# Patient Record
Sex: FEMALE | Race: WHITE | NOT HISPANIC OR LATINO | Employment: OTHER | URBAN - NONMETROPOLITAN AREA
[De-identification: names, ages, dates, MRNs, and addresses within clinical notes are randomized per-mention and may not be internally consistent; named-entity substitution may affect disease eponyms.]

---

## 2018-01-14 PROBLEM — E78.00 PURE HYPERCHOLESTEROLEMIA: Status: ACTIVE | Noted: 2018-01-14

## 2018-01-14 PROBLEM — K21.9 GASTROESOPHAGEAL REFLUX DISEASE: Status: ACTIVE | Noted: 2018-01-14

## 2018-01-14 PROBLEM — L30.9 ECZEMA: Status: ACTIVE | Noted: 2018-01-14

## 2018-01-14 PROBLEM — E66.01 MORBID OBESITY (CMS/HCC): Status: ACTIVE | Noted: 2018-01-14

## 2018-01-14 PROBLEM — K57.90 DIVERTICULAR DISEASE: Status: ACTIVE | Noted: 2018-01-14

## 2018-01-14 PROBLEM — I10 ESSENTIAL HYPERTENSION: Status: ACTIVE | Noted: 2018-01-14

## 2018-01-15 ENCOUNTER — OFFICE VISIT (OUTPATIENT)
Dept: INTERNAL MEDICINE | Facility: CLINIC | Age: 73
End: 2018-01-15
Payer: MEDICARE

## 2018-01-15 ENCOUNTER — APPOINTMENT (OUTPATIENT)
Dept: LAB | Facility: CLINIC | Age: 73
End: 2018-01-15
Payer: MEDICARE

## 2018-01-15 VITALS
HEART RATE: 78 BPM | BODY MASS INDEX: 40.22 KG/M2 | OXYGEN SATURATION: 100 % | SYSTOLIC BLOOD PRESSURE: 172 MMHG | DIASTOLIC BLOOD PRESSURE: 80 MMHG | HEIGHT: 63 IN | WEIGHT: 227 LBS

## 2018-01-15 DIAGNOSIS — I10 ESSENTIAL HYPERTENSION: ICD-10-CM

## 2018-01-15 DIAGNOSIS — E78.00 PURE HYPERCHOLESTEROLEMIA: ICD-10-CM

## 2018-01-15 DIAGNOSIS — E11.9 DIABETES MELLITUS WITHOUT COMPLICATION (CMS/HCC): Primary | ICD-10-CM

## 2018-01-15 DIAGNOSIS — E11.9 TYPE 2 DIABETES MELLITUS WITHOUT COMPLICATIONS (CMS/HCC): ICD-10-CM

## 2018-01-15 LAB
ALBUMIN SERPL-MCNC: 4.2 G/DL (ref 3.5–5.3)
ALP SERPL-CCNC: 114 U/L (ref 55–142)
ALT SERPL-CCNC: 17 U/L (ref 0–52)
ANION GAP SERPL CALC-SCNC: 7 MMOL/L (ref 3–11)
AST SERPL-CCNC: 13 U/L (ref 0–39)
BILIRUB SERPL-MCNC: 0.34 MG/DL (ref 0–1.4)
BUN SERPL-MCNC: 16 MG/DL (ref 7–25)
CALCIUM ALBUM COR SERPL-MCNC: 9.1 MG/DL (ref 8.5–10.1)
CALCIUM SERPL-MCNC: 9.3 MG/DL (ref 8.6–10.3)
CHLORIDE SERPL-SCNC: 104 MMOL/L (ref 98–107)
CHOLEST SERPL-MCNC: 213 MG/DL (ref 0–199)
CO2 SERPL-SCNC: 29 MMOL/L (ref 21–32)
CREAT SERPL-MCNC: 1 MG/DL (ref 0.6–1.2)
CREAT UR-MCNC: 102.2 MG/DL
EST. AVERAGE GLUCOSE BLD GHB EST-MCNC: 165.7 MG/DL
GFR SERPL CREATININE-BSD FRML MDRD: 55 ML/MIN/1.73M*2
GLUCOSE SERPL-MCNC: 74 MG/DL (ref 70–105)
HBA1C MFR BLD: 7.4 % (ref 4–6)
HDLC SERPL-MCNC: 67 MG/DL
LDLC SERPL CALC-MCNC: 109 MG/DL (ref 0–99)
MICROALBUMIN UR-MCNC: 371 MG/L (ref 0–30)
MICROALBUMIN/CREAT UR: 363 MG/G CREAT (ref 0–30)
POTASSIUM SERPL-SCNC: 3.8 MMOL/L (ref 3.5–5.1)
PROT SERPL-MCNC: 6.8 G/DL (ref 6–8.3)
SODIUM SERPL-SCNC: 140 MMOL/L (ref 135–145)
TRIGL SERPL-MCNC: 184 MG/DL

## 2018-01-15 PROCEDURE — 82043 UR ALBUMIN QUANTITATIVE: CPT | Mod: PO

## 2018-01-15 PROCEDURE — 36415 COLL VENOUS BLD VENIPUNCTURE: CPT | Mod: PO

## 2018-01-15 PROCEDURE — 80053 COMPREHEN METABOLIC PANEL: CPT | Mod: PO

## 2018-01-15 PROCEDURE — 83036 HEMOGLOBIN GLYCOSYLATED A1C: CPT | Mod: PO

## 2018-01-15 PROCEDURE — 99213 OFFICE O/P EST LOW 20 MIN: CPT | Performed by: INTERNAL MEDICINE

## 2018-01-15 PROCEDURE — 80061 LIPID PANEL: CPT | Mod: PO

## 2018-01-15 PROCEDURE — 99213 OFFICE O/P EST LOW 20 MIN: CPT | Mod: PO | Performed by: INTERNAL MEDICINE

## 2018-01-15 RX ORDER — INSULIN ASPART 100 [IU]/ML
INJECTION, SOLUTION INTRAVENOUS; SUBCUTANEOUS
COMMUNITY
End: 2018-06-30 | Stop reason: SDUPTHER

## 2018-01-15 RX ORDER — INSULIN DEGLUDEC 100 U/ML
100 INJECTION, SOLUTION SUBCUTANEOUS DAILY
Qty: 10 PEN | Refills: 5 | Status: SHIPPED | OUTPATIENT
Start: 2018-01-15 | End: 2018-07-23 | Stop reason: SDUPTHER

## 2018-01-15 ASSESSMENT — PAIN SCALES - GENERAL: PAINLEVEL: 0-NO PAIN

## 2018-01-15 NOTE — PROGRESS NOTES
"Internal Medicine Progress Note    Chief Complaint:   Chief Complaint   Patient presents with   • Follow-up     had labs   • Breast Cancer Screening     requests mammo         HPI: Patient is a 72 y.o. female comes in for follow-up mainly on the type 2 diabetes as well as her hypercholesterolemia and her hypertension.    Review of Systems      Current Outpatient Prescriptions:   •  insulin aspart (NovoLOG Flexpen) 100 unit/mL insulin pen, Inject under the skin. Per sliding scale, Disp: , Rfl:   •  aspirin 325 mg tablet, Take 1 tablet every day by oral route., Disp: , Rfl:   •  ciprofloxacin-dexamethasone (CIPRODEX) otic suspension, INSTILL 4 DROPS INTO AFFECTED EAR(S) BY OTIC ROUTE 2 TIMES PER DAY, Disp: 1, Rfl: 1  •  insulin degludec (TRESIBA FLEXTOUCH U-100) 100 unit/mL (3 mL) insulin pen, Inject 100 Units under the skin daily., Disp: 10 pen, Rfl: 5  •  losartan-hydrochlorothiazide (HYZAAR) 50-12.5 mg per tablet, Take 1 tablet by mouth daily., Disp: 90 tablet, Rfl: 3  •  nortriptyline (PAMELOR) 50 mg capsule, , Disp: 90, Rfl: 3  •  omeprazole (PriLOSEC) 40 mg capsule, , Disp: 90, Rfl: 0  •  pen needle, diabetic (RELION NEEDLES) 31 gauge x 1/4\" needle, , Disp: , Rfl:   •  pimecrolimus (ELIDEL) 1 % cream, apply to affected areas by topical route BID prn, Disp: 1, Rfl: 3  •  verapamil SR (CALAN-SR) 180 mg CR tablet, , Disp: 90, Rfl: 0    Allergies:   Allergies   Allergen Reactions   • Aloe Vera    • Bacitracin      ZINC   • Erythromycin Base    • Formaldehyde    • Gramicidins      OPHTHALMIC   • Hydrocortisone      ACETATE   • Latex    • Levofloxacin    • Metformin    • Neomycin    • Neomycin-Bacitracnzn-Polymyxnb    • Polymyxin B    • Prednisolone    • Prednisone    • Statins-Hmg-Coa Reductase Inhibitors    • Sulfamethoxazole    • Sulfamethoxazole-Trimethoprim    • Thimerosal    • Trimethoprim          Labs:   Recent Results (from the past 336 hour(s))   Comprehensive metabolic panel    Collection Time: 01/15/18  " "7:09 AM   Result Value Ref Range    Sodium 140 135 - 145 mmol/L    Potassium 3.8 3.5 - 5.1 mmol/L    Chloride 104 98 - 107 mmol/L    CO2 29 21 - 32 mmol/L    Anion Gap 7 3 - 11 mmol/L    BUN 16 7 - 25 mg/dL    Creatinine 1.0 0.6 - 1.2 mg/dL    Glucose 74 70 - 105 mg/dL    Calcium 9.3 8.6 - 10.3 mg/dL    AST 13 0 - 39 U/L    ALT (SGPT) 17 0 - 52 U/L    Alkaline Phosphatase 114 55 - 142 U/L    Total Protein 6.8 6.0 - 8.3 g/dL    Albumin 4.2 3.5 - 5.3 g/dL    Total Bilirubin 0.34 0.00 - 1.40 mg/dL    eGFR 55 (L) >60 mL/min/1.73m*2    Corrected Calcium 9.1 8.5 - 10.1 mg/dL   Hemoglobin A1c (glycosylated)    Collection Time: 01/15/18  7:09 AM   Result Value Ref Range    Hemoglobin A1C 7.4 (H) 4.0 - 6.0 %    Estimated Average Glucose 165.7 mg/dL   Lipid panel    Collection Time: 01/15/18  7:09 AM   Result Value Ref Range    Triglycerides 184 (H) <=149 mg/dL    Cholesterol 213 (H) 0 - 199 mg/dL    HDL 67 >=40 mg/dL    LDL Calculated 109 (H) 0 - 99 mg/dL   Microalbumin / creatinine ratio, urine    Collection Time: 01/15/18  7:14 AM   Result Value Ref Range    Microalb, Ur 371 (H) 0 - 30 mg/L    Microalb/Creat Ratio 363 (H) 0 - 30 mg/g Creat    Creatinine, Ur 102.2 mg/dL         Imaging: No results found.    Vitals: Blood pressure 172/80, pulse 78, height 1.6 m (5' 3\"), weight 103 kg (227 lb), SpO2 100 %.      Physical Exam   Constitutional: She appears well-developed and well-nourished.   Neck: Normal range of motion. Neck supple.   Cardiovascular: Normal rate, regular rhythm and normal heart sounds.    Pulmonary/Chest: Effort normal and breath sounds normal.   Abdominal: Soft.   Musculoskeletal: She exhibits no edema.   Nursing note and vitals reviewed.        Plan and Discussion:     Diagnosis Plan   1. Diabetes mellitus without complication (CMS/[facility])  Comprehensive metabolic panel Blood, Venous    Hemoglobin A1c (glycosylated) Blood, Venous    insulin degludec (TRESIBA FLEXTOUCH U-100) 100 unit/mL (3 mL) insulin pen   2. " Essential hypertension     3. Pure hypercholesterolemia     4. Uncontrolled type 2 diabetes mellitus without complication, without long-term current use of insulin (CMS/[facility])       For the patient's diabetes 4-1/2 to switch her from Toujeo to Tresiba, was make the switch of 1 unit to 1 unit.  For her elevation in her blood pressure and her microalbuminemia were and increase her losartan at the 5012-1/2 up to 100/25 and she will follow-up with a blood pressure next week.  Otherwise we will see her back in 3 months with repeat labs.    JAMILA MANCERA MD    Date: 1/15/2018  Time: 12:48 PM

## 2018-01-19 ENCOUNTER — ANCILLARY PROCEDURE (OUTPATIENT)
Dept: MAMMOGRAPHY | Facility: CLINIC | Age: 73
End: 2018-01-19
Payer: MEDICARE

## 2018-01-19 DIAGNOSIS — Z12.39 BREAST SCREENING: ICD-10-CM

## 2018-01-19 PROCEDURE — 77067 SCR MAMMO BI INCL CAD: CPT | Mod: 26 | Performed by: RADIOLOGY

## 2018-01-19 PROCEDURE — 77063 BREAST TOMOSYNTHESIS BI: CPT | Mod: 26 | Performed by: RADIOLOGY

## 2018-01-19 PROCEDURE — 77067 SCR MAMMO BI INCL CAD: CPT | Mod: GA,PO

## 2018-01-26 ENCOUNTER — TELEPHONE (OUTPATIENT)
Dept: FAMILY MEDICINE | Facility: CLINIC | Age: 73
End: 2018-01-26

## 2018-01-26 VITALS — SYSTOLIC BLOOD PRESSURE: 138 MMHG | DIASTOLIC BLOOD PRESSURE: 68 MMHG

## 2018-01-26 NOTE — TELEPHONE ENCOUNTER
BP was 138/68 left arm, sitting.    She needs a refill or new prescription if she will continue at current dose of medication.  She has been on the new dose since her recent visit.

## 2018-02-05 ENCOUNTER — TELEPHONE (OUTPATIENT)
Dept: FAMILY MEDICINE | Facility: CLINIC | Age: 73
End: 2018-02-05

## 2018-02-05 NOTE — TELEPHONE ENCOUNTER
I spoke with patient she is doing better and will continue on the higher dose, I did advise patient that they make the higher dose in tablet and this she would only have to take once daily, she would like to do this, rx sent to pharmacy

## 2018-02-05 NOTE — TELEPHONE ENCOUNTER
Called Consuelo, she has been taking 2 tabs of  losartan-hydrochlorothiazide (HYZAAR) 50-12.5 mg   Can she remain on this dose? Last BP 1/26 138/68.

## 2018-02-17 DIAGNOSIS — Z76.0 PRESCRIPTION REFILL: Primary | ICD-10-CM

## 2018-02-21 RX ORDER — PEN NEEDLE, DIABETIC 31 GX5/16"
NEEDLE, DISPOSABLE MISCELLANEOUS
Qty: 100 EACH | Refills: 2 | Status: SHIPPED | OUTPATIENT
Start: 2018-02-21 | End: 2019-09-17 | Stop reason: ALTCHOICE

## 2018-02-24 RX ORDER — PEN NEEDLE, DIABETIC 29 G X1/2"
NEEDLE, DISPOSABLE MISCELLANEOUS
Refills: 19 | Status: CANCELLED | OUTPATIENT
Start: 2018-02-24

## 2018-03-23 RX ORDER — BLOOD SUGAR DIAGNOSTIC
STRIP MISCELLANEOUS
Qty: 200 STRIP | Refills: 17 | OUTPATIENT
Start: 2018-03-23

## 2018-03-31 DIAGNOSIS — E11.9 DIABETES MELLITUS WITHOUT COMPLICATION (CMS/HCC): Primary | ICD-10-CM

## 2018-04-02 RX ORDER — BLOOD SUGAR DIAGNOSTIC
STRIP MISCELLANEOUS
Qty: 300 STRIP | Refills: 2 | Status: SHIPPED | OUTPATIENT
Start: 2018-04-02 | End: 2019-01-08 | Stop reason: SDUPTHER

## 2018-04-23 ENCOUNTER — APPOINTMENT (OUTPATIENT)
Dept: LAB | Facility: CLINIC | Age: 73
End: 2018-04-23
Payer: MEDICARE

## 2018-04-25 ENCOUNTER — OFFICE VISIT (OUTPATIENT)
Dept: INTERNAL MEDICINE | Facility: CLINIC | Age: 73
End: 2018-04-25
Payer: MEDICARE

## 2018-04-25 VITALS
SYSTOLIC BLOOD PRESSURE: 138 MMHG | OXYGEN SATURATION: 95 % | DIASTOLIC BLOOD PRESSURE: 80 MMHG | HEART RATE: 111 BPM | BODY MASS INDEX: 40.4 KG/M2 | RESPIRATION RATE: 17 BRPM | WEIGHT: 228 LBS | HEIGHT: 63 IN

## 2018-04-25 DIAGNOSIS — E66.01 MORBID OBESITY (CMS/HCC): ICD-10-CM

## 2018-04-25 DIAGNOSIS — E78.00 PURE HYPERCHOLESTEROLEMIA: ICD-10-CM

## 2018-04-25 DIAGNOSIS — I10 ESSENTIAL HYPERTENSION: Primary | ICD-10-CM

## 2018-04-25 DIAGNOSIS — Z79.4 TYPE 2 DIABETES MELLITUS WITH DIABETIC NEPHROPATHY, WITH LONG-TERM CURRENT USE OF INSULIN (CMS/HCC): ICD-10-CM

## 2018-04-25 DIAGNOSIS — E11.21 TYPE 2 DIABETES MELLITUS WITH DIABETIC NEPHROPATHY, WITH LONG-TERM CURRENT USE OF INSULIN (CMS/HCC): ICD-10-CM

## 2018-04-25 PROCEDURE — 99213 OFFICE O/P EST LOW 20 MIN: CPT | Mod: PO | Performed by: INTERNAL MEDICINE

## 2018-04-25 PROCEDURE — 99213 OFFICE O/P EST LOW 20 MIN: CPT | Performed by: INTERNAL MEDICINE

## 2018-04-25 ASSESSMENT — PAIN SCALES - GENERAL: PAINLEVEL: 0-NO PAIN

## 2018-04-25 NOTE — PROGRESS NOTES
"Internal Medicine Progress Note    Chief Complaint:   Chief Complaint   Patient presents with   • Follow-up     Patient is in today for recheck. Patient also had labs done.  Patient has had increased stress and anxiety last few days.         HPI: Patient is a 72 y.o. female patient is in for follow-up on her type 2 diabetes.  Overall she is doing fairly well her sugars had been a little high earlier this month she is going to change her diet and increase her activity and they seem to be improving.    Review of Systems      Current Outpatient Prescriptions:   •  aspirin 325 mg tablet, Take 325 mg by mouth daily.  , Disp: , Rfl:   •  BD INSULIN PEN NEEDLE UF MINI 31 gauge x 3/16\" needle, USE ONE NEEDLE TO INJECT INSULIN FIVE TIMES DAILY, Disp: 100 each, Rfl: 2  •  ciprofloxacin-dexamethasone (CIPRODEX) otic suspension, INSTILL 4 DROPS INTO AFFECTED EAR(S) BY OTIC ROUTE 2 TIMES PER DAY, Disp: 1, Rfl: 1  •  insulin aspart (NovoLOG Flexpen) 100 unit/mL insulin pen, Inject under the skin. Per sliding scale (5-8 units q breakfast, 10-12units q lunch, 15-18units q dinner) , Disp: , Rfl:   •  insulin degludec (TRESIBA FLEXTOUCH U-100) 100 unit/mL (3 mL) insulin pen, Inject 100 Units under the skin daily., Disp: 10 pen, Rfl: 5  •  losartan-hydrochlorothiazide (HYZAAR) 100-25 mg per tablet, Take 1 tablet by mouth daily., Disp: 30 tablet, Rfl: 5  •  nortriptyline (PAMELOR) 50 mg capsule, Take 50 mg by mouth daily.  , Disp: 90, Rfl: 3  •  omeprazole (PriLOSEC) 40 mg capsule, 1 capsules daily by mouth, Disp: 90, Rfl: 0  •  ONETOUCH ULTRA TEST strip, USE ONE STRIP TO CHECK GLUCOSE THREE TIMES DAILY, Disp: 300 strip, Rfl: 2  •  pen needle, diabetic (RELION NEEDLES) 31 gauge x 1/4\" needle, Inject 1 each under the skin 3 (three) times a day., Disp: 90 each, Rfl: 3  •  pimecrolimus (ELIDEL) 1 % cream, apply to affected areas by topical route BID prn, Disp: 1, Rfl: 3  •  verapamil SR (CALAN-SR) 180 mg CR tablet, 1 tablet daily by " "mouth, Disp: 90, Rfl: 0    Allergies:   Allergies   Allergen Reactions   • Aloe Vera    • Bacitracin      ZINC   • Erythromycin Base    • Formaldehyde    • Gramicidins      OPHTHALMIC   • Hydrocortisone      ACETATE   • Latex    • Levofloxacin    • Metformin    • Neomycin    • Neomycin-Bacitracnzn-Polymyxnb    • Polymyxin B    • Prednisolone    • Prednisone    • Statins-Hmg-Coa Reductase Inhibitors    • Sulfamethoxazole    • Sulfamethoxazole-Trimethoprim    • Thimerosal    • Trimethoprim          Labs:   Recent Results (from the past 336 hour(s))   Comprehensive metabolic panel Blood, Venous    Collection Time: 04/23/18  7:29 AM   Result Value Ref Range    Sodium 139 135 - 145 mmol/L    Potassium 3.8 3.5 - 5.1 mmol/L    Chloride 101 98 - 107 mmol/L    CO2 30 21 - 32 mmol/L    Anion Gap 8 3 - 11 mmol/L    BUN 23 7 - 25 mg/dL    Creatinine 1.0 0.6 - 1.2 mg/dL    Glucose 116 (H) 70 - 105 mg/dL    Calcium 9.4 8.6 - 10.3 mg/dL    AST 14 0 - 39 U/L    ALT (SGPT) 16 0 - 52 U/L    Alkaline Phosphatase 129 55 - 142 U/L    Total Protein 7.1 6.0 - 8.3 g/dL    Albumin 4.3 3.5 - 5.3 g/dL    Total Bilirubin 0.40 0.00 - 1.40 mg/dL    eGFR 55 (L) >60 mL/min/1.73m*2    Corrected Calcium 9.2 8.5 - 10.1 mg/dL   Hemoglobin A1c (glycosylated) Blood, Venous    Collection Time: 04/23/18  7:29 AM   Result Value Ref Range    Hemoglobin A1C 7.5 (H) 4.0 - 6.0 %    Estimated Average Glucose 168.6 mg/dL         Imaging: No results found.    Vitals: Blood pressure 138/80, pulse 111, resp. rate 17, height 1.6 m (5' 3\"), weight 103 kg (228 lb), SpO2 95 %.      Physical Exam   Constitutional: She appears well-developed.   Neck: Normal range of motion. Neck supple.   Cardiovascular: Normal rate, regular rhythm and normal heart sounds.    Pulmonary/Chest: Effort normal and breath sounds normal.   Abdominal: Soft.   Skin:   Patient does have a fairly large rash it is about 6 cm in size right above the right foot has seen Dr. Rose for this in the " past.   Nursing note and vitals reviewed.        Plan and Discussion:     Diagnosis Plan   1. Essential hypertension     2. Morbid obesity (CMS/[facility])     3. Pure hypercholesterolemia     4. Type 2 diabetes mellitus with diabetic nephropathy, with long-term current use of insulin (CMS/[facility])       Patient's labs reviewed with her and her A1c did increase from 7.2 up to 7.5 at this time were to work with diet, exercise, and weight loss for the next 3 months and have follow-up labs if she still elevated at that time.  We need to adjust her medications.    JAMILA MANCERA MD    Date: 4/25/2018  Time: 2:42 PM

## 2018-05-02 ENCOUNTER — TELEPHONE (OUTPATIENT)
Dept: INTERNAL MEDICINE | Facility: CLINIC | Age: 73
End: 2018-05-02

## 2018-05-23 DIAGNOSIS — I10 HYPERTENSION, UNSPECIFIED TYPE: ICD-10-CM

## 2018-05-23 RX ORDER — LOSARTAN POTASSIUM AND HYDROCHLOROTHIAZIDE 25; 100 MG/1; MG/1
TABLET ORAL
Qty: 90 TABLET | Refills: 1 | Status: SHIPPED | OUTPATIENT
Start: 2018-05-23 | End: 2018-11-05 | Stop reason: SDUPTHER

## 2018-06-13 ENCOUNTER — OFFICE VISIT (OUTPATIENT)
Dept: OTOLARYNGOLOGY | Facility: CLINIC | Age: 73
End: 2018-06-13
Payer: MEDICARE

## 2018-06-13 VITALS
TEMPERATURE: 97.1 F | WEIGHT: 228 LBS | HEIGHT: 63 IN | HEART RATE: 98 BPM | DIASTOLIC BLOOD PRESSURE: 69 MMHG | SYSTOLIC BLOOD PRESSURE: 169 MMHG | BODY MASS INDEX: 40.4 KG/M2

## 2018-06-13 DIAGNOSIS — H61.23 BILATERAL IMPACTED CERUMEN: Primary | ICD-10-CM

## 2018-06-13 PROCEDURE — 99212 OFFICE O/P EST SF 10 MIN: CPT | Performed by: OTOLARYNGOLOGY

## 2018-06-13 PROCEDURE — 69220 CLEAN OUT MASTOID CAVITY: CPT | Performed by: OTOLARYNGOLOGY

## 2018-06-13 PROCEDURE — 99212 OFFICE O/P EST SF 10 MIN: CPT | Mod: 25 | Performed by: OTOLARYNGOLOGY

## 2018-06-13 RX ORDER — DOCUSATE SODIUM 100 MG/1
100 CAPSULE, LIQUID FILLED ORAL DAILY
COMMUNITY
End: 2018-08-13 | Stop reason: ALTCHOICE

## 2018-06-13 ASSESSMENT — ENCOUNTER SYMPTOMS
SINUS PRESSURE: 0
FEVER: 0
SORE THROAT: 0
TROUBLE SWALLOWING: 0
CHILLS: 0
RHINORRHEA: 0
CONSTITUTIONAL NEGATIVE: 1

## 2018-06-13 NOTE — PROGRESS NOTES
"Subjective    CC: Ear cleaning    HPI: Consuelo Singh is a 72 y.o. female here for her periodic mastoid debridement.  She has no other complaints.  She was last here in December.    Past Medical History:   Diagnosis Date   • Blood clot in vein     shani legs   • Diabetes (CMS/HCC)     type 2   • Eczema    • Fibromyalgia    • GERD (gastroesophageal reflux disease)    • Heart murmur    • Hyperlipidemia    • Hypertension    • Psoriasis    • Scoliosis        Past Surgical History:   Procedure Laterality Date   • CHOLECYSTECTOMY     • COLONOSCOPY  03/11/2016    5 yr  follow up   • HYSTERECTOMY      With BSO   • OTHER SURGICAL HISTORY      Rt. radical mastoidectomy, rebuilt canal from growth         Current Outpatient Prescriptions:   •  docusate sodium (COLACE) 100 mg capsule, Take 50 mg by mouth 2 (two) times a day., Disp: , Rfl:   •  aspirin 325 mg tablet, Take 325 mg by mouth daily.  , Disp: , Rfl:   •  BD INSULIN PEN NEEDLE UF MINI 31 gauge x 3/16\" needle, USE ONE NEEDLE TO INJECT INSULIN FIVE TIMES DAILY, Disp: 100 each, Rfl: 2  •  ciprofloxacin-dexamethasone (CIPRODEX) otic suspension, INSTILL 4 DROPS INTO AFFECTED EAR(S) BY OTIC ROUTE 2 TIMES PER DAY, Disp: 1, Rfl: 1  •  insulin aspart (NovoLOG Flexpen) 100 unit/mL insulin pen, Inject under the skin. Per sliding scale (5-8 units q breakfast, 10-12units q lunch, 15-18units q dinner) , Disp: , Rfl:   •  insulin degludec (TRESIBA FLEXTOUCH U-100) 100 unit/mL (3 mL) insulin pen, Inject 100 Units under the skin daily., Disp: 10 pen, Rfl: 5  •  losartan-hydrochlorothiazide (HYZAAR) 100-25 mg per tablet, TAKE ONE TABLET BY MOUTH ONCE DAILY, Disp: 90 tablet, Rfl: 1  •  nortriptyline (PAMELOR) 50 mg capsule, Take 50 mg by mouth daily.  , Disp: 90, Rfl: 3  •  omeprazole (PriLOSEC) 40 mg capsule, 1 capsules daily by mouth, Disp: 90, Rfl: 0  •  ONETOUCH ULTRA TEST strip, USE ONE STRIP TO CHECK GLUCOSE THREE TIMES DAILY, Disp: 300 strip, Rfl: 2  •  pen needle, diabetic (RELION " "NEEDLES) 31 gauge x 1/4\" needle, Inject 1 each under the skin 3 (three) times a day., Disp: 300 each, Rfl: 2  •  pimecrolimus (ELIDEL) 1 % cream, apply to affected areas by topical route BID prn, Disp: 1, Rfl: 3  •  verapamil SR (CALAN-SR) 180 mg CR tablet, Take 1 tablet (180 mg total) by mouth nightly., Disp: 90 tablet, Rfl: 3    Allergies   Allergen Reactions   • Aloe Vera    • Bacitracin      ZINC   • Erythromycin Base    • Formaldehyde    • Gramicidins      OPHTHALMIC   • Hydrocortisone      ACETATE   • Latex    • Levofloxacin    • Metformin    • Neomycin    • Neomycin-Bacitracnzn-Polymyxnb    • Polymyxin B    • Prednisolone    • Prednisone    • Statins-Hmg-Coa Reductase Inhibitors    • Sulfamethoxazole    • Sulfamethoxazole-Trimethoprim    • Thimerosal    • Trimethoprim        family history includes Bone cancer in her maternal grandfather; Breast cancer in her mother; Lung cancer in her maternal grandmother.    Social History   Substance Use Topics   • Smoking status: Former Smoker     Packs/day: 0.50     Years: 40.00     Quit date: 2007   • Smokeless tobacco: Former User   • Alcohol use Yes      Comment: Occasionally; 1-2 glasses monthly       Review of Systems   Constitutional: Negative.  Negative for chills and fever.   HENT: Negative.  Negative for congestion, ear discharge, ear pain, hearing loss, nosebleeds, postnasal drip, rhinorrhea, sinus pressure, sneezing, sore throat, tinnitus and trouble swallowing.         Cerumen       Objective    /69 (BP Location: Left arm, Patient Position: Sitting, Cuff Size: Large)   Pulse 98   Temp 36.2 °C (97.1 °F) (Temporal)   Ht 1.6 m (5' 3\")   Wt 103 kg (228 lb)   BMI 40.39 kg/m²     General: Well-developed well-nourished female in no acute distress.    Ears: Left ear appears to have some cerumen present but otherwise is normal.  Right ear status post canal wall down mastoidectomy.  Cerumen and other debris are present in the cavity.    Procedure: Mastoid " debridement, right ear.    The patient's right ear was examined with the operating microscope.  A combination of cerumen curette and alligator forceps were used to perform removal of all cerumen and associated debris from the mastoid bowl and ear canal.  The underlying tissues appear healthy without evidence of infection.    Diagnosis    1. Bilateral impacted cerumen        Recommendations  Right mastoid debridement done today.  Scant cerumen on the left not a problem.  Follow-up in 6 months for repeat debridement or sooner for any problems.

## 2018-06-30 DIAGNOSIS — E11.9 DIABETES MELLITUS WITHOUT COMPLICATION (CMS/HCC): Primary | ICD-10-CM

## 2018-07-02 RX ORDER — INSULIN ASPART 100 [IU]/ML
INJECTION, SOLUTION INTRAVENOUS; SUBCUTANEOUS
Qty: 10 PEN | Refills: 2 | Status: SHIPPED | OUTPATIENT
Start: 2018-07-02 | End: 2018-11-24 | Stop reason: SDUPTHER

## 2018-07-23 DIAGNOSIS — E11.9 DIABETES MELLITUS WITHOUT COMPLICATION (CMS/HCC): ICD-10-CM

## 2018-07-23 RX ORDER — INSULIN DEGLUDEC 100 U/ML
INJECTION, SOLUTION SUBCUTANEOUS
Qty: 10 PEN | Refills: 5 | Status: SHIPPED | OUTPATIENT
Start: 2018-07-23 | End: 2018-08-16

## 2018-07-30 ENCOUNTER — APPOINTMENT (OUTPATIENT)
Dept: LAB | Facility: CLINIC | Age: 73
End: 2018-07-30
Payer: MEDICARE

## 2018-07-30 ENCOUNTER — OFFICE VISIT (OUTPATIENT)
Dept: INTERNAL MEDICINE | Facility: CLINIC | Age: 73
End: 2018-07-30
Payer: MEDICARE

## 2018-07-30 VITALS
BODY MASS INDEX: 40.93 KG/M2 | WEIGHT: 231 LBS | RESPIRATION RATE: 16 BRPM | DIASTOLIC BLOOD PRESSURE: 82 MMHG | OXYGEN SATURATION: 97 % | HEIGHT: 63 IN | HEART RATE: 78 BPM | SYSTOLIC BLOOD PRESSURE: 140 MMHG

## 2018-07-30 DIAGNOSIS — E78.00 PURE HYPERCHOLESTEROLEMIA: ICD-10-CM

## 2018-07-30 DIAGNOSIS — I10 ESSENTIAL HYPERTENSION: Primary | ICD-10-CM

## 2018-07-30 DIAGNOSIS — Z79.4 TYPE 2 DIABETES MELLITUS WITH DIABETIC NEPHROPATHY, WITH LONG-TERM CURRENT USE OF INSULIN (CMS/HCC): ICD-10-CM

## 2018-07-30 DIAGNOSIS — E11.21 TYPE 2 DIABETES MELLITUS WITH DIABETIC NEPHROPATHY, WITH LONG-TERM CURRENT USE OF INSULIN (CMS/HCC): ICD-10-CM

## 2018-07-30 DIAGNOSIS — K59.09 CHRONIC CONSTIPATION: ICD-10-CM

## 2018-07-30 LAB
ALBUMIN SERPL-MCNC: 4.1 G/DL (ref 3.5–5.3)
ALP SERPL-CCNC: 111 U/L (ref 55–142)
ALT SERPL-CCNC: 15 U/L (ref 0–52)
ANION GAP SERPL CALC-SCNC: 8 MMOL/L (ref 3–11)
AST SERPL-CCNC: 12 U/L (ref 0–39)
BILIRUB SERPL-MCNC: 0.34 MG/DL (ref 0–1.4)
BUN SERPL-MCNC: 18 MG/DL (ref 7–25)
CALCIUM ALBUM COR SERPL-MCNC: 9.4 MG/DL (ref 8.5–10.1)
CALCIUM SERPL-MCNC: 9.5 MG/DL (ref 8.6–10.3)
CHLORIDE SERPL-SCNC: 103 MMOL/L (ref 98–107)
CHOLEST SERPL-MCNC: 185 MG/DL (ref 0–199)
CO2 SERPL-SCNC: 28 MMOL/L (ref 21–32)
CREAT SERPL-MCNC: 0.9 MG/DL (ref 0.6–1.2)
EST. AVERAGE GLUCOSE BLD GHB EST-MCNC: 157.1 MG/DL
FASTING STATUS PATIENT QL REPORTED: YES
GFR SERPL CREATININE-BSD FRML MDRD: 62 ML/MIN/1.73M*2
GLUCOSE SERPL-MCNC: 83 MG/DL (ref 70–105)
HBA1C MFR BLD: 7.1 % (ref 4–6)
HDLC SERPL-MCNC: 57 MG/DL
LDLC SERPL CALC-MCNC: 90 MG/DL (ref 0–99)
POTASSIUM SERPL-SCNC: 3.9 MMOL/L (ref 3.5–5.1)
PROT SERPL-MCNC: 6.7 G/DL (ref 6–8.3)
SODIUM SERPL-SCNC: 139 MMOL/L (ref 135–145)
TRIGL SERPL-MCNC: 189 MG/DL

## 2018-07-30 PROCEDURE — 99213 OFFICE O/P EST LOW 20 MIN: CPT | Mod: PO | Performed by: INTERNAL MEDICINE

## 2018-07-30 PROCEDURE — 80053 COMPREHEN METABOLIC PANEL: CPT | Mod: PO

## 2018-07-30 PROCEDURE — 36415 COLL VENOUS BLD VENIPUNCTURE: CPT | Mod: PO

## 2018-07-30 PROCEDURE — 99213 OFFICE O/P EST LOW 20 MIN: CPT | Performed by: INTERNAL MEDICINE

## 2018-07-30 PROCEDURE — 83036 HEMOGLOBIN GLYCOSYLATED A1C: CPT | Mod: PO

## 2018-07-30 PROCEDURE — 80061 LIPID PANEL: CPT | Mod: PO

## 2018-07-30 RX ORDER — AMLODIPINE BESYLATE 2.5 MG/1
2.5 TABLET ORAL DAILY
Qty: 30 TABLET | Refills: 11 | Status: SHIPPED | OUTPATIENT
Start: 2018-07-30 | End: 2018-10-05 | Stop reason: ALTCHOICE

## 2018-07-30 ASSESSMENT — PAIN SCALES - GENERAL: PAINLEVEL: 0-NO PAIN

## 2018-07-30 NOTE — PROGRESS NOTES
Diabetic foot exam:   Left: Pulses Dorsalis Pedis:  present  Posterior Tibial:  present   Proprioception normal   Sharp/dull discrimination normal   Filament test present    Right: Pulses Dorsalis Pedis:  present  Posterior Tibial:  present   Proprioception normal   Sharp/dull discrimination normal   Filament test present

## 2018-07-30 NOTE — PROGRESS NOTES
"Internal Medicine Progress Note    Chief Complaint:   Chief Complaint   Patient presents with   • Follow-up     Patient is in today for recheck. Patient also had labs done. Patient also has c/o irregular bowel movements.          HPI: Patient is a 72 y.o. female comes in for follow-up on her type 2 diabetes.  Patient's biggest concern is she just continues to have fluctuations in her bowels she seems to go to 3 4 days without a bowel movement and then can have a bowel movements every 15-30 minutes and has to actually leave work.  Last colonoscopy was only a 1-2 years ago and was unremarkable.  The patient's had chronic constipation all of her life.    Review of Systems      Current Outpatient Prescriptions:   •  aspirin 325 mg tablet, Take 325 mg by mouth daily.  , Disp: , Rfl:   •  BD INSULIN PEN NEEDLE UF MINI 31 gauge x 3/16\" needle, USE ONE NEEDLE TO INJECT INSULIN FIVE TIMES DAILY, Disp: 100 each, Rfl: 2  •  docusate sodium (COLACE) 100 mg capsule, Take 100 mg by mouth daily.  , Disp: , Rfl:   •  losartan-hydrochlorothiazide (HYZAAR) 100-25 mg per tablet, TAKE ONE TABLET BY MOUTH ONCE DAILY, Disp: 90 tablet, Rfl: 1  •  nortriptyline (PAMELOR) 50 mg capsule, Take 50 mg by mouth daily.  , Disp: 90, Rfl: 3  •  NOVOLOG FLEXPEN U-100 INSULIN 100 unit/mL insulin pen, INJECT 5 TO 7 UNITS SUBCUTANEOUSLY IN THE MORNING, 10 TO 12 UNITS AT NOON, AND 15 TO 17 UNITS IN THE EVENING, Disp: 10 pen, Rfl: 2  •  omeprazole (PriLOSEC) 40 mg capsule, 1 capsules daily by mouth, Disp: 90, Rfl: 0  •  ONETOUCH ULTRA TEST strip, USE ONE STRIP TO CHECK GLUCOSE THREE TIMES DAILY, Disp: 300 strip, Rfl: 2  •  pen needle, diabetic (RELION NEEDLES) 31 gauge x 1/4\" needle, Inject 1 each under the skin 3 (three) times a day., Disp: 300 each, Rfl: 2  •  TRESIBA FLEXTOUCH U-100 100 unit/mL (3 mL) insulin pen, INJECT 100 UNITS SUBCUTANEOUSLY DAILY, Disp: 10 pen, Rfl: 5    Allergies:   Allergies   Allergen Reactions   • Aloe Vera    • Bacitracin  " "    ZINC   • Erythromycin Base    • Formaldehyde    • Gramicidins      OPHTHALMIC   • Hydrocortisone      ACETATE   • Latex    • Levofloxacin    • Metformin    • Neomycin    • Neomycin-Bacitracnzn-Polymyxnb    • Polymyxin B    • Prednisolone    • Prednisone    • Statins-Hmg-Coa Reductase Inhibitors    • Sulfamethoxazole    • Sulfamethoxazole-Trimethoprim    • Thimerosal    • Trimethoprim          Labs:   Recent Results (from the past 336 hour(s))   Hemoglobin A1c (glycosylated) Blood, Venous    Collection Time: 07/30/18  7:06 AM   Result Value Ref Range    Hemoglobin A1C 7.1 (H) 4.0 - 6.0 %    Estimated Average Glucose 157.1 mg/dL         Imaging: No results found.    Vitals: Blood pressure 140/82, pulse 78, resp. rate 16, height 1.6 m (5' 3\"), weight 105 kg (231 lb), SpO2 97 %.      Physical Exam   Constitutional: She appears well-developed and well-nourished.   Neck: Normal range of motion. Neck supple.   Cardiovascular: Normal rate, regular rhythm and normal heart sounds.    Pulmonary/Chest: Effort normal and breath sounds normal.   Abdominal: Soft. Bowel sounds are normal.   Nursing note and vitals reviewed.        Plan and Discussion:     Diagnosis Plan   1. Essential hypertension     2. Type 2 diabetes mellitus with diabetic nephropathy, with long-term current use of insulin (CMS/[facility])     3. Pure hypercholesterolemia     4. Chronic constipation     For the patient's chronic constipation we will discontinue her verapamil and switch her over to amlodipine.  She is also to start taking Senokot S1-2 per day if that is not effective then we will put her on a small dose of MiraLAX daily.  The patient will follow-up in 3 months or sooner if she has problems.      JAMILA MANCERA MD    Date: 7/30/2018  Time: 9:12 AM          "

## 2018-08-09 ENCOUNTER — TELEPHONE (OUTPATIENT)
Dept: FAMILY MEDICINE | Facility: CLINIC | Age: 73
End: 2018-08-09

## 2018-08-09 NOTE — TELEPHONE ENCOUNTER
Called patient. She is still having cramping and small bowel movements daily and has to go home from work. Advised patient that Dr. Boyer was out of the clinic until next week. She could come into UC or schedule with Dr. Boyer next week. She will get an appointment with Dr. Boyer.

## 2018-08-09 NOTE — TELEPHONE ENCOUNTER
Talked to Frieda BARRON and left a note for her to check the schedule Monday 8/13/18 to see if we can get patient in Monday. Patient was advised and verbalized understanding.

## 2018-08-13 ENCOUNTER — OFFICE VISIT (OUTPATIENT)
Dept: INTERNAL MEDICINE | Facility: CLINIC | Age: 73
End: 2018-08-13
Payer: MEDICARE

## 2018-08-13 VITALS
OXYGEN SATURATION: 96 % | DIASTOLIC BLOOD PRESSURE: 80 MMHG | WEIGHT: 226 LBS | SYSTOLIC BLOOD PRESSURE: 140 MMHG | BODY MASS INDEX: 40.04 KG/M2 | HEIGHT: 63 IN | HEART RATE: 101 BPM | RESPIRATION RATE: 15 BRPM

## 2018-08-13 DIAGNOSIS — E11.9 DIABETES MELLITUS WITHOUT COMPLICATION (CMS/HCC): Primary | ICD-10-CM

## 2018-08-13 DIAGNOSIS — K59.1 FUNCTIONAL DIARRHEA: ICD-10-CM

## 2018-08-13 PROCEDURE — 99213 OFFICE O/P EST LOW 20 MIN: CPT | Performed by: INTERNAL MEDICINE

## 2018-08-13 PROCEDURE — 99213 OFFICE O/P EST LOW 20 MIN: CPT | Mod: PO | Performed by: INTERNAL MEDICINE

## 2018-08-13 RX ORDER — INSULIN GLARGINE 300 [IU]/ML
100 INJECTION, SOLUTION SUBCUTANEOUS DAILY
Qty: 1 PEN | Refills: 0 | Status: SHIPPED | COMMUNITY
Start: 2018-08-13 | End: 2018-08-22 | Stop reason: ALTCHOICE

## 2018-08-13 ASSESSMENT — PAIN SCALES - GENERAL: PAINLEVEL: 3

## 2018-08-13 NOTE — PROGRESS NOTES
"Internal Medicine Progress Note    Chief Complaint:   Chief Complaint   Patient presents with   • Diarrhea     Patient is in today with c/o irregular bowels and cramps in lower abdomen.          HPI: Patient is a 72 y.o. female comes in for follow-up on her irregular bowels.  Patient initially had had some problems with constipation she then used Senokot with fairly frequent loose stools she is now discontinued the Senokot and she is continued to have an urge to have a bowel movement up to 10-20 times a day does have a bowel movement at least 10 times a day and is having some lower abdominal discomfort she denies any blood or mucus in the stool.  Time correlation is similar to when the patient was switched over from Toujeo to Tresiba.    Review of Systems      Current Outpatient Prescriptions:   •  amLODIPine (NORVASC) 2.5 mg tablet, Take 1 tablet (2.5 mg total) by mouth daily., Disp: 30 tablet, Rfl: 11  •  aspirin 325 mg tablet, Take 325 mg by mouth daily.  , Disp: , Rfl:   •  BD INSULIN PEN NEEDLE UF MINI 31 gauge x 3/16\" needle, USE ONE NEEDLE TO INJECT INSULIN FIVE TIMES DAILY, Disp: 100 each, Rfl: 2  •  losartan-hydrochlorothiazide (HYZAAR) 100-25 mg per tablet, TAKE ONE TABLET BY MOUTH ONCE DAILY, Disp: 90 tablet, Rfl: 1  •  nortriptyline (PAMELOR) 50 mg capsule, Take 50 mg by mouth daily.  , Disp: 90, Rfl: 3  •  NOVOLOG FLEXPEN U-100 INSULIN 100 unit/mL insulin pen, INJECT 5 TO 7 UNITS SUBCUTANEOUSLY IN THE MORNING, 10 TO 12 UNITS AT NOON, AND 15 TO 17 UNITS IN THE EVENING, Disp: 10 pen, Rfl: 2  •  omeprazole (PriLOSEC) 40 mg capsule, 1 capsules daily by mouth, Disp: 90, Rfl: 0  •  ONETOUCH ULTRA TEST strip, USE ONE STRIP TO CHECK GLUCOSE THREE TIMES DAILY, Disp: 300 strip, Rfl: 2  •  pen needle, diabetic (RELION NEEDLES) 31 gauge x 1/4\" needle, Inject 1 each under the skin 3 (three) times a day., Disp: 300 each, Rfl: 2  •  TRESIBA FLEXTOUCH U-100 100 unit/mL (3 mL) insulin pen, INJECT 100 UNITS " "SUBCUTANEOUSLY DAILY, Disp: 10 pen, Rfl: 5  •  insulin glargine U-300 conc 300 unit/mL (1.5 mL) insulin pen, Inject 100 Units under the skin daily., Disp: 1 pen, Rfl: 0    Allergies:   Allergies   Allergen Reactions   • Aloe Vera    • Bacitracin      ZINC   • Erythromycin Base    • Formaldehyde    • Gramicidins      OPHTHALMIC   • Hydrocortisone      ACETATE   • Latex    • Levofloxacin    • Metformin    • Neomycin    • Neomycin-Bacitracnzn-Polymyxnb    • Polymyxin B    • Prednisolone    • Prednisone    • Statins-Hmg-Coa Reductase Inhibitors    • Sulfamethoxazole    • Sulfamethoxazole-Trimethoprim    • Thimerosal    • Trimethoprim          Labs:   No results found for this or any previous visit (from the past 336 hour(s)).      Imaging: No results found.    Vitals: Blood pressure 140/80, pulse 101, resp. rate 15, height 1.6 m (5' 3\"), weight 103 kg (226 lb), SpO2 96 %.      Physical Exam   Constitutional: She appears well-developed and well-nourished.   Neck: Normal range of motion. Neck supple.   Cardiovascular: Normal rate and regular rhythm.    Pulmonary/Chest: Effort normal and breath sounds normal.   Abdominal: Soft. Bowel sounds are normal.   Patient does have some very minimal tenderness left lower quadrant and right lower quadrant but has no rebound or guarding.   Nursing note and vitals reviewed.        Plan and Discussion:     Diagnosis Plan   1. Diabetes mellitus without complication (CMS/HCC)  insulin glargine U-300 conc 300 unit/mL (1.5 mL) insulin pen   2. Functional diarrhea       Patient's had a lifelong history of chronic constipation but is now having a lot of fluctuation between constipation, diarrhea, and some abdominal pain it appears to be associated with the time.  But she was changed over to her current insulin.  We are trying to put her back on the Toujeo and see if it resolves or not the patient may need a repeat colonoscopy even though her last ones done within the last 2 years.    JAMILA " MD FIORDALIZA    Date: 8/13/2018  Time: 12:44 PM

## 2018-08-15 DIAGNOSIS — F32.A DEPRESSIVE DISORDER: Primary | ICD-10-CM

## 2018-08-15 RX ORDER — NORTRIPTYLINE HYDROCHLORIDE 50 MG/1
CAPSULE ORAL
Qty: 90 CAPSULE | Refills: 3 | Status: SHIPPED | OUTPATIENT
Start: 2018-08-15 | End: 2019-08-10 | Stop reason: SDUPTHER

## 2018-08-16 ENCOUNTER — TELEPHONE (OUTPATIENT)
Dept: FAMILY MEDICINE | Facility: CLINIC | Age: 73
End: 2018-08-16

## 2018-08-16 DIAGNOSIS — E11.9 DIABETES MELLITUS WITHOUT COMPLICATION (CMS/HCC): ICD-10-CM

## 2018-08-16 RX ORDER — INSULIN GLARGINE 300 U/ML
100 INJECTION, SOLUTION SUBCUTANEOUS DAILY
Qty: 2 PEN | Refills: 0 | Status: SHIPPED | COMMUNITY
Start: 2018-08-16 | End: 2018-08-22 | Stop reason: ALTCHOICE

## 2018-08-16 NOTE — TELEPHONE ENCOUNTER
Pt contacted.  States that she is feeling much better since switching back to Toujeo.  Currently the medication in under prior auth with her insurance and pt is almost out of samples.  Pharmacy contacted and will dispense 2 pens to patient until prior auth can be obtained.  Pt will p/u samples and contact the clinic next week prior to running out if we have not heard from her insurance.

## 2018-08-20 ENCOUNTER — DOCUMENTATION (OUTPATIENT)
Dept: INTERNAL MEDICINE | Facility: CLINIC | Age: 73
End: 2018-08-20

## 2018-08-22 ENCOUNTER — TELEPHONE (OUTPATIENT)
Dept: FAMILY MEDICINE | Facility: CLINIC | Age: 73
End: 2018-08-22

## 2018-08-22 DIAGNOSIS — E11.9 DIABETES MELLITUS WITHOUT COMPLICATION (CMS/HCC): Primary | ICD-10-CM

## 2018-08-22 DIAGNOSIS — I10 ESSENTIAL HYPERTENSION: ICD-10-CM

## 2018-08-22 RX ORDER — INSULIN GLARGINE 300 [IU]/ML
110 INJECTION, SOLUTION SUBCUTANEOUS DAILY
Qty: 4 SYRINGE | Refills: 11 | Status: SHIPPED | OUTPATIENT
Start: 2018-08-22 | End: 2018-11-05 | Stop reason: SDUPTHER

## 2018-08-22 NOTE — TELEPHONE ENCOUNTER
Pt advised that medications have been sent to [facility] with the new dosing instructions.  Pt will contact the office with any further concerns.

## 2018-09-16 DIAGNOSIS — K21.9 CHRONIC GERD: Primary | ICD-10-CM

## 2018-09-17 RX ORDER — OMEPRAZOLE 40 MG/1
CAPSULE, DELAYED RELEASE ORAL
Qty: 90 CAPSULE | Refills: 1 | Status: SHIPPED | OUTPATIENT
Start: 2018-09-17 | End: 2019-02-13 | Stop reason: SDUPTHER

## 2018-09-27 ENCOUNTER — OFFICE VISIT (OUTPATIENT)
Dept: FAMILY MEDICINE | Facility: CLINIC | Age: 73
End: 2018-09-27
Payer: MEDICARE

## 2018-09-27 VITALS
DIASTOLIC BLOOD PRESSURE: 66 MMHG | OXYGEN SATURATION: 99 % | BODY MASS INDEX: 39.64 KG/M2 | SYSTOLIC BLOOD PRESSURE: 134 MMHG | WEIGHT: 223.8 LBS | TEMPERATURE: 98.2 F | HEART RATE: 103 BPM

## 2018-09-27 DIAGNOSIS — R60.0 BILATERAL LOWER EXTREMITY EDEMA: Primary | ICD-10-CM

## 2018-09-27 DIAGNOSIS — M25.561 ACUTE PAIN OF RIGHT KNEE: ICD-10-CM

## 2018-09-27 PROCEDURE — 99213 OFFICE O/P EST LOW 20 MIN: CPT | Performed by: FAMILY MEDICINE

## 2018-09-27 PROCEDURE — 99213 OFFICE O/P EST LOW 20 MIN: CPT | Mod: PO | Performed by: FAMILY MEDICINE

## 2018-09-27 RX ORDER — FUROSEMIDE 20 MG/1
20 TABLET ORAL DAILY
Qty: 30 TABLET | Refills: 0 | Status: SHIPPED | OUTPATIENT
Start: 2018-09-27 | End: 2018-10-27

## 2018-09-27 NOTE — PROGRESS NOTES
Subjective      Consuelo Singh is a 73 y.o. female who presents for Knee Pain (Left knee pain/swelling. Unsure of injury)      Patient presents for swelling and pain to bilateral knees since 9/22/18 right >Left. Pain worsens with walking/weightbearing. She denies chest pain, worse shortness of breath, PND, or palpitations. She denies any recent injury that she is aware of, no previous surgeries to this knee either. Her medications have recently changed, she went from verapamil to amlodipine and Tresiba to Toujeo insulin as prescribed by her PCP.Has tried stretching with no improvement.  Patient feels like there is a tightness to the back of her leg.  Patient also with pain in the anterior lateral knee.      Allergies   Allergen Reactions   • Aloe Vera    • Bacitracin      ZINC   • Erythromycin Base    • Formaldehyde    • Gramicidins      OPHTHALMIC   • Hydrocortisone      ACETATE   • Latex    • Levofloxacin    • Metformin    • Neomycin    • Neomycin-Bacitracnzn-Polymyxnb    • Polymyxin B    • Prednisolone    • Prednisone    • Statins-Hmg-Coa Reductase Inhibitors    • Sulfamethoxazole    • Sulfamethoxazole-Trimethoprim    • Thimerosal    • Tresiba Flextouch U-100 [Insulin Degludec] Diarrhea and Other (see comments)     Abdominal pain   • Trimethoprim        Review of Systems    Objective     Vitals  /66 (BP Location: Left arm, Patient Position: Sitting, Cuff Size: Reg)   Pulse 103   Temp 36.8 °C (98.2 °F) (Temporal)   Wt 102 kg (223 lb 12.8 oz)   SpO2 99%   BMI 39.64 kg/m²     Physical Exam   Constitutional: She appears well-developed and well-nourished. No distress.   Cardiovascular: Normal rate, regular rhythm, normal heart sounds and intact distal pulses.  Exam reveals no gallop and no friction rub.    No murmur heard.  Pulmonary/Chest: Effort normal and breath sounds normal. No respiratory distress. She has no wheezes. She has no rales. She exhibits no tenderness.   Musculoskeletal: She exhibits edema  and tenderness. She exhibits no deformity.   Tender anterior lateral and posterior of right knee.  Trace bilateral pitting edema to knee.  Negative Laina's.  Negative Lochman's.  Negative anterior posterior drawer.   Skin: Skin is warm and dry. No rash noted. She is not diaphoretic. There is erythema (bilateral ankles). No pallor.   Nursing note and vitals reviewed.      Assessment/Plan   Diagnoses and all orders for this visit:    Bilateral lower extremity edema  Comments:  Likely amlodipine side effect. Start furosemide 20mg, f/u by phone call on Monday regarding symptoms. Return precautions given.  Orders:  -     furosemide (LASIX) 20 mg tablet; Take 1 tablet (20 mg total) by mouth daily.    Acute pain of right knee  Comments:  I question if pain is secondary to edema from her amlodipine. Start trial of Lasix.F/u if symptom worsen or persist, will send to physical therapy at that time.        Return if symptoms worsen or fail to improve.    AILYN TOBIN MD

## 2018-10-05 ENCOUNTER — OFFICE VISIT (OUTPATIENT)
Dept: INTERNAL MEDICINE | Facility: CLINIC | Age: 73
End: 2018-10-05
Payer: MEDICARE

## 2018-10-05 VITALS
BODY MASS INDEX: 40.57 KG/M2 | DIASTOLIC BLOOD PRESSURE: 74 MMHG | OXYGEN SATURATION: 95 % | WEIGHT: 229 LBS | HEART RATE: 99 BPM | SYSTOLIC BLOOD PRESSURE: 144 MMHG

## 2018-10-05 DIAGNOSIS — I10 ESSENTIAL HYPERTENSION: Primary | ICD-10-CM

## 2018-10-05 DIAGNOSIS — G89.29 CHRONIC PAIN OF RIGHT KNEE: ICD-10-CM

## 2018-10-05 DIAGNOSIS — M25.561 CHRONIC PAIN OF RIGHT KNEE: ICD-10-CM

## 2018-10-05 PROCEDURE — 99213 OFFICE O/P EST LOW 20 MIN: CPT | Mod: PO | Performed by: INTERNAL MEDICINE

## 2018-10-05 PROCEDURE — 99213 OFFICE O/P EST LOW 20 MIN: CPT | Performed by: INTERNAL MEDICINE

## 2018-10-05 RX ORDER — METOPROLOL SUCCINATE 50 MG/1
50 TABLET, EXTENDED RELEASE ORAL DAILY
Qty: 30 TABLET | Refills: 11 | Status: SHIPPED | OUTPATIENT
Start: 2018-10-05 | End: 2019-10-05 | Stop reason: WASHOUT

## 2018-10-05 ASSESSMENT — PAIN SCALES - GENERAL: PAINLEVEL: 4

## 2018-10-05 NOTE — PROGRESS NOTES
"Internal Medicine Progress Note    Chief Complaint:   Chief Complaint   Patient presents with   • Knee Pain     Right knee pain X2 weeks. She denies any accident or injury. She rates her pain 3-4/10. She notes swelling. She saw Dr. Yanes on 9/27/18 for this issue. She has not had any imaging done. He started her on lasix & she is still taking.    • Follow-up     She would like to follow up on her blood sugars. Her blood sugar this morning was 160 this am, ate cereal and at lunch her blood sugar was 245.          HPI: Patient is a 73 y.o. female is in with with concerns about potential edema of her thighs bilaterally she feels like this is occurred since she started taking the amlodipine.  The swelling seems to go down at night but then returns during the daytime she really has no peripheral edema of the ankle itself.  Her other concern is that she has some significant right knee discomfort    Review of Systems      Current Outpatient Prescriptions:   •  aspirin 325 mg tablet, Take 325 mg by mouth daily.  , Disp: , Rfl:   •  BD INSULIN PEN NEEDLE UF MINI 31 gauge x 3/16\" needle, USE ONE NEEDLE TO INJECT INSULIN FIVE TIMES DAILY, Disp: 100 each, Rfl: 2  •  furosemide (LASIX) 20 mg tablet, Take 1 tablet (20 mg total) by mouth daily., Disp: 30 tablet, Rfl: 0  •  insulin glargine U-300 conc (TOUJEO MAX U-300 SOLOSTAR) 300 unit/mL (3 mL) insulin pen, Inject 110 Units under the skin daily., Disp: 4 Syringe, Rfl: 11  •  losartan-hydrochlorothiazide (HYZAAR) 100-25 mg per tablet, TAKE ONE TABLET BY MOUTH ONCE DAILY, Disp: 90 tablet, Rfl: 1  •  nortriptyline (PAMELOR) 50 mg capsule, TAKE ONE CAPSULE BY MOUTH ONCE DAILY AS DIRECTED, Disp: 90 capsule, Rfl: 3  •  NOVOLOG FLEXPEN U-100 INSULIN 100 unit/mL insulin pen, INJECT 5 TO 7 UNITS SUBCUTANEOUSLY IN THE MORNING, 10 TO 12 UNITS AT NOON, AND 15 TO 17 UNITS IN THE EVENING, Disp: 10 pen, Rfl: 2  •  omeprazole (PriLOSEC) 40 mg capsule, TAKE ONE CAPSULE BY MOUTH ONCE DAILY, " "Disp: 90 capsule, Rfl: 1  •  ONETOUCH ULTRA TEST strip, USE ONE STRIP TO CHECK GLUCOSE THREE TIMES DAILY, Disp: 300 strip, Rfl: 2  •  pen needle, diabetic (RELION NEEDLES) 31 gauge x 1/4\" needle, Inject 1 each under the skin 3 (three) times a day., Disp: 300 each, Rfl: 2    Allergies:   Allergies   Allergen Reactions   • Aloe Vera    • Bacitracin      ZINC   • Erythromycin Base    • Formaldehyde    • Gramicidins      OPHTHALMIC   • Hydrocortisone      ACETATE   • Latex    • Levofloxacin    • Metformin    • Neomycin    • Neomycin-Bacitracnzn-Polymyxnb    • Polymyxin B    • Prednisolone    • Prednisone    • Statins-Hmg-Coa Reductase Inhibitors    • Sulfamethoxazole    • Sulfamethoxazole-Trimethoprim    • Thimerosal    • Tresiba Flextouch U-100 [Insulin Degludec] Diarrhea and Other (see comments)     Abdominal pain   • Trimethoprim          Labs:   No results found for this or any previous visit (from the past 336 hour(s)).      Imaging: No results found.    Vitals: Blood pressure 144/74, pulse 99, weight 104 kg (229 lb), SpO2 95 %.      Physical Exam   Constitutional: She appears well-developed and well-nourished.   Cardiovascular: Normal rate and regular rhythm.    Pulmonary/Chest: Effort normal and breath sounds normal.   Musculoskeletal:   Patient does have some decreased range of motion with crepitations of her right knee examination of both thighs rule out there a relatively large in diameter compared to her calf size she has negative Homans and no specific findings that are consistent with DVTs in her thighs.   Nursing note and vitals reviewed.        Plan and Discussion:     Diagnosis Plan   1. Essential hypertension     2. Chronic pain of right knee     Since the patient's symptoms developed after she started the amlodipine were going to discontinue it switch her over to metoprolol for her blood pressure control.  For her knee discomfort will refer to Dr. Gaviria for further evaluation and " treatment.      JAMILA MANCERA MD    Date: 10/5/2018  Time: 4:08 PM

## 2018-10-08 ENCOUNTER — OFFICE VISIT (OUTPATIENT)
Dept: ORTHOPEDIC SURGERY | Facility: CLINIC | Age: 73
End: 2018-10-08
Payer: MEDICARE

## 2018-10-08 ENCOUNTER — ANCILLARY PROCEDURE (OUTPATIENT)
Dept: RADIOLOGY | Facility: CLINIC | Age: 73
End: 2018-10-08
Payer: MEDICARE

## 2018-10-08 VITALS — WEIGHT: 221 LBS | DIASTOLIC BLOOD PRESSURE: 88 MMHG | SYSTOLIC BLOOD PRESSURE: 144 MMHG | BODY MASS INDEX: 39.15 KG/M2

## 2018-10-08 DIAGNOSIS — M25.561 CHRONIC PAIN OF RIGHT KNEE: ICD-10-CM

## 2018-10-08 DIAGNOSIS — G89.29 CHRONIC PAIN OF RIGHT KNEE: ICD-10-CM

## 2018-10-08 PROCEDURE — 99213 OFFICE O/P EST LOW 20 MIN: CPT | Mod: PO | Performed by: PHYSICIAN ASSISTANT

## 2018-10-08 PROCEDURE — 20610 DRAIN/INJ JOINT/BURSA W/O US: CPT | Mod: RT | Performed by: PHYSICIAN ASSISTANT

## 2018-10-08 PROCEDURE — 73562 X-RAY EXAM OF KNEE 3: CPT | Mod: RT,PO

## 2018-10-08 PROCEDURE — 73562 X-RAY EXAM OF KNEE 3: CPT | Mod: 26,RT | Performed by: ORTHOPAEDIC SURGERY

## 2018-10-08 PROCEDURE — 6360000200 HC RX 636 W HCPCS (ALT 250 FOR IP): Mod: PO | Performed by: PHYSICIAN ASSISTANT

## 2018-10-08 PROCEDURE — 96372 THER/PROPH/DIAG INJ SC/IM: CPT | Mod: PO | Performed by: PHYSICIAN ASSISTANT

## 2018-10-08 PROCEDURE — 99213 OFFICE O/P EST LOW 20 MIN: CPT | Mod: 25 | Performed by: PHYSICIAN ASSISTANT

## 2018-10-08 RX ADMIN — LIDOCAINE HYDROCHLORIDE: 10 INJECTION, SOLUTION EPIDURAL; INFILTRATION; INTRACAUDAL; PERINEURAL at 15:11

## 2018-10-08 RX ADMIN — METHYLPREDNISOLONE ACETATE 80 MG: 80 INJECTION, SUSPENSION INTRA-ARTICULAR; INTRALESIONAL; INTRAMUSCULAR; SOFT TISSUE at 15:11

## 2018-10-08 ASSESSMENT — PAIN - FUNCTIONAL ASSESSMENT: PAIN_FUNCTIONAL_ASSESSMENT: 0-10

## 2018-10-08 NOTE — PROGRESS NOTES
Injection/Arthrocentesis Joint/Tendon/Bursa  Date/Time: 10/8/2018 3:11 PM  Performed by: JOSIE BANG       Consent  Verbal consent was obtained from the patient. Written consent was not obtained from the patient. Risks, benefits, and alternatives were discussed. Consent given by patient. The patient states understanding of the procedure being performed. Patient ID confirmed verbally with patient.       Location Details  An injection was given to Consuelo in her knee. The injection site was the R intraarticular.    Procedure Details   Patient was prepped and draped in the usual sterile fashion. Ethyl chloride spray was used for local anesthesia. A gauge needle was inserted into the R intraarticular using a lateral approach . R intraarticular was injected with lidocaine PF 10 mg/mL (1 %); 80 mg methylPREDNISolone acetate 80 mg/mL. Needle removed without complication.     Post-Procedure  Patient tolerated the procedure well with no immediate complications. A standard bandage was applied. Advised patient to avoid strenous activity for 24-48 hours. Advised patient to use ice, NSAIDs or acetaminophen for pain as needed.     Procedure Comments  80 mg Depomedrol and 3 ml of 1% lidocaine without epinephrine

## 2018-10-08 NOTE — PROGRESS NOTES
Subjective   Patient ID: Consuelo Singh is a 73 y.o. female.    Chief Complaint:  Chief Complaint   Patient presents with   • Pain     Presents with Right knee pain. States pain started about 1 month ago; denies injury; anterior knee pain. Pain level rates 4/10. Does not take meds for pain.       HPI  Patient is here today for evaluation treatment of her right knee pain.  States that he has been bothering her for 6 weeks denies any particular injury, trauma, sprain/strain.  No history of previous surgery.  Pain is primarily in the anterior knee and medial knee.  Increases with weightbearing and activity.  Occasionally feels like it will give out.  Denies any kind of numbness or tingling.  She is taken on a couple different occasions over-the-counter NSAIDs which really did not seem to help much so she is not currently taking anything.  Social History     Occupational History   • Not on file.     Social History Main Topics   • Smoking status: Former Smoker     Packs/day: 0.50     Years: 40.00     Quit date: 2007   • Smokeless tobacco: Former User   • Alcohol use Yes      Comment: Occasionally; 1-2 glasses monthly   • Drug use: No   • Sexual activity: Defer      Review of Systems   Constitutional: Negative for activity change and fever.   Musculoskeletal: Positive for arthralgias, gait problem and joint swelling.   Neurological: Positive for numbness.             Objective   Ortho Exam        General: Patient well-developed well-nourished 73-year-old female in no acute distress.  A and O x3 pleasant and cooperative  Gait: Patient is ambulating without an assistive device mild antalgia is noted  Right knee: Patient has no open areas no lesions no rashes.  Normal color.  Minimal near the superior aspect of the patella just medial to it.  Strength is within normal limits range of motion 0-110 with increased pain at full flexion.  Negative varus and valgus stress test, negative anterior posterior drawer, negative  Madina's.  Mildly positive Laina's  Assessment       Right knee pain is been going on about 4-6 weeks with no specific injury precipitating her symptoms.  She has not had much resolution of her symptoms with over-the-counter NSAIDs.  No significant instability on physical exam.  X-rays were obtained today showing joint space narrowing the medial compartment as well as the patellofemoral joint.      Plan    Reviewed x-rays with patient discussed that she does have some degenerative changes and looks to have had an acute exacerbation of degenerative joint disease.  Cortisone injection was administered as detailed in the procedure note will plan a follow-up visit in about 3 weeks.  Patient may also benefit from physical supplementation in the future

## 2018-10-09 RX ORDER — LIDOCAINE HYDROCHLORIDE 10 MG/ML
INJECTION, SOLUTION EPIDURAL; INFILTRATION; INTRACAUDAL; PERINEURAL
Status: COMPLETED | OUTPATIENT
Start: 2018-10-08 | End: 2018-10-08

## 2018-10-09 RX ORDER — METHYLPREDNISOLONE ACETATE 80 MG/ML
80 INJECTION, SUSPENSION INTRA-ARTICULAR; INTRALESIONAL; INTRAMUSCULAR; SOFT TISSUE
Status: COMPLETED | OUTPATIENT
Start: 2018-10-08 | End: 2018-10-08

## 2018-10-09 ASSESSMENT — ENCOUNTER SYMPTOMS
JOINT SWELLING: 1
ACTIVITY CHANGE: 0
ARTHRALGIAS: 1
FEVER: 0
NUMBNESS: 1

## 2018-10-24 ENCOUNTER — OFFICE VISIT (OUTPATIENT)
Dept: ORTHOPEDIC SURGERY | Facility: CLINIC | Age: 73
End: 2018-10-24
Payer: MEDICARE

## 2018-10-24 VITALS — BODY MASS INDEX: 39.86 KG/M2 | DIASTOLIC BLOOD PRESSURE: 80 MMHG | WEIGHT: 225 LBS | SYSTOLIC BLOOD PRESSURE: 136 MMHG

## 2018-10-24 DIAGNOSIS — M25.561 CHRONIC PAIN OF RIGHT KNEE: Primary | ICD-10-CM

## 2018-10-24 DIAGNOSIS — G89.29 CHRONIC PAIN OF RIGHT KNEE: Primary | ICD-10-CM

## 2018-10-24 PROCEDURE — 99212 OFFICE O/P EST SF 10 MIN: CPT | Performed by: PHYSICIAN ASSISTANT

## 2018-10-24 PROCEDURE — 99212 OFFICE O/P EST SF 10 MIN: CPT | Mod: PO | Performed by: PHYSICIAN ASSISTANT

## 2018-10-24 RX ORDER — MELOXICAM 15 MG/1
15 TABLET ORAL DAILY
Qty: 30 TABLET | Refills: 2 | Status: SHIPPED | OUTPATIENT
Start: 2018-10-24 | End: 2019-10-24

## 2018-10-24 ASSESSMENT — ENCOUNTER SYMPTOMS
FEVER: 0
ACTIVITY CHANGE: 0
NUMBNESS: 1
ARTHRALGIAS: 1

## 2018-10-24 ASSESSMENT — PAIN - FUNCTIONAL ASSESSMENT: PAIN_FUNCTIONAL_ASSESSMENT: 0-10

## 2018-10-24 NOTE — PROGRESS NOTES
Subjective   Patient ID: Consuelo Singh is a 73 y.o. female.    Chief Complaint:  Chief Complaint   Patient presents with   • Follow-up     F/U Right knee pain. Had cortisone injection on 10/8/18; states her knee is better but still has some painful days. Pain level rates 1/10. Does not take meds for pain.       HPI  Patient comes in today for follow-up of her right knee, she had a cortisone injection on 10/8/2018 which did help her symptoms quite a bit.  She still has some intermittent mild pain mostly when she is standing or walking for long periods of time.  She does not take any anti-inflammatories or any other pain medication.  Social History     Occupational History   • Not on file   Tobacco Use   • Smoking status: Former Smoker     Packs/day: 0.50     Years: 40.00     Pack years: 20.00     Last attempt to quit:      Years since quittin.8   • Smokeless tobacco: Former User   Substance and Sexual Activity   • Alcohol use: Yes     Comment: Occasionally; 1-2 glasses monthly   • Drug use: No   • Sexual activity: Defer      Review of Systems   Constitutional: Negative for activity change and fever.   Musculoskeletal: Positive for arthralgias. Negative for gait problem.   Neurological: Positive for numbness.             Objective   Ortho Exam      General: Patient well-developed well-nourished 73-year-old female in no acute distress.  A and O x3  Gait: Patient is ambulating without an assistive device no antalgia is noted  Right knee: Mild swelling is noted she has range of motion 0-115 degrees pain at end range of flexion.      Assessment       Right knee pain due to degenerative joint disease which had increased required cortisone injection administered last visit on 10/8/2018.  She did have quite a significant increase in her blood sugar after the injection which I had discussed with her would probably happen.  Patient is done quite well with cortisone injection her pain is minimal and intermittent at  this point.  She does have some swelling would benefit from inflammatory.      Plan    Patient will continue with her activities as tolerated, will prescribe meloxicam to see if this does not help with her symptoms and will see her on a as needed basis at this point  May benefit from continued cortisone injections every 3-4 months if needed also might consider Visco supplementation in the future

## 2018-11-05 ENCOUNTER — APPOINTMENT (OUTPATIENT)
Dept: LAB | Facility: CLINIC | Age: 73
End: 2018-11-05
Payer: MEDICARE

## 2018-11-05 ENCOUNTER — OFFICE VISIT (OUTPATIENT)
Dept: INTERNAL MEDICINE | Facility: CLINIC | Age: 73
End: 2018-11-05
Payer: MEDICARE

## 2018-11-05 VITALS
RESPIRATION RATE: 16 BRPM | DIASTOLIC BLOOD PRESSURE: 80 MMHG | OXYGEN SATURATION: 96 % | BODY MASS INDEX: 40.57 KG/M2 | WEIGHT: 229 LBS | HEART RATE: 83 BPM | HEIGHT: 63 IN | SYSTOLIC BLOOD PRESSURE: 130 MMHG

## 2018-11-05 DIAGNOSIS — E11.9 DIABETES MELLITUS WITHOUT COMPLICATION (CMS/HCC): ICD-10-CM

## 2018-11-05 DIAGNOSIS — E78.00 PURE HYPERCHOLESTEROLEMIA: ICD-10-CM

## 2018-11-05 DIAGNOSIS — I10 HYPERTENSION, UNSPECIFIED TYPE: ICD-10-CM

## 2018-11-05 DIAGNOSIS — Z79.4 TYPE 2 DIABETES MELLITUS WITH DIABETIC NEPHROPATHY, WITH LONG-TERM CURRENT USE OF INSULIN (CMS/HCC): ICD-10-CM

## 2018-11-05 DIAGNOSIS — E11.21 TYPE 2 DIABETES MELLITUS WITH DIABETIC NEPHROPATHY, WITH LONG-TERM CURRENT USE OF INSULIN (CMS/HCC): ICD-10-CM

## 2018-11-05 DIAGNOSIS — I10 ESSENTIAL HYPERTENSION: Primary | ICD-10-CM

## 2018-11-05 LAB
ALBUMIN SERPL-MCNC: 4.1 G/DL (ref 3.5–5.3)
ALP SERPL-CCNC: 126 U/L (ref 55–142)
ALT SERPL-CCNC: 15 U/L (ref 0–52)
ANION GAP SERPL CALC-SCNC: 6 MMOL/L (ref 3–11)
AST SERPL-CCNC: 12 U/L (ref 0–39)
BILIRUB SERPL-MCNC: 0.4 MG/DL (ref 0–1.4)
BUN SERPL-MCNC: 19 MG/DL (ref 7–25)
CALCIUM ALBUM COR SERPL-MCNC: 9.3 MG/DL (ref 8.6–10.3)
CALCIUM SERPL-MCNC: 9.4 MG/DL (ref 8.6–10.3)
CHLORIDE SERPL-SCNC: 102 MMOL/L (ref 98–107)
CO2 SERPL-SCNC: 29 MMOL/L (ref 21–32)
CREAT SERPL-MCNC: 1.14 MG/DL (ref 0.6–1.2)
EST. AVERAGE GLUCOSE BLD GHB EST-MCNC: 171.4 MG/DL
GFR SERPL CREATININE-BSD FRML MDRD: 47 ML/MIN/1.73M*2
GLUCOSE SERPL-MCNC: 144 MG/DL (ref 70–105)
HBA1C MFR BLD: 7.6 % (ref 4–6)
POTASSIUM SERPL-SCNC: 4.3 MMOL/L (ref 3.5–5.1)
PROT SERPL-MCNC: 6.9 G/DL (ref 6–8.3)
SODIUM SERPL-SCNC: 137 MMOL/L (ref 135–145)

## 2018-11-05 PROCEDURE — 83036 HEMOGLOBIN GLYCOSYLATED A1C: CPT | Mod: PO

## 2018-11-05 PROCEDURE — 99213 OFFICE O/P EST LOW 20 MIN: CPT | Performed by: INTERNAL MEDICINE

## 2018-11-05 PROCEDURE — 80053 COMPREHEN METABOLIC PANEL: CPT | Mod: PO

## 2018-11-05 PROCEDURE — 36415 COLL VENOUS BLD VENIPUNCTURE: CPT | Mod: PO

## 2018-11-05 PROCEDURE — 99213 OFFICE O/P EST LOW 20 MIN: CPT | Mod: PO | Performed by: INTERNAL MEDICINE

## 2018-11-05 RX ORDER — LOSARTAN POTASSIUM AND HYDROCHLOROTHIAZIDE 25; 100 MG/1; MG/1
1 TABLET ORAL DAILY
Qty: 90 TABLET | Refills: 3 | Status: SHIPPED | OUTPATIENT
Start: 2018-11-05 | End: 2019-09-16 | Stop reason: ALTCHOICE

## 2018-11-05 RX ORDER — INSULIN GLARGINE 300 [IU]/ML
120 INJECTION, SOLUTION SUBCUTANEOUS DAILY
Qty: 12 SYRINGE | Refills: 3 | Status: SHIPPED | OUTPATIENT
Start: 2018-11-05 | End: 2019-09-29 | Stop reason: SDUPTHER

## 2018-11-05 RX ORDER — PEN NEEDLE, DIABETIC 29 G X1/2"
1 NEEDLE, DISPOSABLE MISCELLANEOUS 3 TIMES DAILY
Qty: 1000 EACH | Refills: 11 | Status: SHIPPED | OUTPATIENT
Start: 2018-11-05 | End: 2019-09-17 | Stop reason: ALTCHOICE

## 2018-11-05 ASSESSMENT — PAIN SCALES - GENERAL: PAINLEVEL: 0-NO PAIN

## 2018-11-05 NOTE — PROGRESS NOTES
"Internal Medicine Progress Note    Chief Complaint:   Chief Complaint   Patient presents with   • Follow-up     Patient is in today for recheck. Patient also had labs done.          HPI: Patient is a 73 y.o. female in for follow-up on her type II insulin requiring diabetes.  The patient did get a steroid injection in her knee and it has seemed to help she has had a fairly good bump in her sugars for about a week where they were running up in the actually in the 350 range.  They have now returned back to normal    Review of Systems      Current Outpatient Medications:   •  aspirin 325 mg tablet, Take 325 mg by mouth daily.  , Disp: , Rfl:   •  BD INSULIN PEN NEEDLE UF MINI 31 gauge x 3/16\" needle, USE ONE NEEDLE TO INJECT INSULIN FIVE TIMES DAILY, Disp: 100 each, Rfl: 2  •  insulin glargine U-300 conc (TOUJEO MAX U-300 SOLOSTAR) 300 unit/mL (3 mL) insulin pen, Inject 110 Units under the skin daily., Disp: 4 Syringe, Rfl: 11  •  losartan-hydrochlorothiazide (HYZAAR) 100-25 mg per tablet, TAKE ONE TABLET BY MOUTH ONCE DAILY, Disp: 90 tablet, Rfl: 1  •  meloxicam (MOBIC) 15 mg tablet, Take 1 tablet (15 mg total) by mouth daily., Disp: 30 tablet, Rfl: 2  •  metoprolol succinate XL (TOPROL-XL) 50 mg 24 hr tablet, Take 1 tablet (50 mg total) by mouth daily., Disp: 30 tablet, Rfl: 11  •  nortriptyline (PAMELOR) 50 mg capsule, TAKE ONE CAPSULE BY MOUTH ONCE DAILY AS DIRECTED, Disp: 90 capsule, Rfl: 3  •  NOVOLOG FLEXPEN U-100 INSULIN 100 unit/mL insulin pen, INJECT 5 TO 7 UNITS SUBCUTANEOUSLY IN THE MORNING, 10 TO 12 UNITS AT NOON, AND 15 TO 17 UNITS IN THE EVENING (Patient taking differently: INJECT 8 UNITS SUBCUTANEOUSLY IN THE MORNING, 10 TO 12 UNITS AT NOON, AND 15 TO 17 UNITS IN THE EVENING), Disp: 10 pen, Rfl: 2  •  omeprazole (PriLOSEC) 40 mg capsule, TAKE ONE CAPSULE BY MOUTH ONCE DAILY, Disp: 90 capsule, Rfl: 1  •  ONETOUCH ULTRA TEST strip, USE ONE STRIP TO CHECK GLUCOSE THREE TIMES DAILY, Disp: 300 strip, Rfl: " "2  •  pen needle, diabetic (RELION NEEDLES) 31 gauge x 1/4\" needle, Inject 1 each under the skin 3 (three) times a day., Disp: 300 each, Rfl: 2    Allergies:   Allergies   Allergen Reactions   • Aloe Vera    • Bacitracin      ZINC   • Erythromycin Base    • Formaldehyde    • Gramicidins      OPHTHALMIC   • Hydrocortisone      ACETATE   • Latex    • Levofloxacin    • Metformin    • Neomycin    • Neomycin-Bacitracnzn-Polymyxnb    • Polymyxin B    • Prednisolone    • Prednisone    • Statins-Hmg-Coa Reductase Inhibitors    • Sulfamethoxazole    • Sulfamethoxazole-Trimethoprim    • Thimerosal    • Tresiba Flextouch U-100 [Insulin Degludec] Diarrhea and Other (see comments)     Abdominal pain   • Trimethoprim          Labs:   Recent Results (from the past 336 hour(s))   Comprehensive metabolic panel Blood, Venous    Collection Time: 11/05/18  7:12 AM   Result Value Ref Range    Sodium 137 135 - 145 mmol/L    Potassium 4.3 3.5 - 5.1 mmol/L    Chloride 102 98 - 107 mmol/L    CO2 29 21 - 32 mmol/L    Anion Gap 6 3 - 11 mmol/L    BUN 19 7 - 25 mg/dL    Creatinine 1.14 0.60 - 1.20 mg/dL    Glucose 144 (H) 70 - 105 mg/dL    Calcium 9.4 8.6 - 10.3 mg/dL    AST 12 0 - 39 U/L    ALT (SGPT) 15 0 - 52 U/L    Alkaline Phosphatase 126 55 - 142 U/L    Total Protein 6.9 6.0 - 8.3 g/dL    Albumin 4.1 3.5 - 5.3 g/dL    Total Bilirubin 0.40 0.00 - 1.40 mg/dL    eGFR 47 (L) >60 mL/min/1.73m*2    Corrected Calcium 9.3 8.6 - 10.3 mg/dL   Hemoglobin A1c (glycosylated) Blood, Venous    Collection Time: 11/05/18  7:12 AM   Result Value Ref Range    Hemoglobin A1C 7.6 (H) 4.0 - 6.0 %    Estimated Average Glucose 171.4 mg/dL         Imaging: X-ray Knee 3 Views Right    Result Date: 10/10/2018  Narrative: Standard views of the right knee shows minimal arthritic changes with joint space narrowing without fractures or dislocations      Vitals: Blood pressure 130/80, pulse 83, resp. rate 16, height 1.6 m (5' 3\"), weight 104 kg (229 lb), SpO2 96 " %.      Physical Exam   Constitutional: She appears well-developed and well-nourished.   Neck: Normal range of motion. Neck supple.   Cardiovascular: Normal rate and regular rhythm.   Pulmonary/Chest: Effort normal and breath sounds normal.   Abdominal: Soft.   Nursing note and vitals reviewed.        Plan and Discussion:     Diagnosis Plan   1. Essential hypertension     2. Type 2 diabetes mellitus with diabetic nephropathy, with long-term current use of insulin (CMS/[facility]) ([facility])  Comprehensive metabolic panel Blood, Venous    Hemoglobin A1c (glycosylated) Blood, Venous    Lipid panel Blood, Venous    Urine Albumin/Creatinine Ratio Urine, Clean Catch   3. Pure hypercholesterolemia     Patient's labs are reviewed with her A1c did bump up a little bit from 7.1 up to 7.5 due to that we have again talked her about diet, exercise, and weight loss.  In the meantime were will increase her long-acting insulin from 110 up to 120 units/day.  JAMILA MANCERA MD    Date: 11/5/2018  Time: 10:07 AM

## 2018-11-24 DIAGNOSIS — E11.9 DIABETES MELLITUS WITHOUT COMPLICATION (CMS/HCC): ICD-10-CM

## 2018-11-26 RX ORDER — INSULIN ASPART 100 [IU]/ML
INJECTION, SOLUTION INTRAVENOUS; SUBCUTANEOUS
Qty: 10 PEN | Refills: 2 | Status: SHIPPED | OUTPATIENT
Start: 2018-11-26 | End: 2019-06-22 | Stop reason: SDUPTHER

## 2018-12-19 ENCOUNTER — OFFICE VISIT (OUTPATIENT)
Dept: OTOLARYNGOLOGY | Facility: CLINIC | Age: 73
End: 2018-12-19
Payer: MEDICARE

## 2018-12-19 VITALS
TEMPERATURE: 97.6 F | HEIGHT: 63 IN | WEIGHT: 229 LBS | SYSTOLIC BLOOD PRESSURE: 157 MMHG | BODY MASS INDEX: 40.57 KG/M2 | OXYGEN SATURATION: 97 % | HEART RATE: 98 BPM | DIASTOLIC BLOOD PRESSURE: 67 MMHG

## 2018-12-19 DIAGNOSIS — H61.21 IMPACTED CERUMEN OF RIGHT EAR: Primary | ICD-10-CM

## 2018-12-19 PROCEDURE — 69220 CLEAN OUT MASTOID CAVITY: CPT | Performed by: OTOLARYNGOLOGY

## 2018-12-19 PROCEDURE — G0463 HOSPITAL OUTPT CLINIC VISIT: HCPCS | Performed by: OTOLARYNGOLOGY

## 2018-12-19 ASSESSMENT — ENCOUNTER SYMPTOMS
CONSTITUTIONAL NEGATIVE: 1
SINUS PRESSURE: 0
SORE THROAT: 0
TROUBLE SWALLOWING: 0
RHINORRHEA: 0
CHILLS: 0
FEVER: 0

## 2018-12-19 NOTE — PROGRESS NOTES
"Subjective    CC: Follow-up right ear    HPI: Consuelo Singh is a 73 y.o. female with history of right ear canal wall down mastoidectomy.  She sees me periodically for venous exams.  She was last seen a year ago.  She states that the ear has been feeling fine and has not been having pain or otorrhea.    Past Medical History:   Diagnosis Date   • Blood clot in vein     shani legs   • Diabetes (CMS/HCC) (HCC)     type 2   • Eczema    • Fibromyalgia    • GERD (gastroesophageal reflux disease)    • Heart murmur    • Hyperlipidemia    • Hypertension    • Psoriasis    • Scoliosis        Past Surgical History:   Procedure Laterality Date   • CHOLECYSTECTOMY     • COLONOSCOPY  03/11/2016    5 yr  follow up   • HYSTERECTOMY      With BSO   • OTHER SURGICAL HISTORY      Rt. radical mastoidectomy, rebuilt canal from growth         Current Outpatient Medications:   •  aspirin 325 mg tablet, Take 325 mg by mouth daily.  , Disp: , Rfl:   •  BD INSULIN PEN NEEDLE UF MINI 31 gauge x 3/16\" needle, USE ONE NEEDLE TO INJECT INSULIN FIVE TIMES DAILY, Disp: 100 each, Rfl: 2  •  insulin glargine U-300 conc (TOUJEO MAX U-300 SOLOSTAR) 300 unit/mL (3 mL) insulin pen, Inject 120 Units under the skin daily., Disp: 12 Syringe, Rfl: 3  •  losartan-hydrochlorothiazide (HYZAAR) 100-25 mg per tablet, Take 1 tablet by mouth daily., Disp: 90 tablet, Rfl: 3  •  meloxicam (MOBIC) 15 mg tablet, Take 1 tablet (15 mg total) by mouth daily., Disp: 30 tablet, Rfl: 2  •  metoprolol succinate XL (TOPROL-XL) 50 mg 24 hr tablet, Take 1 tablet (50 mg total) by mouth daily., Disp: 30 tablet, Rfl: 11  •  nortriptyline (PAMELOR) 50 mg capsule, TAKE ONE CAPSULE BY MOUTH ONCE DAILY AS DIRECTED, Disp: 90 capsule, Rfl: 3  •  NOVOLOG FLEXPEN U-100 INSULIN 100 unit/mL insulin pen, INJECT 5 TO 7 UNIT SUBCUTANEOUSLY IN THE MORNING, 10 TO 12 UNITS AT NOON, AND 15 TO 17 UNITS IN THE EVENING., Disp: 10 pen, Rfl: 2  •  omeprazole (PriLOSEC) 40 mg capsule, TAKE ONE CAPSULE BY " "MOUTH ONCE DAILY, Disp: 90 capsule, Rfl: 1  •  ONETOUCH ULTRA TEST strip, USE ONE STRIP TO CHECK GLUCOSE THREE TIMES DAILY, Disp: 300 strip, Rfl: 2  •  pen needle, diabetic (RELION NEEDLES) 31 gauge x 1/4\" needle, Inject 1 each under the skin 3 (three) times a day., Disp: 1000 each, Rfl: 11    Allergies   Allergen Reactions   • Aloe Vera    • Bacitracin      ZINC   • Erythromycin Base    • Formaldehyde    • Gramicidins      OPHTHALMIC   • Hydrocortisone      ACETATE   • Latex    • Levofloxacin    • Metformin    • Neomycin    • Neomycin-Bacitracnzn-Polymyxnb    • Polymyxin B    • Prednisolone    • Prednisone    • Statins-Hmg-Coa Reductase Inhibitors    • Sulfamethoxazole    • Sulfamethoxazole-Trimethoprim    • Thimerosal    • Tresiba Flextouch U-100 [Insulin Degludec] Diarrhea and Other (see comments)     Abdominal pain   • Trimethoprim        family history includes Bone cancer in her maternal grandfather; Breast cancer in her mother; Lung cancer in her maternal grandmother.    Social History     Tobacco Use   • Smoking status: Former Smoker     Packs/day: 0.50     Years: 40.00     Pack years: 20.00     Last attempt to quit:      Years since quittin.0   • Smokeless tobacco: Former User   Substance Use Topics   • Alcohol use: Yes     Comment: Occasionally; 1-2 glasses monthly   • Drug use: No       Review of Systems   Constitutional: Negative.  Negative for chills and fever.   HENT: Negative.  Negative for congestion, ear discharge, ear pain, hearing loss, nosebleeds, postnasal drip, rhinorrhea, sinus pressure, sneezing, sore throat, tinnitus and trouble swallowing.        Objective    /67 (BP Location: Left arm, Patient Position: Sitting, Cuff Size: Large)   Pulse 98   Temp 36.4 °C (97.6 °F) (Temporal)   Ht 1.6 m (5' 3\")   Wt 104 kg (229 lb)   SpO2 97%   BMI 40.57 kg/m²     General: Well-developed well-nourished female in no acute distress.    Ears: Left external ear, external auditory canal and " tympanic membrane normal.  Right external ear status post canal wall down mastoidectomy with some cerumen and debris present.    Procedure: Mastoid debridement, simple.  The right external auditory canal and mastoid cavity were examined with the operating microscope.  Cerumen and debris were removed with suction and curette.  Forceps were used as well.  Underlying structures appear normal.    Diagnosis    1. Impacted cerumen of right ear        Recommendations  Patient doing well.  Right mastoid debridement done today.  Follow-up as needed in 6-12 months.

## 2019-01-08 DIAGNOSIS — E11.9 DIABETES MELLITUS WITHOUT COMPLICATION (CMS/HCC): ICD-10-CM

## 2019-01-10 ENCOUNTER — OFFICE VISIT (OUTPATIENT)
Dept: DERMATOLOGY | Facility: CLINIC | Age: 74
End: 2019-01-10
Attending: DERMATOLOGY
Payer: MEDICARE

## 2019-01-10 VITALS
BODY MASS INDEX: 40.59 KG/M2 | HEART RATE: 94 BPM | DIASTOLIC BLOOD PRESSURE: 68 MMHG | HEIGHT: 63 IN | WEIGHT: 229.06 LBS | SYSTOLIC BLOOD PRESSURE: 136 MMHG

## 2019-01-10 DIAGNOSIS — L01.00 IMPETIGO: ICD-10-CM

## 2019-01-10 DIAGNOSIS — R21 RASH: Primary | ICD-10-CM

## 2019-01-10 PROCEDURE — 99213 OFFICE O/P EST LOW 20 MIN: CPT | Mod: 25 | Performed by: DERMATOLOGY

## 2019-01-10 PROCEDURE — 88305 TISSUE EXAM BY PATHOLOGIST: CPT | Mod: PO | Performed by: DERMATOLOGY

## 2019-01-10 PROCEDURE — 88312 SPECIAL STAINS GROUP 1: CPT | Mod: PO | Performed by: DERMATOLOGY

## 2019-01-10 PROCEDURE — 11104 PUNCH BX SKIN SINGLE LESION: CPT | Mod: PO | Performed by: DERMATOLOGY

## 2019-01-10 PROCEDURE — G0463 HOSPITAL OUTPT CLINIC VISIT: HCPCS | Mod: PO | Performed by: DERMATOLOGY

## 2019-01-10 RX ORDER — CLOBETASOL PROPIONATE 0.5 MG/G
1 OINTMENT TOPICAL 2 TIMES DAILY PRN
Qty: 60 G | Refills: 2 | Status: SHIPPED | OUTPATIENT
Start: 2019-01-10 | End: 2020-11-18 | Stop reason: SDUPTHER

## 2019-01-10 RX ORDER — CEPHALEXIN 500 MG/1
500 CAPSULE ORAL 3 TIMES DAILY
Qty: 21 CAPSULE | Refills: 0 | Status: SHIPPED | OUTPATIENT
Start: 2019-01-10 | End: 2019-01-17

## 2019-01-10 RX ORDER — VERAPAMIL HYDROCHLORIDE 180 MG/1
TABLET, EXTENDED RELEASE ORAL
COMMUNITY
Start: 2018-11-10 | End: 2019-02-15 | Stop reason: ENTERED-IN-ERROR

## 2019-01-10 NOTE — PROGRESS NOTES
Consuelo presents for lesion on lower legs and right hand. She states she has had these before and it came back a few months ago.  She reports she has had eczema as long as she can remember. She doesn't respond to creams very well.

## 2019-01-10 NOTE — PROGRESS NOTES
Lesion Biopsy  Date/Time: 1/10/2019 2:41 PM  Performed by: Mor Rose DO      Consent  Verbal consent was obtained from the patient. Written consent was not obtained from the patient. Risks, benefits, and alternatives were discussed. Consent given by patient. The patient states understanding of the procedure being performed. Patient ID confirmed verbally with patient and provided demographic data.         Biopsy 1    Location Details  Body area: lower extremity  Site: L lower leg      Preparation Details  Adequate anesthesia is achieved using lidocaine 1% with epinephrine in a local infiltration method. Area cleaned and prepped with Alcohol.     Procedure Details  Punch biopsy completed with 3mm diameter punch. Hemostasis obtained with sutures. Skin wound edges were approximated and closed using 4-0 Ethilon. A total of 2 sutures were placed in a simple interrupted fashion.                     Post-Procedure  Specimen was sent to Pathology. Non-adherent dressing (vaseline and hypafix) applied for dressing. Patient tolerated the procedure well with no complications.     Procedure Comments  Specimen ID: [id number]  Specimen location: L) Lower Leg  R/O: eczema vs. psoriasis

## 2019-01-10 NOTE — PROGRESS NOTES
HPI     The patient presents today for evaluation of a pruritic eruption on her lower extremities.  She has had similar rashes in the past for the past 15 years.  We have previously seen her in the office for the same though it was milder at that time.  The working diagnosis was psoriasis.  The patient states she did not respond to our previous therapies which included betamethasone and Dovonex.  She has no arthralgias or nail disease.  She reports some pain of the lesions on the right ankle and leg.        Patient problems have been reviewed and documented as below:  Patient Active Problem List   Diagnosis   • Diverticular disease   • Eczema   • Essential hypertension   • Gastroesophageal reflux disease   • Morbid obesity (CMS/[facility]) ([facility])   • Pure hypercholesterolemia   • Type 2 diabetes mellitus with diabetic nephropathy, with long-term current use of insulin (CMS/[facility]) ([facility])   • Chronic constipation   • Functional diarrhea   • Chronic pain of right knee       Patient's social and family history have been reviewed and documented as below:  Social History     Socioeconomic History   • Marital status:      Spouse name: Not on file   • Number of children: Not on file   • Years of education: Not on file   • Highest education level: Not on file   Social Needs   • Financial resource strain: Not on file   • Food insecurity - worry: Not on file   • Food insecurity - inability: Not on file   • Transportation needs - medical: Not on file   • Transportation needs - non-medical: Not on file   Occupational History   • Not on file   Tobacco Use   • Smoking status: Former Smoker     Packs/day: 0.50     Years: 40.00     Pack years: 20.00     Last attempt to quit:      Years since quittin.0   • Smokeless tobacco: Former User   Substance and Sexual Activity   • Alcohol use: Yes     Comment: Occasionally; 1-2 glasses monthly   • Drug use: No   • Sexual activity: Defer   Other Topics Concern   • Not on file   Social  "History Narrative   • Not on file     Family History   Problem Relation Age of Onset   • Breast cancer Mother    • Lung cancer Maternal Grandmother    • Bone cancer Maternal Grandfather        Pertinent positive review of systems are listed below and are otherwise reviewed as negative from HPI:  Review of Systems    All allergies have been reviewed and documented as below:  Allergies   Allergen Reactions   • Aloe Vera    • Bacitracin      ZINC   • Erythromycin Base    • Formaldehyde    • Gramicidins      OPHTHALMIC   • Hydrocortisone      ACETATE   • Latex    • Levofloxacin    • Metformin    • Neomycin    • Neomycin-Bacitracnzn-Polymyxnb    • Polymyxin B    • Prednisolone    • Prednisone    • Statins-Hmg-Coa Reductase Inhibitors    • Sulfamethoxazole    • Sulfamethoxazole-Trimethoprim    • Thimerosal    • Tresiba Flextouch U-100 [Insulin Degludec] Diarrhea and Other (see comments)     Abdominal pain   • Trimethoprim        All medications have been reviewed and documented as currently taking as below:    Current Outpatient Medications:   •  aspirin 325 mg tablet, Take 325 mg by mouth daily.  , Disp: , Rfl:   •  BD INSULIN PEN NEEDLE UF MINI 31 gauge x 3/16\" needle, USE ONE NEEDLE TO INJECT INSULIN FIVE TIMES DAILY, Disp: 100 each, Rfl: 2  •  blood sugar diagnostic (ONETOUCH ULTRA TEST) strip, Use one strip to check glucose TID, Disp: 300 strip, Rfl: 3  •  insulin glargine U-300 conc (TOUJEO MAX U-300 SOLOSTAR) 300 unit/mL (3 mL) insulin pen, Inject 120 Units under the skin daily., Disp: 12 Syringe, Rfl: 3  •  losartan-hydrochlorothiazide (HYZAAR) 100-25 mg per tablet, Take 1 tablet by mouth daily., Disp: 90 tablet, Rfl: 3  •  meloxicam (MOBIC) 15 mg tablet, Take 1 tablet (15 mg total) by mouth daily., Disp: 30 tablet, Rfl: 2  •  metoprolol succinate XL (TOPROL-XL) 50 mg 24 hr tablet, Take 1 tablet (50 mg total) by mouth daily., Disp: 30 tablet, Rfl: 11  •  nortriptyline (PAMELOR) 50 mg capsule, TAKE ONE CAPSULE BY " "MOUTH ONCE DAILY AS DIRECTED, Disp: 90 capsule, Rfl: 3  •  NOVOLOG FLEXPEN U-100 INSULIN 100 unit/mL insulin pen, INJECT 5 TO 7 UNIT SUBCUTANEOUSLY IN THE MORNING, 10 TO 12 UNITS AT NOON, AND 15 TO 17 UNITS IN THE EVENING., Disp: 10 pen, Rfl: 2  •  omeprazole (PriLOSEC) 40 mg capsule, TAKE ONE CAPSULE BY MOUTH ONCE DAILY, Disp: 90 capsule, Rfl: 1  •  pen needle, diabetic (RELION NEEDLES) 31 gauge x 1/4\" needle, Inject 1 each under the skin 3 (three) times a day., Disp: 1000 each, Rfl: 11  •  verapamil SR (CALAN-SR) 180 mg CR tablet, , Disp: , Rfl:     Objective     Vital signs have been reviewed and documented as below:  Vitals:    01/10/19 1409   BP: 136/68   BP Location: Right arm   Patient Position: Sitting   Cuff Size: Regular   Pulse: 94   Weight: 104 kg (229 lb 0.9 oz)   Height: 1.6 m (5' 2.99\")       Physical Exam:  ----------------------  Constitutional: General Appearance: healthy-appearing, well-nourished, and well-developed. Level of Distress: NAD. Ambulation: ambulating normally.  Psychiatric: Insight: good judgement. Mental Status: normal mood and affect and active and alert. Orientation: to time, place, and person. Memory: recent memory normal and remote memory normal.  Head: normocephalic and atraumatic.  Eyes: Lids and Conjunctivae: no discharge or pallor and non-injected. Lens: clear. Sclerae: non-icteric.  Neurologic: Gait and Station: normal gait and station. Cranial Nerves: grossly intact. Coordination and Cerebellum: no tremor.  Skin: a cutaneous examination was performed including: left upper extremity and lower extremity; right upper extremity and lower extremity; and scalp, face, ears, neck, chest, abdomen, and back, and were found to be within normal limits with the exceptions of the findings listed below:  Skin: Nummular pink plaques with scale on the bilateral anterior legs and dorsal hands.  No nail abnormalities    Procedures   Procedures  Derm Physical Exam    Assessment and Plan   1. " Rash  2. Impetigo  Nummular eruption with secondary impetiginization on the bilateral lower extremities.  The patient has a prior history of psoriasis though clinically this appears to be more of an impetiginized nummular eczema at this point.  She has never had a biopsy of these lesions.  Recommend 3 mm punch biopsy today with further management pending biopsy review.  In the interim she will start clobetasol ointment twice daily as well as Keflex 500 mg p.o. 3 times daily times 7 days.  We will see her back in the office in 4 weeks, sooner as needed.

## 2019-01-28 ENCOUNTER — CLINICAL SUPPORT (OUTPATIENT)
Dept: DERMATOLOGY | Facility: CLINIC | Age: 74
End: 2019-01-28
Attending: DERMATOLOGY
Payer: MEDICARE

## 2019-01-28 DIAGNOSIS — Z48.02 ENCOUNTER FOR REMOVAL OF SUTURES: Primary | ICD-10-CM

## 2019-01-28 PROCEDURE — 99024 POSTOP FOLLOW-UP VISIT: CPT | Performed by: DERMATOLOGY

## 2019-01-28 NOTE — PROGRESS NOTES
Pt presents for suture removal following biopsy  Suture/ Staple removal  Date/Time: 1/28/2019 3:58 PM  Performed by: Rachael Mckeon RN  Authorized by: Mor Rose DO     Consent:     Consent obtained:  Verbal    Consent given by:  Patient    Alternatives discussed:  No treatment  Location:     Location:  Lower extremity    Lower extremity location:  Leg    Leg location:  L lower leg  Procedure details:     Wound appearance:  No signs of infection  Post-procedure details:     Post-removal:  No dressing applied    Patient tolerance of procedure:  Tolerated well, no immediate complications

## 2019-02-07 ENCOUNTER — OFFICE VISIT (OUTPATIENT)
Dept: DERMATOLOGY | Facility: CLINIC | Age: 74
End: 2019-02-07
Attending: DERMATOLOGY
Payer: MEDICARE

## 2019-02-07 VITALS
BODY MASS INDEX: 40.57 KG/M2 | WEIGHT: 229 LBS | HEIGHT: 63 IN | HEART RATE: 93 BPM | DIASTOLIC BLOOD PRESSURE: 78 MMHG | SYSTOLIC BLOOD PRESSURE: 168 MMHG

## 2019-02-07 DIAGNOSIS — L30.0 NUMMULAR ECZEMATOUS DERMATITIS: Primary | ICD-10-CM

## 2019-02-07 PROCEDURE — 99212 OFFICE O/P EST SF 10 MIN: CPT | Performed by: DERMATOLOGY

## 2019-02-07 PROCEDURE — G0463 HOSPITAL OUTPT CLINIC VISIT: HCPCS | Mod: PO | Performed by: DERMATOLOGY

## 2019-02-07 RX ORDER — LOSARTAN POTASSIUM 100 MG/1
TABLET ORAL
COMMUNITY
Start: 2019-02-04 | End: 2019-02-15 | Stop reason: ENTERED-IN-ERROR

## 2019-02-07 RX ORDER — HYDROCHLOROTHIAZIDE 25 MG/1
TABLET ORAL
COMMUNITY
Start: 2019-02-04 | End: 2019-02-15 | Stop reason: ENTERED-IN-ERROR

## 2019-02-07 ASSESSMENT — PAIN SCALES - GENERAL: PAINLEVEL: 0-NO PAIN

## 2019-02-07 NOTE — PROGRESS NOTES
Consuelo presents today for a 4 week recheck of a pruritic eruption on her lower extremities.  Most current treatment was clobetasol ointment twice daily as well as Keflex 500 mg p.o. 3 times daily times 7 days.  Skin punch, left lower leg (CCG19/0025) on 1/10/19 showed spongiotic dermatitis. After pt had sutures removed she went to using clobetasol once a day. She wasn't sure how long she should use medication.  She states her legs and had hand are much better.

## 2019-02-07 NOTE — PROGRESS NOTES
HPI     No chief complaint on file.      Consuelo presents today for a 4 week recheck of a pruritic eruption on her lower extremities.  Most current treatment was clobetasol ointment twice daily as well as Keflex 500 mg p.o. 3 times daily times 7 days.  Skin punch, left lower leg (CCG19/0025) on 1/10/19 showed spongiotic dermatitis. After pt had sutures removed she went to using clobetasol once a day. She wasn't sure how long she should use medication.  She states her legs and had hand are much better.          Patient problems have been reviewed and documented as below:  Patient Active Problem List   Diagnosis   • Diverticular disease   • Eczema   • Essential hypertension   • Gastroesophageal reflux disease   • Morbid obesity (CMS/[facility]) ([facility])   • Pure hypercholesterolemia   • Type 2 diabetes mellitus with diabetic nephropathy, with long-term current use of insulin (CMS/[facility]) ([facility])   • Chronic constipation   • Functional diarrhea   • Chronic pain of right knee       Patient's social and family history have been reviewed and documented as below:  Social History     Socioeconomic History   • Marital status:      Spouse name: Not on file   • Number of children: Not on file   • Years of education: Not on file   • Highest education level: Not on file   Social Needs   • Financial resource strain: Not on file   • Food insecurity - worry: Not on file   • Food insecurity - inability: Not on file   • Transportation needs - medical: Not on file   • Transportation needs - non-medical: Not on file   Occupational History   • Not on file   Tobacco Use   • Smoking status: Former Smoker     Packs/day: 0.50     Years: 40.00     Pack years: 20.00     Last attempt to quit:      Years since quittin.1   • Smokeless tobacco: Former User   Substance and Sexual Activity   • Alcohol use: Yes     Comment: Occasionally; 1-2 glasses monthly   • Drug use: No   • Sexual activity: Defer   Other Topics Concern   • Not on file   Social  "History Narrative   • Not on file     Family History   Problem Relation Age of Onset   • Breast cancer Mother    • Lung cancer Maternal Grandmother    • Bone cancer Maternal Grandfather        Pertinent positive review of systems are listed below and are otherwise reviewed as negative from HPI:  Review of Systems   All other systems reviewed and are negative.      All allergies have been reviewed and documented as below:  Allergies   Allergen Reactions   • Aloe Vera    • Bacitracin      ZINC   • Erythromycin Base    • Formaldehyde    • Gramicidins      OPHTHALMIC   • Hydrocortisone      ACETATE   • Latex    • Levofloxacin    • Metformin    • Neomycin    • Neomycin-Bacitracnzn-Polymyxnb    • Polymyxin B    • Prednisolone    • Prednisone    • Statins-Hmg-Coa Reductase Inhibitors    • Sulfamethoxazole    • Sulfamethoxazole-Trimethoprim    • Thimerosal    • Tresiba Flextouch U-100 [Insulin Degludec] Diarrhea and Other (see comments)     Abdominal pain   • Trimethoprim        All medications have been reviewed and documented as currently taking as below:    Current Outpatient Medications:   •  aspirin 325 mg tablet, Take 325 mg by mouth daily.  , Disp: , Rfl:   •  BD INSULIN PEN NEEDLE UF MINI 31 gauge x 3/16\" needle, USE ONE NEEDLE TO INJECT INSULIN FIVE TIMES DAILY, Disp: 100 each, Rfl: 2  •  blood sugar diagnostic (ONETOUCH ULTRA TEST) strip, Use one strip to check glucose TID, Disp: 300 strip, Rfl: 3  •  clobetasol (TEMOVATE) 0.05 % ointment, Apply 1 application topically 2 (two) times a day as needed (Rash), Disp: 60 g, Rfl: 2  •  hydroCHLOROthiazide (HYDRODIURIL) 25 mg tablet, , Disp: , Rfl:   •  insulin glargine U-300 conc (TOUJEO MAX U-300 SOLOSTAR) 300 unit/mL (3 mL) insulin pen, Inject 120 Units under the skin daily., Disp: 12 Syringe, Rfl: 3  •  losartan (COZAAR) 100 mg tablet, , Disp: , Rfl:   •  losartan-hydrochlorothiazide (HYZAAR) 100-25 mg per tablet, Take 1 tablet by mouth daily., Disp: 90 tablet, Rfl: " "3  •  meloxicam (MOBIC) 15 mg tablet, Take 1 tablet (15 mg total) by mouth daily., Disp: 30 tablet, Rfl: 2  •  metoprolol succinate XL (TOPROL-XL) 50 mg 24 hr tablet, Take 1 tablet (50 mg total) by mouth daily., Disp: 30 tablet, Rfl: 11  •  nortriptyline (PAMELOR) 50 mg capsule, TAKE ONE CAPSULE BY MOUTH ONCE DAILY AS DIRECTED, Disp: 90 capsule, Rfl: 3  •  NOVOLOG FLEXPEN U-100 INSULIN 100 unit/mL insulin pen, INJECT 5 TO 7 UNIT SUBCUTANEOUSLY IN THE MORNING, 10 TO 12 UNITS AT NOON, AND 15 TO 17 UNITS IN THE EVENING., Disp: 10 pen, Rfl: 2  •  omeprazole (PriLOSEC) 40 mg capsule, TAKE ONE CAPSULE BY MOUTH ONCE DAILY, Disp: 90 capsule, Rfl: 1  •  pen needle, diabetic (RELION NEEDLES) 31 gauge x 1/4\" needle, Inject 1 each under the skin 3 (three) times a day., Disp: 1000 each, Rfl: 11  •  verapamil SR (CALAN-SR) 180 mg CR tablet, , Disp: , Rfl:     Objective     Vital signs have been reviewed and documented as below:  Vitals:    02/07/19 0701   BP: 168/78   BP Location: Left arm   Patient Position: Sitting   Cuff Size: Regular   Pulse: 93   Weight: 104 kg (229 lb)   Height: 1.6 m (5' 2.99\")       Physical Exam:  ----------------------  Constitutional: General Appearance: healthy-appearing, well-nourished, and well-developed. Level of Distress: NAD. Ambulation: ambulating normally.  Psychiatric: Insight: good judgement. Mental Status: normal mood and affect and active and alert. Orientation: to time, place, and person. Memory: recent memory normal and remote memory normal.  Head: normocephalic and atraumatic.  Eyes: Lids and Conjunctivae: no discharge or pallor and non-injected. Lens: clear. Sclerae: non-icteric.  Neurologic: Gait and Station: normal gait and station. Cranial Nerves: grossly intact. Coordination and Cerebellum: no tremor.  Skin: a cutaneous examination was performed including: left upper extremity and lower extremity; right upper extremity and lower extremity; and scalp, face, ears, neck, chest, " abdomen, and back, and were found to be within normal limits with the exceptions of the findings listed below:  Skin: Nummular Pink patches on right dorsal hand and anterior lower extremities bilaterally.  Thin pink plaque on the right lateral ankle    Procedures   Procedures  Derm Physical Exam    Assessment and Plan   1. Nummular eczematous dermatitis  -Biopsy demonstrated spongiotic dermatitis  -Excellent response to clobetasol and dry skin care measures.  Nearly resolved today.  There is a single thin plaque on the right lateral ankle.  Otherwise there is just some residual erythema on the bilateral lower extremities and right dorsal hand  -Stressed importance of continued dry skin care measures and liberal bland emollients.  The patient states she typically experiences burning with creams.  I discussed with her that this is a component of her eczema.  This should improve with improvement in her eczema.  Recommend petrolatum ointment, Vanicream or Aquaphor as an emollient.  -Continued clobetasol ointment daily to twice daily as needed  -Recommend reevaluation in 1 year, sooner as needed

## 2019-02-13 ENCOUNTER — APPOINTMENT (OUTPATIENT)
Dept: LAB | Facility: CLINIC | Age: 74
End: 2019-02-13
Payer: MEDICARE

## 2019-02-13 DIAGNOSIS — Z79.4 TYPE 2 DIABETES MELLITUS WITH DIABETIC NEPHROPATHY, WITH LONG-TERM CURRENT USE OF INSULIN (CMS/HCC): ICD-10-CM

## 2019-02-13 DIAGNOSIS — K21.9 CHRONIC GERD: ICD-10-CM

## 2019-02-13 DIAGNOSIS — E11.21 TYPE 2 DIABETES MELLITUS WITH DIABETIC NEPHROPATHY, WITH LONG-TERM CURRENT USE OF INSULIN (CMS/HCC): ICD-10-CM

## 2019-02-13 LAB
ALBUMIN SERPL-MCNC: 4.2 G/DL (ref 3.5–5.3)
ALP SERPL-CCNC: 126 U/L (ref 55–142)
ALT SERPL-CCNC: 16 U/L (ref 0–52)
ANION GAP SERPL CALC-SCNC: 8 MMOL/L (ref 3–11)
AST SERPL-CCNC: 13 U/L (ref 0–39)
BILIRUB SERPL-MCNC: 0.47 MG/DL (ref 0–1.4)
BUN SERPL-MCNC: 21 MG/DL (ref 7–25)
CALCIUM ALBUM COR SERPL-MCNC: 9.4 MG/DL (ref 8.6–10.3)
CALCIUM SERPL-MCNC: 9.6 MG/DL (ref 8.6–10.3)
CHLORIDE SERPL-SCNC: 101 MMOL/L (ref 98–107)
CHOLEST SERPL-MCNC: 221 MG/DL (ref 0–199)
CO2 SERPL-SCNC: 28 MMOL/L (ref 21–32)
CREAT SERPL-MCNC: 1.1 MG/DL (ref 0.6–1.2)
CREAT UR-MCNC: 80.8 MG/DL
EST. AVERAGE GLUCOSE BLD GHB EST-MCNC: 171.4 MG/DL
FASTING STATUS PATIENT QL REPORTED: YES
GFR SERPL CREATININE-BSD FRML MDRD: 50 ML/MIN/1.73M*2
GLUCOSE SERPL-MCNC: 156 MG/DL (ref 70–105)
HBA1C MFR BLD: 7.6 % (ref 4–6)
HDLC SERPL-MCNC: 60 MG/DL
LDLC SERPL CALC-MCNC: 117 MG/DL (ref 20–99)
MICROALBUMIN UR-MCNC: 66.5 MG/L (ref 0–30)
MICROALBUMIN/CREAT UR: 82.3 MG/G CREAT (ref 0–34)
POTASSIUM SERPL-SCNC: 4.2 MMOL/L (ref 3.5–5.1)
PROT SERPL-MCNC: 7 G/DL (ref 6–8.3)
SODIUM SERPL-SCNC: 137 MMOL/L (ref 135–145)
TRIGL SERPL-MCNC: 218 MG/DL

## 2019-02-13 PROCEDURE — 80061 LIPID PANEL: CPT | Mod: PO

## 2019-02-13 PROCEDURE — 80053 COMPREHEN METABOLIC PANEL: CPT | Mod: PO

## 2019-02-13 PROCEDURE — 36415 COLL VENOUS BLD VENIPUNCTURE: CPT | Mod: PO

## 2019-02-13 PROCEDURE — 82570 ASSAY OF URINE CREATININE: CPT | Mod: PO

## 2019-02-13 PROCEDURE — 83036 HEMOGLOBIN GLYCOSYLATED A1C: CPT | Mod: PO

## 2019-02-13 RX ORDER — OMEPRAZOLE 40 MG/1
CAPSULE, DELAYED RELEASE ORAL
Qty: 90 CAPSULE | Refills: 1 | Status: SHIPPED | OUTPATIENT
Start: 2019-02-13 | End: 2019-08-14 | Stop reason: SDUPTHER

## 2019-02-15 ENCOUNTER — OFFICE VISIT (OUTPATIENT)
Dept: INTERNAL MEDICINE | Facility: CLINIC | Age: 74
End: 2019-02-15
Payer: MEDICARE

## 2019-02-15 VITALS
BODY MASS INDEX: 41.11 KG/M2 | WEIGHT: 232 LBS | HEIGHT: 63 IN | DIASTOLIC BLOOD PRESSURE: 86 MMHG | OXYGEN SATURATION: 96 % | HEART RATE: 72 BPM | SYSTOLIC BLOOD PRESSURE: 142 MMHG

## 2019-02-15 DIAGNOSIS — Z79.4 TYPE 2 DIABETES MELLITUS WITH DIABETIC NEPHROPATHY, WITH LONG-TERM CURRENT USE OF INSULIN (CMS/HCC): Primary | ICD-10-CM

## 2019-02-15 DIAGNOSIS — E11.21 TYPE 2 DIABETES MELLITUS WITH DIABETIC NEPHROPATHY, WITH LONG-TERM CURRENT USE OF INSULIN (CMS/HCC): Primary | ICD-10-CM

## 2019-02-15 DIAGNOSIS — I10 ESSENTIAL HYPERTENSION: ICD-10-CM

## 2019-02-15 DIAGNOSIS — E78.00 PURE HYPERCHOLESTEROLEMIA: ICD-10-CM

## 2019-02-15 PROCEDURE — G0463 HOSPITAL OUTPT CLINIC VISIT: HCPCS | Mod: PO | Performed by: INTERNAL MEDICINE

## 2019-02-15 PROCEDURE — 99213 OFFICE O/P EST LOW 20 MIN: CPT | Performed by: INTERNAL MEDICINE

## 2019-02-15 RX ORDER — PRAVASTATIN SODIUM 20 MG/1
TABLET ORAL
Qty: 30 TABLET | Refills: 0 | Status: SHIPPED | OUTPATIENT
Start: 2019-02-15 | End: 2019-04-16 | Stop reason: SDUPTHER

## 2019-02-15 ASSESSMENT — PAIN SCALES - GENERAL: PAINLEVEL: 0-NO PAIN

## 2019-02-15 NOTE — PROGRESS NOTES
"Internal Medicine Progress Note    Chief Complaint:   Chief Complaint   Patient presents with   • Diabetes     Follow up with lab review   • Hypertension         HPI: Patient is a 73 y.o. female in for follow-up on her type II insulin requiring diabetes.  Patient freely admits to being off her diet and she just recently within the last 2 weeks is started a exercise program with a .    Review of Systems      Current Outpatient Medications:   •  aspirin 325 mg tablet, Take 325 mg by mouth daily.  , Disp: , Rfl:   •  BD INSULIN PEN NEEDLE UF MINI 31 gauge x 3/16\" needle, USE ONE NEEDLE TO INJECT INSULIN FIVE TIMES DAILY, Disp: 100 each, Rfl: 2  •  blood sugar diagnostic (ONETOUCH ULTRA TEST) strip, Use one strip to check glucose TID, Disp: 300 strip, Rfl: 3  •  clobetasol (TEMOVATE) 0.05 % ointment, Apply 1 application topically 2 (two) times a day as needed (Rash), Disp: 60 g, Rfl: 2  •  insulin glargine U-300 conc (TOUJEO MAX U-300 SOLOSTAR) 300 unit/mL (3 mL) insulin pen, Inject 120 Units under the skin daily., Disp: 12 Syringe, Rfl: 3  •  losartan-hydrochlorothiazide (HYZAAR) 100-25 mg per tablet, Take 1 tablet by mouth daily., Disp: 90 tablet, Rfl: 3  •  meloxicam (MOBIC) 15 mg tablet, Take 1 tablet (15 mg total) by mouth daily., Disp: 30 tablet, Rfl: 2  •  metoprolol succinate XL (TOPROL-XL) 50 mg 24 hr tablet, Take 1 tablet (50 mg total) by mouth daily., Disp: 30 tablet, Rfl: 11  •  nortriptyline (PAMELOR) 50 mg capsule, TAKE ONE CAPSULE BY MOUTH ONCE DAILY AS DIRECTED, Disp: 90 capsule, Rfl: 3  •  NOVOLOG FLEXPEN U-100 INSULIN 100 unit/mL insulin pen, INJECT 5 TO 7 UNIT SUBCUTANEOUSLY IN THE MORNING, 10 TO 12 UNITS AT NOON, AND 15 TO 17 UNITS IN THE EVENING. (Patient taking differently: INJECT 5 TO 9 UNIT SUBCUTANEOUSLY IN THE MORNING, 10 TO 12 UNITS AT NOON, AND 15 TO 17 UNITS IN THE EVENING.), Disp: 10 pen, Rfl: 2  •  omeprazole (PriLOSEC) 40 mg capsule, TAKE 1 CAPSULE BY MOUTH ONCE DAILY, Disp: 90 " "capsule, Rfl: 1  •  pen needle, diabetic (RELION NEEDLES) 31 gauge x 1/4\" needle, Inject 1 each under the skin 3 (three) times a day., Disp: 1000 each, Rfl: 11  •  pravastatin (PRAVACHOL) 20 mg tablet, Take on M-W-F, Disp: 30 tablet, Rfl: 0    Allergies:   Allergies   Allergen Reactions   • Aloe Vera    • Bacitracin      ZINC   • Erythromycin Base    • Formaldehyde    • Gramicidins      OPHTHALMIC   • Hydrocortisone      ACETATE   • Latex    • Levofloxacin    • Metformin    • Neomycin    • Neomycin-Bacitracnzn-Polymyxnb    • Polymyxin B    • Prednisolone    • Prednisone    • Statins-Hmg-Coa Reductase Inhibitors    • Sulfamethoxazole    • Sulfamethoxazole-Trimethoprim    • Thimerosal    • Tresiba Flextouch U-100 [Insulin Degludec] Diarrhea and Other (see comments)     Abdominal pain   • Trimethoprim          Labs:   Recent Results (from the past 336 hour(s))   Comprehensive metabolic panel Blood, Venous    Collection Time: 02/13/19  7:05 AM   Result Value Ref Range    Sodium 137 135 - 145 mmol/L    Potassium 4.2 3.5 - 5.1 mmol/L    Chloride 101 98 - 107 mmol/L    CO2 28 21 - 32 mmol/L    Anion Gap 8 3 - 11 mmol/L    BUN 21 7 - 25 mg/dL    Creatinine 1.10 0.60 - 1.20 mg/dL    Glucose 156 (H) 70 - 105 mg/dL    Calcium 9.6 8.6 - 10.3 mg/dL    AST 13 0 - 39 U/L    ALT (SGPT) 16 0 - 52 U/L    Alkaline Phosphatase 126 55 - 142 U/L    Total Protein 7.0 6.0 - 8.3 g/dL    Albumin 4.2 3.5 - 5.3 g/dL    Total Bilirubin 0.47 0.00 - 1.40 mg/dL    eGFR 50 (L) >60 mL/min/1.73m*2    Corrected Calcium 9.4 8.6 - 10.3 mg/dL   Hemoglobin A1c (glycosylated) Blood, Venous    Collection Time: 02/13/19  7:05 AM   Result Value Ref Range    Hemoglobin A1C 7.6 (H) 4.0 - 6.0 %    Estimated Average Glucose 171.4 mg/dL   Lipid panel Blood, Venous    Collection Time: 02/13/19  7:05 AM   Result Value Ref Range    Triglycerides 218 (H) <=149 mg/dL    Cholesterol 221 (H) 0 - 199 mg/dL    HDL 60 >=40 mg/dL    LDL Calculated 117 (H) 20 - 99 mg/dL    " "Fasting? Yes    Urine Albumin/Creatinine Ratio Urine, Clean Catch    Collection Time: 02/13/19  7:05 AM   Result Value Ref Range    Albumin, Urine 66.5 (H) 0.0 - 30.0 mg/L    Albumin/Creat Ratio 82.3 (H) 0.0 - 34.0 mg/g Creat    Creatinine, Ur 80.8 mg/dL         Imaging: No results found.    Vitals: Blood pressure 142/86, pulse 72, height 1.6 m (5' 3\"), weight 105 kg (232 lb), SpO2 96 %.      Physical Exam   Constitutional: She appears well-developed and well-nourished.   Cardiovascular: Normal rate and regular rhythm.   Pulmonary/Chest: Effort normal and breath sounds normal.   Abdominal: Soft. Bowel sounds are normal.   Musculoskeletal: She exhibits no edema.   Nursing note and vitals reviewed.        Plan and Discussion:     Diagnosis Plan   1. Type 2 diabetes mellitus with diabetic nephropathy, with long-term current use of insulin (CMS/[facility]) ([facility])  Comprehensive metabolic panel Blood, Venous    Hemoglobin A1c (glycosylated) Blood, Venous   2. Pure hypercholesterolemia  pravastatin (PRAVACHOL) 20 mg tablet   3. Essential hypertension     Reviewed her labs with her A1c stable but still elevated at 7.6 we have encouraged patient to work with her diet and exercise like she has just started and her numbers should improve.  For her cholesterol the patient's always developed a little bit of a skin rash when she takes a statin.  She is willing to try and go on pravastatin 20 mg just on Monday Wednesday and Friday if she would develop any rash she will discontinue it.  Otherwise she will follow-up in 3 months or sooner if she has issues.      JAMILA MANCERA MD    Date: 2/15/2019  Time: 11:12 AM          "

## 2019-02-22 ENCOUNTER — ANCILLARY PROCEDURE (OUTPATIENT)
Dept: MAMMOGRAPHY | Facility: CLINIC | Age: 74
End: 2019-02-22
Payer: MEDICARE

## 2019-02-22 DIAGNOSIS — Z12.39 BREAST SCREENING: ICD-10-CM

## 2019-02-22 PROCEDURE — 77067 SCR MAMMO BI INCL CAD: CPT | Mod: PO

## 2019-02-22 PROCEDURE — 77063 BREAST TOMOSYNTHESIS BI: CPT | Mod: 26 | Performed by: RADIOLOGY

## 2019-02-22 PROCEDURE — 77067 SCR MAMMO BI INCL CAD: CPT | Mod: 26 | Performed by: RADIOLOGY

## 2019-02-26 ENCOUNTER — OFFICE VISIT (OUTPATIENT)
Dept: URGENT CARE | Facility: CLINIC | Age: 74
End: 2019-02-26
Payer: MEDICARE

## 2019-02-26 VITALS
RESPIRATION RATE: 18 BRPM | OXYGEN SATURATION: 97 % | HEART RATE: 96 BPM | BODY MASS INDEX: 39.5 KG/M2 | WEIGHT: 223 LBS | TEMPERATURE: 98.7 F | DIASTOLIC BLOOD PRESSURE: 88 MMHG | SYSTOLIC BLOOD PRESSURE: 132 MMHG

## 2019-02-26 DIAGNOSIS — R05.9 COUGH: ICD-10-CM

## 2019-02-26 DIAGNOSIS — J06.9 UPPER RESPIRATORY TRACT INFECTION, UNSPECIFIED TYPE: ICD-10-CM

## 2019-02-26 DIAGNOSIS — H66.91 RIGHT OTITIS MEDIA, UNSPECIFIED OTITIS MEDIA TYPE: Primary | ICD-10-CM

## 2019-02-26 PROCEDURE — 99213 OFFICE O/P EST LOW 20 MIN: CPT | Performed by: PHYSICIAN ASSISTANT

## 2019-02-26 PROCEDURE — G0463 HOSPITAL OUTPT CLINIC VISIT: HCPCS | Mod: PO | Performed by: PHYSICIAN ASSISTANT

## 2019-02-26 RX ORDER — AMOXICILLIN 875 MG/1
875 TABLET, FILM COATED ORAL 2 TIMES DAILY
Qty: 20 TABLET | Refills: 0 | Status: SHIPPED | OUTPATIENT
Start: 2019-02-26 | End: 2019-03-08

## 2019-02-26 RX ORDER — BENZONATATE 200 MG/1
200 CAPSULE ORAL 3 TIMES DAILY PRN
Qty: 30 CAPSULE | Refills: 0 | Status: SHIPPED | OUTPATIENT
Start: 2019-02-26 | End: 2019-03-08

## 2019-02-26 ASSESSMENT — ENCOUNTER SYMPTOMS
FATIGUE: 1
WHEEZING: 0
COUGH: 1
FEVER: 0
MYALGIAS: 1
HEADACHES: 1
SHORTNESS OF BREATH: 1
CHILLS: 1
APPETITE CHANGE: 1
CHEST TIGHTNESS: 1
SINUS PRESSURE: 1
VOMITING: 0
DIZZINESS: 0
NAUSEA: 0
SORE THROAT: 1
ABDOMINAL PAIN: 0
NUMBNESS: 0
DIARRHEA: 0

## 2019-02-26 NOTE — PATIENT INSTRUCTIONS
"We will treat for right ear infection and upper respiratory infection.  You are advised to begin Amoxicillin - 1 tablet AM and PM x 10 days.  Take with food to avoid nausea.  Stay well hydrated.      You are advised to begin nasal saline spray - 2 sprays per nostril AM and Pm.  If maxillary sinus pressure, you can follow these with Fluticasone (generic Flonase) - 1 spray per nostril AM and Pm.    For sore throat, you are advised warm, salt water gargles, hot tea with honey, lozenges or you can use tylenol (1-2 tabs) every 4-6 hours if needed.     For cough, you can use benzonatate (Tessalon Perles) - 1 capsule up to 3 times daily as needed for cough.  May cause mild drowsiness.    Close monitoring advised.  Follow up if symptoms worsen:  Fever over 100.4F on medication, increased work of breathing, not staying well hydrated, nausea/vomiting/diarrhea lasting longer than 24 hours.    Patient Education     Upper Respiratory Infection, Adult  Most upper respiratory infections (URIs) are caused by a virus. A URI affects the nose, throat, and upper air passages. The most common type of URI is often called \"the common cold.\"  Follow these instructions at home:  · Take medicines only as told by your doctor.  · Gargle warm saltwater or take cough drops to comfort your throat as told by your doctor.  · Use a warm mist humidifier or inhale steam from a shower to increase air moisture. This may make it easier to breathe.  · Drink enough fluid to keep your pee (urine) clear or pale yellow.  · Eat soups and other clear broths.  · Have a healthy diet.  · Rest as needed.  · Go back to work when your fever is gone or your doctor says it is okay.  ? You may need to stay home longer to avoid giving your URI to others.  ? You can also wear a face mask and wash your hands often to prevent spread of the virus.  · Use your inhaler more if you have asthma.  · Do not use any tobacco products, including cigarettes, chewing tobacco, or " electronic cigarettes. If you need help quitting, ask your doctor.  Contact a doctor if:  · You are getting worse, not better.  · Your symptoms are not helped by medicine.  · You have chills.  · You are getting more short of breath.  · You have brown or red mucus.  · You have yellow or brown discharge from your nose.  · You have pain in your face, especially when you bend forward.  · You have a fever.  · You have puffy (swollen) neck glands.  · You have pain while swallowing.  · You have white areas in the back of your throat.  Get help right away if:  · You have very bad or constant:  ? Headache.  ? Ear pain.  ? Pain in your forehead, behind your eyes, and over your cheekbones (sinus pain).  ? Chest pain.  · You have long-lasting (chronic) lung disease and any of the following:  ? Wheezing.  ? Long-lasting cough.  ? Coughing up blood.  ? A change in your usual mucus.  · You have a stiff neck.  · You have changes in your:  ? Vision.  ? Hearing.  ? Thinking.  ? Mood.  This information is not intended to replace advice given to you by your health care provider. Make sure you discuss any questions you have with your health care provider.  Document Released: 06/05/2009 Document Revised: 08/20/2017 Document Reviewed: 03/25/2015  Elsevier Interactive Patient Education © 2018 [employer]

## 2019-02-26 NOTE — PROGRESS NOTES
"Subjective      Consuelo Singh is a 73 y.o. female who presents for cough and cold symptoms.    Consuelo presents today due to cough, sore throat, and nasal congestion ongoing since Thursday (5 days).  She also states ear pressure.      She did get influenza vaccination this year.  Her  has had similar symptoms ongoing for 1 1/2 weeks.      She did try [facility] with no specific improvement noted.  She is a diabetic and has noted increased fasting AM glucose values since feeling ill.  They have been running between 150 - 190s.  This AM, it was 150.  She generally is in the 120s when not ill.            The following have been reviewed and updated as appropriate in this visit:  Allergies  Meds  Problems         Allergies   Allergen Reactions   • Aloe Vera    • Bacitracin      ZINC   • Erythromycin Base    • Formaldehyde    • Gramicidins      OPHTHALMIC   • Hydrocortisone      ACETATE   • Latex    • Levofloxacin    • Metformin    • Neomycin    • Neomycin-Bacitracnzn-Polymyxnb    • Polymyxin B    • Prednisolone    • Prednisone    • Statins-Hmg-Coa Reductase Inhibitors    • Sulfamethoxazole    • Sulfamethoxazole-Trimethoprim    • Thimerosal    • Tresiba Flextouch U-100 [Insulin Degludec] Diarrhea and Other (see comments)     Abdominal pain   • Trimethoprim      Current Outpatient Medications   Medication Sig Dispense Refill   • aspirin 325 mg tablet Take 325 mg by mouth daily.       • BD INSULIN PEN NEEDLE UF MINI 31 gauge x 3/16\" needle USE ONE NEEDLE TO INJECT INSULIN FIVE TIMES DAILY 100 each 2   • blood sugar diagnostic (ONETOUCH ULTRA TEST) strip Use one strip to check glucose  strip 3   • clobetasol (TEMOVATE) 0.05 % ointment Apply 1 application topically 2 (two) times a day as needed (Rash) 60 g 2   • insulin glargine U-300 conc (TOUJEO MAX U-300 SOLOSTAR) 300 unit/mL (3 mL) insulin pen Inject 120 Units under the skin daily. 12 Syringe 3   • losartan-hydrochlorothiazide (HYZAAR) " "100-25 mg per tablet Take 1 tablet by mouth daily. 90 tablet 3   • meloxicam (MOBIC) 15 mg tablet Take 1 tablet (15 mg total) by mouth daily. 30 tablet 2   • metoprolol succinate XL (TOPROL-XL) 50 mg 24 hr tablet Take 1 tablet (50 mg total) by mouth daily. 30 tablet 11   • nortriptyline (PAMELOR) 50 mg capsule TAKE ONE CAPSULE BY MOUTH ONCE DAILY AS DIRECTED 90 capsule 3   • NOVOLOG FLEXPEN U-100 INSULIN 100 unit/mL insulin pen INJECT 5 TO 7 UNIT SUBCUTANEOUSLY IN THE MORNING, 10 TO 12 UNITS AT NOON, AND 15 TO 17 UNITS IN THE EVENING. (Patient taking differently: INJECT 5 TO 9 UNIT SUBCUTANEOUSLY IN THE MORNING, 10 TO 12 UNITS AT NOON, AND 15 TO 17 UNITS IN THE EVENING.) 10 pen 2   • omeprazole (PriLOSEC) 40 mg capsule TAKE 1 CAPSULE BY MOUTH ONCE DAILY 90 capsule 1   • pen needle, diabetic (RELION NEEDLES) 31 gauge x 1/4\" needle Inject 1 each under the skin 3 (three) times a day. 1000 each 11   • pravastatin (PRAVACHOL) 20 mg tablet Take on M-W-F 30 tablet 0   • amoxicillin (AMOXIL) 875 mg tablet Take 1 tablet (875 mg total) by mouth 2 (two) times a day for 10 days 20 tablet 0   • benzonatate (TESSALON) 200 mg capsule Take 1 capsule (200 mg total) by mouth 3 (three) times a day as needed for cough for up to 10 days 30 capsule 0     No current facility-administered medications for this visit.      Past Medical History:   Diagnosis Date   • Blood clot in vein     shani legs   • Diabetes (CMS/HCC) (HCC)     type 2   • Eczema    • Fibromyalgia    • GERD (gastroesophageal reflux disease)    • Heart murmur    • Hyperlipidemia    • Hypertension    • Psoriasis    • Scoliosis      Past Surgical History:   Procedure Laterality Date   • CHOLECYSTECTOMY     • COLONOSCOPY  03/11/2016    5 yr  follow up   • HYSTERECTOMY      With BSO   • OTHER SURGICAL HISTORY      Rt. radical mastoidectomy, rebuilt canal from growth     Family History   Problem Relation Age of Onset   • Breast cancer Mother    • Lung cancer Maternal Grandmother  "   • Bone cancer Maternal Grandfather      Social History     Socioeconomic History   • Marital status:      Spouse name: Not on file   • Number of children: Not on file   • Years of education: Not on file   • Highest education level: Not on file   Social Needs   • Financial resource strain: Not on file   • Food insecurity - worry: Not on file   • Food insecurity - inability: Not on file   • Transportation needs - medical: Not on file   • Transportation needs - non-medical: Not on file   Occupational History   • Not on file   Tobacco Use   • Smoking status: Former Smoker     Packs/day: 0.50     Years: 40.00     Pack years: 20.00     Last attempt to quit:      Years since quittin.1   • Smokeless tobacco: Former User   Substance and Sexual Activity   • Alcohol use: Yes     Comment: Occasionally; 1-2 glasses monthly   • Drug use: No   • Sexual activity: Defer   Other Topics Concern   • Not on file   Social History Narrative   • Not on file       Review of Systems   Constitutional: Positive for appetite change (decreased yet staying hydrated), chills and fatigue. Negative for fever.   HENT: Positive for congestion, postnasal drip, sinus pressure and sore throat. Negative for ear pain (bilateral itching).    Respiratory: Positive for cough (with clear sputum production), chest tightness (intermittent) and shortness of breath (intermittent). Negative for wheezing.    Gastrointestinal: Negative for abdominal pain, diarrhea, nausea and vomiting.   Musculoskeletal: Positive for myalgias.   Skin: Positive for rash.   Neurological: Positive for headaches. Negative for dizziness and numbness.       Objective   /88   Pulse 96   Temp 37.1 °C (98.7 °F) (Temporal)   Resp 18   Wt 101 kg (223 lb)   SpO2 97%   BMI 39.50 kg/m²     Physical Exam   Constitutional: She is oriented to person, place, and time. She appears well-developed. No distress.   obese   HENT:   Head: Normocephalic and atraumatic.   Right  Ear: Tympanic membrane is erythematous and retracted.   Nose: Nose normal.   Mouth/Throat: Posterior oropharyngeal erythema present. No oropharyngeal exudate. No tonsillar exudate.   External ear canals erythematous   Eyes: Pupils are equal, round, and reactive to light. Conjunctivae and EOM are normal. Right eye exhibits no discharge. Left eye exhibits no discharge.   Cardiovascular: Normal rate, regular rhythm, normal heart sounds and intact distal pulses. Exam reveals no gallop and no friction rub.   No murmur heard.  Pulmonary/Chest: Effort normal and breath sounds normal. She has no wheezes. She has no rales.   Lymphadenopathy:     She has cervical adenopathy (shotty).   Neurological: She is alert and oriented to person, place, and time.   Skin: Skin is warm and dry. No rash noted. She is not diaphoretic. No erythema. No pallor.   Psychiatric: She has a normal mood and affect. Her behavior is normal.   Nursing note and vitals reviewed.      Assessment/Plan   Diagnoses and all orders for this visit:    Right otitis media, unspecified otitis media type  -     amoxicillin (AMOXIL) 875 mg tablet; Take 1 tablet (875 mg total) by mouth 2 (two) times a day for 10 days    Cough  -     benzonatate (TESSALON) 200 mg capsule; Take 1 capsule (200 mg total) by mouth 3 (three) times a day as needed for cough for up to 10 days    Upper respiratory tract infection, unspecified type      We will treat for right ear infection and upper respiratory infection.  You are advised to begin Amoxicillin - 1 tablet AM and PM x 10 days.  Take with food to avoid nausea.  Stay well hydrated.      You are advised to begin nasal saline spray - 2 sprays per nostril AM and Pm.  If maxillary sinus pressure, you can follow these with Fluticasone (generic Flonase) - 1 spray per nostril AM and Pm.    For sore throat, you are advised warm, salt water gargles, hot tea with honey, lozenges or you can use tylenol (1-2 tabs) every 4-6 hours if needed.      For cough, you can use benzonatate (Tessalon Perles) - 1 capsule up to 3 times daily as needed for cough.  May cause mild drowsiness.    Close monitoring advised.  Follow up if symptoms worsen:  Fever over 100.4F on medication, increased work of breathing, not staying well hydrated, nausea/vomiting/diarrhea lasting longer than 24 hours.      ERNIE SORIANO, PA

## 2019-04-16 DIAGNOSIS — E78.00 PURE HYPERCHOLESTEROLEMIA: ICD-10-CM

## 2019-04-16 RX ORDER — PRAVASTATIN SODIUM 20 MG/1
TABLET ORAL
Qty: 30 TABLET | Refills: 1 | Status: SHIPPED | OUTPATIENT
Start: 2019-04-16 | End: 2019-05-28 | Stop reason: SDUPTHER

## 2019-05-24 ENCOUNTER — OFFICE VISIT (OUTPATIENT)
Dept: INTERNAL MEDICINE | Facility: CLINIC | Age: 74
End: 2019-05-24
Payer: MEDICARE

## 2019-05-24 ENCOUNTER — APPOINTMENT (OUTPATIENT)
Dept: LAB | Facility: CLINIC | Age: 74
End: 2019-05-24
Payer: MEDICARE

## 2019-05-24 VITALS
DIASTOLIC BLOOD PRESSURE: 70 MMHG | RESPIRATION RATE: 18 BRPM | WEIGHT: 223 LBS | HEART RATE: 79 BPM | BODY MASS INDEX: 39.5 KG/M2 | OXYGEN SATURATION: 96 % | SYSTOLIC BLOOD PRESSURE: 162 MMHG

## 2019-05-24 DIAGNOSIS — E11.21 TYPE 2 DIABETES MELLITUS WITH DIABETIC NEPHROPATHY, WITH LONG-TERM CURRENT USE OF INSULIN (CMS/HCC): Primary | ICD-10-CM

## 2019-05-24 DIAGNOSIS — E78.00 PURE HYPERCHOLESTEROLEMIA: ICD-10-CM

## 2019-05-24 DIAGNOSIS — Z79.4 TYPE 2 DIABETES MELLITUS WITH DIABETIC NEPHROPATHY, WITH LONG-TERM CURRENT USE OF INSULIN (CMS/HCC): Primary | ICD-10-CM

## 2019-05-24 DIAGNOSIS — R60.9 EDEMA, UNSPECIFIED TYPE: ICD-10-CM

## 2019-05-24 DIAGNOSIS — I10 ESSENTIAL HYPERTENSION: ICD-10-CM

## 2019-05-24 DIAGNOSIS — N18.30 CKD (CHRONIC KIDNEY DISEASE) STAGE 3, GFR 30-59 ML/MIN (CMS/HCC): ICD-10-CM

## 2019-05-24 DIAGNOSIS — E11.21 TYPE 2 DIABETES MELLITUS WITH DIABETIC NEPHROPATHY, WITH LONG-TERM CURRENT USE OF INSULIN (CMS/HCC): ICD-10-CM

## 2019-05-24 DIAGNOSIS — Z79.4 TYPE 2 DIABETES MELLITUS WITH DIABETIC NEPHROPATHY, WITH LONG-TERM CURRENT USE OF INSULIN (CMS/HCC): ICD-10-CM

## 2019-05-24 LAB
ALBUMIN SERPL-MCNC: 4.3 G/DL (ref 3.5–5.3)
ALP SERPL-CCNC: 117 U/L (ref 55–142)
ALT SERPL-CCNC: 16 U/L (ref 0–52)
ANION GAP SERPL CALC-SCNC: 9 MMOL/L (ref 3–11)
AST SERPL-CCNC: 13 U/L (ref 0–39)
BILIRUB SERPL-MCNC: 0.36 MG/DL (ref 0–1.4)
BUN SERPL-MCNC: 24 MG/DL (ref 7–25)
CALCIUM ALBUM COR SERPL-MCNC: 9.3 MG/DL (ref 8.6–10.3)
CALCIUM SERPL-MCNC: 9.5 MG/DL (ref 8.6–10.3)
CHLORIDE SERPL-SCNC: 105 MMOL/L (ref 98–107)
CHOLEST SERPL-MCNC: 205 MG/DL (ref 0–199)
CO2 SERPL-SCNC: 26 MMOL/L (ref 21–32)
CREAT SERPL-MCNC: 0.97 MG/DL (ref 0.6–1.2)
EST. AVERAGE GLUCOSE BLD GHB EST-MCNC: 168.6 MG/DL
GFR SERPL CREATININE-BSD FRML MDRD: 58 ML/MIN/1.73M*2
GLUCOSE SERPL-MCNC: 88 MG/DL (ref 70–105)
HBA1C MFR BLD: 7.5 % (ref 4–6)
HDLC SERPL-MCNC: 61 MG/DL
LDLC SERPL CALC-MCNC: 107 MG/DL (ref 20–99)
POTASSIUM SERPL-SCNC: 4.1 MMOL/L (ref 3.5–5.1)
PROT SERPL-MCNC: 6.9 G/DL (ref 6–8.3)
SODIUM SERPL-SCNC: 140 MMOL/L (ref 135–145)
TRIGL SERPL-MCNC: 185 MG/DL

## 2019-05-24 PROCEDURE — 99213 OFFICE O/P EST LOW 20 MIN: CPT | Performed by: INTERNAL MEDICINE

## 2019-05-24 PROCEDURE — 80061 LIPID PANEL: CPT | Mod: PO

## 2019-05-24 PROCEDURE — G0463 HOSPITAL OUTPT CLINIC VISIT: HCPCS | Mod: PO | Performed by: INTERNAL MEDICINE

## 2019-05-24 PROCEDURE — 80053 COMPREHEN METABOLIC PANEL: CPT | Mod: PO

## 2019-05-24 PROCEDURE — 83036 HEMOGLOBIN GLYCOSYLATED A1C: CPT | Mod: PO

## 2019-05-24 PROCEDURE — 36415 COLL VENOUS BLD VENIPUNCTURE: CPT | Mod: PO

## 2019-05-24 RX ORDER — LOSARTAN POTASSIUM 100 MG/1
1 TABLET ORAL DAILY
COMMUNITY
Start: 2019-03-11 | End: 2020-03-09

## 2019-05-24 RX ORDER — HYDROCHLOROTHIAZIDE 25 MG/1
1 TABLET ORAL DAILY
Refills: 3 | COMMUNITY
Start: 2019-03-27 | End: 2019-05-24

## 2019-05-24 RX ORDER — FUROSEMIDE 40 MG/1
TABLET ORAL
Qty: 30 TABLET | Refills: 3 | Status: SHIPPED | OUTPATIENT
Start: 2019-05-24 | End: 2019-08-21 | Stop reason: SDUPTHER

## 2019-05-24 ASSESSMENT — PAIN SCALES - GENERAL: PAINLEVEL: 0-NO PAIN

## 2019-05-24 NOTE — PROGRESS NOTES
"Internal Medicine Progress Note    Chief Complaint:   Chief Complaint   Patient presents with   • Follow-up     here for recheck. labs this morning         HPI: Patient is a 73 y.o. female in for follow-up on her insulin requiring type 2 diabetes.  Patient does admit to not doing as much exercise as normal over the last 3 months.  She is currently tolerating her cholesterol medicine well without concerns.    Review of Systems      Current Outpatient Medications:   •  losartan (COZAAR) 100 mg tablet, Take 1 tablet by mouth daily, Disp: , Rfl:   •  pravastatin (PRAVACHOL) 20 mg tablet, Take on M-W-F, Disp: 30 tablet, Rfl: 1  •  omeprazole (PriLOSEC) 40 mg capsule, TAKE 1 CAPSULE BY MOUTH ONCE DAILY, Disp: 90 capsule, Rfl: 1  •  clobetasol (TEMOVATE) 0.05 % ointment, Apply 1 application topically 2 (two) times a day as needed (Rash), Disp: 60 g, Rfl: 2  •  blood sugar diagnostic (ONETOUCH ULTRA TEST) strip, Use one strip to check glucose TID, Disp: 300 strip, Rfl: 3  •  NOVOLOG FLEXPEN U-100 INSULIN 100 unit/mL insulin pen, INJECT 5 TO 7 UNIT SUBCUTANEOUSLY IN THE MORNING, 10 TO 12 UNITS AT NOON, AND 15 TO 17 UNITS IN THE EVENING. (Patient taking differently: INJECT 5 TO 9 UNIT SUBCUTANEOUSLY IN THE MORNING, 10 TO 12 UNITS AT NOON, AND 15 TO 17 UNITS IN THE EVENING.), Disp: 10 pen, Rfl: 2  •  insulin glargine U-300 conc (TOUJEO MAX U-300 SOLOSTAR) 300 unit/mL (3 mL) insulin pen, Inject 120 Units under the skin daily., Disp: 12 Syringe, Rfl: 3  •  pen needle, diabetic (RELION NEEDLES) 31 gauge x 1/4\" needle, Inject 1 each under the skin 3 (three) times a day., Disp: 1000 each, Rfl: 11  •  meloxicam (MOBIC) 15 mg tablet, Take 1 tablet (15 mg total) by mouth daily., Disp: 30 tablet, Rfl: 2  •  metoprolol succinate XL (TOPROL-XL) 50 mg 24 hr tablet, Take 1 tablet (50 mg total) by mouth daily., Disp: 30 tablet, Rfl: 11  •  nortriptyline (PAMELOR) 50 mg capsule, TAKE ONE CAPSULE BY MOUTH ONCE DAILY AS DIRECTED, Disp: 90 " "capsule, Rfl: 3  •  BD INSULIN PEN NEEDLE UF MINI 31 gauge x 3/16\" needle, USE ONE NEEDLE TO INJECT INSULIN FIVE TIMES DAILY, Disp: 100 each, Rfl: 2  •  aspirin 325 mg tablet, Take 325 mg by mouth daily.  , Disp: , Rfl:   •  furosemide (LASIX) 40 mg tablet, 1 po q am prn edema, Disp: 30 tablet, Rfl: 3  •  losartan-hydrochlorothiazide (HYZAAR) 100-25 mg per tablet, Take 1 tablet by mouth daily. (Patient not taking: Reported on 5/24/2019 ), Disp: 90 tablet, Rfl: 3    Allergies:   Allergies   Allergen Reactions   • Aloe Vera    • Bacitracin      ZINC   • Erythromycin Base    • Formaldehyde    • Gramicidins      OPHTHALMIC   • Hydrocortisone      ACETATE   • Latex    • Levofloxacin    • Metformin    • Neomycin    • Neomycin-Bacitracnzn-Polymyxnb    • Polymyxin B    • Prednisolone    • Prednisone    • Statins-Hmg-Coa Reductase Inhibitors    • Sulfamethoxazole    • Sulfamethoxazole-Trimethoprim    • Thimerosal    • Tresiba Flextouch U-100 [Insulin Degludec] Diarrhea and Other (see comments)     Abdominal pain   • Trimethoprim          Labs:   Recent Results (from the past 336 hour(s))   Comprehensive metabolic panel Blood, Venous    Collection Time: 05/24/19  7:12 AM   Result Value Ref Range    Sodium 140 135 - 145 mmol/L    Potassium 4.1 3.5 - 5.1 mmol/L    Chloride 105 98 - 107 mmol/L    CO2 26 21 - 32 mmol/L    Anion Gap 9 3 - 11 mmol/L    BUN 24 7 - 25 mg/dL    Creatinine 0.97 0.60 - 1.20 mg/dL    Glucose 88 70 - 105 mg/dL    Calcium 9.5 8.6 - 10.3 mg/dL    AST 13 0 - 39 U/L    ALT (SGPT) 16 0 - 52 U/L    Alkaline Phosphatase 117 55 - 142 U/L    Total Protein 6.9 6.0 - 8.3 g/dL    Albumin 4.3 3.5 - 5.3 g/dL    Total Bilirubin 0.36 0.00 - 1.40 mg/dL    eGFR 58 (L) >60 mL/min/1.73m*2    Corrected Calcium 9.3 8.6 - 10.3 mg/dL   Hemoglobin A1c (glycosylated) Blood, Venous    Collection Time: 05/24/19  7:12 AM   Result Value Ref Range    Hemoglobin A1C 7.5 (H) 4.0 - 6.0 %    Estimated Average Glucose 168.6 mg/dL "         Imaging: No results found.    Vitals: Blood pressure 162/70, pulse 79, resp. rate 18, weight 101 kg (223 lb), SpO2 96 %.      Physical Exam   Neck: Normal range of motion. Neck supple. No thyromegaly present.   Cardiovascular: Normal rate and regular rhythm.   Pulmonary/Chest: Effort normal and breath sounds normal.   Musculoskeletal:   She has just a trace of edema bilaterally   Lymphadenopathy:     She has no cervical adenopathy.   Nursing note and vitals reviewed.        Plan and Discussion:     Diagnosis Plan   1. Type 2 diabetes mellitus with diabetic nephropathy, with long-term current use of insulin (CMS/[facility]) ([facility])  Comprehensive metabolic panel Blood, Venous    Hemoglobin A1c (glycosylated) Blood, Venous    Lipid panel Blood, Venous   2. CKD (chronic kidney disease) stage 3, GFR 30-59 ml/min (CMS/[facility]) ([facility])     3. Essential hypertension     4. Pure hypercholesterolemia     5. Edema, unspecified type  furosemide (LASIX) 40 mg tablet   Patient currently gets pretty significant itching for about 3 days after taking her hydrochlorothiazide for her peripheral edema.  Due to that we will discontinue it try and put her on just pure Lasix that she can use on a as needed basis.  Labs are reviewed with her A1c is down to 7.5 but we still encouraged her to work harder with her exercise and try and get it lower.      JAMILA MANCERA MD    Date: 5/24/2019  Time: 10:15 AM

## 2019-05-26 DIAGNOSIS — E11.9 DIABETES MELLITUS WITHOUT COMPLICATION (CMS/HCC): ICD-10-CM

## 2019-05-28 DIAGNOSIS — E78.00 PURE HYPERCHOLESTEROLEMIA: ICD-10-CM

## 2019-05-28 RX ORDER — PRAVASTATIN SODIUM 20 MG/1
TABLET ORAL
Qty: 30 TABLET | Refills: 2 | Status: SHIPPED | OUTPATIENT
Start: 2019-05-28 | End: 2019-12-16 | Stop reason: SDUPTHER

## 2019-05-29 RX ORDER — PEN NEEDLE, DIABETIC 29 G X1/2"
NEEDLE, DISPOSABLE MISCELLANEOUS
Qty: 60 EACH | Refills: 5 | Status: SHIPPED | OUTPATIENT
Start: 2019-05-29 | End: 2019-09-17 | Stop reason: ALTCHOICE

## 2019-06-22 DIAGNOSIS — E11.9 DIABETES MELLITUS WITHOUT COMPLICATION (CMS/HCC): ICD-10-CM

## 2019-06-24 RX ORDER — INSULIN ASPART 100 [IU]/ML
INJECTION, SOLUTION INTRAVENOUS; SUBCUTANEOUS
Qty: 10 PEN | Refills: 2 | Status: SHIPPED | OUTPATIENT
Start: 2019-06-24 | End: 2019-09-16 | Stop reason: SDUPTHER

## 2019-07-17 NOTE — PROGRESS NOTES
HPI     Chief Complaint   Patient presents with   • Suspicious Skin Lesion     Patient presents for evaluation and treatment of a painful growth to her forehead.       Consuelo Singh is a 73 y.o. female who is an established patient with a history of tinea pedis, psoriasis (econazole and dovonex), cyst, nummular eczematous dermatitis (clobetasol ointment); here for evaluation of a painful growth to her forehead. She was last seen in office by Dr. Rose on 02/07/2019. Patient has a flesh colored bump to her right upper forehead that has been causing her significant pain. She would like to have it removed today, if possible. Patient also notes that her dermatitis has cleared completely. She declines a fse and gown today.      Patient problems have been reviewed and documented as below:  Patient Active Problem List   Diagnosis   • Diverticular disease   • Eczema   • Essential hypertension   • Gastroesophageal reflux disease   • Morbid obesity (CMS/[facility]) ([facility])   • Pure hypercholesterolemia   • Type 2 diabetes mellitus with diabetic nephropathy, with long-term current use of insulin (CMS/[facility]) ([facility])   • Chronic constipation   • Functional diarrhea   • Chronic pain of right knee   • CKD (chronic kidney disease) stage 3, GFR 30-59 ml/min (CMS/[facility]) ([facility])   • Edema       Patient's social and family history have been reviewed and documented as below:  Social History     Socioeconomic History   • Marital status:      Spouse name: Not on file   • Number of children: Not on file   • Years of education: Not on file   • Highest education level: Not on file   Occupational History   • Not on file   Social Needs   • Financial resource strain: Not on file   • Food insecurity:     Worry: Not on file     Inability: Not on file   • Transportation needs:     Medical: Not on file     Non-medical: Not on file   Tobacco Use   • Smoking status: Former Smoker     Packs/day: 0.50     Years: 40.00     Pack years: 20.00     Last attempt  to quit: 2007     Years since quittin.5   • Smokeless tobacco: Former User   Substance and Sexual Activity   • Alcohol use: Yes     Comment: Occasionally; 1-2 glasses monthly   • Drug use: No   • Sexual activity: Defer   Lifestyle   • Physical activity:     Days per week: Not on file     Minutes per session: Not on file   • Stress: Not on file   Relationships   • Social connections:     Talks on phone: Not on file     Gets together: Not on file     Attends Jew service: Not on file     Active member of club or organization: Not on file     Attends meetings of clubs or organizations: Not on file     Relationship status: Not on file   • Intimate partner violence:     Fear of current or ex partner: Not on file     Emotionally abused: Not on file     Physically abused: Not on file     Forced sexual activity: Not on file   Other Topics Concern   • Not on file   Social History Narrative   • Not on file     Family History   Problem Relation Age of Onset   • Breast cancer Mother    • Lung cancer Maternal Grandmother    • Bone cancer Maternal Grandfather        Pertinent positive review of systems are listed below and are otherwise reviewed as negative from HPI:  Review of Systems   Skin:        Painful spot on right forehead   All other systems reviewed and are negative.      All allergies have been reviewed and documented as below:  Allergies   Allergen Reactions   • Aloe Vera    • Bacitracin      ZINC   • Erythromycin Base    • Formaldehyde    • Gramicidins      OPHTHALMIC   • Hydrocortisone      ACETATE   • Latex    • Levofloxacin    • Metformin    • Neomycin    • Neomycin-Bacitracnzn-Polymyxnb    • Polymyxin B    • Prednisolone    • Prednisone    • Statins-Hmg-Coa Reductase Inhibitors    • Sulfamethoxazole    • Sulfamethoxazole-Trimethoprim    • Thimerosal    • Tresiba Flextouch U-100 [Insulin Degludec] Diarrhea and Other (see comments)     Abdominal pain   • Trimethoprim        All medications have been  "reviewed and documented as currently taking as below:    Current Outpatient Medications:   •  hydroCHLOROthiazide (HYDRODIURIL) 25 mg tablet, Take 25 mg by mouth, Disp: , Rfl: 3  •  NOVOLOG FLEXPEN U-100 INSULIN 100 unit/mL (3 mL) insulin pen, INJECT 5 TO 7 UNITS SUBCUTANEOUSLY IN THE MORNING, 10 TO 12 UNITS SUBCUTANEOUSLY AT NOON, AND 15 TO 17 UNITS SUBCUTANEOUSLY IN THE EVENING, Disp: 10 pen, Rfl: 2  •  pen needle, diabetic 31 gauge x 1/4\" needle, USE 1 NEEDLE TO INJECT UNDER THE SKIN THREE TIMES DAILY, Disp: 60 each, Rfl: 5  •  pravastatin (PRAVACHOL) 20 mg tablet, Take on M-W-F, Disp: 30 tablet, Rfl: 2  •  losartan (COZAAR) 100 mg tablet, Take 1 tablet by mouth daily, Disp: , Rfl:   •  furosemide (LASIX) 40 mg tablet, 1 po q am prn edema, Disp: 30 tablet, Rfl: 3  •  omeprazole (PriLOSEC) 40 mg capsule, TAKE 1 CAPSULE BY MOUTH ONCE DAILY, Disp: 90 capsule, Rfl: 1  •  clobetasol (TEMOVATE) 0.05 % ointment, Apply 1 application topically 2 (two) times a day as needed (Rash), Disp: 60 g, Rfl: 2  •  blood sugar diagnostic (ONETOUCH ULTRA TEST) strip, Use one strip to check glucose TID, Disp: 300 strip, Rfl: 3  •  insulin glargine U-300 conc (TOUJEO MAX U-300 SOLOSTAR) 300 unit/mL (3 mL) insulin pen, Inject 120 Units under the skin daily., Disp: 12 Syringe, Rfl: 3  •  pen needle, diabetic (RELION NEEDLES) 31 gauge x 1/4\" needle, Inject 1 each under the skin 3 (three) times a day., Disp: 1000 each, Rfl: 11  •  losartan-hydrochlorothiazide (HYZAAR) 100-25 mg per tablet, Take 1 tablet by mouth daily. (Patient not taking: Reported on 5/24/2019 ), Disp: 90 tablet, Rfl: 3  •  meloxicam (MOBIC) 15 mg tablet, Take 1 tablet (15 mg total) by mouth daily., Disp: 30 tablet, Rfl: 2  •  metoprolol succinate XL (TOPROL-XL) 50 mg 24 hr tablet, Take 1 tablet (50 mg total) by mouth daily., Disp: 30 tablet, Rfl: 11  •  nortriptyline (PAMELOR) 50 mg capsule, TAKE ONE CAPSULE BY MOUTH ONCE DAILY AS DIRECTED, Disp: 90 capsule, Rfl: 3  •  BD " "INSULIN PEN NEEDLE UF MINI 31 gauge x 3/16\" needle, USE ONE NEEDLE TO INJECT INSULIN FIVE TIMES DAILY, Disp: 100 each, Rfl: 2  •  aspirin 325 mg tablet, Take 325 mg by mouth daily.  , Disp: , Rfl:     Objective     Vital signs have been reviewed and documented as below:  Vitals:    07/18/19 0656   Weight: 101 kg (223 lb)   Height: 1.6 m (5' 3\")       Physical Exam:  ----------------------  Constitutional: General Appearance: healthy-appearing, well-nourished, and well-developed. Level of Distress: NAD. Ambulation: ambulating normally.  Psychiatric: Insight: good judgement. Mental Status: normal mood and affect and active and alert. Orientation: to time, place, and person. Memory: recent memory normal and remote memory normal.  Head: normocephalic and atraumatic.  Eyes: Lids and Conjunctivae: no discharge or pallor and non-injected. Lens: clear. Sclerae: non-icteric.  Neurologic: Gait and Station: normal gait and station. Cranial Nerves: grossly intact. Coordination and Cerebellum: no tremor.  Skin: a cutaneous examination was performed including: left upper extremity and lower extremity; right upper extremity and lower extremity; and scalp, face, ears, neck, chest, abdomen, and back, and were found to be within normal limits with the exceptions of the findings listed below:  Skin: On the right forehead there is a flesh colored pedunculated papule.     Procedures   Destruction of Lesion  Date/Time: 7/18/2019 7:10 AM  Performed by: CHEYANNE Ascencio      Consent  Verbal consent was obtained from the patient. Risks include permanent scarring, light or dark discoloration, infection, pain, bleeding, bruising, redness, blister formation and recurrence of the lesion. Consent given by patient. The patient states understanding of the procedure being performed. Patient ID confirmed verbally with patient and provided demographic data.     Location:  1 total lesions removed from head/neck.  Head/neck location(s): " forehead  Number of head/neck lesions removed: 1    Procedure Details   The area was prepped and draped in a sterile fashion. Local anesthesia was not used. Lesion(s) are benign. Lesion(s) destroyed with: liquid nitrogen. Number of freeze/thaw cycles was 2. Lesion(s) were frozen until an ice ball extended beyond the lesion.     Post-Procedure  Patient tolerated procedure well with no complications.     Procedure Comments  Cryo to one viral wart to the right forehead for a total of one lesion treated today.      Derm Physical Exam    Assessment and Plan   1. Viral warts, unspecified type  - Education and counseling provided to patient on etiology, prognosis, and treatment options and side effects including observation, cryotherapy, topical 5FU, topical imiquimod, candida injections, oral cimetidine, salicylic acid occlusion, and PDL.   - Patient would like to proceed with cryotherapy with ln2 to 1 wart on the right forehead  - Recheck 4 weeks    - Destruction of Lesion

## 2019-07-18 ENCOUNTER — OFFICE VISIT (OUTPATIENT)
Dept: DERMATOLOGY | Facility: CLINIC | Age: 74
End: 2019-07-18
Payer: MEDICARE

## 2019-07-18 VITALS
DIASTOLIC BLOOD PRESSURE: 74 MMHG | WEIGHT: 223 LBS | HEIGHT: 63 IN | SYSTOLIC BLOOD PRESSURE: 128 MMHG | OXYGEN SATURATION: 96 % | BODY MASS INDEX: 39.51 KG/M2 | HEART RATE: 76 BPM

## 2019-07-18 DIAGNOSIS — B07.9 VIRAL WARTS, UNSPECIFIED TYPE: Primary | ICD-10-CM

## 2019-07-18 PROCEDURE — G0463 HOSPITAL OUTPT CLINIC VISIT: HCPCS | Mod: PO | Performed by: PHYSICIAN ASSISTANT

## 2019-07-18 PROCEDURE — 17110 DESTRUCTION B9 LES UP TO 14: CPT | Mod: PO | Performed by: PHYSICIAN ASSISTANT

## 2019-07-18 RX ORDER — HYDROCHLOROTHIAZIDE 25 MG/1
25 TABLET ORAL
Refills: 3 | COMMUNITY
Start: 2019-06-22 | End: 2020-04-06 | Stop reason: SDUPTHER

## 2019-07-18 ASSESSMENT — PAIN SCALES - GENERAL: PAINLEVEL: 0-NO PAIN

## 2019-08-10 DIAGNOSIS — F32.A DEPRESSIVE DISORDER: ICD-10-CM

## 2019-08-12 RX ORDER — NORTRIPTYLINE HYDROCHLORIDE 50 MG/1
CAPSULE ORAL
Qty: 90 CAPSULE | Refills: 1 | Status: SHIPPED | OUTPATIENT
Start: 2019-08-12 | End: 2020-02-10

## 2019-08-14 DIAGNOSIS — K21.9 CHRONIC GERD: ICD-10-CM

## 2019-08-14 RX ORDER — OMEPRAZOLE 40 MG/1
CAPSULE, DELAYED RELEASE ORAL
Qty: 30 CAPSULE | Refills: 0 | Status: SHIPPED | OUTPATIENT
Start: 2019-08-14 | End: 2019-09-21 | Stop reason: SDUPTHER

## 2019-08-21 ENCOUNTER — OFFICE VISIT (OUTPATIENT)
Dept: OTOLARYNGOLOGY | Facility: CLINIC | Age: 74
End: 2019-08-21
Payer: MEDICARE

## 2019-08-21 VITALS
DIASTOLIC BLOOD PRESSURE: 99 MMHG | WEIGHT: 231.5 LBS | TEMPERATURE: 97 F | BODY MASS INDEX: 41.01 KG/M2 | SYSTOLIC BLOOD PRESSURE: 223 MMHG | HEART RATE: 88 BPM

## 2019-08-21 DIAGNOSIS — H61.21 IMPACTED CERUMEN OF RIGHT EAR: Primary | ICD-10-CM

## 2019-08-21 DIAGNOSIS — R60.9 EDEMA, UNSPECIFIED TYPE: ICD-10-CM

## 2019-08-21 DIAGNOSIS — Z90.89 HISTORY OF RIGHT MASTOIDECTOMY: ICD-10-CM

## 2019-08-21 PROCEDURE — G0463 HOSPITAL OUTPT CLINIC VISIT: HCPCS | Performed by: OTOLARYNGOLOGY

## 2019-08-21 PROCEDURE — 69222 CLEAN OUT MASTOID CAVITY: CPT | Performed by: OTOLARYNGOLOGY

## 2019-08-21 PROCEDURE — 99212 OFFICE O/P EST SF 10 MIN: CPT | Mod: 25 | Performed by: OTOLARYNGOLOGY

## 2019-08-21 RX ORDER — FUROSEMIDE 40 MG/1
TABLET ORAL
Qty: 30 TABLET | Refills: 0 | Status: SHIPPED | OUTPATIENT
Start: 2019-08-21 | End: 2020-04-06 | Stop reason: ALTCHOICE

## 2019-08-21 RX ORDER — CIPROFLOXACIN AND DEXAMETHASONE 3; 1 MG/ML; MG/ML
4 SUSPENSION/ DROPS AURICULAR (OTIC) 2 TIMES DAILY
Qty: 7.5 ML | Refills: 0 | Status: SHIPPED | OUTPATIENT
Start: 2019-08-21 | End: 2019-08-26

## 2019-08-21 ASSESSMENT — ENCOUNTER SYMPTOMS
RHINORRHEA: 0
SINUS PRESSURE: 0
CHILLS: 0
FEVER: 0
CONSTITUTIONAL NEGATIVE: 1
TROUBLE SWALLOWING: 0
SORE THROAT: 0

## 2019-08-21 ASSESSMENT — PAIN SCALES - GENERAL: PAINLEVEL: 0-NO PAIN

## 2019-08-21 NOTE — PROGRESS NOTES
"Subjective    CC: right ear    HPI: Consuelo Singh is a 73 y.o. female with h/o right CWD mastoidectomy who complains of a one month h/o hearing a pulse in the right ear and some minor soreness in the right ear over the same time. No otorrhea. No change in hearing.    Past Medical History:   Diagnosis Date   • Blood clot in vein     shani legs   • Diabetes (CMS/HCC) (HCC)     type 2   • Eczema    • Fibromyalgia    • GERD (gastroesophageal reflux disease)    • Heart murmur    • Hyperlipidemia    • Hypertension    • Psoriasis    • Scoliosis        Past Surgical History:   Procedure Laterality Date   • CHOLECYSTECTOMY     • COLONOSCOPY  03/11/2016    5 yr  follow up   • HYSTERECTOMY      With BSO   • OTHER SURGICAL HISTORY      Rt. radical mastoidectomy, rebuilt canal from growth         Current Outpatient Medications:   •  omeprazole (PriLOSEC) 40 mg capsule, TAKE 1 CAPSULE BY MOUTH ONCE DAILY, Disp: 30 capsule, Rfl: 0  •  nortriptyline (PAMELOR) 50 mg capsule, TAKE 1 CAPSULE BY MOUTH ONCE DAILY AS DIRECTED, Disp: 90 capsule, Rfl: 1  •  hydroCHLOROthiazide (HYDRODIURIL) 25 mg tablet, Take 25 mg by mouth, Disp: , Rfl: 3  •  NOVOLOG FLEXPEN U-100 INSULIN 100 unit/mL (3 mL) insulin pen, INJECT 5 TO 7 UNITS SUBCUTANEOUSLY IN THE MORNING, 10 TO 12 UNITS SUBCUTANEOUSLY AT NOON, AND 15 TO 17 UNITS SUBCUTANEOUSLY IN THE EVENING, Disp: 10 pen, Rfl: 2  •  pen needle, diabetic 31 gauge x 1/4\" needle, USE 1 NEEDLE TO INJECT UNDER THE SKIN THREE TIMES DAILY, Disp: 60 each, Rfl: 5  •  pravastatin (PRAVACHOL) 20 mg tablet, Take on M-W-F, Disp: 30 tablet, Rfl: 2  •  losartan (COZAAR) 100 mg tablet, Take 1 tablet by mouth daily, Disp: , Rfl:   •  furosemide (LASIX) 40 mg tablet, 1 po q am prn edema, Disp: 30 tablet, Rfl: 3  •  clobetasol (TEMOVATE) 0.05 % ointment, Apply 1 application topically 2 (two) times a day as needed (Rash), Disp: 60 g, Rfl: 2  •  blood sugar diagnostic (ONETOUCH ULTRA TEST) strip, Use one strip to check glucose " "TID, Disp: 300 strip, Rfl: 3  •  insulin glargine U-300 conc (TOUJEO MAX U-300 SOLOSTAR) 300 unit/mL (3 mL) insulin pen, Inject 120 Units under the skin daily., Disp: 12 Syringe, Rfl: 3  •  pen needle, diabetic (RELION NEEDLES) 31 gauge x 1/4\" needle, Inject 1 each under the skin 3 (three) times a day., Disp: 1000 each, Rfl: 11  •  meloxicam (MOBIC) 15 mg tablet, Take 1 tablet (15 mg total) by mouth daily., Disp: 30 tablet, Rfl: 2  •  metoprolol succinate XL (TOPROL-XL) 50 mg 24 hr tablet, Take 1 tablet (50 mg total) by mouth daily., Disp: 30 tablet, Rfl: 11  •  BD INSULIN PEN NEEDLE UF MINI 31 gauge x 3/16\" needle, USE ONE NEEDLE TO INJECT INSULIN FIVE TIMES DAILY, Disp: 100 each, Rfl: 2  •  aspirin 325 mg tablet, Take 325 mg by mouth daily.  , Disp: , Rfl:   •  losartan-hydrochlorothiazide (HYZAAR) 100-25 mg per tablet, Take 1 tablet by mouth daily. (Patient not taking: Reported on 2019 ), Disp: 90 tablet, Rfl: 3    Allergies   Allergen Reactions   • Aloe Vera    • Bacitracin      ZINC   • Erythromycin Base    • Formaldehyde    • Gramicidins      OPHTHALMIC   • Hydrocortisone      ACETATE   • Latex    • Levofloxacin    • Metformin    • Neomycin    • Neomycin-Bacitracnzn-Polymyxnb    • Polymyxin B    • Prednisolone    • Prednisone    • Statins-Hmg-Coa Reductase Inhibitors    • Sulfamethoxazole    • Sulfamethoxazole-Trimethoprim    • Thimerosal    • Tresiba Flextouch U-100 [Insulin Degludec] Diarrhea and Other (see comments)     Abdominal pain   • Trimethoprim        family history includes Bone cancer in her maternal grandfather; Breast cancer in her mother; Lung cancer in her maternal grandmother.    Social History     Tobacco Use   • Smoking status: Former Smoker     Packs/day: 0.50     Years: 40.00     Pack years: 20.00     Last attempt to quit:      Years since quittin.6   • Smokeless tobacco: Former User   Substance Use Topics   • Alcohol use: Yes     Comment: Occasionally; 1-2 glasses monthly "   • Drug use: No       Review of Systems   Constitutional: Negative.  Negative for chills and fever.   HENT: Positive for ear pain. Negative for congestion, ear discharge, hearing loss, nosebleeds, postnasal drip, rhinorrhea, sinus pressure, sneezing, sore throat, tinnitus and trouble swallowing.        Objective    BP (!) 223/99 (BP Location: Right arm, Patient Position: Sitting, Cuff Size: Reg)   Pulse 88   Temp 36.1 °C (97 °F) (Temporal)   Wt 105 kg (231 lb 8 oz)   BMI 41.01 kg/m²     General: Well-developed well-nourished female in no acute distress.    Ears: The right ear was examined and there is noted to be significant cerumen present in the mastoid bowl and tympanic cavity.    Procedure: Complex debridement right mastoidectomy cavity  Using the operating microscope the right external auditory canal, tympanic cavity and mastoid bowl were visualized.  Using suction, curette and alligator forceps cerumen was carefully removed along with desquamated debris.  At the far anterior extent there was some purulent material present.  There was a small amount of visible inflammation of the underlying skin at this area.  Posteriorly there was one small area of adherent wax that was causing significant vertigo to the patient when attempts were made to remove it.  A small amount of cerumen was left in place at that site to avoid injury to the vestibular system.  The remainder of cerumen and debris were completely cleared.  The procedure took approximately 10 to 15 minutes.    Diagnosis    1. Impacted cerumen of right ear    2. History of right mastoidectomy        No orders of the defined types were placed in this encounter.      Recommendations    Patient with mild otitis due to impacted cerumen in her mastoidectomy cavity.  This was cleaned out today.  We will treat with Ciprodex drops for 5 days 4 drops in the right ear twice daily.  Follow-up with me if the symptoms of irritation and pulsatile tinnitus on the right  do not resolve within a couple weeks.  Otherwise follow-up in 6 months for repeat mastoid debridement.            Portions of this patient note were documented using voice to text software technology.  Some errors related to this technology may have been missed during the editing process.

## 2019-09-16 ENCOUNTER — APPOINTMENT (OUTPATIENT)
Dept: LAB | Facility: CLINIC | Age: 74
End: 2019-09-16
Payer: MEDICARE

## 2019-09-16 ENCOUNTER — OFFICE VISIT (OUTPATIENT)
Dept: INTERNAL MEDICINE | Facility: CLINIC | Age: 74
End: 2019-09-16
Payer: MEDICARE

## 2019-09-16 VITALS
OXYGEN SATURATION: 96 % | HEIGHT: 63 IN | WEIGHT: 233 LBS | DIASTOLIC BLOOD PRESSURE: 82 MMHG | RESPIRATION RATE: 17 BRPM | BODY MASS INDEX: 41.29 KG/M2 | SYSTOLIC BLOOD PRESSURE: 122 MMHG | HEART RATE: 78 BPM

## 2019-09-16 DIAGNOSIS — E11.21 TYPE 2 DIABETES MELLITUS WITH DIABETIC NEPHROPATHY, WITH LONG-TERM CURRENT USE OF INSULIN (CMS/HCC): ICD-10-CM

## 2019-09-16 DIAGNOSIS — Z79.4 TYPE 2 DIABETES MELLITUS WITH DIABETIC NEPHROPATHY, WITH LONG-TERM CURRENT USE OF INSULIN (CMS/HCC): ICD-10-CM

## 2019-09-16 DIAGNOSIS — N18.30 CKD (CHRONIC KIDNEY DISEASE) STAGE 3, GFR 30-59 ML/MIN (CMS/HCC): ICD-10-CM

## 2019-09-16 DIAGNOSIS — K59.09 CHRONIC CONSTIPATION: ICD-10-CM

## 2019-09-16 PROBLEM — K59.1 FUNCTIONAL DIARRHEA: Status: RESOLVED | Noted: 2018-08-13 | Resolved: 2019-09-16

## 2019-09-16 LAB
ALBUMIN SERPL-MCNC: 4 G/DL (ref 3.5–5.3)
ALP SERPL-CCNC: 125 U/L (ref 55–142)
ALT SERPL-CCNC: 15 U/L (ref 0–52)
ANION GAP SERPL CALC-SCNC: 8 MMOL/L (ref 3–11)
AST SERPL-CCNC: 15 U/L (ref 0–39)
BILIRUB SERPL-MCNC: 0.4 MG/DL (ref 0–1.4)
BUN SERPL-MCNC: 19 MG/DL (ref 7–25)
CALCIUM ALBUM COR SERPL-MCNC: 9.5 MG/DL (ref 8.6–10.3)
CALCIUM SERPL-MCNC: 9.5 MG/DL (ref 8.6–10.3)
CHLORIDE SERPL-SCNC: 100 MMOL/L (ref 98–107)
CHOLEST SERPL-MCNC: 193 MG/DL (ref 0–199)
CO2 SERPL-SCNC: 29 MMOL/L (ref 21–32)
CREAT SERPL-MCNC: 1.04 MG/DL (ref 0.6–1.2)
EST. AVERAGE GLUCOSE BLD GHB EST-MCNC: 171.4 MG/DL
FASTING STATUS PATIENT QL REPORTED: YES
GFR SERPL CREATININE-BSD FRML MDRD: 54 ML/MIN/1.73M*2
GLUCOSE SERPL-MCNC: 141 MG/DL (ref 70–105)
HBA1C MFR BLD: 7.6 % (ref 4–6)
HDLC SERPL-MCNC: 57 MG/DL
LDLC SERPL CALC-MCNC: 96 MG/DL (ref 20–99)
POTASSIUM SERPL-SCNC: 4.2 MMOL/L (ref 3.5–5.1)
PROT SERPL-MCNC: 6.9 G/DL (ref 6–8.3)
SODIUM SERPL-SCNC: 137 MMOL/L (ref 135–145)
TRIGL SERPL-MCNC: 202 MG/DL

## 2019-09-16 PROCEDURE — 80053 COMPREHEN METABOLIC PANEL: CPT | Mod: PO

## 2019-09-16 PROCEDURE — 80061 LIPID PANEL: CPT | Mod: PO

## 2019-09-16 PROCEDURE — G0463 HOSPITAL OUTPT CLINIC VISIT: HCPCS | Mod: PO | Performed by: INTERNAL MEDICINE

## 2019-09-16 PROCEDURE — 99214 OFFICE O/P EST MOD 30 MIN: CPT | Performed by: INTERNAL MEDICINE

## 2019-09-16 PROCEDURE — 83036 HEMOGLOBIN GLYCOSYLATED A1C: CPT | Mod: PO

## 2019-09-16 PROCEDURE — 36415 COLL VENOUS BLD VENIPUNCTURE: CPT | Mod: PO

## 2019-09-16 RX ORDER — INSULIN ASPART 100 [IU]/ML
INJECTION, SOLUTION INTRAVENOUS; SUBCUTANEOUS
COMMUNITY
End: 2019-12-16 | Stop reason: SDUPTHER

## 2019-09-16 ASSESSMENT — PAIN SCALES - GENERAL: PAINLEVEL: 3

## 2019-09-17 DIAGNOSIS — E11.9 DIABETES MELLITUS WITHOUT COMPLICATION (CMS/HCC): ICD-10-CM

## 2019-09-17 RX ORDER — PEN NEEDLE, DIABETIC 29 G X1/2"
NEEDLE, DISPOSABLE MISCELLANEOUS
Qty: 400 EACH | Refills: 3 | Status: SHIPPED | OUTPATIENT
Start: 2019-09-17 | End: 2020-05-27

## 2019-09-21 DIAGNOSIS — K21.9 CHRONIC GERD: ICD-10-CM

## 2019-09-23 RX ORDER — OMEPRAZOLE 40 MG/1
40 CAPSULE, DELAYED RELEASE ORAL DAILY
Qty: 90 CAPSULE | Refills: 1 | Status: SHIPPED | OUTPATIENT
Start: 2019-09-23 | End: 2020-03-23

## 2019-09-25 ENCOUNTER — OFFICE VISIT NO LOS (OUTPATIENT)
Dept: DIABETES SERVICES | Facility: CLINIC | Age: 74
End: 2019-09-25
Payer: MEDICARE

## 2019-09-25 DIAGNOSIS — E11.21 TYPE 2 DIABETES MELLITUS WITH DIABETIC NEPHROPATHY, WITH LONG-TERM CURRENT USE OF INSULIN (CMS/HCC): Primary | ICD-10-CM

## 2019-09-25 DIAGNOSIS — E11.21 TYPE 2 DIABETES MELLITUS WITH DIABETIC NEPHROPATHY, WITH LONG-TERM CURRENT USE OF INSULIN (CMS/HCC): ICD-10-CM

## 2019-09-25 DIAGNOSIS — Z79.4 TYPE 2 DIABETES MELLITUS WITH DIABETIC NEPHROPATHY, WITH LONG-TERM CURRENT USE OF INSULIN (CMS/HCC): ICD-10-CM

## 2019-09-25 DIAGNOSIS — Z79.4 TYPE 2 DIABETES MELLITUS WITH DIABETIC NEPHROPATHY, WITH LONG-TERM CURRENT USE OF INSULIN (CMS/HCC): Primary | ICD-10-CM

## 2019-09-25 PROCEDURE — 99999 PR OFFICE/OUTPT VISIT,PROCEDURE ONLY: CPT

## 2019-09-25 NOTE — PROGRESS NOTES
CGM (Continuous Glucose Monitoring) Placement    Session Performed by:  ANNY BLEVINS RN    Time: 1345      Calibration/Testing:  Instructed      Three times daily    Troubleshooting:  Instructed      Sensor Type:  Dexcom      Lot #:  [id number]      Expiration Date:  03/18/20    Hyper/Hypoglycemia Setting:  Hyperglycemia alert off, hypoglycemia alert set at 70mg/dl.    Placement Site:  Left abdomen.    Sensor Activation/Securement:    Sensor secured and activated.      Equipment to Return:  Instructed  10/2/19    Comments:  Patient agrees to placement of professional Dexcom CGM as she would like to see if she can tolerate the adhesive that is used prior to obtaining a personal Dexcom CGM.  She reports she does not use acetminophen and does not have any scheduled CT scan or MRIs.  She is agreeable to log insulin taken, food/beverages consumes, activity and blood sugars for the next 7 days and will bring this back to us upon sensor removal.      Not billed until removed.

## 2019-09-27 ENCOUNTER — DOCUMENTATION (OUTPATIENT)
Dept: FAMILY MEDICINE | Facility: CLINIC | Age: 74
End: 2019-09-27

## 2019-09-27 NOTE — PROGRESS NOTES
Request for Toujeo has been sent to and approved by pt's insurance, approved coverage from 6/28/19 thru 9/25/2020

## 2019-09-29 DIAGNOSIS — E11.9 DIABETES MELLITUS WITHOUT COMPLICATION (CMS/HCC): ICD-10-CM

## 2019-10-02 ENCOUNTER — OFFICE VISIT (OUTPATIENT)
Dept: URGENT CARE | Facility: CLINIC | Age: 74
End: 2019-10-02
Payer: MEDICARE

## 2019-10-02 ENCOUNTER — OFFICE VISIT NO LOS (OUTPATIENT)
Dept: DIABETES SERVICES | Facility: CLINIC | Age: 74
End: 2019-10-02
Payer: MEDICARE

## 2019-10-02 VITALS
RESPIRATION RATE: 20 BRPM | TEMPERATURE: 97.8 F | HEART RATE: 88 BPM | OXYGEN SATURATION: 95 % | SYSTOLIC BLOOD PRESSURE: 180 MMHG | DIASTOLIC BLOOD PRESSURE: 73 MMHG

## 2019-10-02 DIAGNOSIS — Z79.4 TYPE 2 DIABETES MELLITUS WITH DIABETIC NEPHROPATHY, WITH LONG-TERM CURRENT USE OF INSULIN (CMS/HCC): ICD-10-CM

## 2019-10-02 DIAGNOSIS — N61.1 ABSCESS OF BREAST, RIGHT: Primary | ICD-10-CM

## 2019-10-02 DIAGNOSIS — E11.21 TYPE 2 DIABETES MELLITUS WITH DIABETIC NEPHROPATHY, WITH LONG-TERM CURRENT USE OF INSULIN (CMS/HCC): ICD-10-CM

## 2019-10-02 PROCEDURE — 10060 I&D ABSCESS SIMPLE/SINGLE: CPT | Performed by: PHYSICIAN ASSISTANT

## 2019-10-02 PROCEDURE — G0463 HOSPITAL OUTPT CLINIC VISIT: HCPCS | Performed by: PHYSICIAN ASSISTANT

## 2019-10-02 PROCEDURE — 95250 CONT GLUC MNTR PHYS/QHP EQP: CPT

## 2019-10-02 PROCEDURE — 87077 CULTURE AEROBIC IDENTIFY: CPT | Performed by: PHYSICIAN ASSISTANT

## 2019-10-02 PROCEDURE — 99213 OFFICE O/P EST LOW 20 MIN: CPT | Mod: 25 | Performed by: PHYSICIAN ASSISTANT

## 2019-10-02 RX ORDER — FLUCONAZOLE 150 MG/1
TABLET ORAL
Qty: 2 TABLET | Refills: 0 | Status: SHIPPED | OUTPATIENT
Start: 2019-10-02 | End: 2019-12-10 | Stop reason: ALTCHOICE

## 2019-10-02 RX ORDER — INSULIN GLARGINE 300 [IU]/ML
120 INJECTION, SOLUTION SUBCUTANEOUS DAILY
Qty: 12 SYRINGE | Refills: 1 | Status: SHIPPED | OUTPATIENT
Start: 2019-10-02 | End: 2020-04-06 | Stop reason: SDUPTHER

## 2019-10-02 RX ORDER — CEPHALEXIN 500 MG/1
500 TABLET, FILM COATED ORAL 4 TIMES DAILY
Qty: 28 TABLET | Refills: 0 | Status: SHIPPED | OUTPATIENT
Start: 2019-10-02 | End: 2019-10-09

## 2019-10-02 ASSESSMENT — ENCOUNTER SYMPTOMS
FEVER: 0
CHILLS: 0
LIGHT-HEADEDNESS: 0
COLOR CHANGE: 1
DIZZINESS: 0
NUMBNESS: 0
HEADACHES: 0
NAUSEA: 0
VOMITING: 0

## 2019-10-02 ASSESSMENT — PAIN SCALES - GENERAL: PAINLEVEL: 4

## 2019-10-02 NOTE — PROGRESS NOTES
"Subjective      Consuelo Singh is a 74 y.o. female who presents for lump on breast.    Consuelo presents today due to redness and swelling noted to right breast about a week ago.  Current discomfort rated 4/10.  She has had previous abscesses - generally in inguinal region.     No fever or chills.  She has not applied ice or heat nor topical antibiotic ointment.      Reviewed elevated blood pressure - states she has had 4 cups of coffee this AM.            The following have been reviewed and updated as appropriate in this visit:  Allergies  Meds         Allergies   Allergen Reactions   • Aloe Vera    • Bacitracin      ZINC   • Erythromycin Base    • Formaldehyde    • Gramicidins      OPHTHALMIC   • Hydrocortisone      ACETATE   • Latex    • Levofloxacin    • Metformin    • Neomycin    • Neomycin-Bacitracnzn-Polymyxnb    • Polymyxin B    • Prednisolone    • Prednisone    • Statins-Hmg-Coa Reductase Inhibitors    • Sulfamethoxazole    • Sulfamethoxazole-Trimethoprim    • Thimerosal    • Tresiba Flextouch U-100 [Insulin Degludec] Diarrhea and Other (see comments)     Abdominal pain   • Trimethoprim      Current Outpatient Medications   Medication Sig Dispense Refill   • insulin glargine U-300 conc (Toujeo Max U-300 SoloStar) 300 unit/mL (3 mL) insulin pen insulin pen Inject 120 Units under the skin daily 12 Syringe 1   • cephalexin (KEFTAB) 500 mg tablet Take 1 tablet (500 mg total) by mouth 4 (four) times a day for 7 days 28 tablet 0   • fluconazole (DIFLUCAN) 150 mg tablet 1 tablet every 3 days if needed 2 tablet 0   • omeprazole (PriLOSEC) 40 mg capsule Take 1 capsule (40 mg total) by mouth daily 90 capsule 1   • pen needle, diabetic (ReliOn Needles) 31 gauge x 1/4\" needle Use daily 4x to inject insulin  each 3   • insulin aspart U-100 (NovoLOG Flexpen U-100 Insulin) 100 unit/mL (3 mL) insulin pen Inject under the skin 5-12 units q am, 12-14 units q noon, 15-17 units q pm by subcutaneous injection     • " furosemide (LASIX) 40 mg tablet 1 po q am prn edema 30 tablet 0   • nortriptyline (PAMELOR) 50 mg capsule TAKE 1 CAPSULE BY MOUTH ONCE DAILY AS DIRECTED 90 capsule 1   • hydroCHLOROthiazide (HYDRODIURIL) 25 mg tablet Take 25 mg by mouth  3   • pravastatin (PRAVACHOL) 20 mg tablet Take on  30 tablet 2   • losartan (COZAAR) 100 mg tablet Take 1 tablet by mouth daily     • clobetasol (TEMOVATE) 0.05 % ointment Apply 1 application topically 2 (two) times a day as needed (Rash) 60 g 2   • blood sugar diagnostic (ONETOUCH ULTRA TEST) strip Use one strip to check glucose  strip 3   • meloxicam (MOBIC) 15 mg tablet Take 1 tablet (15 mg total) by mouth daily. 30 tablet 2   • metoprolol succinate XL (TOPROL-XL) 50 mg 24 hr tablet Take 1 tablet (50 mg total) by mouth daily. 30 tablet 11   • aspirin 325 mg tablet Take 325 mg by mouth daily.         No current facility-administered medications for this visit.      Past Medical History:   Diagnosis Date   • Blood clot in vein     shani legs   • Diabetes (CMS/HCC) (HCC)     type 2   • Eczema    • Fibromyalgia    • GERD (gastroesophageal reflux disease)    • Heart murmur    • Hyperlipidemia    • Hypertension    • Psoriasis    • Scoliosis      Past Surgical History:   Procedure Laterality Date   • CHOLECYSTECTOMY     • COLONOSCOPY  2016    5 yr  follow up   • HYSTERECTOMY      With BSO   • OTHER SURGICAL HISTORY      Rt. radical mastoidectomy, rebuilt canal from growth     Family History   Problem Relation Age of Onset   • Breast cancer Mother    • Lung cancer Maternal Grandmother    • Bone cancer Maternal Grandfather      Social History     Occupational History   • Not on file   Tobacco Use   • Smoking status: Former Smoker     Packs/day: 0.50     Years: 40.00     Pack years: 20.00     Last attempt to quit:      Years since quittin.7   • Smokeless tobacco: Former User   Substance and Sexual Activity   • Alcohol use: Yes     Comment: Occasionally; 1-2  glasses monthly   • Drug use: No   • Sexual activity: Defer   Social History Narrative   • Not on file       Review of Systems   Constitutional: Negative for chills and fever.   Cardiovascular: Negative for chest pain.   Gastrointestinal: Negative for nausea and vomiting.   Skin: Positive for color change.   Neurological: Negative for dizziness, light-headedness, numbness and headaches.       Objective   /73   Pulse 88   Temp 36.6 °C (97.8 °F)   Resp 20   SpO2 95%     Physical Exam  Vitals signs and nursing note reviewed.   Constitutional:       General: She is not in acute distress.     Appearance: Normal appearance. She is obese. She is not ill-appearing.   HENT:      Head: Normocephalic.   Eyes:      Conjunctiva/sclera: Conjunctivae normal.   Cardiovascular:      Rate and Rhythm: Normal rate and regular rhythm.      Pulses: Normal pulses.      Heart sounds: Normal heart sounds.   Pulmonary:      Effort: Pulmonary effort is normal.      Breath sounds: Normal breath sounds. No wheezing, rhonchi or rales.   Lymphadenopathy:      Cervical: No cervical adenopathy.   Skin:     General: Skin is warm.      Findings: Lesion present.      Comments: Abscess noted to medial side of right breast - 5 o'clock position.  Erythema measures 4.5 cm x 7 cm.  Induration measures 3 cm x 2 cm.  Mildly fluctuant.  Mildly tender to palpation.     Neurological:      Mental Status: She is alert and oriented to person, place, and time.   Psychiatric:         Mood and Affect: Mood normal.         Behavior: Behavior normal.     Incision and Drainage  Date/Time: 10/2/2019 1:03 PM  Performed by: CHEYANNE Ortiz      Consent  Verbal consent was obtained from the patient. Written consent was not obtained from the patient. Risks, benefits, and alternatives were discussed. Consent given by patient. The patient states understanding of the procedure being performed. Patient ID confirmed verbally with the patient.       Incision  and Drainage #1    Location Details  Incision was made at Consuelo's trunk in her R breast. Incision size was 7.5 cm.     Pre-Procedure  Adequate anesthesia is achieved using 2.5 mL of lidocaine 1% with epinephrine in a local infiltration method.     Procedure Details   A simple I&D was performed:  Using a 11 blade scalpel, the lesion was incised using a single straight incision. A moderate amount of purulent material was drained. Total volume was 2 cc. A specimen was sent for culture. Wound was rinsed with normal saline (20 cc). Packing was placed (1 cm of iodoform tape packed into wound with tail left).     Post-Procedure  Patient tolerated the procedure well with no complications. Site was cleansed and wound covered with standard dressing. Patient was given wound care instructions.               No results found for this or any previous visit (from the past 24 hour(s)).    Assessment/Plan   Diagnoses and all orders for this visit:    Abscess of breast, right  -     Lesion culture  -     cephalexin (KEFTAB) 500 mg tablet; Take 1 tablet (500 mg total) by mouth 4 (four) times a day for 7 days  -     fluconazole (DIFLUCAN) 150 mg tablet; 1 tablet every 3 days if needed    Other orders  -     Incision and Drainage        Patient Instructions   Incision and drainage of abscess on right breast was done in office today.  You are advised to keep the area clean and dry x 48 hours, then you may shower, but do not soak.  Change bandage when soiled (as it may continue to drain).  Change at least once daily.  A small amount of iodoform tape was packed into the opening.  This may come out naturally when you change dressings.  If it does not, remove it on Friday.  Keep a close eye on the region for worsening of infection:  Increased redness, pain, swelling, fever....  Follow up if these signs occur.      Advised to begin Cephalexin - 1 tablet four times daily x 7 days.  Take with food.  A wound culture was obtained.  You will be  contacted with results in 2 days.      You are advised to apply warm packs for 15 - 20 minutes several times per day to encourage drainage.  You can use tylenol (1-2 tabs) every 4-6 hours if needed for pain.  See handout for additional information regarding abscesses.      Follow up if symptoms worsen or do not fully resolve.  Follow up in 2-3 days if any concerns.      NOTE:  Consuelo has had yeast infection with antibiotic use in the past.  She was prescribed fluconazole and counseled to not use unless symptomatic - 1 tablet every 3 days if needed.     Patient Education     Skin Abscess    A skin abscess is an infected area of your skin that contains pus and other material. An abscess can happen in any part of your body. Some abscesses break open (rupture) on their own. Most continue to get worse unless they are treated. The infection can spread deeper into the body and into your blood, which can make you feel sick.  A skin abscess is caused by germs that enter the skin through a cut or scrape. It can also be caused by blocked oil and sweat glands or infected hair follicles.  This condition is usually treated by:  · Draining the pus.  · Taking antibiotic medicines.  · Placing a warm, wet washcloth over the abscess.  Follow these instructions at home:  Medicines    · Take over-the-counter and prescription medicines only as told by your doctor.  · If you were prescribed an antibiotic medicine, take it as told by your doctor. Do not stop taking the antibiotic even if you start to feel better.  Abscess care    · If you have an abscess that has not drained, place a warm, clean, wet washcloth over the abscess several times a day. Do this as told by your doctor.  · Follow instructions from your doctor about how to take care of your abscess. Make sure you:  ? Cover the abscess with a bandage (dressing).  ? Change your bandage or gauze as told by your doctor.  ? Wash your hands with soap and water before you change the  bandage or gauze. If you cannot use soap and water, use hand .  · Check your abscess every day for signs that the infection is getting worse. Check for:  ? More redness, swelling, or pain.  ? More fluid or blood.  ? Warmth.  ? More pus or a bad smell.  General instructions  · To avoid spreading the infection:  ? Do not share personal care items, towels, or hot tubs with others.  ? Avoid making skin-to-skin contact with other people.  · Keep all follow-up visits as told by your doctor. This is important.  Contact a doctor if:  · You have more redness, swelling, or pain around your abscess.  · You have more fluid or blood coming from your abscess.  · Your abscess feels warm when you touch it.  · You have more pus or a bad smell coming from your abscess.  · You have a fever.  · Your muscles ache.  · You have chills.  · You feel sick.  Get help right away if:  · You have very bad (severe) pain.  · You see red streaks on your skin spreading away from the abscess.  Summary  · A skin abscess is an infected area of your skin that contains pus and other material.  · The abscess is caused by germs that enter the skin through a cut or scrape. It can also be caused by blocked oil and sweat glands or infected hair follicles.  · Follow your doctor's instructions on caring for your abscess, taking medicines, preventing infections, and keeping follow-up visits.  This information is not intended to replace advice given to you by your health care provider. Make sure you discuss any questions you have with your health care provider.  Document Released: 06/05/2009 Document Revised: 01/31/2019 Document Reviewed: 01/31/2019  [employer] Interactive Patient Education © 2019 [employer]           CHEYANNE WHITTEN

## 2019-10-02 NOTE — PATIENT INSTRUCTIONS
Incision and drainage of abscess on right breast was done in office today.  You are advised to keep the area clean and dry x 48 hours, then you may shower, but do not soak.  Change bandage when soiled (as it may continue to drain).  Change at least once daily.  A small amount of iodoform tape was packed into the opening.  This may come out naturally when you change dressings.  If it does not, remove it on Friday.  Keep a close eye on the region for worsening of infection:  Increased redness, pain, swelling, fever....  Follow up if these signs occur.      Advised to begin Cephalexin - 1 tablet four times daily x 7 days.  Take with food.  A wound culture was obtained.  You will be contacted with results in 2 days.      You are advised to apply warm packs for 15 - 20 minutes several times per day to encourage drainage.  You can use tylenol (1-2 tabs) every 4-6 hours if needed for pain.  See handout for additional information regarding abscesses.      Follow up if symptoms worsen or do not fully resolve.  Follow up in 2-3 days if any concerns.      NOTE:  Consuelo has had yeast infection with antibiotic use in the past.  She was prescribed fluconazole and counseled to not use unless symptomatic - 1 tablet every 3 days if needed.     Patient Education     Skin Abscess    A skin abscess is an infected area of your skin that contains pus and other material. An abscess can happen in any part of your body. Some abscesses break open (rupture) on their own. Most continue to get worse unless they are treated. The infection can spread deeper into the body and into your blood, which can make you feel sick.  A skin abscess is caused by germs that enter the skin through a cut or scrape. It can also be caused by blocked oil and sweat glands or infected hair follicles.  This condition is usually treated by:  · Draining the pus.  · Taking antibiotic medicines.  · Placing a warm, wet washcloth over the abscess.  Follow these instructions  at home:  Medicines    · Take over-the-counter and prescription medicines only as told by your doctor.  · If you were prescribed an antibiotic medicine, take it as told by your doctor. Do not stop taking the antibiotic even if you start to feel better.  Abscess care    · If you have an abscess that has not drained, place a warm, clean, wet washcloth over the abscess several times a day. Do this as told by your doctor.  · Follow instructions from your doctor about how to take care of your abscess. Make sure you:  ? Cover the abscess with a bandage (dressing).  ? Change your bandage or gauze as told by your doctor.  ? Wash your hands with soap and water before you change the bandage or gauze. If you cannot use soap and water, use hand .  · Check your abscess every day for signs that the infection is getting worse. Check for:  ? More redness, swelling, or pain.  ? More fluid or blood.  ? Warmth.  ? More pus or a bad smell.  General instructions  · To avoid spreading the infection:  ? Do not share personal care items, towels, or hot tubs with others.  ? Avoid making skin-to-skin contact with other people.  · Keep all follow-up visits as told by your doctor. This is important.  Contact a doctor if:  · You have more redness, swelling, or pain around your abscess.  · You have more fluid or blood coming from your abscess.  · Your abscess feels warm when you touch it.  · You have more pus or a bad smell coming from your abscess.  · You have a fever.  · Your muscles ache.  · You have chills.  · You feel sick.  Get help right away if:  · You have very bad (severe) pain.  · You see red streaks on your skin spreading away from the abscess.  Summary  · A skin abscess is an infected area of your skin that contains pus and other material.  · The abscess is caused by germs that enter the skin through a cut or scrape. It can also be caused by blocked oil and sweat glands or infected hair follicles.  · Follow your doctor's  instructions on caring for your abscess, taking medicines, preventing infections, and keeping follow-up visits.  This information is not intended to replace advice given to you by your health care provider. Make sure you discuss any questions you have with your health care provider.  Document Released: 06/05/2009 Document Revised: 01/31/2019 Document Reviewed: 01/31/2019  [employer] Interactive Patient Education © 2019 [employer]

## 2019-10-02 NOTE — PROGRESS NOTES
"CGM (Continuous Glucose Monitoring) Removal    Session Performed by:  ANNY BLEVINS RN    Time:  1140    Equipment Return:  Yes    Meter/Pump Downloaded:  No    Insertion Site Status:  No redness    Data Downloaded and Sent to Provider for Interpretation:  Yes                 Provider:  Dr. Boyer    Comments:  Patient returns today stating that she \"loved\" the Dexcom that she wore for the past 7 days.  She states that she is much more aware of what different foods are doing to her blood sugar since wearing the sensor.  She denies any irritation from the sensor adhesive and no redness is noted upon sensor removal.  Patient would like to move forward with obtaining her own Dexcom.    "

## 2019-10-04 LAB
BACTERIA ISLT CULT: ABNORMAL
GRAM STN SPEC: ABNORMAL
GRAM STN SPEC: ABNORMAL

## 2019-10-09 DIAGNOSIS — Z79.4 TYPE 2 DIABETES MELLITUS WITH DIABETIC NEPHROPATHY, WITH LONG-TERM CURRENT USE OF INSULIN (CMS/HCC): Primary | ICD-10-CM

## 2019-10-09 DIAGNOSIS — E11.21 TYPE 2 DIABETES MELLITUS WITH DIABETIC NEPHROPATHY, WITH LONG-TERM CURRENT USE OF INSULIN (CMS/HCC): Primary | ICD-10-CM

## 2019-11-07 ENCOUNTER — TELEPHONE (OUTPATIENT)
Dept: FAMILY MEDICINE | Facility: CLINIC | Age: 74
End: 2019-11-07

## 2019-11-13 ENCOUNTER — TELEPHONE (OUTPATIENT)
Dept: FAMILY MEDICINE | Facility: CLINIC | Age: 74
End: 2019-11-13

## 2019-11-20 ENCOUNTER — TELEPHONE (OUTPATIENT)
Dept: DIABETES SERVICES | Facility: CLINIC | Age: 74
End: 2019-11-20

## 2019-11-20 DIAGNOSIS — Z79.4 TYPE 2 DIABETES MELLITUS WITH DIABETIC NEPHROPATHY, WITH LONG-TERM CURRENT USE OF INSULIN (CMS/HCC): Primary | ICD-10-CM

## 2019-11-20 DIAGNOSIS — E11.21 TYPE 2 DIABETES MELLITUS WITH DIABETIC NEPHROPATHY, WITH LONG-TERM CURRENT USE OF INSULIN (CMS/HCC): Primary | ICD-10-CM

## 2019-11-21 ENCOUNTER — OFFICE VISIT NO LOS (OUTPATIENT)
Dept: DIABETES SERVICES | Facility: CLINIC | Age: 74
End: 2019-11-21
Payer: MEDICARE

## 2019-11-21 DIAGNOSIS — E11.21 TYPE 2 DIABETES MELLITUS WITH DIABETIC NEPHROPATHY, WITH LONG-TERM CURRENT USE OF INSULIN (CMS/HCC): ICD-10-CM

## 2019-11-21 DIAGNOSIS — Z79.4 TYPE 2 DIABETES MELLITUS WITH DIABETIC NEPHROPATHY, WITH LONG-TERM CURRENT USE OF INSULIN (CMS/HCC): ICD-10-CM

## 2019-11-21 PROCEDURE — 99999 PR OFFICE/OUTPT VISIT,PROCEDURE ONLY: CPT

## 2019-12-06 DIAGNOSIS — H61.21 IMPACTED CERUMEN OF RIGHT EAR: ICD-10-CM

## 2019-12-06 DIAGNOSIS — Z90.89 HISTORY OF RIGHT MASTOIDECTOMY: ICD-10-CM

## 2019-12-09 RX ORDER — CIPROFLOXACIN AND DEXAMETHASONE 3; 1 MG/ML; MG/ML
SUSPENSION/ DROPS AURICULAR (OTIC)
Refills: 0 | OUTPATIENT
Start: 2019-12-09

## 2019-12-10 DIAGNOSIS — B37.31 YEAST INFECTION INVOLVING THE VAGINA AND SURROUNDING AREA: Primary | ICD-10-CM

## 2019-12-10 RX ORDER — FLUCONAZOLE 150 MG/1
150 TABLET ORAL ONCE
Qty: 1 TABLET | Refills: 0 | Status: SHIPPED | OUTPATIENT
Start: 2019-12-10 | End: 2019-12-10

## 2019-12-13 ENCOUNTER — OFFICE VISIT (OUTPATIENT)
Dept: ORTHOPEDIC SURGERY | Facility: CLINIC | Age: 74
End: 2019-12-13
Payer: MEDICARE

## 2019-12-13 ENCOUNTER — ANCILLARY PROCEDURE (OUTPATIENT)
Dept: RADIOLOGY | Facility: CLINIC | Age: 74
End: 2019-12-13
Payer: MEDICARE

## 2019-12-13 VITALS
HEIGHT: 63 IN | BODY MASS INDEX: 41.02 KG/M2 | WEIGHT: 231.5 LBS | DIASTOLIC BLOOD PRESSURE: 74 MMHG | SYSTOLIC BLOOD PRESSURE: 134 MMHG

## 2019-12-13 DIAGNOSIS — M25.561 RIGHT KNEE PAIN, UNSPECIFIED CHRONICITY: Primary | ICD-10-CM

## 2019-12-13 PROCEDURE — 6360000200 HC RX 636 W HCPCS (ALT 250 FOR IP): Mod: PO | Performed by: PHYSICIAN ASSISTANT

## 2019-12-13 PROCEDURE — G0463 HOSPITAL OUTPT CLINIC VISIT: HCPCS | Mod: PO | Performed by: PHYSICIAN ASSISTANT

## 2019-12-13 PROCEDURE — 96372 THER/PROPH/DIAG INJ SC/IM: CPT | Mod: PO | Performed by: PHYSICIAN ASSISTANT

## 2019-12-13 PROCEDURE — 73564 X-RAY EXAM KNEE 4 OR MORE: CPT | Mod: 26,RT | Performed by: ORTHOPAEDIC SURGERY

## 2019-12-13 PROCEDURE — 73564 X-RAY EXAM KNEE 4 OR MORE: CPT | Mod: RT,PO

## 2019-12-13 PROCEDURE — 20610 DRAIN/INJ JOINT/BURSA W/O US: CPT | Mod: PO | Performed by: PHYSICIAN ASSISTANT

## 2019-12-13 RX ORDER — METHYLPREDNISOLONE ACETATE 80 MG/ML
80 INJECTION, SUSPENSION INTRA-ARTICULAR; INTRALESIONAL; INTRAMUSCULAR; SOFT TISSUE
Status: COMPLETED | OUTPATIENT
Start: 2019-12-13 | End: 2019-12-13

## 2019-12-13 RX ORDER — LIDOCAINE HYDROCHLORIDE 10 MG/ML
1 INJECTION, SOLUTION INFILTRATION; PERINEURAL
Status: COMPLETED | OUTPATIENT
Start: 2019-12-13 | End: 2019-12-13

## 2019-12-13 RX ADMIN — METHYLPREDNISOLONE ACETATE 80 MG: 80 INJECTION, SUSPENSION INTRA-ARTICULAR; INTRALESIONAL; INTRAMUSCULAR; SOFT TISSUE at 11:58

## 2019-12-13 RX ADMIN — LIDOCAINE HYDROCHLORIDE 1 ML: 10 INJECTION, SOLUTION INFILTRATION; PERINEURAL at 11:58

## 2019-12-13 ASSESSMENT — ENCOUNTER SYMPTOMS
ARTHRALGIAS: 1
NUMBNESS: 0
WOUND: 0
FEVER: 0
JOINT SWELLING: 1
ACTIVITY CHANGE: 0

## 2019-12-13 ASSESSMENT — PAIN - FUNCTIONAL ASSESSMENT: PAIN_FUNCTIONAL_ASSESSMENT: 0-10

## 2019-12-13 NOTE — PROGRESS NOTES
Subjective   Patient ID: Consuelo Singh is a 74 y.o. female.    Chief Complaint:  Chief Complaint   Patient presents with   • Right Knee - Pain   • Knee Pain     Presents with right knee pain and would like to discuss injections. Last cortiosne injection was on 10/8/18. RAting pain 2/10 today. She states pain does come and go and some days are better than others. Denies any injury or accident       HPI  Patient is here today for evaluation treatment of her right knee pain.  She had a cortisone injection on 10/8/2018 which provided good relief for her at the time but her symptoms have returned she reports most of the pain is in the lateral aspect of the knee.  Denies any new injury, trauma, sprain/strain.  No numbness or tingling and no mechanical symptoms are reported  Last time patient was in meloxicam was prescribed to her which she thinks did help some.  She is currently not using it  Social History     Occupational History   • Not on file   Tobacco Use   • Smoking status: Former Smoker     Packs/day: 0.50     Years: 40.00     Pack years: 20.00     Last attempt to quit:      Years since quittin.9   • Smokeless tobacco: Former User   Substance and Sexual Activity   • Alcohol use: Yes     Comment: Occasionally; 1-2 glasses monthly   • Drug use: No   • Sexual activity: Defer      Review of Systems   Constitutional: Negative for activity change and fever.   Musculoskeletal: Positive for arthralgias, gait problem and joint swelling.   Skin: Negative for wound.   Neurological: Negative for numbness.             Objective   Ortho Exam      Constitutional: Patient is well-developed well-nourished, no acute distress.  A and O x3   HEENT: Head is normocephalic and atraumatic.  Eyes: EOMs are normal  Respiratory: Normal respiratory effort with no shortness of breath  Neurologic: Normal affect, pleasant and cooperative, A and O x3  Skin: Warm and dry with no obvious rashes, open sores,  lesions  Musculoskeletal:  Right knee: No open sores, lesions, rashes no erythema normal temperature and color.  Mild tenderness to palpation both medial and lateral joint lines.  Mild patellofemoral crepitus.  Range of motion 0-125 with increased pain at full flexion.  Gait: Ambulates with no assistive device no significant antalgia is noted      Assessment       Right knee pain with previous x-rays showing mild to moderate medial compartment degenerative changes.  She had a cortisone injection in October 2018 which provided good relief for her.    X-rays obtained today show mild to moderate joint space narrowing the medial compartment no significant change from her x-ray done in October 2018.  Plan    Discussed risks and benefits of cortisone injection discussed the fact that she will have an increase in her blood sugars as she is diabetic and she did have increased last time she has increased her insulin somewhat at her primary care provider's direction in preparation for the cortisone injection.  Also discussed that she may benefit from Visco supplementation so this will be preauthorized.  And a prescription for meloxicam provided and to be called into [facility].  Patient could have another cortisone injection on 3/13/2020 or later and Visco supplementation at any point if it is approved.  We will continue to see on a as needed basis otherwise.

## 2019-12-13 NOTE — PROGRESS NOTES
Injection/Arthrocentesis Joint/Tendon/Bursa  Date/Time: 12/13/2019 11:58 AM  Performed by: CHEYANNE Noland       Consent  Verbal consent was obtained from the patient. Written consent was not obtained from the patient. Risks, benefits, and alternatives were discussed. Consent given by patient. The patient states understanding of the procedure being performed. Patient ID confirmed verbally with the patient.       Location Details  An injection was given to Consuelo in her knee. The injection site was the R intraarticular.    Procedure Details   Patient was prepped and draped in the usual sterile fashion. Ethyl chloride spray was used for local anesthesia.   A 25 gauge needle was inserted into the R intraarticular using a lateral approach . Aspirate was not obtained. R intraarticular was injected with 80 mg methylPREDNISolone acetate 80 mg/mL, 1 mL lidocaine 10 mg/mL (1 %),  . Needle removed without complication.     Post-Procedure  Patient tolerated the procedure well with no immediate complications. A standard bandage was applied. Advised patient to avoid strenous activity for 24-48 hours. Advised patient to use ice, NSAIDs or acetaminophen for pain as needed.     Procedure Comments  80 mg Depomedrol and 3 ml of 1% lidocaine without epinephrine

## 2019-12-16 ENCOUNTER — OFFICE VISIT (OUTPATIENT)
Dept: INTERNAL MEDICINE | Facility: CLINIC | Age: 74
End: 2019-12-16
Payer: MEDICARE

## 2019-12-16 ENCOUNTER — APPOINTMENT (OUTPATIENT)
Dept: LAB | Facility: CLINIC | Age: 74
End: 2019-12-16
Payer: MEDICARE

## 2019-12-16 VITALS
SYSTOLIC BLOOD PRESSURE: 150 MMHG | OXYGEN SATURATION: 99 % | DIASTOLIC BLOOD PRESSURE: 86 MMHG | RESPIRATION RATE: 16 BRPM | HEIGHT: 63 IN | BODY MASS INDEX: 41.46 KG/M2 | HEART RATE: 80 BPM | WEIGHT: 234 LBS

## 2019-12-16 DIAGNOSIS — E66.01 MORBID OBESITY (CMS/HCC): ICD-10-CM

## 2019-12-16 DIAGNOSIS — Z79.4 TYPE 2 DIABETES MELLITUS WITH DIABETIC NEPHROPATHY, WITH LONG-TERM CURRENT USE OF INSULIN (CMS/HCC): ICD-10-CM

## 2019-12-16 DIAGNOSIS — E78.00 PURE HYPERCHOLESTEROLEMIA: ICD-10-CM

## 2019-12-16 DIAGNOSIS — I10 ESSENTIAL HYPERTENSION: ICD-10-CM

## 2019-12-16 DIAGNOSIS — E11.21 TYPE 2 DIABETES MELLITUS WITH DIABETIC NEPHROPATHY, WITH LONG-TERM CURRENT USE OF INSULIN (CMS/HCC): Primary | ICD-10-CM

## 2019-12-16 DIAGNOSIS — Z79.4 TYPE 2 DIABETES MELLITUS WITH DIABETIC NEPHROPATHY, WITH LONG-TERM CURRENT USE OF INSULIN (CMS/HCC): Primary | ICD-10-CM

## 2019-12-16 DIAGNOSIS — E11.21 TYPE 2 DIABETES MELLITUS WITH DIABETIC NEPHROPATHY, WITH LONG-TERM CURRENT USE OF INSULIN (CMS/HCC): ICD-10-CM

## 2019-12-16 DIAGNOSIS — N18.30 CKD (CHRONIC KIDNEY DISEASE) STAGE 3, GFR 30-59 ML/MIN (CMS/HCC): ICD-10-CM

## 2019-12-16 LAB
ALBUMIN SERPL-MCNC: 3.8 G/DL (ref 3.5–5.3)
ALP SERPL-CCNC: 122 U/L (ref 55–142)
ALT SERPL-CCNC: 18 U/L (ref 0–52)
ANION GAP SERPL CALC-SCNC: 8 MMOL/L (ref 3–11)
AST SERPL-CCNC: 15 U/L (ref 0–39)
BILIRUB SERPL-MCNC: 0.27 MG/DL (ref 0–1.4)
BUN SERPL-MCNC: 19 MG/DL (ref 7–25)
CALCIUM ALBUM COR SERPL-MCNC: 9.5 MG/DL (ref 8.6–10.3)
CALCIUM SERPL-MCNC: 9.3 MG/DL (ref 8.6–10.3)
CHLORIDE SERPL-SCNC: 103 MMOL/L (ref 98–107)
CO2 SERPL-SCNC: 28 MMOL/L (ref 21–32)
CREAT SERPL-MCNC: 1 MG/DL (ref 0.6–1.2)
EST. AVERAGE GLUCOSE BLD GHB EST-MCNC: 177.2 MG/DL
GFR SERPL CREATININE-BSD FRML MDRD: 56 ML/MIN/1.73M*2
GLUCOSE SERPL-MCNC: 95 MG/DL (ref 70–105)
HBA1C MFR BLD: 7.8 % (ref 4–6)
POTASSIUM SERPL-SCNC: 4.2 MMOL/L (ref 3.5–5.1)
PROT SERPL-MCNC: 6.4 G/DL (ref 6–8.3)
SODIUM SERPL-SCNC: 139 MMOL/L (ref 135–145)

## 2019-12-16 PROCEDURE — 99214 OFFICE O/P EST MOD 30 MIN: CPT | Performed by: INTERNAL MEDICINE

## 2019-12-16 PROCEDURE — 80053 COMPREHEN METABOLIC PANEL: CPT

## 2019-12-16 PROCEDURE — 83036 HEMOGLOBIN GLYCOSYLATED A1C: CPT

## 2019-12-16 PROCEDURE — G0463 HOSPITAL OUTPT CLINIC VISIT: HCPCS | Performed by: INTERNAL MEDICINE

## 2019-12-16 PROCEDURE — 36415 COLL VENOUS BLD VENIPUNCTURE: CPT

## 2019-12-16 RX ORDER — PRAVASTATIN SODIUM 20 MG/1
TABLET ORAL
Qty: 36 TABLET | Refills: 1 | Status: SHIPPED | OUTPATIENT
Start: 2019-12-16 | End: 2020-07-20

## 2019-12-16 RX ORDER — INSULIN ASPART 100 [IU]/ML
INJECTION, SOLUTION INTRAVENOUS; SUBCUTANEOUS
Qty: 15 ML | Refills: 2 | Status: SHIPPED | OUTPATIENT
Start: 2019-12-16 | End: 2020-03-09

## 2019-12-16 ASSESSMENT — PAIN SCALES - GENERAL: PAINLEVEL: 0-NO PAIN

## 2019-12-16 NOTE — PROGRESS NOTES
"Internal Medicine Progress Note    Chief Complaint:   Chief Complaint   Patient presents with   • Follow-up     Patient is in today for recheck. Patient also had labs done.          HPI: Patient is a 74 y.o. female is in for follow-up on her type II insulin requiring diabetes, CKD 3, and her hypertension.  Patient is now had her dexacom, and for a couple months and is still in the process of learning how to use it and interpret her values.  Patient brings in her last week's recordings and she is high particularly in the evening.  The patient on her own has increased her evening NovoLog up to approximately 20 units.    Review of Systems      Current Outpatient Medications:   •  pravastatin (Pravachol) 20 mg tablet, Take on M-W-F, Disp: 36 tablet, Rfl: 1  •  insulin aspart U-100 (NovoLOG Flexpen U-100 Insulin) 100 unit/mL (3 mL) insulin pen, 8 units q am, 10 units q noon, 20 units q pm by subcutaneous injection, Disp: 15 mL, Rfl: 2  •  meloxicam (MOBIC) 15 mg tablet, Take one tablet daily, Disp: 30 tablet, Rfl: 1  •  metoprolol succinate XL (TOPROL-XL) 50 mg 24 hr tablet, Take 1 tablet (50 mg total) by mouth daily, Disp: 30 tablet, Rfl: 11  •  blood-glucose sensor device, Use 1 sensor every 10 days., Disp: 3 Device, Rfl: 11  •  blood-glucose meter,continuous (Dexcom G6 ) misc, Use as directed., Disp: 1 each, Rfl: 0  •  blood-glucose transmitter (Dexcom G6 Transmitter) device, Use as directed.  Change every 3 months., Disp: 1 Device, Rfl: 4  •  insulin glargine U-300 conc (Toujeo Max U-300 SoloStar) 300 unit/mL (3 mL) insulin pen insulin pen, Inject 120 Units under the skin daily, Disp: 12 Syringe, Rfl: 1  •  omeprazole (PriLOSEC) 40 mg capsule, Take 1 capsule (40 mg total) by mouth daily, Disp: 90 capsule, Rfl: 1  •  pen needle, diabetic (ReliOn Needles) 31 gauge x 1/4\" needle, Use daily 4x to inject insulin SQ, Disp: 400 each, Rfl: 3  •  furosemide (LASIX) 40 mg tablet, 1 po q am prn edema, Disp: 30 tablet, " Rfl: 0  •  nortriptyline (PAMELOR) 50 mg capsule, TAKE 1 CAPSULE BY MOUTH ONCE DAILY AS DIRECTED, Disp: 90 capsule, Rfl: 1  •  hydroCHLOROthiazide (HYDRODIURIL) 25 mg tablet, Take 25 mg by mouth, Disp: , Rfl: 3  •  losartan (COZAAR) 100 mg tablet, Take 1 tablet by mouth daily, Disp: , Rfl:   •  clobetasol (TEMOVATE) 0.05 % ointment, Apply 1 application topically 2 (two) times a day as needed (Rash), Disp: 60 g, Rfl: 2  •  blood sugar diagnostic (ONETOUCH ULTRA TEST) strip, Use one strip to check glucose TID, Disp: 300 strip, Rfl: 3  •  aspirin 325 mg tablet, Take 325 mg by mouth daily.  , Disp: , Rfl:     Allergies:   Allergies   Allergen Reactions   • Aloe Vera    • Bacitracin      ZINC   • Erythromycin Base    • Formaldehyde    • Gramicidins      OPHTHALMIC   • Hydrocortisone      ACETATE   • Latex    • Levofloxacin    • Metformin    • Neomycin    • Neomycin-Bacitracnzn-Polymyxnb    • Polymyxin B    • Prednisolone    • Prednisone    • Statins-Hmg-Coa Reductase Inhibitors    • Sulfamethoxazole    • Sulfamethoxazole-Trimethoprim    • Thimerosal    • Tresiba Flextouch U-100 [Insulin Degludec] Diarrhea and Other (see comments)     Abdominal pain   • Trimethoprim          Labs:   Recent Results (from the past 336 hour(s))   Comprehensive metabolic panel Blood, Venous    Collection Time: 12/16/19  7:17 AM   Result Value Ref Range    Sodium 139 135 - 145 mmol/L    Potassium 4.2 3.5 - 5.1 mmol/L    Chloride 103 98 - 107 mmol/L    CO2 28 21 - 32 mmol/L    Anion Gap 8 3 - 11 mmol/L    BUN 19 7 - 25 mg/dL    Creatinine 1.00 0.60 - 1.20 mg/dL    Glucose 95 70 - 105 mg/dL    Calcium 9.3 8.6 - 10.3 mg/dL    AST 15 0 - 39 U/L    ALT (SGPT) 18 0 - 52 U/L    Alkaline Phosphatase 122 55 - 142 U/L    Total Protein 6.4 6.0 - 8.3 g/dL    Albumin 3.8 3.5 - 5.3 g/dL    Total Bilirubin 0.27 0.00 - 1.40 mg/dL    eGFR 56 (L) >60 mL/min/1.73m*2    Corrected Calcium 9.5 8.6 - 10.3 mg/dL   Hemoglobin A1c (glycosylated) Blood, Venous     "Collection Time: 12/16/19  7:17 AM   Result Value Ref Range    Hemoglobin A1C 7.8 (H) 4.0 - 6.0 %    Estimated Average Glucose 177.2 mg/dL         Imaging: No results found.    Vitals: Blood pressure 150/86, pulse 80, resp. rate 16, height 1.6 m (5' 3\"), weight 106 kg (234 lb), SpO2 99 %.      Physical Exam  Vitals signs and nursing note reviewed.   Constitutional:       Appearance: She is obese.   Neck:      Musculoskeletal: Normal range of motion and neck supple.   Cardiovascular:      Rate and Rhythm: Normal rate.   Pulmonary:      Effort: Pulmonary effort is normal.      Breath sounds: Normal breath sounds.   Musculoskeletal:      Right lower leg: No edema.      Left lower leg: No edema.   Lymphadenopathy:      Cervical: No cervical adenopathy.   Neurological:      Mental Status: She is alert.           Plan and Discussion:     Diagnosis Plan   1. Type 2 diabetes mellitus with diabetic nephropathy, with long-term current use of insulin (CMS/[facility]) ([facility])  Comprehensive metabolic panel Blood, Venous    Hemoglobin A1c (glycosylated) Blood, Venous    Lipid panel Blood, Venous    Urine Albumin/Creatinine Ratio Urine, Clean Catch    insulin aspart U-100 (NovoLOG Flexpen U-100 Insulin) 100 unit/mL (3 mL) insulin pen   2. Pure hypercholesterolemia  pravastatin (Pravachol) 20 mg tablet   3. CKD (chronic kidney disease) stage 3, GFR 30-59 ml/min (CMS/[facility]) ([facility])     4. Morbid obesity (CMS/[facility]) ([facility])     5. Essential hypertension       Diabetes: The patient's A1c is up to 7.8.  Order to increase her evening insulin on a routine basis up to 20 and try and have her get away from her sliding scale.  We have also given her a correction factor she will use 1 unit of insulin per 50 minutes greater than 150.  We have also talked to the patient about her weight gain would really like to try and add some other agent to her other than just insulin but due to financial reasons the patient wants to stay with just pure " insulin.  Hypertension: The patient just has minimal control of her diabetes again we talked her about weight loss and diet to try and lower this which she would like to do in the next 3 months versus additional medication.  Immunizations: The patient does not do flu shots but she is current with her pneumococcal series.  JAMILA MANCERA MD    Date: 12/16/2019  Time: 11:49 AM

## 2020-01-01 ENCOUNTER — OFFICE VISIT (OUTPATIENT)
Dept: URGENT CARE | Facility: CLINIC | Age: 75
End: 2020-01-01
Payer: MEDICARE

## 2020-01-01 VITALS
HEART RATE: 108 BPM | DIASTOLIC BLOOD PRESSURE: 60 MMHG | RESPIRATION RATE: 18 BRPM | TEMPERATURE: 98.6 F | SYSTOLIC BLOOD PRESSURE: 178 MMHG | OXYGEN SATURATION: 96 %

## 2020-01-01 DIAGNOSIS — J20.9 ACUTE BRONCHITIS, UNSPECIFIED ORGANISM: Primary | ICD-10-CM

## 2020-01-01 DIAGNOSIS — R68.89 FLU-LIKE SYMPTOMS: ICD-10-CM

## 2020-01-01 LAB
INFLUENZA A (AMB): NEGATIVE
INFLUENZA B (AMB): NEGATIVE
INTERNAL QC PASS?: YES

## 2020-01-01 PROCEDURE — G0463 HOSPITAL OUTPT CLINIC VISIT: HCPCS | Performed by: PHYSICIAN ASSISTANT

## 2020-01-01 PROCEDURE — 87804 INFLUENZA ASSAY W/OPTIC: CPT | Mod: QW | Performed by: PHYSICIAN ASSISTANT

## 2020-01-01 PROCEDURE — 99213 OFFICE O/P EST LOW 20 MIN: CPT | Performed by: PHYSICIAN ASSISTANT

## 2020-01-01 RX ORDER — BENZONATATE 200 MG/1
200 CAPSULE ORAL 3 TIMES DAILY PRN
Qty: 30 CAPSULE | Refills: 0 | Status: SHIPPED | OUTPATIENT
Start: 2020-01-01 | End: 2020-06-08 | Stop reason: ALTCHOICE

## 2020-01-01 RX ORDER — DOXYCYCLINE 100 MG/1
100 CAPSULE ORAL 2 TIMES DAILY
Qty: 14 CAPSULE | Refills: 0 | Status: SHIPPED | OUTPATIENT
Start: 2020-01-01 | End: 2020-01-08

## 2020-01-01 ASSESSMENT — PAIN SCALES - GENERAL: PAINLEVEL: 6

## 2020-01-01 NOTE — PROGRESS NOTES
HPI: Consuelo Singh is a 74 y.o. female Who presents today with complaint of not feeling well for the past 5-6 days.    Patient states that about 5-6 days ago she started with a cold.  Patient states that she had her kids visiting from [place].  She states that they did get sick at her house while they were visiting.  She states that she has been feeling feverish, earaches, headache, cough which sometimes is productive, stuffy nose, sore throat initially but that has improved.  Patient states that the cough has also been keeping her up at night, worse with laying down.  She also complains of feeling lightheaded at times.  She states over-the-counter medicines have not really been helping.  Patient denies dyspnea, hemoptysis, nausea, vomiting, abdominal discomfort, diarrhea.     has a past medical history of Blood clot in vein, Diabetes (CMS/HCC) (HCC), Eczema, Fibromyalgia, GERD (gastroesophageal reflux disease), Heart murmur, Hyperlipidemia, Hypertension, Psoriasis, and Scoliosis.     has a past surgical history that includes Cholecystectomy; Hysterectomy; Other surgical history; and Colonoscopy (03/11/2016).      Current Outpatient Medications:   •  doxycycline (MONODOX) 100 mg capsule, Take 1 capsule (100 mg total) by mouth 2 (two) times a day for 7 days, Disp: 14 capsule, Rfl: 0  •  benzonatate (TESSALON) 200 mg capsule, Take 1 capsule (200 mg total) by mouth 3 (three) times a day as needed for cough, Disp: 30 capsule, Rfl: 0  •  pravastatin (Pravachol) 20 mg tablet, Take on M-W-F, Disp: 36 tablet, Rfl: 1  •  insulin aspart U-100 (NovoLOG Flexpen U-100 Insulin) 100 unit/mL (3 mL) insulin pen, 8 units q am, 10 units q noon, 20 units q pm by subcutaneous injection, Disp: 15 mL, Rfl: 2  •  meloxicam (MOBIC) 15 mg tablet, Take one tablet daily, Disp: 30 tablet, Rfl: 1  •  metoprolol succinate XL (TOPROL-XL) 50 mg 24 hr tablet, Take 1 tablet (50 mg total) by mouth daily, Disp: 30 tablet, Rfl: 11  •  blood-glucose  "sensor device, Use 1 sensor every 10 days., Disp: 3 Device, Rfl: 11  •  blood-glucose meter,continuous (Dexcom G6 ) misc, Use as directed., Disp: 1 each, Rfl: 0  •  blood-glucose transmitter (Dexcom G6 Transmitter) device, Use as directed.  Change every 3 months., Disp: 1 Device, Rfl: 4  •  insulin glargine U-300 conc (Toujeo Max U-300 SoloStar) 300 unit/mL (3 mL) insulin pen insulin pen, Inject 120 Units under the skin daily, Disp: 12 Syringe, Rfl: 1  •  omeprazole (PriLOSEC) 40 mg capsule, Take 1 capsule (40 mg total) by mouth daily, Disp: 90 capsule, Rfl: 1  •  pen needle, diabetic (ReliOn Needles) 31 gauge x 1/4\" needle, Use daily 4x to inject insulin SQ, Disp: 400 each, Rfl: 3  •  furosemide (LASIX) 40 mg tablet, 1 po q am prn edema, Disp: 30 tablet, Rfl: 0  •  nortriptyline (PAMELOR) 50 mg capsule, TAKE 1 CAPSULE BY MOUTH ONCE DAILY AS DIRECTED, Disp: 90 capsule, Rfl: 1  •  hydroCHLOROthiazide (HYDRODIURIL) 25 mg tablet, Take 25 mg by mouth, Disp: , Rfl: 3  •  losartan (COZAAR) 100 mg tablet, Take 1 tablet by mouth daily, Disp: , Rfl:   •  clobetasol (TEMOVATE) 0.05 % ointment, Apply 1 application topically 2 (two) times a day as needed (Rash), Disp: 60 g, Rfl: 2  •  blood sugar diagnostic (ONETOUCH ULTRA TEST) strip, Use one strip to check glucose TID, Disp: 300 strip, Rfl: 3  •  aspirin 325 mg tablet, Take 325 mg by mouth daily.  , Disp: , Rfl:      reports that she quit smoking about 13 years ago. She has a 20.00 pack-year smoking history. She has quit using smokeless tobacco. She reports current alcohol use. She reports that she does not use drugs.    Allergies   Allergen Reactions   • Aloe Vera    • Bacitracin      ZINC   • Erythromycin Base    • Formaldehyde    • Gramicidins      OPHTHALMIC   • Hydrocortisone      ACETATE   • Latex    • Levofloxacin    • Metformin    • Neomycin    • Neomycin-Bacitracnzn-Polymyxnb    • Polymyxin B    • Prednisolone    • Prednisone    • Statins-Hmg-Coa Reductase " Inhibitors    • Sulfamethoxazole    • Sulfamethoxazole-Trimethoprim    • Thimerosal    • Tresiba Flextouch U-100 [Insulin Degludec] Diarrhea and Other (see comments)     Abdominal pain   • Trimethoprim        ROS: As stated in HPI    /60   Pulse 108   Temp 37 °C (98.6 °F)   Resp 18   SpO2 96%     Physical exam:  General: Well-nourished, well-developed, no acute distress  Neuro: Alert and oriented x 3  HEENT: Normocephalic, atraumatic, sclera anicteric, conjunctiva pink, external ears without masses, lesions or tenderness, TMs gray with normal light reflex, auditory canals without erythema, nasal mucosa with mild erythema and nasal drainage, posterior pharynx pink without erythema or exudates  Cardiac: Regular rate and rhythm, no murmur, rub or gallop  Lungs: Clear to auscultation bilaterally, no wheezing, rales or rhonchi    Diagnoses and all orders for this visit:    Acute bronchitis, unspecified organism  -     doxycycline (MONODOX) 100 mg capsule; Take 1 capsule (100 mg total) by mouth 2 (two) times a day for 7 days  -     benzonatate (TESSALON) 200 mg capsule; Take 1 capsule (200 mg total) by mouth 3 (three) times a day as needed for cough    Flu-like symptoms  -     POCT Influenza A/B        Assessment and Plan:  Rapid flu negative.    Physical exam and symptoms appear consistent with acute bronchitis.  Patient was started on doxycycline and was given benzonatate as needed for the cough.  Also advised trying Coricidin cold and cough in combination with the above therapy.  Further care instructions were provided by handout and discussed with the patient in clinic.  Patient advised to follow up with the primary care physician in the next 10 days as needed for this visit.  For new or worsening symptoms, patient advised to return to Urgent Care if unable to follow up with the Primary Care Physician .  Patient was in agreement with this plan of care.

## 2020-01-01 NOTE — PATIENT INSTRUCTIONS
Patient Education     Acute Bronchitis, Adult  Acute bronchitis is when air tubes (bronchi) in the lungs suddenly get swollen. The condition can make it hard to breathe. It can also cause these symptoms:  · A cough.  · Coughing up clear, yellow, or green mucus.  · Wheezing.  · Chest congestion.  · Shortness of breath.  · A fever.  · Body aches.  · Chills.  · A sore throat.  Follow these instructions at home:    Medicines  · Take over-the-counter and prescription medicines only as told by your doctor.  · If you were prescribed an antibiotic medicine, take it as told by your doctor. Do not stop taking the antibiotic even if you start to feel better.  General instructions  · Rest.  · Drink enough fluids to keep your pee (urine) pale yellow.  · Avoid smoking and secondhand smoke. If you smoke and you need help quitting, ask your doctor. Quitting will help your lungs heal faster.  · Use an inhaler, cool mist vaporizer, or humidifier as told by your doctor.  · Keep all follow-up visits as told by your doctor. This is important.  How is this prevented?  To lower your risk of getting this condition again:  · Wash your hands often with soap and water. If you cannot use soap and water, use hand .  · Avoid contact with people who have cold symptoms.  · Try not to touch your hands to your mouth, nose, or eyes.  · Make sure to get the flu shot every year.  Contact a doctor if:  · Your symptoms do not get better in 2 weeks.  Get help right away if:  · You cough up blood.  · You have chest pain.  · You have very bad shortness of breath.  · You become dehydrated.  · You faint (pass out) or keep feeling like you are going to pass out.  · You keep throwing up (vomiting).  · You have a very bad headache.  · Your fever or chills gets worse.  This information is not intended to replace advice given to you by your health care provider. Make sure you discuss any questions you have with your health care provider.  Document  Released: 06/05/2009 Document Revised: 08/01/2018 Document Reviewed: 06/07/2017  Elsevier Interactive Patient Education © 2019 [employer]

## 2020-01-01 NOTE — LETTER
[facility] [place]  [address] [facility] [address]  [phone]  Dept: [phone]    January 1, 2020     Patient: Consuelo Singh   YOB: 1945   Date of Visit: 1/1/2020       To Whom it May Concern:    Consuelo Singh was seen in my clinic on 1/1/2020.  Please excuse absence due to illness.  She is okay to return to work as of January 3rd, 2020.    If you have any questions or concerns, please don't hesitate to call.         Sincerely,          CHEYANNE KWON        CC: No Recipients

## 2020-01-14 ENCOUNTER — OFFICE VISIT (OUTPATIENT)
Dept: INTERNAL MEDICINE | Facility: CLINIC | Age: 75
End: 2020-01-14
Payer: MEDICARE

## 2020-01-14 VITALS
TEMPERATURE: 98.8 F | BODY MASS INDEX: 40.4 KG/M2 | RESPIRATION RATE: 18 BRPM | DIASTOLIC BLOOD PRESSURE: 90 MMHG | HEART RATE: 98 BPM | WEIGHT: 228 LBS | HEIGHT: 63 IN | SYSTOLIC BLOOD PRESSURE: 140 MMHG | OXYGEN SATURATION: 99 %

## 2020-01-14 DIAGNOSIS — R05.9 COUGH: Primary | ICD-10-CM

## 2020-01-14 PROCEDURE — G0463 HOSPITAL OUTPT CLINIC VISIT: HCPCS | Performed by: INTERNAL MEDICINE

## 2020-01-14 PROCEDURE — 99213 OFFICE O/P EST LOW 20 MIN: CPT | Performed by: INTERNAL MEDICINE

## 2020-01-14 RX ORDER — CODEINE PHOSPHATE AND GUAIFENESIN 10; 100 MG/5ML; MG/5ML
5 SOLUTION ORAL 3 TIMES DAILY PRN
Qty: 120 ML | Refills: 0 | Status: SHIPPED | OUTPATIENT
Start: 2020-01-14 | End: 2020-04-06 | Stop reason: ALTCHOICE

## 2020-01-14 ASSESSMENT — PAIN SCALES - GENERAL: PAINLEVEL: 0-NO PAIN

## 2020-01-14 NOTE — PROGRESS NOTES
"Internal Medicine Progress Note    Chief Complaint:   Chief Complaint   Patient presents with   • Cough     Patient is in today with c/o cough x 2 weeks, was seen in urgent care 1/1/20.          HPI: Patient is a 74 y.o. female patient is a with a cough that she has had for about 2 weeks she was seen in urgent care was negative for influenza was treated with doxycycline and some Tessalon Perles she really has no other symptoms other than the cough she has no fever chills or sweats no sore throat sore ears or facial discomfort    Review of Systems      Current Outpatient Medications:   •  benzonatate (TESSALON) 200 mg capsule, Take 1 capsule (200 mg total) by mouth 3 (three) times a day as needed for cough, Disp: 30 capsule, Rfl: 0  •  pravastatin (Pravachol) 20 mg tablet, Take on M-W-F, Disp: 36 tablet, Rfl: 1  •  insulin aspart U-100 (NovoLOG Flexpen U-100 Insulin) 100 unit/mL (3 mL) insulin pen, 8 units q am, 10 units q noon, 20 units q pm by subcutaneous injection, Disp: 15 mL, Rfl: 2  •  meloxicam (MOBIC) 15 mg tablet, Take one tablet daily, Disp: 30 tablet, Rfl: 1  •  metoprolol succinate XL (TOPROL-XL) 50 mg 24 hr tablet, Take 1 tablet (50 mg total) by mouth daily, Disp: 30 tablet, Rfl: 11  •  blood-glucose sensor device, Use 1 sensor every 10 days., Disp: 3 Device, Rfl: 11  •  blood-glucose meter,continuous (Dexcom G6 ) misc, Use as directed., Disp: 1 each, Rfl: 0  •  blood-glucose transmitter (Dexcom G6 Transmitter) device, Use as directed.  Change every 3 months., Disp: 1 Device, Rfl: 4  •  insulin glargine U-300 conc (Toujeo Max U-300 SoloStar) 300 unit/mL (3 mL) insulin pen insulin pen, Inject 120 Units under the skin daily, Disp: 12 Syringe, Rfl: 1  •  omeprazole (PriLOSEC) 40 mg capsule, Take 1 capsule (40 mg total) by mouth daily, Disp: 90 capsule, Rfl: 1  •  pen needle, diabetic (ReliOn Needles) 31 gauge x 1/4\" needle, Use daily 4x to inject insulin SQ, Disp: 400 each, Rfl: 3  •  furosemide " "(LASIX) 40 mg tablet, 1 po q am prn edema, Disp: 30 tablet, Rfl: 0  •  nortriptyline (PAMELOR) 50 mg capsule, TAKE 1 CAPSULE BY MOUTH ONCE DAILY AS DIRECTED, Disp: 90 capsule, Rfl: 1  •  hydroCHLOROthiazide (HYDRODIURIL) 25 mg tablet, Take 25 mg by mouth, Disp: , Rfl: 3  •  losartan (COZAAR) 100 mg tablet, Take 1 tablet by mouth daily, Disp: , Rfl:   •  clobetasol (TEMOVATE) 0.05 % ointment, Apply 1 application topically 2 (two) times a day as needed (Rash), Disp: 60 g, Rfl: 2  •  blood sugar diagnostic (ONETOUCH ULTRA TEST) strip, Use one strip to check glucose TID, Disp: 300 strip, Rfl: 3  •  aspirin 325 mg tablet, Take 325 mg by mouth daily.  , Disp: , Rfl:   •  codeine-guaifenesin (Cheratussin AC)  mg/5 mL liquid, Take 5 mL by mouth 3 (three) times a day as needed for cough Max Daily Amount: 15 mL, Disp: 120 mL, Rfl: 0    Allergies:   Allergies   Allergen Reactions   • Aloe Vera    • Bacitracin      ZINC   • Erythromycin Base    • Formaldehyde    • Gramicidins      OPHTHALMIC   • Hydrocortisone      ACETATE   • Latex    • Levofloxacin    • Metformin    • Neomycin    • Neomycin-Bacitracnzn-Polymyxnb    • Polymyxin B    • Prednisolone    • Prednisone    • Statins-Hmg-Coa Reductase Inhibitors    • Sulfamethoxazole    • Sulfamethoxazole-Trimethoprim    • Thimerosal    • Tresiba Flextouch U-100 [Insulin Degludec] Diarrhea and Other (see comments)     Abdominal pain   • Trimethoprim          Labs:   Recent Results (from the past 336 hour(s))   POCT Influenza A/B    Collection Time: 01/01/20  1:46 PM   Result Value Ref Range    Influenza A Negative Negative, Invalid, Equivocal, External Results Not Available, Not Reported    Influenza B Negative Negative, Invalid, Equivocal, External Results Not Available, Not Reported    Internal QC pass Yes          Imaging: No results found.    Vitals: Blood pressure 140/90, pulse 98, temperature 37.1 °C (98.8 °F), temperature source Oral, resp. rate 18, height 1.6 m (5' 3\"), " weight 103 kg (228 lb), SpO2 99 %.      Physical Exam  Vitals signs and nursing note reviewed.   Constitutional:       Appearance: She is obese.   HENT:      Right Ear: Tympanic membrane normal.      Left Ear: Tympanic membrane normal.      Mouth/Throat:      Mouth: Mucous membranes are moist.      Pharynx: Oropharynx is clear.   Neck:      Musculoskeletal: Normal range of motion and neck supple.   Cardiovascular:      Rate and Rhythm: Normal rate and regular rhythm.   Pulmonary:      Effort: Pulmonary effort is normal.      Breath sounds: Normal breath sounds. No wheezing, rhonchi or rales.   Lymphadenopathy:      Cervical: No cervical adenopathy.   Neurological:      Mental Status: She is alert.           Plan and Discussion:     Diagnosis Plan   1. Cough  codeine-guaifenesin (Cheratussin AC)  mg/5 mL liquid     Patient currently has a symptoms only of a cough and a treat her with a codeine-containing cough syrup which she is used in the past without problems.  She is aware the risks of nausea, mental confusion, and constipation.  She does not improve she will call and let us know    JAMILA MANCERA MD    Date: 1/14/2020  Time: 9:33 AM

## 2020-02-08 DIAGNOSIS — F32.A DEPRESSIVE DISORDER: ICD-10-CM

## 2020-02-10 ENCOUNTER — OFFICE VISIT (OUTPATIENT)
Dept: OTOLARYNGOLOGY | Facility: CLINIC | Age: 75
End: 2020-02-10
Payer: MEDICARE

## 2020-02-10 VITALS
HEART RATE: 91 BPM | BODY MASS INDEX: 40.4 KG/M2 | HEIGHT: 63 IN | TEMPERATURE: 97.9 F | WEIGHT: 228 LBS | SYSTOLIC BLOOD PRESSURE: 192 MMHG | DIASTOLIC BLOOD PRESSURE: 85 MMHG | OXYGEN SATURATION: 95 %

## 2020-02-10 DIAGNOSIS — Z90.89 HISTORY OF RIGHT MASTOIDECTOMY: ICD-10-CM

## 2020-02-10 DIAGNOSIS — R11.0 NAUSEA: Primary | ICD-10-CM

## 2020-02-10 PROCEDURE — 99212 OFFICE O/P EST SF 10 MIN: CPT | Performed by: OTOLARYNGOLOGY

## 2020-02-10 PROCEDURE — G0463 HOSPITAL OUTPT CLINIC VISIT: HCPCS | Performed by: OTOLARYNGOLOGY

## 2020-02-10 RX ORDER — NORTRIPTYLINE HYDROCHLORIDE 50 MG/1
CAPSULE ORAL
Qty: 90 CAPSULE | Refills: 1 | Status: SHIPPED | OUTPATIENT
Start: 2020-02-10 | End: 2020-09-01

## 2020-02-10 RX ORDER — ONDANSETRON 4 MG/1
4 TABLET, ORALLY DISINTEGRATING ORAL EVERY 8 HOURS PRN
Qty: 20 TABLET | Refills: 0 | Status: SHIPPED | OUTPATIENT
Start: 2020-02-10 | End: 2022-06-13 | Stop reason: ALTCHOICE

## 2020-02-10 ASSESSMENT — ENCOUNTER SYMPTOMS
CONSTITUTIONAL NEGATIVE: 1
CHILLS: 0
TROUBLE SWALLOWING: 0
FEVER: 0
SINUS PRESSURE: 0
RHINORRHEA: 0
SORE THROAT: 0

## 2020-02-10 NOTE — PROGRESS NOTES
Subjective    CC: mastoid debridement    HPI: Consuelo Singh is a 74 y.o. female with h/o right t-mastoid wall down years ago. She is doing fine in that regard and is here for annual debridement.    Past Medical History:   Diagnosis Date   • Blood clot in vein     shani legs   • Diabetes (CMS/HCC) (HCC)     type 2   • Eczema    • Fibromyalgia    • GERD (gastroesophageal reflux disease)    • Heart murmur    • Hyperlipidemia    • Hypertension    • Psoriasis    • Scoliosis        Past Surgical History:   Procedure Laterality Date   • CHOLECYSTECTOMY     • COLONOSCOPY  03/11/2016    5 yr  follow up   • HYSTERECTOMY      With BSO   • OTHER SURGICAL HISTORY      Rt. radical mastoidectomy, rebuilt canal from growth         Current Outpatient Medications:   •  nortriptyline (PAMELOR) 50 mg capsule, TAKE 1 CAPSULE BY MOUTH ONCE DAILY AS DIRECTED, Disp: 90 capsule, Rfl: 1  •  ondansetron ODT (Zofran ODT) 4 mg disintegrating tablet, Take 1 tablet (4 mg total) by mouth every 8 (eight) hours as needed for nausea or vomiting, Disp: 20 tablet, Rfl: 0  •  codeine-guaifenesin (Cheratussin AC)  mg/5 mL liquid, Take 5 mL by mouth 3 (three) times a day as needed for cough Max Daily Amount: 15 mL, Disp: 120 mL, Rfl: 0  •  benzonatate (TESSALON) 200 mg capsule, Take 1 capsule (200 mg total) by mouth 3 (three) times a day as needed for cough, Disp: 30 capsule, Rfl: 0  •  pravastatin (Pravachol) 20 mg tablet, Take on M-W-F, Disp: 36 tablet, Rfl: 1  •  insulin aspart U-100 (NovoLOG Flexpen U-100 Insulin) 100 unit/mL (3 mL) insulin pen, 8 units q am, 10 units q noon, 20 units q pm by subcutaneous injection, Disp: 15 mL, Rfl: 2  •  meloxicam (MOBIC) 15 mg tablet, Take one tablet daily, Disp: 30 tablet, Rfl: 1  •  metoprolol succinate XL (TOPROL-XL) 50 mg 24 hr tablet, Take 1 tablet (50 mg total) by mouth daily, Disp: 30 tablet, Rfl: 11  •  blood-glucose sensor device, Use 1 sensor every 10 days., Disp: 3 Device, Rfl: 11  •  blood-glucose  "meter,continuous (Dexcom G6 ) misc, Use as directed., Disp: 1 each, Rfl: 0  •  blood-glucose transmitter (Dexcom G6 Transmitter) device, Use as directed.  Change every 3 months., Disp: 1 Device, Rfl: 4  •  insulin glargine U-300 conc (Toujeo Max U-300 SoloStar) 300 unit/mL (3 mL) insulin pen insulin pen, Inject 120 Units under the skin daily, Disp: 12 Syringe, Rfl: 1  •  omeprazole (PriLOSEC) 40 mg capsule, Take 1 capsule (40 mg total) by mouth daily, Disp: 90 capsule, Rfl: 1  •  pen needle, diabetic (ReliOn Needles) 31 gauge x 1/4\" needle, Use daily 4x to inject insulin SQ, Disp: 400 each, Rfl: 3  •  furosemide (LASIX) 40 mg tablet, 1 po q am prn edema, Disp: 30 tablet, Rfl: 0  •  hydroCHLOROthiazide (HYDRODIURIL) 25 mg tablet, Take 25 mg by mouth, Disp: , Rfl: 3  •  losartan (COZAAR) 100 mg tablet, Take 1 tablet by mouth daily, Disp: , Rfl:   •  clobetasol (TEMOVATE) 0.05 % ointment, Apply 1 application topically 2 (two) times a day as needed (Rash), Disp: 60 g, Rfl: 2  •  blood sugar diagnostic (ONETOUCH ULTRA TEST) strip, Use one strip to check glucose TID, Disp: 300 strip, Rfl: 3  •  aspirin 325 mg tablet, Take 325 mg by mouth daily.  , Disp: , Rfl:     Allergies   Allergen Reactions   • Aloe Vera    • Bacitracin      ZINC   • Erythromycin Base    • Formaldehyde    • Gramicidins      OPHTHALMIC   • Hydrocortisone      ACETATE   • Latex    • Levofloxacin    • Metformin    • Neomycin    • Neomycin-Bacitracnzn-Polymyxnb    • Polymyxin B    • Prednisolone    • Prednisone    • Statins-Hmg-Coa Reductase Inhibitors    • Sulfamethoxazole    • Sulfamethoxazole-Trimethoprim    • Thimerosal    • Tresiba Flextouch U-100 [Insulin Degludec] Diarrhea and Other (see comments)     Abdominal pain   • Trimethoprim        family history includes Bone cancer in her maternal grandfather; Breast cancer in her mother; Lung cancer in her maternal grandmother.    Social History     Tobacco Use   • Smoking status: Former Smoker " "    Packs/day: 0.50     Years: 40.00     Pack years: 20.00     Last attempt to quit:      Years since quittin.1   • Smokeless tobacco: Former User   Substance Use Topics   • Alcohol use: Yes     Comment: Occasionally; 1-2 glasses monthly   • Drug use: No       Review of Systems   Constitutional: Negative.  Negative for chills and fever.   HENT: Positive for ear discharge, ear pain and tinnitus. Negative for congestion, hearing loss, nosebleeds, postnasal drip, rhinorrhea, sinus pressure, sneezing, sore throat and trouble swallowing.        Objective    BP (!) 192/85 (BP Location: Left arm, Patient Position: Sitting, Cuff Size: Large Long Adult)   Pulse 91   Temp 36.6 °C (97.9 °F) (Temporal)   Ht 1.6 m (5' 3\")   Wt 103 kg (228 lb)   SpO2 95%   BMI 40.39 kg/m²     General: Well-developed well-nourished female in no acute distress.    Ears: Left external ear external auditory canal and tympanic membrane appear normal.  Right external ear is normal.  Right external auditory canal status post canal wall down mastoidectomy with an adequate meatal plasty.  There is a large chunk of impacted cerumen posteriorly and superiorly in the mastoid bowl.  An attempt to remove this with alligator forceps resulted in pretty severe immediate onset of spinning vertigo with strong nystagmus.  Removal attempts were aborted.  The ear otherwise appears healthy with no evidence of infection, otorrhea, granulation or other problems.    Diagnosis    1. Nausea    2. History of right mastoidectomy        Recommendations  Patient with spinning vertigo as soon as attempt to debride the right mastoid cavity was undertaken.  We are going to have her do some Debrox drops for 1 week leading up to a follow-up appointment here in the next few weeks.  We will attempt removal of the cerumen after it has been softened with drops.  "

## 2020-02-12 ENCOUNTER — TELEPHONE (OUTPATIENT)
Dept: FAMILY MEDICINE | Facility: CLINIC | Age: 75
End: 2020-02-12

## 2020-02-12 DIAGNOSIS — Z01.818 PREOP EXAMINATION: ICD-10-CM

## 2020-02-12 DIAGNOSIS — I10 ESSENTIAL HYPERTENSION: Primary | ICD-10-CM

## 2020-02-12 NOTE — TELEPHONE ENCOUNTER
Pt has a pre-op for cataract surgery on Apr 6.  Please send lab and EKg orders, these appt are scheduled.

## 2020-03-07 DIAGNOSIS — I10 ESSENTIAL HYPERTENSION: Primary | ICD-10-CM

## 2020-03-07 DIAGNOSIS — Z79.4 TYPE 2 DIABETES MELLITUS WITH DIABETIC NEPHROPATHY, WITH LONG-TERM CURRENT USE OF INSULIN (CMS/HCC): ICD-10-CM

## 2020-03-07 DIAGNOSIS — E11.21 TYPE 2 DIABETES MELLITUS WITH DIABETIC NEPHROPATHY, WITH LONG-TERM CURRENT USE OF INSULIN (CMS/HCC): ICD-10-CM

## 2020-03-09 RX ORDER — INSULIN ASPART 100 [IU]/ML
INJECTION, SOLUTION INTRAVENOUS; SUBCUTANEOUS
Qty: 15 ML | Refills: 2 | Status: SHIPPED | OUTPATIENT
Start: 2020-03-09 | End: 2020-04-06 | Stop reason: SDUPTHER

## 2020-03-09 RX ORDER — LOSARTAN POTASSIUM 100 MG/1
TABLET ORAL
Qty: 90 TABLET | Refills: 1 | Status: SHIPPED | OUTPATIENT
Start: 2020-03-09 | End: 2020-09-08 | Stop reason: SDUPTHER

## 2020-03-16 ENCOUNTER — TELEPHONE (OUTPATIENT)
Dept: FAMILY MEDICINE | Facility: CLINIC | Age: 75
End: 2020-03-16

## 2020-03-18 ENCOUNTER — CONSULT (OUTPATIENT)
Dept: OBSTETRICS AND GYNECOLOGY | Facility: CLINIC | Age: 75
End: 2020-03-18
Payer: MEDICARE

## 2020-03-18 VITALS
HEIGHT: 63 IN | BODY MASS INDEX: 40.75 KG/M2 | DIASTOLIC BLOOD PRESSURE: 96 MMHG | WEIGHT: 230 LBS | SYSTOLIC BLOOD PRESSURE: 166 MMHG

## 2020-03-18 DIAGNOSIS — N76.0 ACUTE VAGINITIS: Primary | ICD-10-CM

## 2020-03-18 LAB
BACT WET PREP: NORMAL
CLUE CELLS SPEC QL WET PREP: NEGATIVE
EPITHELIAL CELLS: NORMAL
RBC WET PREP: NEGATIVE
TRICH WET PREP: NEGATIVE
WBC WET PREP: NORMAL
YEAST VAG QL WET PREP: NEGATIVE

## 2020-03-18 PROCEDURE — G0463 HOSPITAL OUTPT CLINIC VISIT: HCPCS | Performed by: OBSTETRICS & GYNECOLOGY

## 2020-03-18 PROCEDURE — 99203 OFFICE O/P NEW LOW 30 MIN: CPT | Performed by: OBSTETRICS & GYNECOLOGY

## 2020-03-18 PROCEDURE — 87210 SMEAR WET MOUNT SALINE/INK: CPT | Performed by: OBSTETRICS & GYNECOLOGY

## 2020-03-18 ASSESSMENT — PAIN SCALES - GENERAL: PAINLEVEL: 0-NO PAIN

## 2020-03-19 ASSESSMENT — ENCOUNTER SYMPTOMS
VOMITING: 0
CONSTIPATION: 0
HEMATURIA: 0
CHILLS: 0
DIARRHEA: 0
DYSURIA: 0
FEVER: 0
SHORTNESS OF BREATH: 0
ABDOMINAL PAIN: 0
DIFFICULTY URINATING: 0
HEADACHES: 0
FATIGUE: 0
COUGH: 0
NAUSEA: 0
FREQUENCY: 0
CHEST TIGHTNESS: 0

## 2020-03-20 NOTE — PROGRESS NOTES
Subjective     Chief Complaint   Patient presents with   • Vaginal Dryness       Consuelo Singh is a 74 y.o. female who presents for vaginal itching for the past month. She reports it does feel like it could be a yeast infection. She reports the itching seems to come and go. She denies any vaginal discharge. No lesions. No bleeding. No history of abnormal paps. Has not had a pelvic exam in several years. She does have many skin allergies including zinc. She has history of hysterectomy with BSO. She reports she has had a right labial cyst for many years that does not bother her and is unchanged and recently developed a left cyst as well.    Current Medications:  Current Outpatient Medications   Medication Sig Dispense Refill   • losartan (COZAAR) 100 mg tablet Take 1 tablet by mouth once daily 90 tablet 1   • insulin aspart U-100 (NovoLOG Flexpen U-100 Insulin) 100 unit/mL (3 mL) insulin pen INJECT 8 UNITS SUBCUTANEOUSLY DAILY IN THE MORNING, 10 UNITS AT NOON, AND 20 UNITS IN THE EVENING 15 mL 2   • nortriptyline (PAMELOR) 50 mg capsule TAKE 1 CAPSULE BY MOUTH ONCE DAILY AS DIRECTED 90 capsule 1   • ondansetron ODT (Zofran ODT) 4 mg disintegrating tablet Take 1 tablet (4 mg total) by mouth every 8 (eight) hours as needed for nausea or vomiting 20 tablet 0   • codeine-guaifenesin (Cheratussin AC)  mg/5 mL liquid Take 5 mL by mouth 3 (three) times a day as needed for cough Max Daily Amount: 15 mL 120 mL 0   • benzonatate (TESSALON) 200 mg capsule Take 1 capsule (200 mg total) by mouth 3 (three) times a day as needed for cough 30 capsule 0   • pravastatin (Pravachol) 20 mg tablet Take on M-W-F 36 tablet 1   • meloxicam (MOBIC) 15 mg tablet Take one tablet daily 30 tablet 1   • metoprolol succinate XL (TOPROL-XL) 50 mg 24 hr tablet Take 1 tablet (50 mg total) by mouth daily 30 tablet 11   • blood-glucose sensor device Use 1 sensor every 10 days. 3 Device 11   • blood-glucose meter,continuous (Dexcom G6 )  "misc Use as directed. 1 each 0   • blood-glucose transmitter (Dexcom G6 Transmitter) device Use as directed.  Change every 3 months. 1 Device 4   • insulin glargine U-300 conc (Toujeo Max U-300 SoloStar) 300 unit/mL (3 mL) insulin pen insulin pen Inject 120 Units under the skin daily 12 Syringe 1   • omeprazole (PriLOSEC) 40 mg capsule Take 1 capsule (40 mg total) by mouth daily 90 capsule 1   • pen needle, diabetic (ReliOn Needles) 31 gauge x 1/4\" needle Use daily 4x to inject insulin  each 3   • furosemide (LASIX) 40 mg tablet 1 po q am prn edema 30 tablet 0   • hydroCHLOROthiazide (HYDRODIURIL) 25 mg tablet Take 25 mg by mouth  3   • clobetasol (TEMOVATE) 0.05 % ointment Apply 1 application topically 2 (two) times a day as needed (Rash) 60 g 2   • blood sugar diagnostic (ONETOUCH ULTRA TEST) strip Use one strip to check glucose  strip 3   • aspirin 325 mg tablet Take 325 mg by mouth daily.         No current facility-administered medications for this visit.        Allergies:  Allergies   Allergen Reactions   • Aloe Vera    • Bacitracin      ZINC   • Erythromycin Base    • Formaldehyde    • Gramicidins      OPHTHALMIC   • Hydrocortisone      ACETATE   • Latex    • Levofloxacin    • Metformin    • Neomycin    • Neomycin-Bacitracnzn-Polymyxnb    • Polymyxin B    • Prednisolone    • Prednisone    • Statins-Hmg-Coa Reductase Inhibitors    • Sulfamethoxazole    • Sulfamethoxazole-Trimethoprim    • Thimerosal    • Tresiba Flextouch U-100 [Insulin Degludec] Diarrhea and Other (see comments)     Abdominal pain   • Trimethoprim        Problem List  does not have any pertinent problems on file.    OBGyn History  OB History        2    Para   2    Term                AB        Living   2       SAB        TAB        Ectopic        Molar        Multiple        Live Births                    Past Medical History  Past Medical History:   Diagnosis Date   • Blood clot in vein     shani legs   • Diabetes " "(CMS/HCC) (HCC)     type 2   • Eczema    • Fibromyalgia    • GERD (gastroesophageal reflux disease)    • Heart murmur    • Hyperlipidemia    • Hypertension    • Psoriasis    • Scoliosis         Family History  Family History   Problem Relation Age of Onset   • Breast cancer Mother    • Lung cancer Maternal Grandmother    • Bone cancer Maternal Grandfather        Surgical History  Past Surgical History:   Procedure Laterality Date   • CHOLECYSTECTOMY     • COLONOSCOPY  2016    5 yr  follow up   • HYSTERECTOMY      With BSO   • OTHER SURGICAL HISTORY      Rt. radical mastoidectomy, rebuilt canal from growth       Social History  Social History     Tobacco Use   • Smoking status: Former Smoker     Packs/day: 0.50     Years: 40.00     Pack years: 20.00     Last attempt to quit:      Years since quittin.2   • Smokeless tobacco: Former User   Substance Use Topics   • Alcohol use: Yes     Comment: Occasionally; 1-2 glasses monthly   • Drug use: No       Review of Systems   Constitutional: Negative for chills, fatigue and fever.   HENT: Negative for congestion.    Respiratory: Negative for cough, chest tightness and shortness of breath.    Cardiovascular: Negative for chest pain.   Gastrointestinal: Negative for abdominal pain, constipation, diarrhea, nausea and vomiting.   Genitourinary: Negative for difficulty urinating, dysuria, frequency, hematuria, pelvic pain, urgency, vaginal bleeding and vaginal discharge.   Neurological: Negative for headaches.       Objective     /96 (BP Location: Right arm, Patient Position: Sitting, Cuff Size: Regular Adult)   Ht 1.6 m (5' 3\")   Wt 104 kg (230 lb)   BMI 40.74 kg/m²     Physical Exam  Vitals signs reviewed.   Constitutional:       General: She is not in acute distress.     Appearance: Normal appearance.   HENT:      Head: Normocephalic and atraumatic.      Nose: No congestion or rhinorrhea.   Neck:      Musculoskeletal: Normal range of motion.   Pulmonary: "      Effort: Pulmonary effort is normal.   Abdominal:      General: There is no distension.      Palpations: Abdomen is soft. There is no mass.      Tenderness: There is no abdominal tenderness.   Genitourinary:     Vagina: Normal. No vaginal discharge, tenderness or lesions.          Comments: 1cm bilateral labial cysts, non-tender. Cervix, uterus, and ovaries surgically absent. Normal vaginal cuff. No discharge  Musculoskeletal:      Right lower leg: No edema.      Left lower leg: No edema.   Skin:     General: Skin is warm and dry.      Findings: No lesion or rash.   Neurological:      General: No focal deficit present.      Mental Status: She is alert.   Psychiatric:         Mood and Affect: Mood normal.         Behavior: Behavior normal.         Diagnoses and all orders for this visit:    Acute vaginitis  -     Wet prep, genital        Assessment/Plan    Will obtain wet prep to rule out yeast or BV. Reviewed vulvar skin care guidelines as she has many allergies. Recommend Vasoline for skin protectant. RTC if symptoms do not improve. Cysts do not need to be removed if not changing and not bothering the patient.     Damaris Briones, DO

## 2020-03-22 DIAGNOSIS — K21.9 CHRONIC GERD: ICD-10-CM

## 2020-03-23 RX ORDER — OMEPRAZOLE 40 MG/1
CAPSULE, DELAYED RELEASE ORAL
Qty: 90 CAPSULE | Refills: 1 | Status: SHIPPED | OUTPATIENT
Start: 2020-03-23 | End: 2020-10-05

## 2020-03-24 ENCOUNTER — TELEPHONE (OUTPATIENT)
Dept: FAMILY MEDICINE | Facility: CLINIC | Age: 75
End: 2020-03-24

## 2020-03-24 NOTE — TELEPHONE ENCOUNTER
Need to add on visit note from 12/16/19 that patient is using CGM please Fax # [phone]   [id number]

## 2020-03-25 ENCOUNTER — TELEPHONE (OUTPATIENT)
Dept: FAMILY MEDICINE | Facility: CLINIC | Age: 75
End: 2020-03-25

## 2020-03-30 ENCOUNTER — TELEPHONE (OUTPATIENT)
Dept: INTERNAL MEDICINE | Facility: CLINIC | Age: 75
End: 2020-03-30

## 2020-03-30 NOTE — TELEPHONE ENCOUNTER
ER did do a culture and wants to make sure the Keflex will work. I informed Viry Adame that I will look out for the culture results.    Will await fax.  Chart note has been updated and [facility] has been contacted numerous times about this patient.  Last call they were advised if they needed more information, pt would need to come in for an appt.

## 2020-04-06 ENCOUNTER — TELEPHONE - BILLABLE (OUTPATIENT)
Dept: INTERNAL MEDICINE | Facility: CLINIC | Age: 75
End: 2020-04-06
Payer: MEDICARE

## 2020-04-06 DIAGNOSIS — E11.21 TYPE 2 DIABETES MELLITUS WITH DIABETIC NEPHROPATHY, WITH LONG-TERM CURRENT USE OF INSULIN (CMS/HCC): ICD-10-CM

## 2020-04-06 DIAGNOSIS — Z79.4 TYPE 2 DIABETES MELLITUS WITH DIABETIC NEPHROPATHY, WITH LONG-TERM CURRENT USE OF INSULIN (CMS/HCC): ICD-10-CM

## 2020-04-06 DIAGNOSIS — E78.00 PURE HYPERCHOLESTEROLEMIA: ICD-10-CM

## 2020-04-06 DIAGNOSIS — N18.30 CKD (CHRONIC KIDNEY DISEASE) STAGE 3, GFR 30-59 ML/MIN (CMS/HCC): ICD-10-CM

## 2020-04-06 DIAGNOSIS — E11.9 DIABETES MELLITUS WITHOUT COMPLICATION (CMS/HCC): ICD-10-CM

## 2020-04-06 DIAGNOSIS — I10 ESSENTIAL HYPERTENSION: Primary | ICD-10-CM

## 2020-04-06 PROCEDURE — 99442 *INACTIVE DO NOT USE* PR PHYS/QHP TELEPHONE EVALUATION 11-20 MIN: CPT | Mod: 95 | Performed by: INTERNAL MEDICINE

## 2020-04-06 RX ORDER — HYDROCHLOROTHIAZIDE 25 MG/1
25 TABLET ORAL DAILY
Qty: 90 TABLET | Refills: 3 | Status: SHIPPED | OUTPATIENT
Start: 2020-04-06 | End: 2021-10-25 | Stop reason: SDUPTHER

## 2020-04-06 RX ORDER — INSULIN ASPART 100 [IU]/ML
INJECTION, SOLUTION INTRAVENOUS; SUBCUTANEOUS
Qty: 10 PEN | Refills: 1 | Status: SHIPPED | OUTPATIENT
Start: 2020-04-06 | End: 2020-06-29

## 2020-04-06 RX ORDER — INSULIN GLARGINE 300 U/ML
120 INJECTION, SOLUTION SUBCUTANEOUS DAILY
Qty: 12 SYRINGE | Refills: 3 | Status: SHIPPED | OUTPATIENT
Start: 2020-04-06 | End: 2021-04-02

## 2020-04-06 NOTE — PROGRESS NOTES
Subjective   Per discussion with JAMILA MANCERA MD, Consuelo Singh has verbally consented to be treated via a telephone based visit: Yes. A total of 20 minutes were required for this telephone based visit.     HPI  Consuelo Singh is a 74 y.o. female who presents for to discuss her insulin requiring type 2 diabetes.  Patient has had her DEXA con now for about 3 months or little bit longer she is getting more used to using it and adjusting her insulin a little bit.  Patient's been off work the last 2 weeks and freely admits to changing her diet a little bit and has been less activity than normal her sugars have been a little bit higher.  Patient occasionally will get a low sugar before lunch this usually occurs when she ends up forgetting to eat part of her breakfast.  The patient evidently separates her yogurt and her English muffin to different time periods and occasionally she gets busy and forgets her English muffin.    HPI    The following have been reviewed and updated as appropriate in this visit:    Allergies   Allergen Reactions   • Aloe Vera    • Bacitracin      ZINC   • Erythromycin Base    • Formaldehyde    • Gramicidins      OPHTHALMIC   • Hydrocortisone      ACETATE   • Latex    • Levofloxacin    • Metformin    • Neomycin    • Neomycin-Bacitracnzn-Polymyxnb    • Polymyxin B    • Prednisolone    • Prednisone    • Statins-Hmg-Coa Reductase Inhibitors    • Sulfamethoxazole    • Sulfamethoxazole-Trimethoprim    • Thimerosal    • Tresiba Flextouch U-100 [Insulin Degludec] Diarrhea and Other (see comments)     Abdominal pain   • Trimethoprim      Current Outpatient Medications   Medication Sig Dispense Refill   • omeprazole (PriLOSEC) 40 mg capsule Take 1 capsule by mouth once daily 90 capsule 1   • losartan (COZAAR) 100 mg tablet Take 1 tablet by mouth once daily 90 tablet 1   • nortriptyline (PAMELOR) 50 mg capsule TAKE 1 CAPSULE BY MOUTH ONCE DAILY AS DIRECTED 90 capsule 1   • ondansetron ODT (Zofran ODT) 4  "mg disintegrating tablet Take 1 tablet (4 mg total) by mouth every 8 (eight) hours as needed for nausea or vomiting 20 tablet 0   • benzonatate (TESSALON) 200 mg capsule Take 1 capsule (200 mg total) by mouth 3 (three) times a day as needed for cough 30 capsule 0   • pravastatin (Pravachol) 20 mg tablet Take on M-W-F 36 tablet 1   • meloxicam (MOBIC) 15 mg tablet Take one tablet daily (Patient taking differently: Take 15 mg by mouth daily as needed Take one tablet daily ) 30 tablet 1   • metoprolol succinate XL (TOPROL-XL) 50 mg 24 hr tablet Take 1 tablet (50 mg total) by mouth daily 30 tablet 11   • blood-glucose sensor device Use 1 sensor every 10 days. 3 Device 11   • blood-glucose meter,continuous (Dexcom G6 ) misc Use as directed. 1 each 0   • blood-glucose transmitter (Dexcom G6 Transmitter) device Use as directed.  Change every 3 months. 1 Device 4   • pen needle, diabetic (ReliOn Needles) 31 gauge x 1/4\" needle Use daily 4x to inject insulin  each 3   • clobetasol (TEMOVATE) 0.05 % ointment Apply 1 application topically 2 (two) times a day as needed (Rash) 60 g 2   • blood sugar diagnostic (ONETOUCH ULTRA TEST) strip Use one strip to check glucose  strip 3   • aspirin 325 mg tablet Take 325 mg by mouth daily.       • hydroCHLOROthiazide (HYDRODIURIL) 25 mg tablet Take 1 tablet (25 mg total) by mouth daily 90 tablet 3   • insulin glargine U-300 conc (Toujeo Max U-300 SoloStar) 300 unit/mL (3 mL) insulin pen insulin pen Inject 120 Units under the skin daily 12 Syringe 3   • insulin aspart U-100 (NovoLOG Flexpen U-100 Insulin) 100 unit/mL (3 mL) insulin pen INJECT 8 UNITS SUBCUTANEOUSLY DAILY IN THE MORNING, 10 UNITS AT NOON, AND 20 UNITS IN THE EVENING 10 pen 1     No current facility-administered medications for this visit.      Past Medical History:   Diagnosis Date   • Blood clot in vein     shani legs   • Diabetes (CMS/HCC) (HCC)     type 2   • Eczema    • Fibromyalgia    • GERD " (gastroesophageal reflux disease)    • Heart murmur    • Hyperlipidemia    • Hypertension    • Psoriasis    • Scoliosis      Past Surgical History:   Procedure Laterality Date   • CHOLECYSTECTOMY     • COLONOSCOPY  2016    5 yr  follow up   • HYSTERECTOMY      With BSO   • OTHER SURGICAL HISTORY      Rt. radical mastoidectomy, rebuilt canal from growth     Family History   Problem Relation Age of Onset   • Breast cancer Mother    • Lung cancer Maternal Grandmother    • Bone cancer Maternal Grandfather      Social History     Occupational History   • Not on file   Tobacco Use   • Smoking status: Former Smoker     Packs/day: 0.50     Years: 40.00     Pack years: 20.00     Last attempt to quit:      Years since quittin.2   • Smokeless tobacco: Former User   Substance and Sexual Activity   • Alcohol use: Yes     Comment: Occasionally; 1-2 glasses monthly   • Drug use: No   • Sexual activity: Defer   Social History Narrative   • Not on file       Review of Systems    Objective   Physical Exam    Assessment/Plan   Diagnoses and all orders for this visit:    Essential hypertension  -     hydroCHLOROthiazide (HYDRODIURIL) 25 mg tablet; Take 1 tablet (25 mg total) by mouth daily    Diabetes mellitus without complication (CMS/[facility]) ([facility])  -     insulin glargine U-300 conc (Toujeo Max U-300 SoloStar) 300 unit/mL (3 mL) insulin pen insulin pen; Inject 120 Units under the skin daily    Type 2 diabetes mellitus with diabetic nephropathy, with long-term current use of insulin (CMS/[facility]) ([facility])  -     insulin aspart U-100 (NovoLOG Flexpen U-100 Insulin) 100 unit/mL (3 mL) insulin pen; INJECT 8 UNITS SUBCUTANEOUSLY DAILY IN THE MORNING, 10 UNITS AT NOON, AND 20 UNITS IN THE EVENING  -     Comprehensive metabolic panel Blood, Venous; Future  -     Hemoglobin A1c (glycosylated) Blood, Venous; Future    CKD (chronic kidney disease) stage 3, GFR 30-59 ml/min (CMS/[facility]) ([facility])    Pure hypercholesterolemia  -     Lipid panel  Blood, Venous; Future    Diabetes: We have encouraged the patient to try to get back onto a little bit better diet than what she has currently been on the last 2 weeks and also to try and increase her activity.  Given her instructions that if she happens to forget her English muffin that she could have a light snack between breakfast and lunch to try and avoid her episodes of hypoglycemia.  Patient will continue to use her DEXA con to try and adjust her insulin.  CKD 3: The patient's last GFR was at 56.  Medications have been reviewed at this time.  We will simply follow-up in 3 months with repeat labs.

## 2020-05-27 DIAGNOSIS — E11.9 DIABETES MELLITUS WITHOUT COMPLICATION (CMS/HCC): ICD-10-CM

## 2020-05-27 RX ORDER — PEN NEEDLE, DIABETIC 29 G X1/2"
NEEDLE, DISPOSABLE MISCELLANEOUS
Qty: 100 EACH | Refills: 1 | Status: SHIPPED | OUTPATIENT
Start: 2020-05-27 | End: 2020-06-02 | Stop reason: SDUPTHER

## 2020-06-01 ENCOUNTER — OFFICE VISIT (OUTPATIENT)
Dept: OTOLARYNGOLOGY | Facility: CLINIC | Age: 75
End: 2020-06-01
Payer: MEDICARE

## 2020-06-01 VITALS
WEIGHT: 227 LBS | BODY MASS INDEX: 40.22 KG/M2 | DIASTOLIC BLOOD PRESSURE: 93 MMHG | SYSTOLIC BLOOD PRESSURE: 201 MMHG | HEIGHT: 63 IN | TEMPERATURE: 98 F | HEART RATE: 82 BPM

## 2020-06-01 DIAGNOSIS — H61.21 IMPACTED CERUMEN OF RIGHT EAR: ICD-10-CM

## 2020-06-01 DIAGNOSIS — Z90.89 HISTORY OF RIGHT MASTOIDECTOMY: Primary | ICD-10-CM

## 2020-06-01 PROCEDURE — G0463 HOSPITAL OUTPT CLINIC VISIT: HCPCS | Performed by: OTOLARYNGOLOGY

## 2020-06-01 PROCEDURE — 99212 OFFICE O/P EST SF 10 MIN: CPT | Performed by: OTOLARYNGOLOGY

## 2020-06-01 ASSESSMENT — PAIN SCALES - GENERAL: PAINLEVEL: 0-NO PAIN

## 2020-06-01 ASSESSMENT — ENCOUNTER SYMPTOMS
RHINORRHEA: 0
CHILLS: 0
SINUS PRESSURE: 0
SORE THROAT: 0
CONSTITUTIONAL NEGATIVE: 1
FEVER: 0
TROUBLE SWALLOWING: 0

## 2020-06-01 NOTE — PROGRESS NOTES
"Subjective    CC: ear check    HPI: Consuelo Singh is a 74 y.o. female with h/o right CWD T-mastoid years ago. She is here for cerumen removal and debridement. She is feeling fine. It has been 4 months since she was last in. At the last debridement she got pretty dizzy and felt terrible for a day.    Past Medical History:   Diagnosis Date   • Blood clot in vein     shnai legs   • Diabetes (CMS/HCC) (HCC)     type 2   • Eczema    • Fibromyalgia    • GERD (gastroesophageal reflux disease)    • Heart murmur    • Hyperlipidemia    • Hypertension    • Psoriasis    • Scoliosis        Past Surgical History:   Procedure Laterality Date   • CHOLECYSTECTOMY     • COLONOSCOPY  03/11/2016    5 yr  follow up   • HYSTERECTOMY      With BSO   • OTHER SURGICAL HISTORY      Rt. radical mastoidectomy, rebuilt canal from growth         Current Outpatient Medications:   •  pen needle, diabetic 31 gauge x 1/4\" needle, USE 1 NEEDLE TO INJECT UNDER THE SKIN THREE TIMES DAILY, Disp: 100 each, Rfl: 1  •  hydroCHLOROthiazide (HYDRODIURIL) 25 mg tablet, Take 1 tablet (25 mg total) by mouth daily, Disp: 90 tablet, Rfl: 3  •  insulin glargine U-300 conc (Toujeo Max U-300 SoloStar) 300 unit/mL (3 mL) insulin pen insulin pen, Inject 120 Units under the skin daily, Disp: 12 Syringe, Rfl: 3  •  insulin aspart U-100 (NovoLOG Flexpen U-100 Insulin) 100 unit/mL (3 mL) insulin pen, INJECT 8 UNITS SUBCUTANEOUSLY DAILY IN THE MORNING, 10 UNITS AT NOON, AND 20 UNITS IN THE EVENING, Disp: 10 pen, Rfl: 1  •  omeprazole (PriLOSEC) 40 mg capsule, Take 1 capsule by mouth once daily, Disp: 90 capsule, Rfl: 1  •  losartan (COZAAR) 100 mg tablet, Take 1 tablet by mouth once daily, Disp: 90 tablet, Rfl: 1  •  nortriptyline (PAMELOR) 50 mg capsule, TAKE 1 CAPSULE BY MOUTH ONCE DAILY AS DIRECTED, Disp: 90 capsule, Rfl: 1  •  ondansetron ODT (Zofran ODT) 4 mg disintegrating tablet, Take 1 tablet (4 mg total) by mouth every 8 (eight) hours as needed for nausea or " vomiting, Disp: 20 tablet, Rfl: 0  •  benzonatate (TESSALON) 200 mg capsule, Take 1 capsule (200 mg total) by mouth 3 (three) times a day as needed for cough, Disp: 30 capsule, Rfl: 0  •  pravastatin (Pravachol) 20 mg tablet, Take on M-W-F, Disp: 36 tablet, Rfl: 1  •  meloxicam (MOBIC) 15 mg tablet, Take one tablet daily (Patient taking differently: Take 15 mg by mouth daily as needed Take one tablet daily ), Disp: 30 tablet, Rfl: 1  •  metoprolol succinate XL (TOPROL-XL) 50 mg 24 hr tablet, Take 1 tablet (50 mg total) by mouth daily, Disp: 30 tablet, Rfl: 11  •  blood-glucose sensor device, Use 1 sensor every 10 days., Disp: 3 Device, Rfl: 11  •  blood-glucose meter,continuous (Dexcom G6 ) misc, Use as directed., Disp: 1 each, Rfl: 0  •  blood-glucose transmitter (Dexcom G6 Transmitter) device, Use as directed.  Change every 3 months., Disp: 1 Device, Rfl: 4  •  clobetasol (TEMOVATE) 0.05 % ointment, Apply 1 application topically 2 (two) times a day as needed (Rash), Disp: 60 g, Rfl: 2  •  blood sugar diagnostic (ONETOUCH ULTRA TEST) strip, Use one strip to check glucose TID, Disp: 300 strip, Rfl: 3  •  aspirin 325 mg tablet, Take 325 mg by mouth daily.  , Disp: , Rfl:     Allergies   Allergen Reactions   • Aloe Vera    • Bacitracin      ZINC   • Erythromycin Base    • Formaldehyde    • Gramicidins      OPHTHALMIC   • Hydrocortisone      ACETATE   • Latex    • Levofloxacin    • Metformin    • Neomycin    • Neomycin-Bacitracnzn-Polymyxnb    • Polymyxin B    • Prednisolone    • Prednisone    • Statins-Hmg-Coa Reductase Inhibitors    • Sulfamethoxazole    • Sulfamethoxazole-Trimethoprim    • Thimerosal    • Tresiba Flextouch U-100 [Insulin Degludec] Diarrhea and Other (see comments)     Abdominal pain   • Trimethoprim        family history includes Bone cancer in her maternal grandfather; Breast cancer in her mother; Lung cancer in her maternal grandmother.    Social History     Tobacco Use   • Smoking status:  Former Smoker     Packs/day: 0.50     Years: 40.00     Pack years: 20.00     Last attempt to quit:      Years since quittin.4   • Smokeless tobacco: Former User   Substance Use Topics   • Alcohol use: Yes     Comment: Occasionally; 1-2 glasses monthly   • Drug use: No       Review of Systems   Constitutional: Negative.  Negative for chills and fever.   HENT: Positive for hearing loss. Negative for congestion, ear discharge, ear pain, nosebleeds, postnasal drip, rhinorrhea, sinus pressure, sneezing, sore throat, tinnitus and trouble swallowing.        Objective    There were no vitals taken for this visit.    Gen: WD/WN, NAD    Ears: scant crust over the mastoid bowl. Attempt to remove this aborted due to dizziness. No moisture, granulation or other sign of malady.    Diagnosis    Cerumen impaction    Recommendations    Right CWD mastoid with some hard wax in mastoid bowl but removal causes a lot of dizziness and the ear appers healthy. Debridement deferred. F/U 4 mos.        Portions of this patient note were documented using voice to text software technology.  Some errors related to this technology may have been missed during the editing process.

## 2020-06-02 DIAGNOSIS — E11.9 DIABETES MELLITUS WITHOUT COMPLICATION (CMS/HCC): ICD-10-CM

## 2020-06-02 RX ORDER — PEN NEEDLE, DIABETIC 29 G X1/2"
NEEDLE, DISPOSABLE MISCELLANEOUS
Qty: 400 EACH | Refills: 3 | Status: SHIPPED | OUTPATIENT
Start: 2020-06-02 | End: 2021-07-26

## 2020-06-08 ENCOUNTER — PROCEDURE VISIT (OUTPATIENT)
Dept: CARDIOLOGY | Facility: CLINIC | Age: 75
End: 2020-06-08
Payer: MEDICARE

## 2020-06-08 ENCOUNTER — OFFICE VISIT (OUTPATIENT)
Dept: INTERNAL MEDICINE | Facility: CLINIC | Age: 75
End: 2020-06-08
Payer: MEDICARE

## 2020-06-08 ENCOUNTER — APPOINTMENT (OUTPATIENT)
Dept: LAB | Facility: CLINIC | Age: 75
End: 2020-06-08
Payer: MEDICARE

## 2020-06-08 VITALS
DIASTOLIC BLOOD PRESSURE: 74 MMHG | SYSTOLIC BLOOD PRESSURE: 128 MMHG | TEMPERATURE: 98.1 F | HEIGHT: 63 IN | OXYGEN SATURATION: 99 % | HEART RATE: 84 BPM | BODY MASS INDEX: 40.22 KG/M2 | WEIGHT: 227 LBS

## 2020-06-08 DIAGNOSIS — Z79.4 TYPE 2 DIABETES MELLITUS WITH DIABETIC NEPHROPATHY, WITH LONG-TERM CURRENT USE OF INSULIN (CMS/HCC): ICD-10-CM

## 2020-06-08 DIAGNOSIS — E11.21 TYPE 2 DIABETES MELLITUS WITH DIABETIC NEPHROPATHY, WITH LONG-TERM CURRENT USE OF INSULIN (CMS/HCC): ICD-10-CM

## 2020-06-08 DIAGNOSIS — H25.9 AGE-RELATED CATARACT OF BOTH EYES, UNSPECIFIED AGE-RELATED CATARACT TYPE: Primary | ICD-10-CM

## 2020-06-08 DIAGNOSIS — Z01.818 PREOP EXAMINATION: ICD-10-CM

## 2020-06-08 DIAGNOSIS — I10 ESSENTIAL HYPERTENSION: ICD-10-CM

## 2020-06-08 DIAGNOSIS — N18.30 CKD (CHRONIC KIDNEY DISEASE) STAGE 3, GFR 30-59 ML/MIN (CMS/HCC): ICD-10-CM

## 2020-06-08 DIAGNOSIS — K21.9 GASTROESOPHAGEAL REFLUX DISEASE, ESOPHAGITIS PRESENCE NOT SPECIFIED: ICD-10-CM

## 2020-06-08 DIAGNOSIS — E66.01 MORBID OBESITY (CMS/HCC): ICD-10-CM

## 2020-06-08 LAB
ALBUMIN SERPL-MCNC: 4.3 G/DL (ref 3.5–5.3)
ALP SERPL-CCNC: 115 U/L (ref 55–142)
ALT SERPL-CCNC: 16 U/L (ref 7–52)
ANION GAP SERPL CALC-SCNC: 8 MMOL/L (ref 3–11)
AST SERPL-CCNC: 15 U/L
BASOPHILS # BLD AUTO: 0.1 10*3/UL
BASOPHILS NFR BLD AUTO: 1 % (ref 0–2)
BILIRUB SERPL-MCNC: 0.4 MG/DL (ref 0.2–1.4)
BUN SERPL-MCNC: 22 MG/DL (ref 7–25)
CALCIUM ALBUM COR SERPL-MCNC: 9.5 MG/DL (ref 8.6–10.3)
CALCIUM SERPL-MCNC: 9.7 MG/DL (ref 8.6–10.3)
CHLORIDE SERPL-SCNC: 101 MMOL/L (ref 98–107)
CHOLEST SERPL-MCNC: 205 MG/DL (ref 0–199)
CO2 SERPL-SCNC: 27 MMOL/L (ref 21–32)
CREAT SERPL-MCNC: 1.03 MG/DL (ref 0.6–1.1)
EOSINOPHIL # BLD AUTO: 0.4 10*3/UL
EOSINOPHIL NFR BLD AUTO: 3 % (ref 0–3)
ERYTHROCYTE [DISTWIDTH] IN BLOOD BY AUTOMATED COUNT: 14.8 % (ref 11.5–14)
EST. AVERAGE GLUCOSE BLD GHB EST-MCNC: 168.6 MG/DL
FASTING STATUS PATIENT QL REPORTED: YES
GFR SERPL CREATININE-BSD FRML MDRD: 54 ML/MIN/1.73M*2
GLUCOSE SERPL-MCNC: 183 MG/DL (ref 70–105)
HBA1C MFR BLD: 7.5 % (ref 4–6)
HCT VFR BLD AUTO: 42.7 % (ref 34–45)
HDLC SERPL-MCNC: 62 MG/DL
HGB BLD-MCNC: 14.1 G/DL (ref 11.5–15.5)
LDLC SERPL CALC-MCNC: 102 MG/DL (ref 20–99)
LYMPHOCYTES # BLD AUTO: 2.3 10*3/UL
LYMPHOCYTES NFR BLD AUTO: 21 % (ref 11–47)
MCH RBC QN AUTO: 28.9 PG (ref 28–33)
MCHC RBC AUTO-ENTMCNC: 33.1 G/DL (ref 32–36)
MCV RBC AUTO: 87.2 FL (ref 81–97)
MONOCYTES # BLD AUTO: 0.6 10*3/UL
MONOCYTES NFR BLD AUTO: 5 % (ref 3–11)
NEUTROPHILS # BLD AUTO: 7.6 10*3/UL
NEUTROPHILS NFR BLD AUTO: 69 % (ref 41–81)
PLATELET # BLD AUTO: 280 10*3/UL (ref 140–350)
PMV BLD AUTO: 8.3 FL (ref 6.9–10.8)
POTASSIUM SERPL-SCNC: 4.6 MMOL/L (ref 3.5–5.1)
PROT SERPL-MCNC: 6.8 G/DL (ref 6–8.3)
RBC # BLD AUTO: 4.9 10*6/ΜL (ref 3.7–5.3)
SODIUM SERPL-SCNC: 136 MMOL/L (ref 135–145)
TRIGL SERPL-MCNC: 205 MG/DL
WBC # BLD AUTO: 11 10*3/UL (ref 4.5–10.5)

## 2020-06-08 PROCEDURE — 80053 COMPREHEN METABOLIC PANEL: CPT

## 2020-06-08 PROCEDURE — 99214 OFFICE O/P EST MOD 30 MIN: CPT | Mod: 25 | Performed by: INTERNAL MEDICINE

## 2020-06-08 PROCEDURE — 93010 ELECTROCARDIOGRAM REPORT: CPT | Performed by: INTERNAL MEDICINE

## 2020-06-08 PROCEDURE — 85025 COMPLETE CBC W/AUTO DIFF WBC: CPT

## 2020-06-08 PROCEDURE — 80061 LIPID PANEL: CPT

## 2020-06-08 PROCEDURE — 83036 HEMOGLOBIN GLYCOSYLATED A1C: CPT

## 2020-06-08 PROCEDURE — 36415 COLL VENOUS BLD VENIPUNCTURE: CPT

## 2020-06-08 PROCEDURE — G0463 HOSPITAL OUTPT CLINIC VISIT: HCPCS | Performed by: INTERNAL MEDICINE

## 2020-06-08 PROCEDURE — 93005 ELECTROCARDIOGRAM TRACING: CPT | Performed by: INTERNAL MEDICINE

## 2020-06-08 ASSESSMENT — PAIN SCALES - GENERAL: PAINLEVEL: 0-NO PAIN

## 2020-06-08 NOTE — PROGRESS NOTES
"Internal Medicine Progress Note    Chief Complaint:   Chief Complaint   Patient presents with   • Pre-op Exam     Cataract bilateral, Dr Booth Lt 6/18, Rt 6/29         HPI: Patient is a 74 y.o. female sent for preop prior to having bilateral cataract surgeries by Dr. Booth.  Patient's first surgery is on her left eye and is scheduled for 6/18 at 8:00 AM and her second surgery is at 6/29 on the right eye scheduled for 7 AM.  The patient currently has decreased vision particularly at night and has some halo effect.    Review of Systems  HEENT: The patient does wear glasses does have the decreased vision as mentioned in the HPI.  Hearing is just minimally decreased but stable and she denies any tinnitus.  She denies any problems chewing, swallowing, change in her voice.  Pulmonary: No cough, shortness of breath or dyspnea upon exertion.  Patient's had an episode of pneumonia she believes 12 to 15 years ago  Cardiovascular: No chest pain, palpitations or anginal symptoms patient typically does water walking 3 days a week and does some strength training usually 3 days a week.  GI: No indigestion or heartburn.  Patient does have chronic constipation.  : Nocturia x1-2  Musculoskeletal: No specific complaints  Sleep: Stable  Weight: Stable  Energy: Stable  Stress: Stable  There is no family or personal history of anesthesia related problems, no bleeding problems and she has had no personal history of acute delirium with narcotics.    Current Outpatient Medications:   •  pen needle, diabetic 31 gauge x 1/4\" needle, USE 1 NEEDLE TO INJECT UNDER THE SKIN FOUR TIMES DAILY, Disp: 400 each, Rfl: 3  •  hydroCHLOROthiazide (HYDRODIURIL) 25 mg tablet, Take 1 tablet (25 mg total) by mouth daily, Disp: 90 tablet, Rfl: 3  •  insulin glargine U-300 conc (Toujeo Max U-300 SoloStar) 300 unit/mL (3 mL) insulin pen insulin pen, Inject 120 Units under the skin daily, Disp: 12 Syringe, Rfl: 3  •  insulin aspart U-100 (NovoLOG " Flexpen U-100 Insulin) 100 unit/mL (3 mL) insulin pen, INJECT 8 UNITS SUBCUTANEOUSLY DAILY IN THE MORNING, 10 UNITS AT NOON, AND 20 UNITS IN THE EVENING, Disp: 10 pen, Rfl: 1  •  omeprazole (PriLOSEC) 40 mg capsule, Take 1 capsule by mouth once daily, Disp: 90 capsule, Rfl: 1  •  losartan (COZAAR) 100 mg tablet, Take 1 tablet by mouth once daily, Disp: 90 tablet, Rfl: 1  •  nortriptyline (PAMELOR) 50 mg capsule, TAKE 1 CAPSULE BY MOUTH ONCE DAILY AS DIRECTED, Disp: 90 capsule, Rfl: 1  •  ondansetron ODT (Zofran ODT) 4 mg disintegrating tablet, Take 1 tablet (4 mg total) by mouth every 8 (eight) hours as needed for nausea or vomiting, Disp: 20 tablet, Rfl: 0  •  pravastatin (Pravachol) 20 mg tablet, Take on M-W-F, Disp: 36 tablet, Rfl: 1  •  meloxicam (MOBIC) 15 mg tablet, Take one tablet daily (Patient taking differently: Take 15 mg by mouth daily as needed Take one tablet daily ), Disp: 30 tablet, Rfl: 1  •  metoprolol succinate XL (TOPROL-XL) 50 mg 24 hr tablet, Take 1 tablet (50 mg total) by mouth daily, Disp: 30 tablet, Rfl: 11  •  blood-glucose sensor device, Use 1 sensor every 10 days., Disp: 3 Device, Rfl: 11  •  blood-glucose meter,continuous (Dexcom G6 ) misc, Use as directed., Disp: 1 each, Rfl: 0  •  blood-glucose transmitter (Dexcom G6 Transmitter) device, Use as directed.  Change every 3 months., Disp: 1 Device, Rfl: 4  •  clobetasol (TEMOVATE) 0.05 % ointment, Apply 1 application topically 2 (two) times a day as needed (Rash), Disp: 60 g, Rfl: 2  •  blood sugar diagnostic (ONETOUCH ULTRA TEST) strip, Use one strip to check glucose TID, Disp: 300 strip, Rfl: 3  •  aspirin 325 mg tablet, Take 325 mg by mouth daily.  , Disp: , Rfl:     Allergies:   Allergies   Allergen Reactions   • Aloe Vera    • Bacitracin      ZINC   • Erythromycin Base    • Formaldehyde    • Gramicidins      OPHTHALMIC   • Hydrocortisone      ACETATE   • Latex    • Levofloxacin    • Metformin    • Neomycin    •  Neomycin-Bacitracnzn-Polymyxnb    • Polymyxin B    • Prednisolone    • Prednisone    • Statins-Hmg-Coa Reductase Inhibitors    • Sulfamethoxazole    • Sulfamethoxazole-Trimethoprim    • Thimerosal    • Tresiba Flextouch U-100 [Insulin Degludec] Diarrhea and Other (see comments)     Abdominal pain   • Trimethoprim          Labs:   Recent Results (from the past 336 hour(s))   Hemoglobin A1c (glycosylated) Blood, Venous    Collection Time: 06/08/20  8:15 AM   Result Value Ref Range    Hemoglobin A1C 7.5 (H) 4.0 - 6.0 %    Estimated Average Glucose 168.6 mg/dL   Lipid panel Blood, Venous    Collection Time: 06/08/20  8:15 AM   Result Value Ref Range    Triglycerides 205 (H) <=149 mg/dL    Cholesterol 205 (H) 0 - 199 mg/dL    HDL 62 >=40 mg/dL    LDL Calculated 102 (H) 20 - 99 mg/dL    Fasting? Yes    Comprehensive metabolic panel Blood, Venous    Collection Time: 06/08/20  8:15 AM   Result Value Ref Range    Sodium 136 135 - 145 mmol/L    Potassium 4.6 3.5 - 5.1 mmol/L    Chloride 101 98 - 107 mmol/L    CO2 27 21 - 32 mmol/L    Anion Gap 8 3 - 11 mmol/L    BUN 22 7 - 25 mg/dL    Creatinine 1.03 0.60 - 1.10 mg/dL    Glucose 183 (H) 70 - 105 mg/dL    Calcium 9.7 8.6 - 10.3 mg/dL    AST 15 <40 U/L    ALT (SGPT) 16 7 - 52 U/L    Alkaline Phosphatase 115 55 - 142 U/L    Total Protein 6.8 6.0 - 8.3 g/dL    Albumin 4.3 3.5 - 5.3 g/dL    Total Bilirubin 0.40 0.20 - 1.40 mg/dL    eGFR 54 (L) >60 mL/min/1.73m*2    Corrected Calcium 9.5 8.6 - 10.3 mg/dL   CBC w/auto differential Blood, Venous    Collection Time: 06/08/20  8:15 AM   Result Value Ref Range    WBC 11.0 (H) 4.5 - 10.5 10*3/uL    RBC 4.90 3.70 - 5.30 10*6/µL    Hemoglobin 14.1 11.5 - 15.5 g/dL    Hematocrit 42.7 34.0 - 45.0 %    MCV 87.2 81.0 - 97.0 fL    MCH 28.9 28.0 - 33.0 pg    MCHC 33.1 32.0 - 36.0 g/dL    RDW 14.8 (H) 11.5 - 14.0 %    Platelets 280 140 - 350 10*3/uL    MPV 8.3 6.9 - 10.8 fL    Neutrophils% 69 41 - 81 %    Lymphocytes% 21 11 - 47 %    Monocytes% 5  "3 - 11 %    Eosinophils% 3 0 - 3 %    Basophils% 1 0 - 2 %    Neutrophils Absolute 7.60 10*3/uL    Lymphocytes Absolute 2.30 10*3/uL    Monocytes Absolute 0.60 10*3/uL    Eosinophils Absolute 0.40 10*3/uL    Basophils Absolute 0.10 10*3/uL         Imaging: No results found.    Vitals: Blood pressure 128/74, pulse 84, temperature 36.7 °C (98.1 °F), temperature source Temporal, height 1.6 m (5' 3\"), weight 103 kg (227 lb), SpO2 99 %.      Physical Exam  Vitals signs and nursing note reviewed.   Constitutional:       Appearance: She is obese.   HENT:      Right Ear: Tympanic membrane, ear canal and external ear normal.      Left Ear: Tympanic membrane, ear canal and external ear normal.   Eyes:      Comments: Patient has bilateral cataracts with the left being greater than the right   Neck:      Musculoskeletal: Normal range of motion and neck supple.   Cardiovascular:      Rate and Rhythm: Normal rate.   Pulmonary:      Effort: Pulmonary effort is normal.      Breath sounds: Normal breath sounds.   Abdominal:      General: Bowel sounds are normal.      Palpations: There is no mass.      Tenderness: There is no abdominal tenderness. There is no right CVA tenderness, left CVA tenderness or guarding.   Musculoskeletal: Normal range of motion.      Right lower leg: No edema.      Left lower leg: No edema.   Lymphadenopathy:      Cervical: No cervical adenopathy.   Neurological:      General: No focal deficit present.      Mental Status: She is alert and oriented to person, place, and time.   Psychiatric:         Mood and Affect: Mood normal.         Behavior: Behavior normal.         Thought Content: Thought content normal.         Judgment: Judgment normal.           Plan and Discussion:     Diagnosis Plan   1. Age-related cataract of both eyes, unspecified age-related cataract type     2. Type 2 diabetes mellitus with diabetic nephropathy, with long-term current use of insulin (CMS/[facility]) ([facility])     3. CKD (chronic kidney " disease) stage 3, GFR 30-59 ml/min (CMS/HCC) (HCC)     4. Morbid obesity (CMS/HCC) (HCC)     5. Essential hypertension     6. Gastroesophageal reflux disease, esophagitis presence not specified     The patient is considered a low risk for surgical complications mainly based upon the low risk surgery being planned.  The patient has been given a written list of medication recommendations #1 as she will discontinue her aspirin 7 days prior to the first surgery and not restarted until after the second surgery.  The morning of surgery the patient will take her omeprazole and her losartan with a small sip of water.  In addition the patient will use any eyedrops given to her by Dr. Booth.  Diabetic medication: The day prior to the surgery the patient will take her normal Toujeo at 9:30 in the morning and she will use her normal doses of NovoLog throughout the day.  The morning of surgery both on 6/18 and 6/29 the patient will not take her Toujeo or her NovoLog prior to her surgeries.  Laboratory data: Patient's CBC reveals just minimal elevation in her WBC which is been present now for the last year.  She has a normal differential.  CMP is remarkable for mild CKD 3 which is remaining stable and for an elevated fasting sugar of 184.  Patient's A1c is currently at 7.5  EKG: The patient is in a normal sinus rhythm and is within normal limits.    JAMILA MANCERA MD    Date: 6/8/2020  Time: 1:25 PM

## 2020-06-27 DIAGNOSIS — E11.21 TYPE 2 DIABETES MELLITUS WITH DIABETIC NEPHROPATHY, WITH LONG-TERM CURRENT USE OF INSULIN (CMS/HCC): ICD-10-CM

## 2020-06-27 DIAGNOSIS — Z79.4 TYPE 2 DIABETES MELLITUS WITH DIABETIC NEPHROPATHY, WITH LONG-TERM CURRENT USE OF INSULIN (CMS/HCC): ICD-10-CM

## 2020-06-29 RX ORDER — INSULIN ASPART 100 [IU]/ML
INJECTION, SOLUTION INTRAVENOUS; SUBCUTANEOUS
Qty: 30 ML | Refills: 2 | Status: SHIPPED | OUTPATIENT
Start: 2020-06-29 | End: 2020-08-05

## 2020-07-20 DIAGNOSIS — E78.00 PURE HYPERCHOLESTEROLEMIA: ICD-10-CM

## 2020-07-20 RX ORDER — PRAVASTATIN SODIUM 20 MG/1
TABLET ORAL
Qty: 36 TABLET | Refills: 1 | Status: SHIPPED | OUTPATIENT
Start: 2020-07-20 | End: 2021-01-04

## 2020-08-05 DIAGNOSIS — E11.21 TYPE 2 DIABETES MELLITUS WITH DIABETIC NEPHROPATHY, WITH LONG-TERM CURRENT USE OF INSULIN (CMS/HCC): ICD-10-CM

## 2020-08-05 DIAGNOSIS — Z79.4 TYPE 2 DIABETES MELLITUS WITH DIABETIC NEPHROPATHY, WITH LONG-TERM CURRENT USE OF INSULIN (CMS/HCC): ICD-10-CM

## 2020-08-05 RX ORDER — INSULIN ASPART 100 [IU]/ML
INJECTION, SOLUTION INTRAVENOUS; SUBCUTANEOUS
Qty: 30 ML | Refills: 2 | Status: SHIPPED | OUTPATIENT
Start: 2020-08-05 | End: 2021-02-05

## 2020-09-01 DIAGNOSIS — F32.A DEPRESSIVE DISORDER: ICD-10-CM

## 2020-09-01 RX ORDER — NORTRIPTYLINE HYDROCHLORIDE 50 MG/1
CAPSULE ORAL
Qty: 90 CAPSULE | Refills: 1 | Status: SHIPPED | OUTPATIENT
Start: 2020-09-01 | End: 2021-03-03

## 2020-09-08 DIAGNOSIS — I10 ESSENTIAL HYPERTENSION: ICD-10-CM

## 2020-09-08 RX ORDER — LOSARTAN POTASSIUM 100 MG/1
100 TABLET ORAL DAILY
Qty: 90 TABLET | Refills: 3 | Status: SHIPPED | OUTPATIENT
Start: 2020-09-08 | End: 2021-08-31

## 2020-09-16 ENCOUNTER — APPOINTMENT (OUTPATIENT)
Dept: LAB | Facility: CLINIC | Age: 75
End: 2020-09-16
Payer: MEDICARE

## 2020-09-16 ENCOUNTER — OFFICE VISIT (OUTPATIENT)
Dept: INTERNAL MEDICINE | Facility: CLINIC | Age: 75
End: 2020-09-16
Payer: MEDICARE

## 2020-09-16 VITALS
RESPIRATION RATE: 15 BRPM | HEART RATE: 97 BPM | SYSTOLIC BLOOD PRESSURE: 160 MMHG | OXYGEN SATURATION: 97 % | WEIGHT: 229 LBS | HEIGHT: 63 IN | DIASTOLIC BLOOD PRESSURE: 78 MMHG | TEMPERATURE: 97.5 F | BODY MASS INDEX: 40.57 KG/M2

## 2020-09-16 DIAGNOSIS — Z79.4 TYPE 2 DIABETES MELLITUS WITH DIABETIC NEPHROPATHY, WITH LONG-TERM CURRENT USE OF INSULIN (CMS/HCC): ICD-10-CM

## 2020-09-16 DIAGNOSIS — E11.21 TYPE 2 DIABETES MELLITUS WITH DIABETIC NEPHROPATHY, WITH LONG-TERM CURRENT USE OF INSULIN (CMS/HCC): ICD-10-CM

## 2020-09-16 DIAGNOSIS — E11.21 TYPE 2 DIABETES MELLITUS WITH DIABETIC NEPHROPATHY, WITH LONG-TERM CURRENT USE OF INSULIN (CMS/HCC): Primary | ICD-10-CM

## 2020-09-16 DIAGNOSIS — Z79.4 TYPE 2 DIABETES MELLITUS WITH DIABETIC NEPHROPATHY, WITH LONG-TERM CURRENT USE OF INSULIN (CMS/HCC): Primary | ICD-10-CM

## 2020-09-16 DIAGNOSIS — I10 ESSENTIAL HYPERTENSION: ICD-10-CM

## 2020-09-16 DIAGNOSIS — E78.00 PURE HYPERCHOLESTEROLEMIA: ICD-10-CM

## 2020-09-16 LAB
ALBUMIN SERPL-MCNC: 4.2 G/DL (ref 3.5–5.3)
ALP SERPL-CCNC: 115 U/L (ref 55–142)
ALT SERPL-CCNC: 28 U/L (ref 7–52)
ANION GAP SERPL CALC-SCNC: 9 MMOL/L (ref 3–11)
AST SERPL-CCNC: 21 U/L
BILIRUB SERPL-MCNC: 0.46 MG/DL (ref 0.2–1.4)
BUN SERPL-MCNC: 26 MG/DL (ref 7–25)
CALCIUM ALBUM COR SERPL-MCNC: 9.3 MG/DL (ref 8.6–10.3)
CALCIUM SERPL-MCNC: 9.5 MG/DL (ref 8.6–10.3)
CHLORIDE SERPL-SCNC: 101 MMOL/L (ref 98–107)
CHOLEST SERPL-MCNC: 178 MG/DL (ref 0–199)
CO2 SERPL-SCNC: 28 MMOL/L (ref 21–32)
CREAT SERPL-MCNC: 1.08 MG/DL (ref 0.6–1.1)
CREAT UR-MCNC: 40 MG/DL
EST. AVERAGE GLUCOSE BLD GHB EST-MCNC: 174.3 MG/DL
FASTING STATUS PATIENT QL REPORTED: YES
GFR SERPL CREATININE-BSD FRML MDRD: 51 ML/MIN/1.73M*2
GLUCOSE SERPL-MCNC: 117 MG/DL (ref 70–105)
HBA1C MFR BLD: 7.7 % (ref 4–6)
HDLC SERPL-MCNC: 60 MG/DL
LDLC SERPL CALC-MCNC: 88 MG/DL (ref 20–99)
MICROALBUMIN UR-MCNC: 470.8 MG/L (ref 0–30)
MICROALBUMIN/CREAT UR: 1177 MG/G CREAT (ref 0–34)
POTASSIUM SERPL-SCNC: 4.1 MMOL/L (ref 3.5–5.1)
PROT SERPL-MCNC: 6.9 G/DL (ref 6–8.3)
SODIUM SERPL-SCNC: 138 MMOL/L (ref 135–145)
TRIGL SERPL-MCNC: 152 MG/DL

## 2020-09-16 PROCEDURE — 36415 COLL VENOUS BLD VENIPUNCTURE: CPT

## 2020-09-16 PROCEDURE — 99214 OFFICE O/P EST MOD 30 MIN: CPT | Performed by: INTERNAL MEDICINE

## 2020-09-16 PROCEDURE — G0463 HOSPITAL OUTPT CLINIC VISIT: HCPCS | Performed by: INTERNAL MEDICINE

## 2020-09-16 PROCEDURE — 80053 COMPREHEN METABOLIC PANEL: CPT

## 2020-09-16 PROCEDURE — 82570 ASSAY OF URINE CREATININE: CPT

## 2020-09-16 PROCEDURE — 80061 LIPID PANEL: CPT

## 2020-09-16 PROCEDURE — 83036 HEMOGLOBIN GLYCOSYLATED A1C: CPT

## 2020-09-16 RX ORDER — METOPROLOL SUCCINATE 50 MG/1
50 TABLET, EXTENDED RELEASE ORAL DAILY
Qty: 90 TABLET | Refills: 3 | Status: SHIPPED | OUTPATIENT
Start: 2020-09-16 | End: 2021-01-18 | Stop reason: SDUPTHER

## 2020-09-16 ASSESSMENT — PAIN SCALES - GENERAL: PAINLEVEL: 0-NO PAIN

## 2020-09-16 NOTE — PROGRESS NOTES
"Internal Medicine Progress Note    Chief Complaint:   Chief Complaint   Patient presents with   • Follow-up     Patient is in today for recheck. Patient also had labs done.          HPI: Patient is a 74 y.o. female comes in for follow-up mainly on her insulin-dependent type 2 diabetes, CKD 3, morbid obesity, and her hypertension.  Patient came in for fasting lab this morning and then took her normal insulin and had a small breakfast and she actually comes in having a episode of hypoglycemia with her sugar down to 57.  Patient was mildly diaphoretic    Review of Systems      Current Outpatient Medications:   •  metoprolol succinate XL (TOPROL-XL) 50 mg 24 hr tablet, Take 1 tablet (50 mg total) by mouth daily, Disp: 90 tablet, Rfl: 3  •  losartan (COZAAR) 100 mg tablet, Take 1 tablet (100 mg total) by mouth daily, Disp: 90 tablet, Rfl: 3  •  nortriptyline (PAMELOR) 50 mg capsule, TAKE 1 CAPSULE BY MOUTH ONCE DAILY AS DIRECTED, Disp: 90 capsule, Rfl: 1  •  NovoLOG Flexpen U-100 Insulin 100 unit/mL (3 mL) insulin pen, INJECT 8 UNITS SUBCUTANEOUSLY DAILY IN THE MORNING, 10 UNITS AT NOON, AND 20 UNITS IN THE EVENING, Disp: 30 mL, Rfl: 2  •  pravastatin (PRAVACHOL) 20 mg tablet, TAKE 1 TABLET BY MOUTH ONCE DAILY ON MONDAY, WEDNESDAY AND FRIDAY, Disp: 36 tablet, Rfl: 1  •  pen needle, diabetic 31 gauge x 1/4\" needle, USE 1 NEEDLE TO INJECT UNDER THE SKIN FOUR TIMES DAILY, Disp: 400 each, Rfl: 3  •  hydroCHLOROthiazide (HYDRODIURIL) 25 mg tablet, Take 1 tablet (25 mg total) by mouth daily, Disp: 90 tablet, Rfl: 3  •  insulin glargine U-300 conc (Toujeo Max U-300 SoloStar) 300 unit/mL (3 mL) insulin pen insulin pen, Inject 120 Units under the skin daily, Disp: 12 Syringe, Rfl: 3  •  omeprazole (PriLOSEC) 40 mg capsule, Take 1 capsule by mouth once daily, Disp: 90 capsule, Rfl: 1  •  ondansetron ODT (Zofran ODT) 4 mg disintegrating tablet, Take 1 tablet (4 mg total) by mouth every 8 (eight) hours as needed for nausea or " vomiting, Disp: 20 tablet, Rfl: 0  •  meloxicam (MOBIC) 15 mg tablet, Take one tablet daily (Patient taking differently: Take 15 mg by mouth daily as needed Take one tablet daily ), Disp: 30 tablet, Rfl: 1  •  blood-glucose sensor device, Use 1 sensor every 10 days., Disp: 3 Device, Rfl: 11  •  blood-glucose meter,continuous (Dexcom G6 ) misc, Use as directed., Disp: 1 each, Rfl: 0  •  blood-glucose transmitter (Dexcom G6 Transmitter) device, Use as directed.  Change every 3 months., Disp: 1 Device, Rfl: 4  •  clobetasol (TEMOVATE) 0.05 % ointment, Apply 1 application topically 2 (two) times a day as needed (Rash), Disp: 60 g, Rfl: 2  •  blood sugar diagnostic (ONETOUCH ULTRA TEST) strip, Use one strip to check glucose TID, Disp: 300 strip, Rfl: 3  •  aspirin 325 mg tablet, Take 325 mg by mouth daily.  , Disp: , Rfl:     Allergies:   Allergies   Allergen Reactions   • Aloe Vera    • Bacitracin      ZINC   • Erythromycin Base    • Formaldehyde    • Gramicidins      OPHTHALMIC   • Hydrocortisone      ACETATE   • Latex    • Levofloxacin    • Metformin    • Neomycin    • Neomycin-Bacitracnzn-Polymyxnb    • Polymyxin B    • Prednisolone    • Prednisone    • Statins-Hmg-Coa Reductase Inhibitors    • Sulfamethoxazole    • Sulfamethoxazole-Trimethoprim    • Thimerosal    • Tresiba Flextouch U-100 [Insulin Degludec] Diarrhea and Other (see comments)     Abdominal pain   • Trimethoprim          Labs:   Recent Results (from the past 336 hour(s))   Comprehensive metabolic panel Blood, Venous    Collection Time: 09/16/20  7:30 AM   Result Value Ref Range    Sodium 138 135 - 145 mmol/L    Potassium 4.1 3.5 - 5.1 mmol/L    Chloride 101 98 - 107 mmol/L    CO2 28 21 - 32 mmol/L    Anion Gap 9 3 - 11 mmol/L    BUN 26 (H) 7 - 25 mg/dL    Creatinine 1.08 0.60 - 1.10 mg/dL    Glucose 117 (H) 70 - 105 mg/dL    Calcium 9.5 8.6 - 10.3 mg/dL    AST 21 <40 U/L    ALT (SGPT) 28 7 - 52 U/L    Alkaline Phosphatase 115 55 - 142 U/L     "Total Protein 6.9 6.0 - 8.3 g/dL    Albumin 4.2 3.5 - 5.3 g/dL    Total Bilirubin 0.46 0.20 - 1.40 mg/dL    eGFR 51 (L) >60 mL/min/1.73m*2    Corrected Calcium 9.3 8.6 - 10.3 mg/dL   Urine Albumin/Creatinine Ratio Urine, Clean Catch    Collection Time: 09/16/20  7:30 AM   Result Value Ref Range    Albumin, Urine 470.8 (H) 0.0 - 30.0 mg/L    Albumin/Creat Ratio 1,177.0 (H) 0.0 - 34.0 mg/g Creat    Creatinine, Ur 40.0 mg/dL   Hemoglobin A1c (glycosylated) Blood, Venous    Collection Time: 09/16/20  7:30 AM   Result Value Ref Range    Hemoglobin A1C 7.7 (H) 4.0 - 6.0 %    Estimated Average Glucose 174.3 mg/dL   Lipid panel Blood, Venous    Collection Time: 09/16/20  7:30 AM   Result Value Ref Range    Triglycerides 152 (H) <=149 mg/dL    Cholesterol 178 0 - 199 mg/dL    HDL 60 >=40 mg/dL    LDL Calculated 88 20 - 99 mg/dL    Fasting? Yes          Imaging: No results found.    Vitals: Blood pressure 160/78, pulse 97, temperature 36.4 °C (97.5 °F), temperature source Temporal, resp. rate 15, height 1.6 m (5' 3\"), weight 103.9 kg (229 lb), SpO2 97 %.      Physical Exam  Vitals signs and nursing note reviewed.   Constitutional:       Appearance: She is obese.   Neck:      Musculoskeletal: Normal range of motion and neck supple.   Cardiovascular:      Rate and Rhythm: Normal rate and regular rhythm.   Pulmonary:      Effort: Pulmonary effort is normal.      Breath sounds: Normal breath sounds.   Musculoskeletal:      Right lower leg: No edema.      Left lower leg: No edema.   Lymphadenopathy:      Cervical: No cervical adenopathy.   Neurological:      Mental Status: She is alert.           Plan and Discussion:     Diagnosis Plan   1. Type 2 diabetes mellitus with diabetic nephropathy, with long-term current use of insulin (CMS/[facility]) ([facility])  Hemoglobin A1c (glycosylated) Blood, Venous    Comprehensive metabolic panel Blood, Venous   2. Essential hypertension  metoprolol succinate XL (TOPROL-XL) 50 mg 24 hr tablet    " Comprehensive metabolic panel Blood, Venous   Type 2 diabetes: The patient's episode of hypoglycemia was treated with a small amount of regular Sprite as well as a yogurt.  The patient sugar came up nicely in the office and the patient's symptoms have resolved.  This is a second episode of hypoglycemia since she started some type of an over-the-counter glucose control system.  Due to that were going to decrease her morning rapid acting insulin from 8 units down to 6 units  Hypertension: The patient's blood pressure was quite high when she first came in and she was having her hypo-glycemic episode.  Just with resting about 10 minutes in the office it dropped down to an acceptable level.  Microalbuminemia: Patient has significant increase in her albumin we want to try and do the best we can with both her diabetes as well as her hypertension.  The patient is already on ARB  Vaccines: The patient does not usually do flu vaccinations.  JAMILA MANCERA MD    Date: 9/16/2020  Time: 12:17 PM

## 2020-09-29 ENCOUNTER — TELEPHONE (OUTPATIENT)
Dept: OTOLARYNGOLOGY | Facility: CLINIC | Age: 75
End: 2020-09-29

## 2020-09-29 ENCOUNTER — OFFICE VISIT (OUTPATIENT)
Dept: OTOLARYNGOLOGY | Facility: CLINIC | Age: 75
End: 2020-09-29
Payer: MEDICARE

## 2020-09-29 VITALS
TEMPERATURE: 97.8 F | OXYGEN SATURATION: 99 % | HEART RATE: 84 BPM | WEIGHT: 230 LBS | BODY MASS INDEX: 40.75 KG/M2 | HEIGHT: 63 IN

## 2020-09-29 DIAGNOSIS — H60.531 ACUTE CONTACT OTITIS EXTERNA OF RIGHT EAR: ICD-10-CM

## 2020-09-29 DIAGNOSIS — H65.01 RIGHT ACUTE SEROUS OTITIS MEDIA, RECURRENCE NOT SPECIFIED: Primary | ICD-10-CM

## 2020-09-29 PROCEDURE — 69210 REMOVE IMPACTED EAR WAX UNI: CPT | Performed by: NURSE PRACTITIONER

## 2020-09-29 PROCEDURE — 99213 OFFICE O/P EST LOW 20 MIN: CPT | Mod: 25 | Performed by: NURSE PRACTITIONER

## 2020-09-29 PROCEDURE — G0463 HOSPITAL OUTPT CLINIC VISIT: HCPCS | Performed by: NURSE PRACTITIONER

## 2020-09-29 RX ORDER — AMOXICILLIN 875 MG/1
875 TABLET, FILM COATED ORAL 2 TIMES DAILY
Qty: 20 TABLET | Refills: 0 | Status: SHIPPED | OUTPATIENT
Start: 2020-09-29 | End: 2020-10-09

## 2020-09-29 RX ORDER — CIPROFLOXACIN AND DEXAMETHASONE 3; 1 MG/ML; MG/ML
4 SUSPENSION/ DROPS AURICULAR (OTIC) 2 TIMES DAILY
Qty: 7.5 ML | Refills: 0 | Status: SHIPPED | OUTPATIENT
Start: 2020-09-29 | End: 2020-10-09

## 2020-09-29 NOTE — LETTER
[facility] EAR/NOSE/THROAT  [address] [place] [address]  [phone]  Dept: [phone]  September 29, 2020     Consuelo Singh      Patient: Consuelo Singh   YOB: 1945   Date of Visit: 9/29/2020       To Whom it May Concern:    Consuelo Singh was seen in my clinic on  9/29/2020 at [facility] EAR/[facility]SE[facility]THRO[facility]. Please excuse Consuelo for her absence for this appointment.           Sincerely,         DREW MOYER CNP    Electronically signed by DREW MOYER CNP at 11:58 AM        CC: Frantz Boyer MD

## 2020-09-29 NOTE — LETTER
[facility] EAR/NOSE/THROAT  [address] [place] [address]  [phone]  Dept: [phone]    September 29, 2020     Patient: Consuelo Singh   YOB: 1945   Date of Visit: 9/29/2020       To Whom it May Concern:    Consuelo Singh was seen in my clinic on 9/29/2020 at [facility] EAR/NOSE/THROAT  [address] [place] [address] [place] [address]  [phone]. Please excuse Consuelo for her absence from school on this day to make the appointment.    If you have any questions or concerns, please don't hesitate to call.         Sincerely,         DREW MOYER, CLEMENTINE        CC: Frantz Boyer MD

## 2020-09-29 NOTE — PROGRESS NOTES
Subjective   CC: right ear pain    Patient ID: Consuelo Singh is a 75 y.o. female.    HPI  Right ear pain and drainage. Pt states that she developed right ear pain starting two days ago. She states she woke up on Monday morning and she had some drainage from her right ear. She rates her pain 5-6/10. She denies hearing changes or vertigo.     Pt had mastoidectomy done many ears ago.     She states that she is really sensitive to vertigo.     She denies recent fever or illness.     Review of Systems see HPI    Allergies   Allergen Reactions   • Aloe Vera    • Bacitracin      ZINC   • Erythromycin Base    • Formaldehyde    • Gramicidins      OPHTHALMIC   • Hydrocortisone      ACETATE   • Latex    • Levofloxacin    • Metformin    • Neomycin    • Neomycin-Bacitracnzn-Polymyxnb    • Polymyxin B    • Prednisolone    • Prednisone    • Statins-Hmg-Coa Reductase Inhibitors    • Sulfamethoxazole    • Sulfamethoxazole-Trimethoprim    • Thimerosal    • Tresiba Flextouch U-100 [Insulin Degludec] Diarrhea and Other (see comments)     Abdominal pain   • Trimethoprim      Current Outpatient Medications   Medication Sig Dispense Refill   • ciprofloxacin-dexamethasone (CIPRODEX) otic suspension Administer 4 drops into the right ear 2 (two) times a day for 10 days 7.5 mL 0   • amoxicillin (AMOXIL) 875 mg tablet Take 1 tablet (875 mg total) by mouth 2 (two) times a day for 10 days 20 tablet 0   • metoprolol succinate XL (TOPROL-XL) 50 mg 24 hr tablet Take 1 tablet (50 mg total) by mouth daily 90 tablet 3   • losartan (COZAAR) 100 mg tablet Take 1 tablet (100 mg total) by mouth daily 90 tablet 3   • nortriptyline (PAMELOR) 50 mg capsule TAKE 1 CAPSULE BY MOUTH ONCE DAILY AS DIRECTED 90 capsule 1   • NovoLOG Flexpen U-100 Insulin 100 unit/mL (3 mL) insulin pen INJECT 8 UNITS SUBCUTANEOUSLY DAILY IN THE MORNING, 10 UNITS AT NOON, AND 20 UNITS IN THE EVENING 30 mL 2   • pravastatin (PRAVACHOL) 20 mg tablet TAKE 1 TABLET BY MOUTH ONCE DAILY  "ON MONDAY, WEDNESDAY AND FRIDAY 36 tablet 1   • pen needle, diabetic 31 gauge x 1/4\" needle USE 1 NEEDLE TO INJECT UNDER THE SKIN FOUR TIMES DAILY 400 each 3   • hydroCHLOROthiazide (HYDRODIURIL) 25 mg tablet Take 1 tablet (25 mg total) by mouth daily 90 tablet 3   • insulin glargine U-300 conc (Toujeo Max U-300 SoloStar) 300 unit/mL (3 mL) insulin pen insulin pen Inject 120 Units under the skin daily 12 Syringe 3   • omeprazole (PriLOSEC) 40 mg capsule Take 1 capsule by mouth once daily 90 capsule 1   • ondansetron ODT (Zofran ODT) 4 mg disintegrating tablet Take 1 tablet (4 mg total) by mouth every 8 (eight) hours as needed for nausea or vomiting 20 tablet 0   • meloxicam (MOBIC) 15 mg tablet Take one tablet daily (Patient taking differently: Take 15 mg by mouth daily as needed Take one tablet daily ) 30 tablet 1   • blood-glucose sensor device Use 1 sensor every 10 days. 3 Device 11   • blood-glucose meter,continuous (Dexcom G6 ) misc Use as directed. 1 each 0   • blood-glucose transmitter (Dexcom G6 Transmitter) device Use as directed.  Change every 3 months. 1 Device 4   • clobetasol (TEMOVATE) 0.05 % ointment Apply 1 application topically 2 (two) times a day as needed (Rash) 60 g 2   • blood sugar diagnostic (ONETOUCH ULTRA TEST) strip Use one strip to check glucose  strip 3   • aspirin 325 mg tablet Take 325 mg by mouth daily.         No current facility-administered medications for this visit.      Past Medical History:   Diagnosis Date   • Blood clot in vein     shani legs   • Diabetes (CMS/HCC) (HCC)     type 2   • Eczema    • Fibromyalgia    • GERD (gastroesophageal reflux disease)    • Heart murmur    • Hyperlipidemia    • Hypertension    • Psoriasis    • Scoliosis      Past Surgical History:   Procedure Laterality Date   • CHOLECYSTECTOMY     • COLONOSCOPY  03/11/2016    5 yr  follow up   • HYSTERECTOMY      With BSO   • OTHER SURGICAL HISTORY      Rt. radical mastoidectomy, rebuilt canal " "from growth       Objective   Pulse 84   Temp 36.6 °C (97.8 °F) (Temporal)   Ht 1.6 m (5' 3\")   Wt 104.3 kg (230 lb)   SpO2 99%   BMI 40.74 kg/m²     Physical Exam    PHYSICAL EXAMINATION:  GENERAL:  Well-developed, well-nourished.  The patient is alert, oriented and in no acute distress.    PSYCH: Normal mood and affect.   SKIN: No evidence of significant rash or concerning skin lesion on the head or neck.  HEAD/FACE:  Normally formed without evidence of mass or trauma.  The sinuses are nontender.    EARS: Bilateral external ears are normal. Right external auditory canal is partially impacted with cerumen. Pt has cerumen in Right mastoid bowel.  Left external auditory canals is normal.  Right tympanic membrane is retracted and opaque in color with loss of landmarks.  Left tympanic membrane is intact and normal.  NOSE:  The external nose appears normal.  The septum is mostly midline.  The turbinates appear normal.    ORAL CAVITY/OROPHARYNX:  There are no focal masses or lesions.  There are normal mucous membranes.  The tonsils appear normal without mass or focal lesion.  Oropharynx mucosa appears normal.  NECK:  There is no palpable adenopathy, goiter or mass.  The trachea is midline.    The patient has obstructive cerumen in the right external auditory canal and mastoid bowel  Patient requests removal of this.  Using handheld otoscope for visualization obstructive cerumen was removed from the external auditory canal.  Partial removal of the cerumen was obtained.  Underlying external auditory canal skin is erythemic and macerated. See above exam on TM exam.  Suction and curette were used for removal.  Patient tolerated the procedure well without complications  Assessment/Plan     Diagnoses and all orders for this visit:    Right acute serous otitis media, recurrence not specified  -     amoxicillin (AMOXIL) 875 mg tablet; Take 1 tablet (875 mg total) by mouth 2 (two) times a day for 10 days    Acute contact " otitis externa of right ear  -     ciprofloxacin-dexamethasone (CIPRODEX) otic suspension; Administer 4 drops into the right ear 2 (two) times a day for 10 days    Amoxicillin and ciprodex ordered; pt was instructed on dose, frequency, and duration. Pt is to follow up with me in 2 weeks or sooner with worsening symptoms.     DREW MOYER, CNP

## 2020-09-30 ENCOUNTER — DOCUMENTATION (OUTPATIENT)
Dept: FAMILY MEDICINE | Facility: CLINIC | Age: 75
End: 2020-09-30

## 2020-10-05 DIAGNOSIS — K21.9 CHRONIC GERD: ICD-10-CM

## 2020-10-05 RX ORDER — OMEPRAZOLE 40 MG/1
CAPSULE, DELAYED RELEASE ORAL
Qty: 90 CAPSULE | Refills: 1 | Status: SHIPPED | OUTPATIENT
Start: 2020-10-05 | End: 2021-03-26

## 2020-10-27 ENCOUNTER — OFFICE VISIT (OUTPATIENT)
Dept: OTOLARYNGOLOGY | Facility: CLINIC | Age: 75
End: 2020-10-27
Payer: MEDICARE

## 2020-10-27 VITALS
WEIGHT: 230 LBS | SYSTOLIC BLOOD PRESSURE: 159 MMHG | HEART RATE: 95 BPM | HEIGHT: 63 IN | DIASTOLIC BLOOD PRESSURE: 81 MMHG | OXYGEN SATURATION: 93 % | BODY MASS INDEX: 40.75 KG/M2 | TEMPERATURE: 97.7 F

## 2020-10-27 DIAGNOSIS — Z90.89 HISTORY OF RIGHT MASTOIDECTOMY: Primary | ICD-10-CM

## 2020-10-27 DIAGNOSIS — H61.21 IMPACTED CERUMEN OF RIGHT EAR: ICD-10-CM

## 2020-10-27 PROCEDURE — G0463 HOSPITAL OUTPT CLINIC VISIT: HCPCS | Performed by: OTOLARYNGOLOGY

## 2020-10-27 PROCEDURE — 99213 OFFICE O/P EST LOW 20 MIN: CPT | Mod: 25 | Performed by: OTOLARYNGOLOGY

## 2020-10-27 PROCEDURE — 69222 CLEAN OUT MASTOID CAVITY: CPT | Performed by: OTOLARYNGOLOGY

## 2020-10-27 ASSESSMENT — ENCOUNTER SYMPTOMS
SORE THROAT: 0
FEVER: 0
TROUBLE SWALLOWING: 0
RHINORRHEA: 0
CHILLS: 0
CONSTITUTIONAL NEGATIVE: 1
SINUS PRESSURE: 0

## 2020-10-27 NOTE — PROGRESS NOTES
"Subjective    CC: f/u OM    HPI: Consuelo Singh is a 75 y.o. female with h/o right CWD who now was seen in 29 Sep for a bilateral OM and treated with ciprodex and augmentin. The ears promptly felt better and had resolution of the drainage. A few days ago she started noticing some drainage in the right ear canal again.    Hearing seems stable and unchanged.    No vertigo. Mild otalgia on the right.    Past Medical History:   Diagnosis Date   • Blood clot in vein     shani legs   • Diabetes (CMS/HCC) (HCC)     type 2   • Eczema    • Fibromyalgia    • GERD (gastroesophageal reflux disease)    • Heart murmur    • Hyperlipidemia    • Hypertension    • Psoriasis    • Scoliosis        Past Surgical History:   Procedure Laterality Date   • CHOLECYSTECTOMY     • COLONOSCOPY  03/11/2016    5 yr  follow up   • HYSTERECTOMY      With BSO   • OTHER SURGICAL HISTORY      Rt. radical mastoidectomy, rebuilt canal from growth         Current Outpatient Medications:   •  omeprazole (PriLOSEC) 40 mg capsule, Take 1 capsule by mouth once daily, Disp: 90 capsule, Rfl: 1  •  metoprolol succinate XL (TOPROL-XL) 50 mg 24 hr tablet, Take 1 tablet (50 mg total) by mouth daily, Disp: 90 tablet, Rfl: 3  •  losartan (COZAAR) 100 mg tablet, Take 1 tablet (100 mg total) by mouth daily, Disp: 90 tablet, Rfl: 3  •  nortriptyline (PAMELOR) 50 mg capsule, TAKE 1 CAPSULE BY MOUTH ONCE DAILY AS DIRECTED, Disp: 90 capsule, Rfl: 1  •  NovoLOG Flexpen U-100 Insulin 100 unit/mL (3 mL) insulin pen, INJECT 8 UNITS SUBCUTANEOUSLY DAILY IN THE MORNING, 10 UNITS AT NOON, AND 20 UNITS IN THE EVENING, Disp: 30 mL, Rfl: 2  •  pravastatin (PRAVACHOL) 20 mg tablet, TAKE 1 TABLET BY MOUTH ONCE DAILY ON MONDAY, WEDNESDAY AND FRIDAY, Disp: 36 tablet, Rfl: 1  •  pen needle, diabetic 31 gauge x 1/4\" needle, USE 1 NEEDLE TO INJECT UNDER THE SKIN FOUR TIMES DAILY, Disp: 400 each, Rfl: 3  •  hydroCHLOROthiazide (HYDRODIURIL) 25 mg tablet, Take 1 tablet (25 mg total) by mouth " daily, Disp: 90 tablet, Rfl: 3  •  insulin glargine U-300 conc (Toujeo Max U-300 SoloStar) 300 unit/mL (3 mL) insulin pen insulin pen, Inject 120 Units under the skin daily, Disp: 12 Syringe, Rfl: 3  •  ondansetron ODT (Zofran ODT) 4 mg disintegrating tablet, Take 1 tablet (4 mg total) by mouth every 8 (eight) hours as needed for nausea or vomiting, Disp: 20 tablet, Rfl: 0  •  meloxicam (MOBIC) 15 mg tablet, Take one tablet daily (Patient taking differently: Take 15 mg by mouth daily as needed Take one tablet daily ), Disp: 30 tablet, Rfl: 1  •  blood-glucose sensor device, Use 1 sensor every 10 days., Disp: 3 Device, Rfl: 11  •  blood-glucose meter,continuous (Dexcom G6 ) misc, Use as directed., Disp: 1 each, Rfl: 0  •  blood-glucose transmitter (Dexcom G6 Transmitter) device, Use as directed.  Change every 3 months., Disp: 1 Device, Rfl: 4  •  clobetasol (TEMOVATE) 0.05 % ointment, Apply 1 application topically 2 (two) times a day as needed (Rash), Disp: 60 g, Rfl: 2  •  blood sugar diagnostic (ONETOUCH ULTRA TEST) strip, Use one strip to check glucose TID, Disp: 300 strip, Rfl: 3  •  aspirin 325 mg tablet, Take 325 mg by mouth daily.  , Disp: , Rfl:     Allergies   Allergen Reactions   • Aloe Vera    • Bacitracin      ZINC   • Erythromycin Base    • Formaldehyde    • Gramicidins      OPHTHALMIC   • Hydrocortisone      ACETATE   • Latex    • Levofloxacin    • Metformin    • Neomycin    • Neomycin-Bacitracnzn-Polymyxnb    • Polymyxin B    • Prednisolone    • Prednisone    • Statins-Hmg-Coa Reductase Inhibitors    • Sulfamethoxazole    • Sulfamethoxazole-Trimethoprim    • Thimerosal    • Tresiba Flextouch U-100 [Insulin Degludec] Diarrhea and Other (see comments)     Abdominal pain   • Trimethoprim        family history includes Bone cancer in her maternal grandfather; Breast cancer in her mother; Lung cancer in her maternal grandmother.    Social History     Tobacco Use   • Smoking status: Former Smoker      "Packs/day: 0.50     Years: 40.00     Pack years: 20.00     Quit date:      Years since quittin.8   • Smokeless tobacco: Former User   Substance Use Topics   • Alcohol use: Yes     Comment: Occasionally; 1-2 glasses monthly   • Drug use: No       Review of Systems   Constitutional: Negative.  Negative for chills and fever.   HENT: Negative.  Negative for congestion, ear discharge, ear pain, hearing loss, nosebleeds, postnasal drip, rhinorrhea, sinus pressure, sneezing, sore throat, tinnitus and trouble swallowing.        Objective    /81 (BP Location: Left arm, Patient Position: Sitting, Cuff Size: Large Long Adult)   Pulse 95   Temp 36.5 °C (97.7 °F) (Temporal)   Ht 1.6 m (5' 3\")   Wt 104.3 kg (230 lb)   SpO2 93%   BMI 40.74 kg/m²     General: Well-developed well-nourished female no acute distress.    Ears: Left external ear external auditory canal and tympanic membrane appear normal.  Right ear status post canal wall down mastoidectomy and there is a lot of debris present in the mastoid bowl.    Procedure: Mastoid debridement, complex.  The right ear was visualized with the operating microscope and using combination of suction, alligator forceps, right angle pick, cerumen loop and bayonet forceps a large amount of cerumen was removed from the mastoid bowl posteriorly and from the mesotympanum area.  Patient did get nauseous during this.  Underlying tissues appear intact after removal.  This took about 25 minutes.    Diagnosis    1. History of right mastoidectomy    2. Impacted cerumen of right ear        Recommendations  1.  Mastoid debridement done on the right today.  This was pretty extensive.  Follow-up in 3 months for repeat debridement.  Use Cipro HC drops for the next 3 days.  "

## 2020-11-02 ENCOUNTER — DOCUMENTATION (OUTPATIENT)
Dept: FAMILY MEDICINE | Facility: CLINIC | Age: 75
End: 2020-11-02

## 2020-11-18 ENCOUNTER — OFFICE VISIT (OUTPATIENT)
Dept: DERMATOLOGY | Facility: CLINIC | Age: 75
End: 2020-11-18
Payer: MEDICARE

## 2020-11-18 VITALS
DIASTOLIC BLOOD PRESSURE: 62 MMHG | WEIGHT: 229.94 LBS | BODY MASS INDEX: 40.74 KG/M2 | HEIGHT: 63 IN | SYSTOLIC BLOOD PRESSURE: 132 MMHG

## 2020-11-18 DIAGNOSIS — D18.01 CHERRY ANGIOMA: ICD-10-CM

## 2020-11-18 DIAGNOSIS — Q82.8 POROKERATOSIS: ICD-10-CM

## 2020-11-18 DIAGNOSIS — R21 RASH: Primary | ICD-10-CM

## 2020-11-18 DIAGNOSIS — B07.9 FILIFORM WART: ICD-10-CM

## 2020-11-18 DIAGNOSIS — L82.0 INFLAMED SEBORRHEIC KERATOSIS: ICD-10-CM

## 2020-11-18 DIAGNOSIS — L81.4 SOLAR LENTIGINOSIS: ICD-10-CM

## 2020-11-18 PROCEDURE — G0463 HOSPITAL OUTPT CLINIC VISIT: HCPCS | Mod: 25,PO | Performed by: NURSE PRACTITIONER

## 2020-11-18 PROCEDURE — G0463 HOSPITAL OUTPT CLINIC VISIT: HCPCS | Mod: PO | Performed by: NURSE PRACTITIONER

## 2020-11-18 PROCEDURE — 17110 DESTRUCTION B9 LES UP TO 14: CPT | Mod: PO | Performed by: NURSE PRACTITIONER

## 2020-11-18 PROCEDURE — 99213 OFFICE O/P EST LOW 20 MIN: CPT | Mod: 25 | Performed by: NURSE PRACTITIONER

## 2020-11-18 RX ORDER — CLOBETASOL PROPIONATE 0.5 MG/G
1 OINTMENT TOPICAL 2 TIMES DAILY PRN
Qty: 60 G | Refills: 1 | Status: SHIPPED | OUTPATIENT
Start: 2020-11-18 | End: 2022-02-28 | Stop reason: SDUPTHER

## 2020-11-18 ASSESSMENT — ENCOUNTER SYMPTOMS
WOUND: 0
NAUSEA: 0
HEADACHES: 0
DIZZINESS: 0
DIARRHEA: 0
ROS SKIN COMMENTS: PER HPI
SHORTNESS OF BREATH: 0
CHILLS: 0
VOMITING: 0
COLOR CHANGE: 0
FEVER: 0

## 2020-11-18 ASSESSMENT — PAIN SCALES - GENERAL: PAINLEVEL: 0-NO PAIN

## 2020-11-18 NOTE — PATIENT INSTRUCTIONS
Education given:  Liquid Nitrogen sites should be treated with Vaseline or Aquaphor twice daily.  May need to cover if rubbed by clothing or in a dirty environment.   Expect that the sites will heal over a 2 week period of time.  If resolution not observed within one month pt to call the clinic for follow up.     -Discussed dry skin care measures including the appropriate use of liberal non-perfumed emollients at least twice daily, luke warm bathing temperature, appropriate cleansing agents  -Recommend liberal use of bland non-perfumed emollient application within 5 minutes of a bath/shower.

## 2020-11-18 NOTE — PROGRESS NOTES
[facility] DERMATOLOGY  OFFICE VISIT      Cc: Lesion of concern  HPI   Consuelo Singh is a 75 y.o. female who is an established patient with a history of warts,eczematic dermatitis,rash,EIC; here for a skin exam.  Patient has been using clobetasol for her eczema.  She wishes a refill today.  She does not have an allergic reaction to it.  Patient does use her clobetasol on bilateral lower legs.  She reports this is very helpful and after a couple days of use eczema is under good control.  Patient has concerning a darker lesion on the scalp that was identified by the hairdresser.  She states that the area had gotten darker in color, no growth of lesion noted.  Denies bleeding, pain, pruritus or exudate.  No use of over-the-counter or prescription medications to the area.  Timing-constant presentation.       Patient problems have been reviewed and documented as below:  Patient Active Problem List   Diagnosis   • Diverticular disease   • Eczema   • Essential hypertension   • Gastroesophageal reflux disease   • Morbid obesity (CMS/[facility]) ([facility])   • Pure hypercholesterolemia   • Type 2 diabetes mellitus with diabetic nephropathy, with long-term current use of insulin (CMS/[facility]) ([facility])   • Chronic constipation   • Chronic pain of right knee   • CKD (chronic kidney disease) stage 3, GFR 30-59 ml/min   • Edema       Patient's social and family history have been reviewed and documented as below:  Social History     Socioeconomic History   • Marital status:      Spouse name: Not on file   • Number of children: Not on file   • Years of education: Not on file   • Highest education level: Not on file   Occupational History   • Not on file   Social Needs   • Financial resource strain: Not on file   • Food insecurity     Worry: Not on file     Inability: Not on file   • Transportation needs     Medical: Not on file     Non-medical: Not on file   Tobacco Use   • Smoking status: Former Smoker     Packs/day: 0.50     Years:  40.00     Pack years: 20.00     Quit date:      Years since quittin.8   • Smokeless tobacco: Former User   Substance and Sexual Activity   • Alcohol use: Yes     Comment: Occasionally; 1-2 glasses monthly   • Drug use: No   • Sexual activity: Defer   Lifestyle   • Physical activity     Days per week: Not on file     Minutes per session: Not on file   • Stress: Not on file   Relationships   • Social connections     Talks on phone: Not on file     Gets together: Not on file     Attends Holiness service: Not on file     Active member of club or organization: Not on file     Attends meetings of clubs or organizations: Not on file     Relationship status: Not on file   • Intimate partner violence     Fear of current or ex partner: Not on file     Emotionally abused: Not on file     Physically abused: Not on file     Forced sexual activity: Not on file   Other Topics Concern   • Not on file   Social History Narrative   • Not on file     Family History   Problem Relation Age of Onset   • Breast cancer Mother    • Lung cancer Maternal Grandmother    • Bone cancer Maternal Grandfather        Pertinent review of systems are listed below.  Other pertinent negatives and positives are as listed in the HPI.    Review of Systems   Constitutional: Negative for chills and fever.   Respiratory: Negative for shortness of breath.    Cardiovascular: Negative for chest pain.   Gastrointestinal: Negative for diarrhea, nausea and vomiting.   Skin: Positive for rash. Negative for color change, pallor and wound.        Per HPI   Neurological: Negative for dizziness and headaches.       All allergies have been reviewed and documented as below:  Allergies   Allergen Reactions   • Aloe Vera    • Bacitracin      ZINC   • Erythromycin Base    • Formaldehyde    • Gramicidins      OPHTHALMIC   • Hydrocortisone      ACETATE   • Latex    • Levofloxacin    • Metformin    • Neomycin    • Neomycin-Bacitracnzn-Polymyxnb    • Polymyxin B    •  "Prednisolone    • Prednisone    • Statins-Hmg-Coa Reductase Inhibitors    • Sulfamethoxazole    • Sulfamethoxazole-Trimethoprim    • Thimerosal    • Tresiba Flextouch U-100 [Insulin Degludec] Diarrhea and Other (see comments)     Abdominal pain   • Trimethoprim        All medications have been reviewed and documented as currently taking as below:    Current Outpatient Medications:   •  clobetasoL (TEMOVATE) 0.05 % ointment, Apply 1 application topically 2 (two) times a day as needed (Rash), Disp: 60 g, Rfl: 1  •  omeprazole (PriLOSEC) 40 mg capsule, Take 1 capsule by mouth once daily, Disp: 90 capsule, Rfl: 1  •  metoprolol succinate XL (TOPROL-XL) 50 mg 24 hr tablet, Take 1 tablet (50 mg total) by mouth daily, Disp: 90 tablet, Rfl: 3  •  losartan (COZAAR) 100 mg tablet, Take 1 tablet (100 mg total) by mouth daily, Disp: 90 tablet, Rfl: 3  •  nortriptyline (PAMELOR) 50 mg capsule, TAKE 1 CAPSULE BY MOUTH ONCE DAILY AS DIRECTED, Disp: 90 capsule, Rfl: 1  •  NovoLOG Flexpen U-100 Insulin 100 unit/mL (3 mL) insulin pen, INJECT 8 UNITS SUBCUTANEOUSLY DAILY IN THE MORNING, 10 UNITS AT NOON, AND 20 UNITS IN THE EVENING, Disp: 30 mL, Rfl: 2  •  pravastatin (PRAVACHOL) 20 mg tablet, TAKE 1 TABLET BY MOUTH ONCE DAILY ON MONDAY, WEDNESDAY AND FRIDAY, Disp: 36 tablet, Rfl: 1  •  pen needle, diabetic 31 gauge x 1/4\" needle, USE 1 NEEDLE TO INJECT UNDER THE SKIN FOUR TIMES DAILY, Disp: 400 each, Rfl: 3  •  hydroCHLOROthiazide (HYDRODIURIL) 25 mg tablet, Take 1 tablet (25 mg total) by mouth daily, Disp: 90 tablet, Rfl: 3  •  insulin glargine U-300 conc (Toujeo Max U-300 SoloStar) 300 unit/mL (3 mL) insulin pen insulin pen, Inject 120 Units under the skin daily, Disp: 12 Syringe, Rfl: 3  •  ondansetron ODT (Zofran ODT) 4 mg disintegrating tablet, Take 1 tablet (4 mg total) by mouth every 8 (eight) hours as needed for nausea or vomiting, Disp: 20 tablet, Rfl: 0  •  meloxicam (MOBIC) 15 mg tablet, Take one tablet daily (Patient " "taking differently: Take 15 mg by mouth daily as needed Take one tablet daily ), Disp: 30 tablet, Rfl: 1  •  blood-glucose sensor device, Use 1 sensor every 10 days., Disp: 3 Device, Rfl: 11  •  blood-glucose meter,continuous (Dexcom G6 ) misc, Use as directed., Disp: 1 each, Rfl: 0  •  blood-glucose transmitter (Dexcom G6 Transmitter) device, Use as directed.  Change every 3 months., Disp: 1 Device, Rfl: 4  •  blood sugar diagnostic (ONETOUCH ULTRA TEST) strip, Use one strip to check glucose TID, Disp: 300 strip, Rfl: 3  •  aspirin 325 mg tablet, Take 325 mg by mouth daily.  , Disp: , Rfl:     Objective     Vital signs have been reviewed and documented as below:  Vitals:    11/18/20 0851   BP: 132/62   BP Location: Right arm   Patient Position: Sitting   Cuff Size: Large Adult   Weight: 104.3 kg (229 lb 15 oz)   Height: 1.6 m (5' 2.99\")       Physical Exam:  ----------------------  Physical Exam  Vitals signs and nursing note reviewed.   Constitutional:       General: She is not in acute distress.     Appearance: She is not ill-appearing or diaphoretic.   HENT:      Head: Normocephalic.   Eyes:      Conjunctiva/sclera: Conjunctivae normal.   Pulmonary:      Effort: Pulmonary effort is normal. No respiratory distress.   Musculoskeletal:      Comments: Gait normal, no assistive devices   Skin:     General: Skin is warm and dry.      Capillary Refill: Capillary refill takes 2 to 3 seconds.      Coloration: Skin is not jaundiced or pale.      Findings: No bruising, erythema, lesion or rash.      Comments: Bilateral lower extremities: Asthmatic dermatitis with scaling erythema today on the right lower leg pretibial and ankle no bleeding or exudate at the site.  Filiform wart left mid lateral thigh 5 mm in size.  Scalp: Midline parietal scalp 5mm inflamed seborrheic keratosis crusted and erythemic.  Face: Seborrheic keratosis, solar lentiginous findings of mid bridge of nose superiorly.  Neck: No unusual " lesions  Chest, abdomen and back: Scattered seborrheic keratosis and cherry angiomas present.  Bilateral upper extremities: Porokeratosis, SK otherwise no unusual lesions     Neurological:      General: No focal deficit present.      Mental Status: She is alert and oriented to person, place, and time.   Psychiatric:         Mood and Affect: Mood normal.         Behavior: Behavior normal.            Destruction of Benign or Premalignant Lesion    Date/Time: 11/18/2020 9:06 AM  Performed by: Jyotsna Velasquez CNP      Consent  Verbal consent was obtained from the patient. Written consent was not obtained from the patient. Risks include permanent scarring, light or dark discoloration, infection, pain, bleeding, bruising, redness, blister formation and recurrence of the lesion. Consent given by patient. The patient states understanding of the procedure being performed. Patient ID confirmed verbally with patient and provided demographic data.     Location:  1 total lesions removed   Lower Extremity location(s): left upper leg   Number of Lower Extremity lesions removed: 1        Procedure Details   Lesion(s) are benign. The area was prepped and draped in a sterile fashion. Local anesthesia was not used. Lesion(s) destroyed with: liquid nitrogen. Number of freeze/thaw cycles was 2. Lesion(s) were frozen until an ice ball extended beyond the lesion.     Post-Procedure  Antibiotic ointment and sterile dressing was applied. Patient tolerated procedure well with no complications.       Destruction of Benign or Premalignant Lesion    Date/Time: 11/18/2020 9:06 AM  Performed by: Jyotsna Velasquez CNP      Consent  Verbal consent was obtained from the patient. Written consent was not obtained from the patient. Risks include permanent scarring, light or dark discoloration, infection, pain, bleeding, bruising, redness, blister formation and recurrence of the lesion. Consent given by patient. The patient states understanding of the  procedure being performed. Patient ID confirmed verbally with patient and provided demographic data.     Location:  1 total lesions removed   Head/neck location(s): scalp  Number of head/neck lesions removed: 1        Procedure Details   Lesion(s) are benign. The area was prepped and draped in a sterile fashion. Local anesthesia was not used. Lesion(s) destroyed with: liquid nitrogen. Number of freeze/thaw cycles was 2. Lesion(s) were frozen until an ice ball extended beyond the lesion.     Post-Procedure  Antibiotic ointment and sterile dressing was applied. Patient tolerated procedure well with no complications.               Assessment and Plan   1. Eczematic Dermatitis    - Refill clobetasoL (TEMOVATE) 0.05 % ointment; Apply 1 application topically 2 (two) times a day as needed (Rash)  Dispense: 60 g; Refill: 1  -Moisturize daily, glycerin soap for bathing.  2. Inflamed seborrheic keratosis    Education and counseling provided to patient on etiology, prognosis and treatment options including observation versus biopsy versus destruction.  Patient would like to proceed with recommended cryotherapy.  Further treatment pending pathology results.   Recheck as needed.  - Destruction of Benign or Premalignant Lesion    3. Solar lentiginosis  4. Cherry angioma  5. Porokeratosis  The benign nature/diagnosis of the skin lesion explained to the patient.  Treatment options discussed with the patient.  No further treatment is required or requested by the patient.  If this site/lesion would change in its appearance or becomes painful patient advised to contact the clinic for follow up.      6. Filiform wart  - Education and counseling provided to patient on HPV etiology, and prognosis.     - Patient would like to proceed with cryotherapy  - Recheck 4 weeks prn    - Destruction of Benign or Premalignant Lesion      Patient Instructions   Education given:  Liquid Nitrogen sites should be treated with Vaseline or Aquaphor twice  daily.  May need to cover if rubbed by clothing or in a dirty environment.   Expect that the sites will heal over a 2 week period of time.  If resolution not observed within one month pt to call the clinic for follow up.     -Discussed dry skin care measures including the appropriate use of liberal non-perfumed emollients at least twice daily, luke warm bathing temperature, appropriate cleansing agents  -Recommend liberal use of bland non-perfumed emollient application within 5 minutes of a bath/shower.             A voice recognition program was used to aid in documentation of this record.  Sometimes words or phrases may not have printed exactly as they were spoken.  While efforts were made to carefully edit and correct any inaccuracies, some errors may be present; please take these into context.  Please contact the provider if areas are identified.

## 2020-11-19 ENCOUNTER — OFFICE VISIT (OUTPATIENT)
Dept: OTOLARYNGOLOGY | Facility: CLINIC | Age: 75
End: 2020-11-19
Payer: MEDICARE

## 2020-11-19 VITALS
TEMPERATURE: 97.8 F | OXYGEN SATURATION: 97 % | WEIGHT: 229 LBS | HEART RATE: 90 BPM | SYSTOLIC BLOOD PRESSURE: 140 MMHG | BODY MASS INDEX: 40.57 KG/M2 | HEIGHT: 63 IN | DIASTOLIC BLOOD PRESSURE: 74 MMHG

## 2020-11-19 DIAGNOSIS — H92.11 OTORRHEA OF RIGHT EAR: Primary | ICD-10-CM

## 2020-11-19 PROCEDURE — G0463 HOSPITAL OUTPT CLINIC VISIT: HCPCS | Performed by: NURSE PRACTITIONER

## 2020-11-19 PROCEDURE — 87205 SMEAR GRAM STAIN: CPT | Performed by: NURSE PRACTITIONER

## 2020-11-19 PROCEDURE — 99213 OFFICE O/P EST LOW 20 MIN: CPT | Performed by: NURSE PRACTITIONER

## 2020-11-19 PROCEDURE — 87070 CULTURE OTHR SPECIMN AEROBIC: CPT | Performed by: NURSE PRACTITIONER

## 2020-11-19 NOTE — PROGRESS NOTES
Subjective   CC: right ear drainage    Patient ID: Consuelo Singh is a 75 y.o. female.    HPI  Patient is here with complaints of right ear drainage.  She states after her last visit she used the Cipro eardrops for about 3 days and then stopped.  She states that she did not have any drainage for about 3 days afterwards.  She states that she has had right ear drainage in the morning off and on since then.  She states that the drainage is red to brown in color and it is only happening in the mornings.  She denies ear pain, pressure, ringing in the ears, tinnitus or vertigo.  She denies recent illness.    Review of Systems see HPI    Allergies   Allergen Reactions   • Aloe Vera    • Bacitracin      ZINC   • Erythromycin Base    • Formaldehyde    • Gramicidins      OPHTHALMIC   • Hydrocortisone      ACETATE   • Latex    • Levofloxacin    • Metformin    • Neomycin    • Neomycin-Bacitracnzn-Polymyxnb    • Polymyxin B    • Prednisolone    • Prednisone    • Statins-Hmg-Coa Reductase Inhibitors    • Sulfamethoxazole    • Sulfamethoxazole-Trimethoprim    • Thimerosal    • Tresiba Flextouch U-100 [Insulin Degludec] Diarrhea and Other (see comments)     Abdominal pain   • Trimethoprim      Current Outpatient Medications   Medication Sig Dispense Refill   • clobetasoL (TEMOVATE) 0.05 % ointment Apply 1 application topically 2 (two) times a day as needed (Rash) 60 g 1   • omeprazole (PriLOSEC) 40 mg capsule Take 1 capsule by mouth once daily 90 capsule 1   • metoprolol succinate XL (TOPROL-XL) 50 mg 24 hr tablet Take 1 tablet (50 mg total) by mouth daily 90 tablet 3   • losartan (COZAAR) 100 mg tablet Take 1 tablet (100 mg total) by mouth daily 90 tablet 3   • nortriptyline (PAMELOR) 50 mg capsule TAKE 1 CAPSULE BY MOUTH ONCE DAILY AS DIRECTED 90 capsule 1   • NovoLOG Flexpen U-100 Insulin 100 unit/mL (3 mL) insulin pen INJECT 8 UNITS SUBCUTANEOUSLY DAILY IN THE MORNING, 10 UNITS AT NOON, AND 20 UNITS IN THE EVENING 30 mL 2   •  "pravastatin (PRAVACHOL) 20 mg tablet TAKE 1 TABLET BY MOUTH ONCE DAILY ON MONDAY, WEDNESDAY AND FRIDAY 36 tablet 1   • pen needle, diabetic 31 gauge x 1/4\" needle USE 1 NEEDLE TO INJECT UNDER THE SKIN FOUR TIMES DAILY 400 each 3   • hydroCHLOROthiazide (HYDRODIURIL) 25 mg tablet Take 1 tablet (25 mg total) by mouth daily 90 tablet 3   • insulin glargine U-300 conc (Toujeo Max U-300 SoloStar) 300 unit/mL (3 mL) insulin pen insulin pen Inject 120 Units under the skin daily 12 Syringe 3   • ondansetron ODT (Zofran ODT) 4 mg disintegrating tablet Take 1 tablet (4 mg total) by mouth every 8 (eight) hours as needed for nausea or vomiting 20 tablet 0   • meloxicam (MOBIC) 15 mg tablet Take one tablet daily (Patient taking differently: Take 15 mg by mouth daily as needed Take one tablet daily ) 30 tablet 1   • blood-glucose sensor device Use 1 sensor every 10 days. 3 Device 11   • blood-glucose meter,continuous (Dexcom G6 ) misc Use as directed. 1 each 0   • blood-glucose transmitter (Dexcom G6 Transmitter) device Use as directed.  Change every 3 months. 1 Device 4   • blood sugar diagnostic (ONETOUCH ULTRA TEST) strip Use one strip to check glucose  strip 3   • aspirin 325 mg tablet Take 325 mg by mouth daily.         No current facility-administered medications for this visit.      Past Medical History:   Diagnosis Date   • Blood clot in vein     shani legs   • Diabetes (CMS/HCC) (HCC)     type 2   • Eczema    • Fibromyalgia    • GERD (gastroesophageal reflux disease)    • Heart murmur    • Hyperlipidemia    • Hypertension    • Psoriasis    • Scoliosis      Past Surgical History:   Procedure Laterality Date   • CHOLECYSTECTOMY     • COLONOSCOPY  03/11/2016    5 yr  follow up   • HYSTERECTOMY      With BSO   • OTHER SURGICAL HISTORY      Rt. radical mastoidectomy, rebuilt canal from growth       Objective   /74   Pulse 90   Temp 36.6 °C (97.8 °F) (Temporal)   Ht 1.6 m (5' 3\")   Wt 103.9 kg (229 lb)  "  SpO2 97%   BMI 40.57 kg/m²     Physical Exam    PHYSICAL EXAMINATION:  GENERAL:  Well-developed, well-nourished.  The patient is alert, oriented and in no acute distress.    PSYCH: Normal mood and affect.   SKIN: No evidence of significant rash or concerning skin lesion on the head or neck.  HEAD/FACE:  Normally formed without evidence of mass or trauma.  The sinuses are nontender.    EARS: Bilateral external ears are normal.  Bilateral external auditory canals are normal.  Bilateral tympanic membranes intact and normal.  No otorrhea noted.  NOSE:  The external nose appears normal.  The septum is mostly midline.  The turbinates appear normal.    ORAL CAVITY/OROPHARYNX:  There are no focal masses or lesions.  There are normal mucous membranes.  The tonsils appear normal without mass or focal lesion.  Oropharynx mucosa appears normal.  NECK:  There is no palpable adenopathy, goiter or mass.  The trachea is midline.      Assessment/Plan   1. Otorrhea of right ear    - Ear culture  - Fungal culture w/stain  Reassured patient that ears look good on exam and culture was obtained for good measure.  We will call her with these results and determine management and follow-up at that time.  Patient has no further questions or concerns.    DREW MOYER, CNP

## 2020-11-19 NOTE — Clinical Note
[facility] EAR/NOSE/THROAT  [address] [facility] [place] [address]  [phone]  Dept: [phone]    November 19, 2020     Patient: Consuelo Singh   YOB: 1945   Date of Visit: 11/19/2020       To Whom it May Concern:    Consuelo Singh was seen in my clinic on 11/19/2020 at [facility] EAR/NOSE/THROAT  [address] [place] [address] [place] [address]  [phone]. Please excuse Consuelo for her absence from school on this day to make the appointment.    If you have any questions or concerns, please don't hesitate to call.         Sincerely,         DREW MYOER, CLEMENTINE        CC: Frantz Boyer MD

## 2020-11-19 NOTE — Clinical Note
[facility] EAR/NOSE/THROAT  [address] [place] [address]  [phone]  Dept: [phone]  November 19, 2020     Consuelo Singh      Patient: Consuelo Singh   YOB: 1945   Date of Visit: 11/19/2020       To Whom it May Concern:    Consuelo Singh was seen in my clinic on  11/19/2020 at [facility] EAR/NOSE/THROAT. Please excuse Consuelo for her absence for this appointment.           Sincerely,         DREW MOYER CNP    Electronically signed by DREW MOYER CNP at 10:21 AM        CC: Frantz Boyer MD

## 2020-11-22 LAB — BACTERIA ISLT CULT: NORMAL

## 2020-12-15 ENCOUNTER — OFFICE VISIT (OUTPATIENT)
Dept: OTOLARYNGOLOGY | Facility: CLINIC | Age: 75
End: 2020-12-15
Payer: MEDICARE

## 2020-12-15 VITALS
HEIGHT: 63 IN | WEIGHT: 229 LBS | TEMPERATURE: 98.4 F | BODY MASS INDEX: 40.57 KG/M2 | HEART RATE: 78 BPM | SYSTOLIC BLOOD PRESSURE: 195 MMHG | OXYGEN SATURATION: 95 % | DIASTOLIC BLOOD PRESSURE: 75 MMHG

## 2020-12-15 DIAGNOSIS — Z90.89 HISTORY OF RIGHT MASTOIDECTOMY: Primary | ICD-10-CM

## 2020-12-15 PROCEDURE — 92504 EAR MICROSCOPY EXAMINATION: CPT | Performed by: OTOLARYNGOLOGY

## 2020-12-15 PROCEDURE — G0463 HOSPITAL OUTPT CLINIC VISIT: HCPCS | Performed by: OTOLARYNGOLOGY

## 2020-12-15 ASSESSMENT — ENCOUNTER SYMPTOMS
SORE THROAT: 0
FEVER: 0
CONSTITUTIONAL NEGATIVE: 1
TROUBLE SWALLOWING: 0
CHILLS: 0
RHINORRHEA: 0
SINUS PRESSURE: 0

## 2020-12-15 NOTE — PROGRESS NOTES
"Subjective    CC: right ear    HPI: Consuelo Singh is a 75 y.o. female who is s/p cwd mastoid on the right done years ago. She was in for some drainage in late November but the ear looked ok and culture was negative. Now she states that she has some bloody d/c every morning and denies any associated otalgia or vertigo or change in hearing.    Past Medical History:   Diagnosis Date   • Blood clot in vein     shani legs   • Diabetes (CMS/HCC) (HCC)     type 2   • Eczema    • Fibromyalgia    • GERD (gastroesophageal reflux disease)    • Heart murmur    • Hyperlipidemia    • Hypertension    • Psoriasis    • Scoliosis        Past Surgical History:   Procedure Laterality Date   • CHOLECYSTECTOMY     • COLONOSCOPY  03/11/2016    5 yr  follow up   • HYSTERECTOMY      With BSO   • OTHER SURGICAL HISTORY      Rt. radical mastoidectomy, rebuilt canal from growth         Current Outpatient Medications:   •  clobetasoL (TEMOVATE) 0.05 % ointment, Apply 1 application topically 2 (two) times a day as needed (Rash), Disp: 60 g, Rfl: 1  •  omeprazole (PriLOSEC) 40 mg capsule, Take 1 capsule by mouth once daily, Disp: 90 capsule, Rfl: 1  •  metoprolol succinate XL (TOPROL-XL) 50 mg 24 hr tablet, Take 1 tablet (50 mg total) by mouth daily, Disp: 90 tablet, Rfl: 3  •  losartan (COZAAR) 100 mg tablet, Take 1 tablet (100 mg total) by mouth daily, Disp: 90 tablet, Rfl: 3  •  nortriptyline (PAMELOR) 50 mg capsule, TAKE 1 CAPSULE BY MOUTH ONCE DAILY AS DIRECTED, Disp: 90 capsule, Rfl: 1  •  NovoLOG Flexpen U-100 Insulin 100 unit/mL (3 mL) insulin pen, INJECT 8 UNITS SUBCUTANEOUSLY DAILY IN THE MORNING, 10 UNITS AT NOON, AND 20 UNITS IN THE EVENING, Disp: 30 mL, Rfl: 2  •  pravastatin (PRAVACHOL) 20 mg tablet, TAKE 1 TABLET BY MOUTH ONCE DAILY ON MONDAY, WEDNESDAY AND FRIDAY, Disp: 36 tablet, Rfl: 1  •  pen needle, diabetic 31 gauge x 1/4\" needle, USE 1 NEEDLE TO INJECT UNDER THE SKIN FOUR TIMES DAILY, Disp: 400 each, Rfl: 3  •  " hydroCHLOROthiazide (HYDRODIURIL) 25 mg tablet, Take 1 tablet (25 mg total) by mouth daily, Disp: 90 tablet, Rfl: 3  •  insulin glargine U-300 conc (Toujeo Max U-300 SoloStar) 300 unit/mL (3 mL) insulin pen insulin pen, Inject 120 Units under the skin daily, Disp: 12 Syringe, Rfl: 3  •  ondansetron ODT (Zofran ODT) 4 mg disintegrating tablet, Take 1 tablet (4 mg total) by mouth every 8 (eight) hours as needed for nausea or vomiting, Disp: 20 tablet, Rfl: 0  •  meloxicam (MOBIC) 15 mg tablet, Take one tablet daily (Patient taking differently: Take 15 mg by mouth daily as needed Take one tablet daily ), Disp: 30 tablet, Rfl: 1  •  blood-glucose sensor device, Use 1 sensor every 10 days., Disp: 3 Device, Rfl: 11  •  blood-glucose meter,continuous (Dexcom G6 ) misc, Use as directed., Disp: 1 each, Rfl: 0  •  blood-glucose transmitter (Dexcom G6 Transmitter) device, Use as directed.  Change every 3 months., Disp: 1 Device, Rfl: 4  •  blood sugar diagnostic (ONETOUCH ULTRA TEST) strip, Use one strip to check glucose TID, Disp: 300 strip, Rfl: 3  •  aspirin 325 mg tablet, Take 325 mg by mouth daily.  , Disp: , Rfl:     Allergies   Allergen Reactions   • Aloe Vera    • Bacitracin      ZINC   • Erythromycin Base    • Formaldehyde    • Gramicidins      OPHTHALMIC   • Hydrocortisone      ACETATE   • Latex    • Levofloxacin    • Metformin    • Neomycin    • Neomycin-Bacitracnzn-Polymyxnb    • Polymyxin B    • Prednisolone    • Prednisone    • Statins-Hmg-Coa Reductase Inhibitors    • Sulfamethoxazole    • Sulfamethoxazole-Trimethoprim    • Thimerosal    • Tresiba Flextouch U-100 [Insulin Degludec] Diarrhea and Other (see comments)     Abdominal pain   • Trimethoprim        family history includes Bone cancer in her maternal grandfather; Breast cancer in her mother; Lung cancer in her maternal grandmother.    Social History     Tobacco Use   • Smoking status: Former Smoker     Packs/day: 0.50     Years: 40.00     Pack  "years: 20.00     Quit date:      Years since quittin.9   • Smokeless tobacco: Former User   Substance Use Topics   • Alcohol use: Yes     Comment: Occasionally; 1-2 glasses monthly   • Drug use: No       Review of Systems   Constitutional: Negative.  Negative for chills and fever.   HENT: Positive for ear discharge. Negative for congestion, ear pain, hearing loss, nosebleeds, postnasal drip, rhinorrhea, sinus pressure, sneezing, sore throat, tinnitus and trouble swallowing.        Objective    BP (!) 195/75 (BP Location: Left arm, Patient Position: Sitting, Cuff Size: Large Long Adult)   Pulse 78   Temp 36.9 °C (98.4 °F) (Temporal)   Ht 1.6 m (5' 3\")   Wt 103.9 kg (229 lb)   SpO2 95%   BMI 40.57 kg/m²     General: Well-developed well-nourished female in no acute distress.    Ears: Some scant blood-tinged cerumen drainage at the lateral canal on the right.    Ear microscopy procedure:  Right ear was examined with the operating microscope.  There is a little bit of moisture over the stapes area with macerated epithelium.  No granulation or otorrhea.  Posteriorly in the mastoid bowl near its inferior tip there is some trapped debris that was removed with suction.  There was a little granulation associated with this.  CSF powder was applied.    Diagnosis    1. History of right mastoidectomy        Recommendations    1.  Patient had a little inflamed area in the mastoid bowl inferiorly.  That was cleaned up with suction today and CSF powder applied.  Follow-up in 3 months for repeat check of her right mastoid cavity or sooner as needed for further problems.  "

## 2020-12-28 LAB
BACTERIA ISLT CULT: NORMAL
PAS STN SPEC: NORMAL

## 2021-01-01 DIAGNOSIS — E78.00 PURE HYPERCHOLESTEROLEMIA: ICD-10-CM

## 2021-01-04 RX ORDER — PRAVASTATIN SODIUM 20 MG/1
TABLET ORAL
Qty: 36 TABLET | Refills: 0 | Status: SHIPPED | OUTPATIENT
Start: 2021-01-04 | End: 2021-04-02

## 2021-01-11 ENCOUNTER — APPOINTMENT (OUTPATIENT)
Dept: LAB | Facility: CLINIC | Age: 76
End: 2021-01-11
Payer: MEDICARE

## 2021-01-11 ENCOUNTER — OFFICE VISIT (OUTPATIENT)
Dept: INTERNAL MEDICINE | Facility: CLINIC | Age: 76
End: 2021-01-11
Payer: MEDICARE

## 2021-01-11 VITALS
OXYGEN SATURATION: 95 % | DIASTOLIC BLOOD PRESSURE: 80 MMHG | SYSTOLIC BLOOD PRESSURE: 130 MMHG | HEART RATE: 80 BPM | WEIGHT: 226 LBS | RESPIRATION RATE: 16 BRPM | TEMPERATURE: 97.8 F | BODY MASS INDEX: 40.03 KG/M2

## 2021-01-11 DIAGNOSIS — I10 ESSENTIAL HYPERTENSION: ICD-10-CM

## 2021-01-11 DIAGNOSIS — Z79.4 TYPE 2 DIABETES MELLITUS WITH DIABETIC NEPHROPATHY, WITH LONG-TERM CURRENT USE OF INSULIN (CMS/HCC): Primary | ICD-10-CM

## 2021-01-11 DIAGNOSIS — Z79.4 TYPE 2 DIABETES MELLITUS WITH DIABETIC NEPHROPATHY, WITH LONG-TERM CURRENT USE OF INSULIN (CMS/HCC): ICD-10-CM

## 2021-01-11 DIAGNOSIS — E11.21 TYPE 2 DIABETES MELLITUS WITH DIABETIC NEPHROPATHY, WITH LONG-TERM CURRENT USE OF INSULIN (CMS/HCC): Primary | ICD-10-CM

## 2021-01-11 DIAGNOSIS — E11.21 TYPE 2 DIABETES MELLITUS WITH DIABETIC NEPHROPATHY, WITH LONG-TERM CURRENT USE OF INSULIN (CMS/HCC): ICD-10-CM

## 2021-01-11 LAB
ALBUMIN SERPL-MCNC: 4.1 G/DL (ref 3.5–5.3)
ALP SERPL-CCNC: 144 U/L (ref 55–142)
ALT SERPL-CCNC: 30 U/L (ref 7–52)
ANION GAP SERPL CALC-SCNC: 11 MMOL/L (ref 3–11)
AST SERPL-CCNC: 22 U/L
BILIRUB SERPL-MCNC: 0.33 MG/DL (ref 0.2–1.4)
BUN SERPL-MCNC: 22 MG/DL (ref 7–25)
CALCIUM ALBUM COR SERPL-MCNC: 9.3 MG/DL (ref 8.6–10.3)
CALCIUM SERPL-MCNC: 9.4 MG/DL (ref 8.6–10.3)
CHLORIDE SERPL-SCNC: 102 MMOL/L (ref 98–107)
CO2 SERPL-SCNC: 25 MMOL/L (ref 21–32)
CREAT SERPL-MCNC: 1.13 MG/DL (ref 0.6–1.1)
EST. AVERAGE GLUCOSE BLD GHB EST-MCNC: 151.3 MG/DL
GFR SERPL CREATININE-BSD FRML MDRD: 48 ML/MIN/1.73M*2
GLUCOSE SERPL-MCNC: 186 MG/DL (ref 70–105)
HBA1C MFR BLD: 6.9 % (ref 4–6)
POTASSIUM SERPL-SCNC: 4 MMOL/L (ref 3.5–5.1)
PROT SERPL-MCNC: 6.8 G/DL (ref 6–8.3)
SODIUM SERPL-SCNC: 138 MMOL/L (ref 135–145)

## 2021-01-11 PROCEDURE — G0463 HOSPITAL OUTPT CLINIC VISIT: HCPCS | Performed by: INTERNAL MEDICINE

## 2021-01-11 PROCEDURE — 36415 COLL VENOUS BLD VENIPUNCTURE: CPT

## 2021-01-11 PROCEDURE — 99213 OFFICE O/P EST LOW 20 MIN: CPT | Performed by: INTERNAL MEDICINE

## 2021-01-11 PROCEDURE — 80053 COMPREHEN METABOLIC PANEL: CPT

## 2021-01-11 PROCEDURE — 83036 HEMOGLOBIN GLYCOSYLATED A1C: CPT

## 2021-01-11 ASSESSMENT — PAIN SCALES - GENERAL: PAINLEVEL: 2

## 2021-01-11 NOTE — PROGRESS NOTES
"Internal Medicine Progress Note    Chief Complaint:   Chief Complaint   Patient presents with   • Follow-up     Patient is in today for recheck. Patient also had labs done.          HPI: Patient is a 75 y.o. female patient is in for follow-up on her type II insulin requiring diabetes.  Patient has had about 3 lows over the last month they vary depending on the time of the day lowest sugars been about 74    Review of Systems      Current Outpatient Medications:   •  pravastatin (PRAVACHOL) 20 mg tablet, TAKE 1 TABLET BY MOUTH ON MONDAY, WEDNESDAY AND FRIDAY, Disp: 36 tablet, Rfl: 0  •  clobetasoL (TEMOVATE) 0.05 % ointment, Apply 1 application topically 2 (two) times a day as needed (Rash), Disp: 60 g, Rfl: 1  •  omeprazole (PriLOSEC) 40 mg capsule, Take 1 capsule by mouth once daily, Disp: 90 capsule, Rfl: 1  •  metoprolol succinate XL (TOPROL-XL) 50 mg 24 hr tablet, Take 1 tablet (50 mg total) by mouth daily, Disp: 90 tablet, Rfl: 3  •  losartan (COZAAR) 100 mg tablet, Take 1 tablet (100 mg total) by mouth daily, Disp: 90 tablet, Rfl: 3  •  nortriptyline (PAMELOR) 50 mg capsule, TAKE 1 CAPSULE BY MOUTH ONCE DAILY AS DIRECTED, Disp: 90 capsule, Rfl: 1  •  NovoLOG Flexpen U-100 Insulin 100 unit/mL (3 mL) insulin pen, INJECT 8 UNITS SUBCUTANEOUSLY DAILY IN THE MORNING, 10 UNITS AT NOON, AND 20 UNITS IN THE EVENING, Disp: 30 mL, Rfl: 2  •  pen needle, diabetic 31 gauge x 1/4\" needle, USE 1 NEEDLE TO INJECT UNDER THE SKIN FOUR TIMES DAILY, Disp: 400 each, Rfl: 3  •  hydroCHLOROthiazide (HYDRODIURIL) 25 mg tablet, Take 1 tablet (25 mg total) by mouth daily, Disp: 90 tablet, Rfl: 3  •  insulin glargine U-300 conc (Toujeo Max U-300 SoloStar) 300 unit/mL (3 mL) insulin pen insulin pen, Inject 120 Units under the skin daily, Disp: 12 Syringe, Rfl: 3  •  ondansetron ODT (Zofran ODT) 4 mg disintegrating tablet, Take 1 tablet (4 mg total) by mouth every 8 (eight) hours as needed for nausea or vomiting, Disp: 20 tablet, Rfl: " 0  •  meloxicam (MOBIC) 15 mg tablet, Take one tablet daily (Patient taking differently: Take 15 mg by mouth daily as needed Take one tablet daily ), Disp: 30 tablet, Rfl: 1  •  blood-glucose sensor device, Use 1 sensor every 10 days., Disp: 3 Device, Rfl: 11  •  blood-glucose meter,continuous (Dexcom G6 ) misc, Use as directed., Disp: 1 each, Rfl: 0  •  blood-glucose transmitter (Dexcom G6 Transmitter) device, Use as directed.  Change every 3 months., Disp: 1 Device, Rfl: 4  •  blood sugar diagnostic (ONETOUCH ULTRA TEST) strip, Use one strip to check glucose TID, Disp: 300 strip, Rfl: 3  •  aspirin 325 mg tablet, Take 325 mg by mouth daily.  , Disp: , Rfl:     Allergies:   Allergies   Allergen Reactions   • Aloe Vera    • Bacitracin      ZINC   • Erythromycin Base    • Formaldehyde    • Gramicidins      OPHTHALMIC   • Hydrocortisone      ACETATE   • Latex    • Levofloxacin    • Metformin    • Neomycin    • Neomycin-Bacitracnzn-Polymyxnb    • Polymyxin B    • Prednisolone    • Prednisone    • Statins-Hmg-Coa Reductase Inhibitors    • Sulfamethoxazole    • Sulfamethoxazole-Trimethoprim    • Thimerosal    • Tresiba Flextouch U-100 [Insulin Degludec] Diarrhea and Other (see comments)     Abdominal pain   • Trimethoprim          Labs:   Recent Results (from the past 336 hour(s))   Hemoglobin A1c (glycosylated) Blood, Venous    Collection Time: 01/11/21  7:03 AM   Result Value Ref Range    Hemoglobin A1C 6.9 (H) 4.0 - 6.0 %    Estimated Average Glucose 151.3 mg/dL   Comprehensive metabolic panel Blood, Venous    Collection Time: 01/11/21  7:03 AM   Result Value Ref Range    Sodium 138 135 - 145 mmol/L    Potassium 4.0 3.5 - 5.1 mmol/L    Chloride 102 98 - 107 mmol/L    CO2 25 21 - 32 mmol/L    Anion Gap 11 3 - 11 mmol/L    BUN 22 7 - 25 mg/dL    Creatinine 1.13 (H) 0.60 - 1.10 mg/dL    Glucose 186 (H) 70 - 105 mg/dL    Calcium 9.4 8.6 - 10.3 mg/dL    AST 22 <40 U/L    ALT (SGPT) 30 7 - 52 U/L    Alkaline  Phosphatase 144 (H) 55 - 142 U/L    Total Protein 6.8 6.0 - 8.3 g/dL    Albumin 4.1 3.5 - 5.3 g/dL    Total Bilirubin 0.33 0.20 - 1.40 mg/dL    eGFR 48 (L) >60 mL/min/1.73m*2    Corrected Calcium 9.3 8.6 - 10.3 mg/dL         Imaging: No results found.    Vitals: Blood pressure 130/80, pulse 80, temperature 36.6 °C (97.8 °F), temperature source Temporal, resp. rate 16, weight 102.5 kg (226 lb), SpO2 95 %.      Physical Exam  Vitals signs and nursing note reviewed.   Constitutional:       Appearance: She is obese.   Neck:      Musculoskeletal: Normal range of motion and neck supple.   Cardiovascular:      Rate and Rhythm: Normal rate and regular rhythm.   Pulmonary:      Effort: Pulmonary effort is normal.      Breath sounds: No wheezing, rhonchi or rales.   Abdominal:      Tenderness: There is no right CVA tenderness or left CVA tenderness.   Musculoskeletal:      Right lower leg: No edema.      Left lower leg: No edema.   Neurological:      General: No focal deficit present.      Mental Status: She is alert.           Plan and Discussion:     Diagnosis Plan   1. Type 2 diabetes mellitus with diabetic nephropathy, with long-term current use of insulin (CMS/[facility]) ([facility])  Comprehensive metabolic panel Blood, Venous    Hemoglobin A1c (glycosylated) Blood, Venous   Patient's fasting sugar is elevated at 184 her A1c is excellent at 6.9.  Due to the fact that she has no consistency with her low sugars she is going to continue her current insulin she would continue to have some low sugars and would like to have her decrease her NovoLog approximately 10%.  Vaccines are reviewed the patient is current with everything other than the Covid vaccine which she will obtain in the near future.  Patient is unable to tolerate flu vaccines without becoming ill.      JAMILA MANCERA MD    Date: 1/11/2021  Time: 11:51 AM

## 2021-01-18 ENCOUNTER — HOSPITAL ENCOUNTER (EMERGENCY)
Facility: HOSPITAL | Age: 76
Discharge: HOME | End: 2021-01-18
Attending: EMERGENCY MEDICINE
Payer: MEDICARE

## 2021-01-18 ENCOUNTER — OFFICE VISIT (OUTPATIENT)
Dept: OTOLARYNGOLOGY | Facility: CLINIC | Age: 76
End: 2021-01-18
Payer: MEDICARE

## 2021-01-18 VITALS
SYSTOLIC BLOOD PRESSURE: 231 MMHG | HEART RATE: 104 BPM | WEIGHT: 226 LBS | BODY MASS INDEX: 40.04 KG/M2 | TEMPERATURE: 98 F | DIASTOLIC BLOOD PRESSURE: 99 MMHG | HEIGHT: 63 IN

## 2021-01-18 VITALS
BODY MASS INDEX: 40.04 KG/M2 | TEMPERATURE: 98 F | WEIGHT: 226 LBS | SYSTOLIC BLOOD PRESSURE: 177 MMHG | HEART RATE: 77 BPM | DIASTOLIC BLOOD PRESSURE: 68 MMHG | OXYGEN SATURATION: 96 % | HEIGHT: 63 IN | RESPIRATION RATE: 12 BRPM

## 2021-01-18 DIAGNOSIS — I10 ESSENTIAL HYPERTENSION: ICD-10-CM

## 2021-01-18 DIAGNOSIS — I16.0 ASYMPTOMATIC HYPERTENSIVE URGENCY: ICD-10-CM

## 2021-01-18 DIAGNOSIS — I10 HYPERTENSION: Primary | ICD-10-CM

## 2021-01-18 DIAGNOSIS — Z90.89 HISTORY OF RIGHT MASTOIDECTOMY: Primary | ICD-10-CM

## 2021-01-18 LAB
ANION GAP SERPL CALC-SCNC: 9 MMOL/L (ref 3–11)
BASOPHILS # BLD AUTO: 0.1 10*3/UL
BASOPHILS NFR BLD AUTO: 1 % (ref 0–2)
BUN SERPL-MCNC: 19 MG/DL (ref 7–25)
CALCIUM SERPL-MCNC: 9.8 MG/DL (ref 8.6–10.3)
CHLORIDE SERPL-SCNC: 101 MMOL/L (ref 98–107)
CO2 SERPL-SCNC: 27 MMOL/L (ref 21–32)
CREAT SERPL-MCNC: 1 MG/DL (ref 0.6–1.1)
EOSINOPHIL # BLD AUTO: 0.2 10*3/UL
EOSINOPHIL NFR BLD AUTO: 2 % (ref 0–3)
ERYTHROCYTE [DISTWIDTH] IN BLOOD BY AUTOMATED COUNT: 14.4 % (ref 11.5–14)
GFR SERPL CREATININE-BSD FRML MDRD: 55 ML/MIN/1.73M*2
GLUCOSE SERPL-MCNC: 137 MG/DL (ref 70–105)
HCT VFR BLD AUTO: 45 % (ref 34–45)
HGB BLD-MCNC: 15 G/DL (ref 11.5–15.5)
LYMPHOCYTES # BLD AUTO: 2.5 10*3/UL
LYMPHOCYTES NFR BLD AUTO: 22 % (ref 11–47)
MCH RBC QN AUTO: 29.4 PG (ref 28–33)
MCHC RBC AUTO-ENTMCNC: 33.2 G/DL (ref 32–36)
MCV RBC AUTO: 88.5 FL (ref 81–97)
MONOCYTES # BLD AUTO: 0.6 10*3/UL
MONOCYTES NFR BLD AUTO: 5 % (ref 3–11)
NEUTROPHILS # BLD AUTO: 8.2 10*3/UL
NEUTROPHILS NFR BLD AUTO: 70 % (ref 41–81)
PLATELET # BLD AUTO: 235 10*3/UL (ref 140–350)
PMV BLD AUTO: 7.8 FL (ref 6.9–10.8)
POTASSIUM SERPL-SCNC: 4 MMOL/L (ref 3.5–5.1)
RBC # BLD AUTO: 5.09 10*6/ΜL (ref 3.7–5.3)
SODIUM SERPL-SCNC: 137 MMOL/L (ref 135–145)
WBC # BLD AUTO: 11.6 10*3/UL (ref 4.5–10.5)

## 2021-01-18 PROCEDURE — 69220 CLEAN OUT MASTOID CAVITY: CPT | Performed by: OTOLARYNGOLOGY

## 2021-01-18 PROCEDURE — 6360000200 HC RX 636 W HCPCS (ALT 250 FOR IP): Performed by: EMERGENCY MEDICINE

## 2021-01-18 PROCEDURE — 85025 COMPLETE CBC W/AUTO DIFF WBC: CPT | Performed by: EMERGENCY MEDICINE

## 2021-01-18 PROCEDURE — 99284 EMERGENCY DEPT VISIT MOD MDM: CPT | Performed by: EMERGENCY MEDICINE

## 2021-01-18 PROCEDURE — 99213 OFFICE O/P EST LOW 20 MIN: CPT | Mod: 25 | Performed by: OTOLARYNGOLOGY

## 2021-01-18 PROCEDURE — 96374 THER/PROPH/DIAG INJ IV PUSH: CPT

## 2021-01-18 PROCEDURE — 80048 BASIC METABOLIC PNL TOTAL CA: CPT | Performed by: EMERGENCY MEDICINE

## 2021-01-18 PROCEDURE — 96375 TX/PRO/DX INJ NEW DRUG ADDON: CPT

## 2021-01-18 PROCEDURE — G0463 HOSPITAL OUTPT CLINIC VISIT: HCPCS | Performed by: OTOLARYNGOLOGY

## 2021-01-18 PROCEDURE — 99283 EMERGENCY DEPT VISIT LOW MDM: CPT | Performed by: EMERGENCY MEDICINE

## 2021-01-18 PROCEDURE — 36415 COLL VENOUS BLD VENIPUNCTURE: CPT | Performed by: EMERGENCY MEDICINE

## 2021-01-18 PROCEDURE — 96376 TX/PRO/DX INJ SAME DRUG ADON: CPT

## 2021-01-18 RX ORDER — METOPROLOL SUCCINATE 100 MG/1
100 TABLET, EXTENDED RELEASE ORAL NIGHTLY
Qty: 90 TABLET | Refills: 0 | Status: SHIPPED | OUTPATIENT
Start: 2021-01-18 | End: 2021-01-27 | Stop reason: SDUPTHER

## 2021-01-18 RX ORDER — METOPROLOL TARTRATE 1 MG/ML
5 INJECTION, SOLUTION INTRAVENOUS ONCE
Status: COMPLETED | OUTPATIENT
Start: 2021-01-18 | End: 2021-01-18

## 2021-01-18 RX ORDER — HYDRALAZINE HYDROCHLORIDE 20 MG/ML
10 INJECTION INTRAMUSCULAR; INTRAVENOUS ONCE
Status: COMPLETED | OUTPATIENT
Start: 2021-01-18 | End: 2021-01-18

## 2021-01-18 RX ADMIN — HYDRALAZINE HYDROCHLORIDE 10 MG: 20 INJECTION INTRAMUSCULAR; INTRAVENOUS at 15:35

## 2021-01-18 RX ADMIN — METOPROLOL TARTRATE 5 MG: 5 INJECTION INTRAVENOUS at 14:49

## 2021-01-18 RX ADMIN — METOPROLOL TARTRATE 5 MG: 5 INJECTION INTRAVENOUS at 16:13

## 2021-01-18 ASSESSMENT — ENCOUNTER SYMPTOMS
SORE THROAT: 0
SINUS PRESSURE: 0
HEADACHES: 1
DIAPHORESIS: 0
PALPITATIONS: 0
MUSCULOSKELETAL NEGATIVE: 1
SHORTNESS OF BREATH: 0
TROUBLE SWALLOWING: 0
CONSTITUTIONAL NEGATIVE: 1
ACTIVITY CHANGE: 0
CARDIOVASCULAR NEGATIVE: 1
RHINORRHEA: 0
CHEST TIGHTNESS: 0
RESPIRATORY NEGATIVE: 1
CONSTITUTIONAL NEGATIVE: 1
NAUSEA: 0
NERVOUS/ANXIOUS: 1
GASTROINTESTINAL NEGATIVE: 1
CHILLS: 0
FEVER: 0
APPETITE CHANGE: 0

## 2021-01-18 NOTE — ED PROVIDER NOTES
HPI:  Chief Complaint   Patient presents with   • Hypertension       This is a 75-year-old female who presents emergency department from the clinic for evaluation of high blood pressure.  She states that she was nervous going into the clinic and believes that is why her blood pressure is elevated.  She did have a headache which is now resolved.  She does have a history of high blood pressure but does not think it is never this high.  Her blood pressure was reported greater than 200/100.  She denies any chest pain, shortness of breath, vision changes, nausea or vomiting.  Her headache was described as a pressure that has now resolved.           HISTORY:  Past Medical History:   Diagnosis Date   • Blood clot in vein     shani legs   • Diabetes (CMS/HCC) (HCC)     type 2   • Eczema    • Fibromyalgia    • GERD (gastroesophageal reflux disease)    • Heart murmur    • Hyperlipidemia    • Hypertension    • Psoriasis    • Scoliosis        Past Surgical History:   Procedure Laterality Date   • CHOLECYSTECTOMY     • COLONOSCOPY  2016    5 yr  follow up   • HYSTERECTOMY      With BSO   • OTHER SURGICAL HISTORY      Rt. radical mastoidectomy, rebuilt canal from growth       Family History   Problem Relation Age of Onset   • Breast cancer Mother    • Lung cancer Maternal Grandmother    • Bone cancer Maternal Grandfather        Social History     Tobacco Use   • Smoking status: Former Smoker     Packs/day: 0.50     Years: 40.00     Pack years: 20.00     Quit date:      Years since quittin.0   • Smokeless tobacco: Former User   Substance Use Topics   • Alcohol use: Yes     Comment: Occasionally; 1-2 glasses monthly   • Drug use: No         ROS:  Review of Systems   Constitutional: Negative.  Negative for activity change, appetite change and diaphoresis.   HENT: Positive for ear discharge.    Respiratory: Negative.  Negative for chest tightness and shortness of breath.    Cardiovascular: Negative.  Negative for  chest pain and palpitations.   Gastrointestinal: Negative.  Negative for nausea.   Genitourinary: Negative.    Musculoskeletal: Negative.    Skin: Negative.    Neurological: Positive for headaches.   Psychiatric/Behavioral: The patient is nervous/anxious.        PE:  ED Triage Vitals [01/18/21 1437]   Temp Heart Rate Resp BP SpO2   36.7 °C (98 °F) 94 16 (!) 223/103 96 %      Temp Source Heart Rate Source Patient Position BP Location FiO2 (%)   Oral -- -- -- --       Physical Exam  Vitals signs and nursing note reviewed.   Constitutional:       General: She is not in acute distress.     Appearance: Normal appearance. She is not ill-appearing.   HENT:      Head: Normocephalic and atraumatic.      Nose: Nose normal.      Mouth/Throat:      Mouth: Mucous membranes are moist.   Eyes:      Extraocular Movements: Extraocular movements intact.      Pupils: Pupils are equal, round, and reactive to light.   Cardiovascular:      Rate and Rhythm: Normal rate and regular rhythm.      Pulses: Normal pulses.      Heart sounds: No murmur.   Pulmonary:      Effort: Pulmonary effort is normal.      Breath sounds: Normal breath sounds.   Abdominal:      General: Abdomen is flat.   Musculoskeletal: Normal range of motion.         General: No swelling or tenderness.   Skin:     General: Skin is warm.      Findings: No erythema or rash.   Neurological:      General: No focal deficit present.      Mental Status: She is alert and oriented to person, place, and time.   Psychiatric:         Mood and Affect: Mood normal.         ED LABS:  Labs Reviewed   CBC WITH AUTO DIFFERENTIAL - Abnormal       Result Value    WBC 11.6 (*)     RBC 5.09      Hemoglobin 15.0      Hematocrit 45.0      MCV 88.5      MCH 29.4      MCHC 33.2      RDW 14.4 (*)     Platelets 235      MPV 7.8      Neutrophils% 70      Lymphocytes% 22      Monocytes% 5      Eosinophils% 2      Basophils% 1      Neutrophils Absolute 8.20      Lymphocytes Absolute 2.50      Monocytes  Absolute 0.60      Eosinophils Absolute 0.20      Basophils Absolute 0.10     BASIC METABOLIC PANEL - Abnormal    Sodium 137      Potassium 4.0      Chloride 101      CO2 27      BUN 19      Creatinine 1.00      Glucose 137 (*)     Calcium 9.8      Anion Gap 9      eGFR 55 (*)     Narrative:     Estimated GFR calculated using the 2009 CKD-EPI creatinine equation.         ED IMAGES:  No orders to display       ED PROCEDURES:  Procedures    ED COURSE:     75-year-old female with history of hypertension presents emergency department for an uncontrolled episode of hypertension.  Her blood pressure was greater than 200/100.  She was given Lopressor 5 mg IV x2 and 1 dose of hydralazine 10 mg IV.  Her blood pressure was trending downwards.  She presented with a headache but as her blood pressure improved her headache did resolve.  I did increase her home medication of metoprolol from 50 mg to 100 mg nightly.  She will will need to monitor blood pressures and follow-up closely with her primary care provider within the next 1 to 2 weeks for reevaluation of her blood pressures and medication adjustment as needed.  Indications for return to the emergency department were discussed.  If she develops elevated blood pressure and unable to control with home medications, develops headaches, vision changes, chest pain or shortness of breath associated with her high blood pressure she is to return to the emergency department       MDM:  MDM  Number of Diagnoses or Management Options  Hypertension: established and worsening     Amount and/or Complexity of Data Reviewed  Clinical lab tests: reviewed and ordered  Tests in the radiology section of CPT®: ordered and reviewed    Risk of Complications, Morbidity, and/or Mortality  Presenting problems: high  Management options: moderate    Patient Progress  Patient progress: improved      Final diagnoses:   [I10] Hypertension        Shandra Sandhu DO  01/19/21 2015

## 2021-01-18 NOTE — PROGRESS NOTES
Subjective    CC:     HPI: Consuelo Singh is a 75 y.o. female examined mastoid who was followed in this clinic for quite some time for cleaning periodically of her right canal wall down mastoid cavity.  She was seen by us last on 15 December and had been having some drainage. She was complaining of bloody drainage at the time. She was cleaned in our clinic on 15 Dec and CSF powder applied.  She said she still had bloody drainage for a week or 2.  That stopped around the first of the year.  The ear feels fine now.  She is nervous because she gets dizzy when we clean her ears.    Past Medical History:   Diagnosis Date   • Blood clot in vein     shani legs   • Diabetes (CMS/HCC) (HCC)     type 2   • Eczema    • Fibromyalgia    • GERD (gastroesophageal reflux disease)    • Heart murmur    • Hyperlipidemia    • Hypertension    • Psoriasis    • Scoliosis        Past Surgical History:   Procedure Laterality Date   • CHOLECYSTECTOMY     • COLONOSCOPY  03/11/2016    5 yr  follow up   • HYSTERECTOMY      With BSO   • OTHER SURGICAL HISTORY      Rt. radical mastoidectomy, rebuilt canal from growth         Current Outpatient Medications:   •  pravastatin (PRAVACHOL) 20 mg tablet, TAKE 1 TABLET BY MOUTH ON MONDAY, WEDNESDAY AND FRIDAY, Disp: 36 tablet, Rfl: 0  •  clobetasoL (TEMOVATE) 0.05 % ointment, Apply 1 application topically 2 (two) times a day as needed (Rash), Disp: 60 g, Rfl: 1  •  omeprazole (PriLOSEC) 40 mg capsule, Take 1 capsule by mouth once daily, Disp: 90 capsule, Rfl: 1  •  metoprolol succinate XL (TOPROL-XL) 50 mg 24 hr tablet, Take 1 tablet (50 mg total) by mouth daily, Disp: 90 tablet, Rfl: 3  •  losartan (COZAAR) 100 mg tablet, Take 1 tablet (100 mg total) by mouth daily, Disp: 90 tablet, Rfl: 3  •  nortriptyline (PAMELOR) 50 mg capsule, TAKE 1 CAPSULE BY MOUTH ONCE DAILY AS DIRECTED, Disp: 90 capsule, Rfl: 1  •  NovoLOG Flexpen U-100 Insulin 100 unit/mL (3 mL) insulin pen, INJECT 8 UNITS SUBCUTANEOUSLY DAILY  "IN THE MORNING, 10 UNITS AT NOON, AND 20 UNITS IN THE EVENING, Disp: 30 mL, Rfl: 2  •  pen needle, diabetic 31 gauge x 1/4\" needle, USE 1 NEEDLE TO INJECT UNDER THE SKIN FOUR TIMES DAILY, Disp: 400 each, Rfl: 3  •  hydroCHLOROthiazide (HYDRODIURIL) 25 mg tablet, Take 1 tablet (25 mg total) by mouth daily, Disp: 90 tablet, Rfl: 3  •  insulin glargine U-300 conc (Toujeo Max U-300 SoloStar) 300 unit/mL (3 mL) insulin pen insulin pen, Inject 120 Units under the skin daily, Disp: 12 Syringe, Rfl: 3  •  ondansetron ODT (Zofran ODT) 4 mg disintegrating tablet, Take 1 tablet (4 mg total) by mouth every 8 (eight) hours as needed for nausea or vomiting, Disp: 20 tablet, Rfl: 0  •  meloxicam (MOBIC) 15 mg tablet, Take one tablet daily (Patient taking differently: Take 15 mg by mouth daily as needed Take one tablet daily ), Disp: 30 tablet, Rfl: 1  •  blood-glucose sensor device, Use 1 sensor every 10 days., Disp: 3 Device, Rfl: 11  •  blood-glucose meter,continuous (Dexcom G6 ) misc, Use as directed., Disp: 1 each, Rfl: 0  •  blood-glucose transmitter (Dexcom G6 Transmitter) device, Use as directed.  Change every 3 months., Disp: 1 Device, Rfl: 4  •  blood sugar diagnostic (ONETOUCH ULTRA TEST) strip, Use one strip to check glucose TID, Disp: 300 strip, Rfl: 3  •  aspirin 325 mg tablet, Take 325 mg by mouth daily.  , Disp: , Rfl:     Allergies   Allergen Reactions   • Aloe Vera    • Bacitracin      ZINC   • Erythromycin Base    • Formaldehyde    • Gramicidins      OPHTHALMIC   • Hydrocortisone      ACETATE   • Latex    • Levofloxacin    • Metformin    • Neomycin    • Neomycin-Bacitracnzn-Polymyxnb    • Polymyxin B    • Prednisolone    • Prednisone    • Statins-Hmg-Coa Reductase Inhibitors    • Sulfamethoxazole    • Sulfamethoxazole-Trimethoprim    • Thimerosal    • Tresiba Flextouch U-100 [Insulin Degludec] Diarrhea and Other (see comments)     Abdominal pain   • Trimethoprim        family history includes Bone cancer " "in her maternal grandfather; Breast cancer in her mother; Lung cancer in her maternal grandmother.    Social History     Tobacco Use   • Smoking status: Former Smoker     Packs/day: 0.50     Years: 40.00     Pack years: 20.00     Quit date:      Years since quittin.0   • Smokeless tobacco: Former User   Substance Use Topics   • Alcohol use: Yes     Comment: Occasionally; 1-2 glasses monthly   • Drug use: No       Review of Systems   Constitutional: Negative.  Negative for chills and fever.   HENT: Positive for ear pain. Negative for congestion, ear discharge, hearing loss, nosebleeds, postnasal drip, rhinorrhea, sinus pressure, sneezing, sore throat, tinnitus and trouble swallowing.        Objective    BP (!) 231/99 (BP Location: Right arm, Patient Position: Sitting, Cuff Size: Large Long Adult)   Pulse 104   Temp 36.7 °C (98 °F) (Temporal)   Ht 1.6 m (5' 3\")   Wt 102.5 kg (226 lb)   BMI 40.03 kg/m²     General: Well-developed well-nourished female in no acute distress.    Ears: Right ear with blood that is dried and hard filling the mastoid bowl.    Mastoid debridement: I attempted to debride the dried blood from the mastoid bowl.  It was painful and made the patient have vertigo so I ceased the efforts.  I tried with alligator forceps, curette and right angle pick without success before abandoning the effort.    Diagnosis    1. History of right mastoidectomy    2. Asymptomatic hypertensive urgency        Recommendations    1.  Patient should use a small amount of hydrogen peroxide dripped into the ear off of a Q-tip daily for the next 2 weeks and follow-up with me for repeat debridement attempt.  2.  I am sending the patient to the emergency department based on her blood pressure with a systolic in the 230s.  Last week and her primary care clinic her systolic was 130.  She has a slight headache today so I definitely want to send her for evaluation.  "

## 2021-01-19 NOTE — DISCHARGE INSTRUCTIONS
Please increase your metoprolol to 100 mg (2 tablets nightly).  Call your primary care provider to schedule follow-up within the next 1 to 2 weeks for reevaluation of your blood pressure.  Return to the emergency department for high blood pressure with headache vision changes or shortness of breath

## 2021-01-27 ENCOUNTER — OFFICE VISIT (OUTPATIENT)
Dept: INTERNAL MEDICINE | Facility: CLINIC | Age: 76
End: 2021-01-27
Payer: MEDICARE

## 2021-01-27 VITALS
WEIGHT: 230 LBS | DIASTOLIC BLOOD PRESSURE: 88 MMHG | BODY MASS INDEX: 40.74 KG/M2 | SYSTOLIC BLOOD PRESSURE: 160 MMHG | OXYGEN SATURATION: 94 % | TEMPERATURE: 97.9 F | RESPIRATION RATE: 15 BRPM | HEART RATE: 81 BPM

## 2021-01-27 DIAGNOSIS — I10 ESSENTIAL HYPERTENSION: Primary | ICD-10-CM

## 2021-01-27 DIAGNOSIS — N18.31 STAGE 3A CHRONIC KIDNEY DISEASE (CMS/HCC): ICD-10-CM

## 2021-01-27 DIAGNOSIS — G44.52 NEW DAILY PERSISTENT HEADACHE: ICD-10-CM

## 2021-01-27 PROCEDURE — 99214 OFFICE O/P EST MOD 30 MIN: CPT | Performed by: INTERNAL MEDICINE

## 2021-01-27 PROCEDURE — G0463 HOSPITAL OUTPT CLINIC VISIT: HCPCS | Performed by: INTERNAL MEDICINE

## 2021-01-27 RX ORDER — METOPROLOL SUCCINATE 100 MG/1
100 TABLET, EXTENDED RELEASE ORAL NIGHTLY
Qty: 90 TABLET | Refills: 3 | Status: SHIPPED | OUTPATIENT
Start: 2021-01-27 | End: 2022-02-18

## 2021-01-27 RX ORDER — AMLODIPINE BESYLATE 2.5 MG/1
2.5 TABLET ORAL DAILY
Qty: 90 TABLET | Refills: 3 | Status: SHIPPED | OUTPATIENT
Start: 2021-01-27 | End: 2021-10-25 | Stop reason: SDUPTHER

## 2021-01-27 ASSESSMENT — PAIN SCALES - GENERAL: PAINLEVEL: 0-NO PAIN

## 2021-01-27 NOTE — PROGRESS NOTES
"Internal Medicine Progress Note    Chief Complaint:   Chief Complaint   Patient presents with   • Follow-up     Patient is in today for recheck from ER.         HPI: Patient is a 75 y.o. female patient is in for follow-up after she was seen in the emergency room for hypertension.  She had an appointment to see ENT and she usually has to get part of her mastoid cleaned out and she always gets a vertigo and has some discomfort with it so she always gets a little nervous going into see them.  Her blood pressure was 200/100 and they sent her to the emergency room where evaluation was unremarkable they increased her Toprol from 50 mg at at bedtime to 100 mg at at bedtime.  She brings in a whole list of blood pressures over the last couple weeks.  She is probably consistently running 160 over about 75-80    Review of Systems      Current Outpatient Medications:   •  metoprolol succinate XL (TOPROL-XL) 100 mg 24 hr tablet, Take 1 tablet (100 mg total) by mouth nightly, Disp: 90 tablet, Rfl: 3  •  pravastatin (PRAVACHOL) 20 mg tablet, TAKE 1 TABLET BY MOUTH ON MONDAY, WEDNESDAY AND FRIDAY, Disp: 36 tablet, Rfl: 0  •  clobetasoL (TEMOVATE) 0.05 % ointment, Apply 1 application topically 2 (two) times a day as needed (Rash), Disp: 60 g, Rfl: 1  •  omeprazole (PriLOSEC) 40 mg capsule, Take 1 capsule by mouth once daily, Disp: 90 capsule, Rfl: 1  •  losartan (COZAAR) 100 mg tablet, Take 1 tablet (100 mg total) by mouth daily, Disp: 90 tablet, Rfl: 3  •  nortriptyline (PAMELOR) 50 mg capsule, TAKE 1 CAPSULE BY MOUTH ONCE DAILY AS DIRECTED, Disp: 90 capsule, Rfl: 1  •  NovoLOG Flexpen U-100 Insulin 100 unit/mL (3 mL) insulin pen, INJECT 8 UNITS SUBCUTANEOUSLY DAILY IN THE MORNING, 10 UNITS AT NOON, AND 20 UNITS IN THE EVENING, Disp: 30 mL, Rfl: 2  •  pen needle, diabetic 31 gauge x 1/4\" needle, USE 1 NEEDLE TO INJECT UNDER THE SKIN FOUR TIMES DAILY, Disp: 400 each, Rfl: 3  •  hydroCHLOROthiazide (HYDRODIURIL) 25 mg tablet, Take 1 " tablet (25 mg total) by mouth daily, Disp: 90 tablet, Rfl: 3  •  insulin glargine U-300 conc (Toujeo Max U-300 SoloStar) 300 unit/mL (3 mL) insulin pen insulin pen, Inject 120 Units under the skin daily, Disp: 12 Syringe, Rfl: 3  •  ondansetron ODT (Zofran ODT) 4 mg disintegrating tablet, Take 1 tablet (4 mg total) by mouth every 8 (eight) hours as needed for nausea or vomiting, Disp: 20 tablet, Rfl: 0  •  meloxicam (MOBIC) 15 mg tablet, Take one tablet daily (Patient taking differently: Take 15 mg by mouth daily as needed Take one tablet daily ), Disp: 30 tablet, Rfl: 1  •  blood-glucose sensor device, Use 1 sensor every 10 days., Disp: 3 Device, Rfl: 11  •  blood-glucose meter,continuous (Dexcom G6 ) misc, Use as directed., Disp: 1 each, Rfl: 0  •  blood-glucose transmitter (Dexcom G6 Transmitter) device, Use as directed.  Change every 3 months., Disp: 1 Device, Rfl: 4  •  blood sugar diagnostic (ONETOUCH ULTRA TEST) strip, Use one strip to check glucose TID, Disp: 300 strip, Rfl: 3  •  aspirin 325 mg tablet, Take 325 mg by mouth daily.  , Disp: , Rfl:   •  amLODIPine (Norvasc) 2.5 mg tablet, Take 1 tablet (2.5 mg total) by mouth daily, Disp: 90 tablet, Rfl: 3    Allergies:   Allergies   Allergen Reactions   • Aloe Vera    • Bacitracin      ZINC   • Erythromycin Base    • Formaldehyde    • Gramicidins      OPHTHALMIC   • Hydrocortisone      ACETATE   • Latex    • Levofloxacin    • Metformin    • Neomycin    • Neomycin-Bacitracnzn-Polymyxnb    • Polymyxin B    • Prednisolone    • Prednisone    • Statins-Hmg-Coa Reductase Inhibitors    • Sulfamethoxazole    • Sulfamethoxazole-Trimethoprim    • Thimerosal    • Tresiba Flextouch U-100 [Insulin Degludec] Diarrhea and Other (see comments)     Abdominal pain   • Trimethoprim          Labs:   Recent Results (from the past 336 hour(s))   CBC w/auto differential Blood, Venous    Collection Time: 01/18/21  2:54 PM   Result Value Ref Range    WBC 11.6 (H) 4.5 - 10.5  10*3/uL    RBC 5.09 3.70 - 5.30 10*6/µL    Hemoglobin 15.0 11.5 - 15.5 g/dL    Hematocrit 45.0 34.0 - 45.0 %    MCV 88.5 81.0 - 97.0 fL    MCH 29.4 28.0 - 33.0 pg    MCHC 33.2 32.0 - 36.0 g/dL    RDW 14.4 (H) 11.5 - 14.0 %    Platelets 235 140 - 350 10*3/uL    MPV 7.8 6.9 - 10.8 fL    Neutrophils% 70 41 - 81 %    Lymphocytes% 22 11 - 47 %    Monocytes% 5 3 - 11 %    Eosinophils% 2 0 - 3 %    Basophils% 1 0 - 2 %    Neutrophils Absolute 8.20 10*3/uL    Lymphocytes Absolute 2.50 10*3/uL    Monocytes Absolute 0.60 10*3/uL    Eosinophils Absolute 0.20 10*3/uL    Basophils Absolute 0.10 10*3/uL   Basic metabolic panel Blood, Venous    Collection Time: 01/18/21  2:54 PM   Result Value Ref Range    Sodium 137 135 - 145 mmol/L    Potassium 4.0 3.5 - 5.1 mmol/L    Chloride 101 98 - 107 mmol/L    CO2 27 21 - 32 mmol/L    BUN 19 7 - 25 mg/dL    Creatinine 1.00 0.60 - 1.10 mg/dL    Glucose 137 (H) 70 - 105 mg/dL    Calcium 9.8 8.6 - 10.3 mg/dL    Anion Gap 9 3 - 11 mmol/L    eGFR 55 (L) >60 mL/min/1.73m*2         Imaging: No results found.    Vitals: Blood pressure 160/88, pulse 81, temperature 36.6 °C (97.9 °F), temperature source Temporal, resp. rate 15, weight 104.3 kg (230 lb), SpO2 94 %.      Physical Exam  Vitals signs and nursing note reviewed.   Constitutional:       Appearance: She is obese.   Neck:      Musculoskeletal: Normal range of motion and neck supple.   Cardiovascular:      Rate and Rhythm: Normal rate and regular rhythm.   Pulmonary:      Effort: Pulmonary effort is normal.      Breath sounds: Normal breath sounds.   Abdominal:      Tenderness: There is no right CVA tenderness or left CVA tenderness.   Musculoskeletal:      Right lower leg: No edema.      Left lower leg: No edema.   Lymphadenopathy:      Cervical: No cervical adenopathy.   Neurological:      Mental Status: She is alert.           Plan and Discussion:     Diagnosis Plan   1. Essential hypertension  amLODIPine (Norvasc) 2.5 mg tablet     metoprolol succinate XL (TOPROL-XL) 100 mg 24 hr tablet   2. Stage 3a chronic kidney disease     3. New daily persistent headache     Hypertension: The patient is on fairly high dose of generic Toprol and then on losartan and hydrochlorothiazide at maximum dose.  We will add in amlodipine at 2.5 mg/day in the morning she will continue to monitor her blood pressure at home if she does not consistently achieve a systolic of 85 or less and a diastolic of 85 or less she will call and let us know.  CKD 3: Patient's last creatinine was at 1.0 but her GFR is mildly decreased at 55.  Is important to control both her blood pressure as well as her diabetes to prevent any further decline.  Medications have been reviewed none need to be modified for her chronic kidney disease at this point.  Headache: This most likely secondary to the increase in the patient's blood pressure once we get her pressure control if she would continue to have this she may needs further neuroimaging.  JAMILA MANCERA MD    Date: 1/27/2021  Time: 2:54 PM

## 2021-02-05 DIAGNOSIS — E11.21 TYPE 2 DIABETES MELLITUS WITH DIABETIC NEPHROPATHY, WITH LONG-TERM CURRENT USE OF INSULIN (CMS/HCC): ICD-10-CM

## 2021-02-05 DIAGNOSIS — Z79.4 TYPE 2 DIABETES MELLITUS WITH DIABETIC NEPHROPATHY, WITH LONG-TERM CURRENT USE OF INSULIN (CMS/HCC): ICD-10-CM

## 2021-02-05 RX ORDER — INSULIN ASPART 100 [IU]/ML
INJECTION, SOLUTION INTRAVENOUS; SUBCUTANEOUS
Qty: 30 ML | Refills: 2 | Status: SHIPPED | OUTPATIENT
Start: 2021-02-05 | End: 2021-08-04

## 2021-02-08 ENCOUNTER — ANCILLARY PROCEDURE (OUTPATIENT)
Dept: MAMMOGRAPHY | Facility: CLINIC | Age: 76
End: 2021-02-08
Payer: MEDICARE

## 2021-02-08 DIAGNOSIS — Z12.31 ENCOUNTER FOR SCREENING MAMMOGRAM FOR MALIGNANT NEOPLASM OF BREAST: ICD-10-CM

## 2021-02-08 PROCEDURE — 77067 SCR MAMMO BI INCL CAD: CPT

## 2021-02-08 PROCEDURE — 77063 BREAST TOMOSYNTHESIS BI: CPT | Mod: 26 | Performed by: RADIOLOGY

## 2021-02-08 PROCEDURE — 77067 SCR MAMMO BI INCL CAD: CPT | Mod: 26 | Performed by: RADIOLOGY

## 2021-02-09 ENCOUNTER — OFFICE VISIT (OUTPATIENT)
Dept: OTOLARYNGOLOGY | Facility: CLINIC | Age: 76
End: 2021-02-09
Payer: MEDICARE

## 2021-02-09 VITALS
DIASTOLIC BLOOD PRESSURE: 82 MMHG | WEIGHT: 230 LBS | OXYGEN SATURATION: 96 % | TEMPERATURE: 97.8 F | HEIGHT: 63 IN | BODY MASS INDEX: 40.75 KG/M2 | SYSTOLIC BLOOD PRESSURE: 138 MMHG | HEART RATE: 86 BPM

## 2021-02-09 DIAGNOSIS — H61.21 IMPACTED CERUMEN OF RIGHT EAR: Primary | ICD-10-CM

## 2021-02-09 DIAGNOSIS — Z90.89 HISTORY OF RIGHT MASTOIDECTOMY: ICD-10-CM

## 2021-02-09 PROCEDURE — G0463 HOSPITAL OUTPT CLINIC VISIT: HCPCS | Performed by: OTOLARYNGOLOGY

## 2021-02-09 PROCEDURE — 69222 CLEAN OUT MASTOID CAVITY: CPT | Performed by: OTOLARYNGOLOGY

## 2021-02-09 PROCEDURE — 99214 OFFICE O/P EST MOD 30 MIN: CPT | Mod: 25 | Performed by: OTOLARYNGOLOGY

## 2021-02-09 ASSESSMENT — ENCOUNTER SYMPTOMS
TROUBLE SWALLOWING: 0
FEVER: 0
SORE THROAT: 0
CONSTITUTIONAL NEGATIVE: 1
SINUS PRESSURE: 0
CHILLS: 0
RHINORRHEA: 0

## 2021-02-11 ENCOUNTER — OFFICE VISIT (OUTPATIENT)
Dept: OTOLARYNGOLOGY | Facility: CLINIC | Age: 76
End: 2021-02-11
Payer: MEDICARE

## 2021-02-11 VITALS
DIASTOLIC BLOOD PRESSURE: 80 MMHG | HEART RATE: 80 BPM | OXYGEN SATURATION: 95 % | SYSTOLIC BLOOD PRESSURE: 151 MMHG | TEMPERATURE: 96.9 F

## 2021-02-11 DIAGNOSIS — H61.21 IMPACTED CERUMEN OF RIGHT EAR: Primary | ICD-10-CM

## 2021-02-11 DIAGNOSIS — Z90.89 HISTORY OF RIGHT MASTOIDECTOMY: ICD-10-CM

## 2021-02-11 PROCEDURE — G0463 HOSPITAL OUTPT CLINIC VISIT: HCPCS | Performed by: NURSE PRACTITIONER

## 2021-02-11 PROCEDURE — 99024 POSTOP FOLLOW-UP VISIT: CPT | Performed by: NURSE PRACTITIONER

## 2021-02-11 ASSESSMENT — ENCOUNTER SYMPTOMS
SINUS PRESSURE: 0
CONSTITUTIONAL NEGATIVE: 1
FEVER: 0
CHILLS: 0
RHINORRHEA: 0
SORE THROAT: 0
TROUBLE SWALLOWING: 0

## 2021-02-11 NOTE — PROGRESS NOTES
Subjective    CC: follow up     HPI: Consuelo Singh is a 75 y.o. female  Who is here for a follow up from Dr. Pollock. She was seen by him two days ago and hat otowicks placed that she is here for removal today. She denies ear pain or drainage. She states that she does have a little pressure in the right ear from the otowicks. She denies recent illness.     Past Medical History:   Diagnosis Date   • Blood clot in vein     shani legs   • Diabetes (CMS/HCC) (HCC)     type 2   • Eczema    • Fibromyalgia    • GERD (gastroesophageal reflux disease)    • Heart murmur    • Hyperlipidemia    • Hypertension    • Psoriasis    • Scoliosis        Past Surgical History:   Procedure Laterality Date   • CHOLECYSTECTOMY     • COLONOSCOPY  03/11/2016    5 yr  follow up   • HYSTERECTOMY      With BSO   • OTHER SURGICAL HISTORY      Rt. radical mastoidectomy, rebuilt canal from growth         Current Outpatient Medications:   •  NovoLOG Flexpen U-100 Insulin 100 unit/mL (3 mL) insulin pen, INJECT 8 UNITS SUBCUTANEOUSLY DAILY IN THE MORNING, 10 UNITS AT NOON AND 20 UNITS IN THE EVENING, Disp: 30 mL, Rfl: 2  •  amLODIPine (Norvasc) 2.5 mg tablet, Take 1 tablet (2.5 mg total) by mouth daily, Disp: 90 tablet, Rfl: 3  •  metoprolol succinate XL (TOPROL-XL) 100 mg 24 hr tablet, Take 1 tablet (100 mg total) by mouth nightly, Disp: 90 tablet, Rfl: 3  •  pravastatin (PRAVACHOL) 20 mg tablet, TAKE 1 TABLET BY MOUTH ON MONDAY, WEDNESDAY AND FRIDAY, Disp: 36 tablet, Rfl: 0  •  clobetasoL (TEMOVATE) 0.05 % ointment, Apply 1 application topically 2 (two) times a day as needed (Rash), Disp: 60 g, Rfl: 1  •  omeprazole (PriLOSEC) 40 mg capsule, Take 1 capsule by mouth once daily, Disp: 90 capsule, Rfl: 1  •  losartan (COZAAR) 100 mg tablet, Take 1 tablet (100 mg total) by mouth daily, Disp: 90 tablet, Rfl: 3  •  nortriptyline (PAMELOR) 50 mg capsule, TAKE 1 CAPSULE BY MOUTH ONCE DAILY AS DIRECTED, Disp: 90 capsule, Rfl: 1  •  pen needle, diabetic 31  "gauge x 1/4\" needle, USE 1 NEEDLE TO INJECT UNDER THE SKIN FOUR TIMES DAILY, Disp: 400 each, Rfl: 3  •  hydroCHLOROthiazide (HYDRODIURIL) 25 mg tablet, Take 1 tablet (25 mg total) by mouth daily, Disp: 90 tablet, Rfl: 3  •  insulin glargine U-300 conc (Toujeo Max U-300 SoloStar) 300 unit/mL (3 mL) insulin pen insulin pen, Inject 120 Units under the skin daily, Disp: 12 Syringe, Rfl: 3  •  ondansetron ODT (Zofran ODT) 4 mg disintegrating tablet, Take 1 tablet (4 mg total) by mouth every 8 (eight) hours as needed for nausea or vomiting, Disp: 20 tablet, Rfl: 0  •  meloxicam (MOBIC) 15 mg tablet, Take one tablet daily (Patient taking differently: Take 15 mg by mouth daily as needed Take one tablet daily ), Disp: 30 tablet, Rfl: 1  •  blood-glucose sensor device, Use 1 sensor every 10 days., Disp: 3 Device, Rfl: 11  •  blood-glucose meter,continuous (Dexcom G6 ) misc, Use as directed., Disp: 1 each, Rfl: 0  •  blood-glucose transmitter (Dexcom G6 Transmitter) device, Use as directed.  Change every 3 months., Disp: 1 Device, Rfl: 4  •  blood sugar diagnostic (ONETOUCH ULTRA TEST) strip, Use one strip to check glucose TID, Disp: 300 strip, Rfl: 3  •  aspirin 325 mg tablet, Take 325 mg by mouth daily.  , Disp: , Rfl:     Allergies   Allergen Reactions   • Aloe Vera    • Bacitracin      ZINC   • Erythromycin Base    • Formaldehyde    • Gramicidins      OPHTHALMIC   • Hydrocortisone      ACETATE   • Latex    • Levofloxacin    • Metformin    • Neomycin    • Neomycin-Bacitracnzn-Polymyxnb    • Polymyxin B    • Prednisolone    • Prednisone    • Statins-Hmg-Coa Reductase Inhibitors    • Sulfamethoxazole    • Sulfamethoxazole-Trimethoprim    • Thimerosal    • Tresiba Flextouch U-100 [Insulin Degludec] Diarrhea and Other (see comments)     Abdominal pain   • Trimethoprim        family history includes Bone cancer in her maternal grandfather; Breast cancer in her mother; Lung cancer in her maternal grandmother.    Social " History     Tobacco Use   • Smoking status: Former Smoker     Packs/day: 0.50     Years: 40.00     Pack years: 20.00     Quit date:      Years since quittin.1   • Smokeless tobacco: Former User   Substance Use Topics   • Alcohol use: Yes     Comment: Occasionally; 1-2 glasses monthly   • Drug use: No       Review of Systems   Constitutional: Negative.  Negative for chills and fever.   HENT: Negative.  Negative for congestion, ear discharge, ear pain, hearing loss, nosebleeds, postnasal drip, rhinorrhea, sinus pressure, sneezing, sore throat, tinnitus and trouble swallowing.        Objective    /80 (BP Location: Left arm, Patient Position: Sitting, Cuff Size: Large Long Adult)   Pulse 80   Temp 36.1 °C (96.9 °F) (Temporal)   SpO2 95%     PHYSICAL EXAMINATION:  GENERAL:  Well-developed, well-nourished.  The patient is alert, oriented and in no acute distress.    PSYCH: Normal mood and affect.   SKIN: No evidence of significant rash or concerning skin lesion on the head or neck.  HEAD/FACE:  Normally formed without evidence of mass or trauma.  The sinuses are nontender.    EARS: Bilateral external ears are normal. Right external auditory canal noted to have otowicks, there were removed with alligator forceps; pt tolerated well. Mild debridement with 5f suction done of Right mastoid bowel. Left external auditory canal is  normal.  Bilateral tympanic membranes intact and normal.  NOSE:  The external nose appears normal.  The septum is mostly midline.  The turbinates appear normal.    ORAL CAVITY/OROPHARYNX:  There are no focal masses or lesions.  There are normal mucous membranes.  The tonsils appear normal without mass or focal lesion.  Oropharynx mucosa appears normal.  NECK:  There is no palpable adenopathy, goiter or mass.  The trachea is midline.       Diagnosis Plan   1. Impacted cerumen of right ear     2. History of right mastoidectomy         No diagnosis found.    Recommendations  Otowicks  removed, pt tolerated well. She is to continue cipro HC drops for a total of 5 days. She is to follow up in 3 months or sooner with concerns. Pt verbalized understanding and has no further questions.

## 2021-02-17 ENCOUNTER — CLINICAL SUPPORT (OUTPATIENT)
Dept: FAMILY MEDICINE | Facility: CLINIC | Age: 76
End: 2021-02-17

## 2021-02-17 DIAGNOSIS — Z23 ENCOUNTER FOR VACCINATION: Primary | ICD-10-CM

## 2021-03-02 DIAGNOSIS — F32.A DEPRESSIVE DISORDER: ICD-10-CM

## 2021-03-03 RX ORDER — NORTRIPTYLINE HYDROCHLORIDE 50 MG/1
CAPSULE ORAL
Qty: 90 CAPSULE | Refills: 0 | Status: SHIPPED | OUTPATIENT
Start: 2021-03-03 | End: 2021-05-17 | Stop reason: SDUPTHER

## 2021-03-08 DIAGNOSIS — Z23 NEED FOR VACCINATION: ICD-10-CM

## 2021-03-17 ENCOUNTER — CLINICAL SUPPORT (OUTPATIENT)
Dept: FAMILY MEDICINE | Facility: CLINIC | Age: 76
End: 2021-03-17

## 2021-03-17 DIAGNOSIS — Z23 ENCOUNTER FOR VACCINATION: Primary | ICD-10-CM

## 2021-03-26 DIAGNOSIS — K21.9 CHRONIC GERD: ICD-10-CM

## 2021-03-26 RX ORDER — OMEPRAZOLE 40 MG/1
CAPSULE, DELAYED RELEASE ORAL
Qty: 90 CAPSULE | Refills: 1 | Status: SHIPPED | OUTPATIENT
Start: 2021-03-26 | End: 2021-08-31

## 2021-04-02 DIAGNOSIS — E11.9 DIABETES MELLITUS WITHOUT COMPLICATION (CMS/HCC): ICD-10-CM

## 2021-04-02 DIAGNOSIS — E78.00 PURE HYPERCHOLESTEROLEMIA: ICD-10-CM

## 2021-04-02 RX ORDER — PRAVASTATIN SODIUM 20 MG/1
TABLET ORAL
Qty: 12 TABLET | Refills: 0 | Status: SHIPPED | OUTPATIENT
Start: 2021-04-02 | End: 2021-04-30

## 2021-04-02 RX ORDER — INSULIN GLARGINE 300 U/ML
120 INJECTION, SOLUTION SUBCUTANEOUS DAILY
Qty: 15 ML | Refills: 0 | Status: SHIPPED | OUTPATIENT
Start: 2021-04-02 | End: 2021-04-29 | Stop reason: SDUPTHER

## 2021-04-12 ENCOUNTER — OFFICE VISIT (OUTPATIENT)
Dept: INTERNAL MEDICINE | Facility: CLINIC | Age: 76
End: 2021-04-12
Payer: MEDICARE

## 2021-04-12 ENCOUNTER — APPOINTMENT (OUTPATIENT)
Dept: LAB | Facility: CLINIC | Age: 76
End: 2021-04-12
Payer: MEDICARE

## 2021-04-12 VITALS
HEART RATE: 71 BPM | RESPIRATION RATE: 16 BRPM | WEIGHT: 225 LBS | SYSTOLIC BLOOD PRESSURE: 140 MMHG | DIASTOLIC BLOOD PRESSURE: 80 MMHG | BODY MASS INDEX: 39.86 KG/M2 | OXYGEN SATURATION: 96 % | TEMPERATURE: 97.2 F

## 2021-04-12 DIAGNOSIS — N18.31 STAGE 3A CHRONIC KIDNEY DISEASE (CMS/HCC): ICD-10-CM

## 2021-04-12 DIAGNOSIS — Z79.4 TYPE 2 DIABETES MELLITUS WITH DIABETIC NEPHROPATHY, WITH LONG-TERM CURRENT USE OF INSULIN (CMS/HCC): ICD-10-CM

## 2021-04-12 DIAGNOSIS — Z86.0100 HISTORY OF COLON POLYPS: Primary | ICD-10-CM

## 2021-04-12 DIAGNOSIS — E11.21 TYPE 2 DIABETES MELLITUS WITH DIABETIC NEPHROPATHY, WITH LONG-TERM CURRENT USE OF INSULIN (CMS/HCC): ICD-10-CM

## 2021-04-12 LAB
ALBUMIN SERPL-MCNC: 4.1 G/DL (ref 3.5–5.3)
ALP SERPL-CCNC: 120 U/L (ref 55–142)
ALT SERPL-CCNC: 44 U/L (ref 7–52)
ANION GAP SERPL CALC-SCNC: 10 MMOL/L (ref 3–11)
AST SERPL-CCNC: 32 U/L
BILIRUB SERPL-MCNC: 0.49 MG/DL (ref 0.2–1.4)
BUN SERPL-MCNC: 28 MG/DL (ref 7–25)
CALCIUM ALBUM COR SERPL-MCNC: 9.3 MG/DL (ref 8.6–10.3)
CALCIUM SERPL-MCNC: 9.4 MG/DL (ref 8.6–10.3)
CHLORIDE SERPL-SCNC: 99 MMOL/L (ref 98–107)
CO2 SERPL-SCNC: 29 MMOL/L (ref 21–32)
CREAT SERPL-MCNC: 1.08 MG/DL (ref 0.6–1.1)
EST. AVERAGE GLUCOSE BLD GHB EST-MCNC: 159.9 MG/DL
GFR SERPL CREATININE-BSD FRML MDRD: 51 ML/MIN/1.73M*2
GLUCOSE SERPL-MCNC: 145 MG/DL (ref 70–105)
HBA1C MFR BLD: 7.2 % (ref 4–6)
POTASSIUM SERPL-SCNC: 4.2 MMOL/L (ref 3.5–5.1)
PROT SERPL-MCNC: 6.7 G/DL (ref 6–8.3)
SODIUM SERPL-SCNC: 138 MMOL/L (ref 135–145)

## 2021-04-12 PROCEDURE — 83036 HEMOGLOBIN GLYCOSYLATED A1C: CPT

## 2021-04-12 PROCEDURE — 99214 OFFICE O/P EST MOD 30 MIN: CPT | Performed by: INTERNAL MEDICINE

## 2021-04-12 PROCEDURE — 80053 COMPREHEN METABOLIC PANEL: CPT

## 2021-04-12 PROCEDURE — 36415 COLL VENOUS BLD VENIPUNCTURE: CPT

## 2021-04-12 PROCEDURE — G0463 HOSPITAL OUTPT CLINIC VISIT: HCPCS | Performed by: INTERNAL MEDICINE

## 2021-04-12 RX ORDER — MULTIVITAMIN
1 TABLET ORAL DAILY
COMMUNITY

## 2021-04-12 RX ORDER — LUTEIN 6 MG
1 TABLET ORAL DAILY
COMMUNITY

## 2021-04-12 ASSESSMENT — PAIN SCALES - GENERAL: PAINLEVEL: 6

## 2021-04-12 NOTE — PROGRESS NOTES
Internal Medicine Progress Note    Chief Complaint:   Chief Complaint   Patient presents with   • Follow-up     Patient is in today for recheck.  Patient also had labs done.  Patient also had fall 3 weeks ago, still has left toe pain.          HPI: Patient is a 75 y.o. female patient is in for follow-up on her type II insulin requiring diabetes, primary hypercholesterolemia, hypertension, and then a recent fall that the patient took she was walking from the grass to the sidewalk and stubbed her toe on the braised sidewalk and fell forward.  The only thing that he got injured was her toes on the left foot particularly the second toe.    Review of Systems      Current Outpatient Medications:   •  lutein 20 mg tablet, Take 1 tab/cap by mouth daily, Disp: , Rfl:   •  multivitamin (THERAGRAN) tablet tablet, Take 1 tablet by mouth daily, Disp: , Rfl:   •  pravastatin (PRAVACHOL) 20 mg tablet, TAKE 1 TABLET BY MOUTH ON MONDAY, WEDNESDAY AND FRIDAY, Disp: 12 tablet, Rfl: 0  •  Toujeo Max U-300 SoloStar 300 unit/mL (3 mL) insulin pen insulin pen, INJECT 120 UNITS UNDER THE SKIN DAILY, Disp: 15 mL, Rfl: 0  •  omeprazole (PriLOSEC) 40 mg capsule, Take 1 capsule by mouth once daily, Disp: 90 capsule, Rfl: 1  •  nortriptyline (PAMELOR) 50 mg capsule, TAKE 1 CAPSULE BY MOUTH ONCE DAILY AS DIRECTED, Disp: 90 capsule, Rfl: 0  •  NovoLOG Flexpen U-100 Insulin 100 unit/mL (3 mL) insulin pen, INJECT 8 UNITS SUBCUTANEOUSLY DAILY IN THE MORNING, 10 UNITS AT NOON AND 20 UNITS IN THE EVENING, Disp: 30 mL, Rfl: 2  •  amLODIPine (Norvasc) 2.5 mg tablet, Take 1 tablet (2.5 mg total) by mouth daily, Disp: 90 tablet, Rfl: 3  •  metoprolol succinate XL (TOPROL-XL) 100 mg 24 hr tablet, Take 1 tablet (100 mg total) by mouth nightly, Disp: 90 tablet, Rfl: 3  •  clobetasoL (TEMOVATE) 0.05 % ointment, Apply 1 application topically 2 (two) times a day as needed (Rash), Disp: 60 g, Rfl: 1  •  losartan (COZAAR) 100 mg tablet, Take 1 tablet (100 mg  "total) by mouth daily, Disp: 90 tablet, Rfl: 3  •  pen needle, diabetic 31 gauge x 1/4\" needle, USE 1 NEEDLE TO INJECT UNDER THE SKIN FOUR TIMES DAILY, Disp: 400 each, Rfl: 3  •  hydroCHLOROthiazide (HYDRODIURIL) 25 mg tablet, Take 1 tablet (25 mg total) by mouth daily, Disp: 90 tablet, Rfl: 3  •  ondansetron ODT (Zofran ODT) 4 mg disintegrating tablet, Take 1 tablet (4 mg total) by mouth every 8 (eight) hours as needed for nausea or vomiting, Disp: 20 tablet, Rfl: 0  •  meloxicam (MOBIC) 15 mg tablet, Take one tablet daily (Patient taking differently: Take 15 mg by mouth daily as needed Take one tablet daily ), Disp: 30 tablet, Rfl: 1  •  blood-glucose sensor device, Use 1 sensor every 10 days., Disp: 3 Device, Rfl: 11  •  blood-glucose meter,continuous (Dexcom G6 ) misc, Use as directed., Disp: 1 each, Rfl: 0  •  blood-glucose transmitter (Dexcom G6 Transmitter) device, Use as directed.  Change every 3 months., Disp: 1 Device, Rfl: 4  •  blood sugar diagnostic (ONETOUCH ULTRA TEST) strip, Use one strip to check glucose TID, Disp: 300 strip, Rfl: 3  •  aspirin 325 mg tablet, Take 325 mg by mouth daily.  , Disp: , Rfl:     Allergies:   Allergies   Allergen Reactions   • Aloe Vera    • Bacitracin      ZINC   • Erythromycin Base    • Formaldehyde    • Gramicidins      OPHTHALMIC   • Hydrocortisone      ACETATE   • Latex    • Levofloxacin    • Metformin    • Neomycin    • Neomycin-Bacitracnzn-Polymyxnb    • Polymyxin B    • Prednisolone    • Prednisone    • Statins-Hmg-Coa Reductase Inhibitors    • Sulfamethoxazole    • Sulfamethoxazole-Trimethoprim    • Thimerosal    • Tresiba Flextouch U-100 [Insulin Degludec] Diarrhea and Other (see comments)     Abdominal pain   • Trimethoprim          Labs:   Recent Results (from the past 336 hour(s))   Comprehensive metabolic panel Blood, Venous    Collection Time: 04/12/21  7:20 AM   Result Value Ref Range    Sodium 138 135 - 145 mmol/L    Potassium 4.2 3.5 - 5.1 mmol/L "    Chloride 99 98 - 107 mmol/L    CO2 29 21 - 32 mmol/L    Anion Gap 10 3 - 11 mmol/L    BUN 28 (H) 7 - 25 mg/dL    Creatinine 1.08 0.60 - 1.10 mg/dL    Glucose 145 (H) 70 - 105 mg/dL    Calcium 9.4 8.6 - 10.3 mg/dL    AST 32 <40 U/L    ALT (SGPT) 44 7 - 52 U/L    Alkaline Phosphatase 120 55 - 142 U/L    Total Protein 6.7 6.0 - 8.3 g/dL    Albumin 4.1 3.5 - 5.3 g/dL    Total Bilirubin 0.49 0.20 - 1.40 mg/dL    eGFR 51 (L) >60 mL/min/1.73m*2    Corrected Calcium 9.3 8.6 - 10.3 mg/dL   Hemoglobin A1c (glycosylated) Blood, Venous    Collection Time: 04/12/21  7:20 AM   Result Value Ref Range    Hemoglobin A1C 7.2 (H) 4.0 - 6.0 %    Estimated Average Glucose 159.9 mg/dL         Imaging: No results found.    Vitals: Blood pressure 140/80, pulse 71, temperature 36.2 °C (97.2 °F), temperature source Temporal, resp. rate 16, weight 102.1 kg (225 lb), SpO2 96 %.      Physical Exam  Vitals and nursing note reviewed.   Constitutional:       Appearance: She is obese.   Cardiovascular:      Rate and Rhythm: Normal rate and regular rhythm.   Pulmonary:      Effort: Pulmonary effort is normal.      Breath sounds: Normal breath sounds. No wheezing, rhonchi or rales.   Musculoskeletal:      Cervical back: Normal range of motion and neck supple.      Right lower leg: No edema.      Left lower leg: No edema.      Comments: On examination of the patient's left foot she does have a little bit of swelling and erythema of the second toe.  There is no crepitations detected she seems to have fairly normal range of motion in the MTP joint as well as the toe itself   Lymphadenopathy:      Cervical: No cervical adenopathy.   Neurological:      Mental Status: She is alert.           Plan and Discussion:     Diagnosis Plan   1. History of colon polyps  Ambulatory referral to General Surgery   2. Stage 3a chronic kidney disease     3. Type 2 diabetes mellitus with diabetic nephropathy, with long-term current use of insulin (CMS/HCC) ([facility])   Comprehensive metabolic panel Blood, Venous    Hemoglobin A1c (glycosylated) Blood, Venous     History of colon polyps: Patient's last colonoscopy was 3/15 that time.  She had 2 polyps that were tubular adenomas low-grade dysplasia.  She needs a follow-up colonoscopy and we will refer her to general surgery.  Insulin requiring diabetes: Patient's A1c just mildly increased she is up to 7.2 have encouraged her to try and get a little bit more active and to just watch her diet a little bit better and then she will follow-up in 3 months.  CKD 3: Patient's renal function is remaining stable.  Follow-up: The patient will follow up with 3 months with Dr. Glass with a CMP and an A1c.  JAMILA MANCERA MD    Date: 4/12/2021  Time: 12:14 PM

## 2021-04-29 DIAGNOSIS — E11.9 DIABETES MELLITUS WITHOUT COMPLICATION (CMS/HCC): ICD-10-CM

## 2021-04-29 RX ORDER — INSULIN GLARGINE 300 U/ML
120 INJECTION, SOLUTION SUBCUTANEOUS DAILY
Qty: 15 ML | Refills: 5 | Status: SHIPPED | OUTPATIENT
Start: 2021-04-29 | End: 2021-11-22 | Stop reason: SDUPTHER

## 2021-04-30 DIAGNOSIS — E78.00 PURE HYPERCHOLESTEROLEMIA: ICD-10-CM

## 2021-04-30 RX ORDER — PRAVASTATIN SODIUM 20 MG/1
TABLET ORAL
Qty: 39 TABLET | Refills: 0 | Status: SHIPPED | OUTPATIENT
Start: 2021-04-30 | End: 2021-05-03 | Stop reason: SDUPTHER

## 2021-05-03 ENCOUNTER — TELEPHONE (OUTPATIENT)
Dept: SURGERY | Facility: CLINIC | Age: 76
End: 2021-05-03

## 2021-05-03 DIAGNOSIS — E78.00 PURE HYPERCHOLESTEROLEMIA: ICD-10-CM

## 2021-05-03 RX ORDER — PRAVASTATIN SODIUM 20 MG/1
20 TABLET ORAL 3 TIMES WEEKLY
Qty: 40 TABLET | Refills: 2 | Status: SHIPPED | OUTPATIENT
Start: 2021-05-03 | End: 2021-07-26 | Stop reason: SDUPTHER

## 2021-05-04 PROBLEM — Z12.11 SCREENING FOR MALIGNANT NEOPLASM OF COLON: Status: ACTIVE | Noted: 2021-05-04

## 2021-05-05 ENCOUNTER — TELEPHONE (OUTPATIENT)
Dept: SURGERY | Facility: CLINIC | Age: 76
End: 2021-05-05

## 2021-05-05 NOTE — TELEPHONE ENCOUNTER
----- Message from Elvi San RN sent at 5/4/2021 12:35 PM MDT -----    ----- Message -----  From: Frantz Boyer MD  Sent: 5/4/2021  12:11 PM MDT  To: Elvi San RN    Day before the colonoscopy have the patient take only 80 units of Toujeo and decrease her rapid acting insulin to 4 for breakfast 5 for lunch, and 10 for evening meal have her follow her sugars closely.  The day of the procedure she will take no insulin prior to the procedure.  The patient is to treat any episodes of hypoglycemia if they occur.  ----- Message -----  From: Elvi San RN  Sent: 5/4/2021  11:27 AM MDT  To: Frantz Boyer MD      ----- Message -----  From: Geraldine Galeano LPN  Sent: 5/4/2021  11:09 AM MDT  To: Elvi San RN    Patient of Dr Boyer's that is having a colonoscopy on May 28th.  Will need instructions on insulin use.    Thank you.

## 2021-05-06 ENCOUNTER — OFFICE VISIT (OUTPATIENT)
Dept: OTOLARYNGOLOGY | Facility: CLINIC | Age: 76
End: 2021-05-06
Payer: MEDICARE

## 2021-05-06 VITALS
HEART RATE: 92 BPM | BODY MASS INDEX: 41.02 KG/M2 | DIASTOLIC BLOOD PRESSURE: 73 MMHG | SYSTOLIC BLOOD PRESSURE: 168 MMHG | WEIGHT: 231.5 LBS | TEMPERATURE: 98.2 F | HEIGHT: 63 IN

## 2021-05-06 DIAGNOSIS — Z90.89 HISTORY OF RIGHT MASTOIDECTOMY: Primary | ICD-10-CM

## 2021-05-06 PROCEDURE — 92504 EAR MICROSCOPY EXAMINATION: CPT | Performed by: OTOLARYNGOLOGY

## 2021-05-06 PROCEDURE — 99213 OFFICE O/P EST LOW 20 MIN: CPT | Performed by: OTOLARYNGOLOGY

## 2021-05-06 PROCEDURE — G0463 HOSPITAL OUTPT CLINIC VISIT: HCPCS | Performed by: OTOLARYNGOLOGY

## 2021-05-06 ASSESSMENT — ENCOUNTER SYMPTOMS
SORE THROAT: 0
RHINORRHEA: 0
CONSTITUTIONAL NEGATIVE: 1
FEVER: 0
CHILLS: 0
SINUS PRESSURE: 0
TROUBLE SWALLOWING: 0

## 2021-05-06 ASSESSMENT — PAIN SCALES - GENERAL: PAINLEVEL: 0-NO PAIN

## 2021-05-06 NOTE — PROGRESS NOTES
Subjective    CC: Recheck ears    HPI: Consuelo Singh is a 75 y.o. female who is status post a right canal wall down tympanomastoidectomy years ago.  Over the last few months we have been struggling with some dried blood on the right.  We got this mostly cleaned up by her last visit and told her to come back for 3-month checkup.  She is here for that now.  She states that she is feeling well.  The ear is not bothering her.    Past Medical History:   Diagnosis Date   • Blood clot in vein     shani legs   • Diabetes (CMS/HCC) (HCC)     type 2   • Eczema    • Fibromyalgia    • GERD (gastroesophageal reflux disease)    • Heart murmur    • Hyperlipidemia    • Hypertension    • Psoriasis    • Scoliosis        Past Surgical History:   Procedure Laterality Date   • CHOLECYSTECTOMY     • COLONOSCOPY  03/11/2016    5 yr  follow up   • HYSTERECTOMY      With BSO   • OTHER SURGICAL HISTORY      Rt. radical mastoidectomy, rebuilt canal from growth         Current Outpatient Medications:   •  pravastatin (PRAVACHOL) 20 mg tablet, Take 1 tablet (20 mg total) by mouth 3 (three) times a week, Disp: 40 tablet, Rfl: 2  •  insulin glargine U-300 conc (Toujeo Max U-300 SoloStar) 300 unit/mL (3 mL) insulin pen insulin pen, Inject 120 Units under the skin daily, Disp: 15 mL, Rfl: 5  •  lutein 20 mg tablet, Take 1 tab/cap by mouth daily, Disp: , Rfl:   •  multivitamin (THERAGRAN) tablet tablet, Take 1 tablet by mouth daily, Disp: , Rfl:   •  omeprazole (PriLOSEC) 40 mg capsule, Take 1 capsule by mouth once daily, Disp: 90 capsule, Rfl: 1  •  nortriptyline (PAMELOR) 50 mg capsule, TAKE 1 CAPSULE BY MOUTH ONCE DAILY AS DIRECTED, Disp: 90 capsule, Rfl: 0  •  NovoLOG Flexpen U-100 Insulin 100 unit/mL (3 mL) insulin pen, INJECT 8 UNITS SUBCUTANEOUSLY DAILY IN THE MORNING, 10 UNITS AT NOON AND 20 UNITS IN THE EVENING, Disp: 30 mL, Rfl: 2  •  amLODIPine (Norvasc) 2.5 mg tablet, Take 1 tablet (2.5 mg total) by mouth daily, Disp: 90 tablet, Rfl: 3  •  " metoprolol succinate XL (TOPROL-XL) 100 mg 24 hr tablet, Take 1 tablet (100 mg total) by mouth nightly, Disp: 90 tablet, Rfl: 3  •  clobetasoL (TEMOVATE) 0.05 % ointment, Apply 1 application topically 2 (two) times a day as needed (Rash), Disp: 60 g, Rfl: 1  •  losartan (COZAAR) 100 mg tablet, Take 1 tablet (100 mg total) by mouth daily, Disp: 90 tablet, Rfl: 3  •  pen needle, diabetic 31 gauge x 1/4\" needle, USE 1 NEEDLE TO INJECT UNDER THE SKIN FOUR TIMES DAILY, Disp: 400 each, Rfl: 3  •  hydroCHLOROthiazide (HYDRODIURIL) 25 mg tablet, Take 1 tablet (25 mg total) by mouth daily, Disp: 90 tablet, Rfl: 3  •  ondansetron ODT (Zofran ODT) 4 mg disintegrating tablet, Take 1 tablet (4 mg total) by mouth every 8 (eight) hours as needed for nausea or vomiting, Disp: 20 tablet, Rfl: 0  •  meloxicam (MOBIC) 15 mg tablet, Take one tablet daily (Patient taking differently: Take 15 mg by mouth daily as needed Take one tablet daily ), Disp: 30 tablet, Rfl: 1  •  blood-glucose sensor device, Use 1 sensor every 10 days., Disp: 3 Device, Rfl: 11  •  blood-glucose meter,continuous (Dexcom G6 ) misc, Use as directed., Disp: 1 each, Rfl: 0  •  blood-glucose transmitter (Dexcom G6 Transmitter) device, Use as directed.  Change every 3 months., Disp: 1 Device, Rfl: 4  •  blood sugar diagnostic (ONETOUCH ULTRA TEST) strip, Use one strip to check glucose TID, Disp: 300 strip, Rfl: 3  •  aspirin 325 mg tablet, Take 325 mg by mouth daily.  , Disp: , Rfl:     Allergies   Allergen Reactions   • Aloe Vera    • Bacitracin      ZINC   • Erythromycin Base    • Formaldehyde    • Gramicidins      OPHTHALMIC   • Hydrocortisone      ACETATE   • Latex    • Levofloxacin    • Metformin    • Neomycin    • Neomycin-Bacitracnzn-Polymyxnb    • Polymyxin B    • Prednisolone    • Prednisone    • Statins-Hmg-Coa Reductase Inhibitors    • Sulfamethoxazole    • Sulfamethoxazole-Trimethoprim    • Thimerosal    • Tresiba Flextouch U-100 [Insulin Degludec] " "Diarrhea and Other (see comments)     Abdominal pain   • Trimethoprim        family history includes Bone cancer in her maternal grandfather; Breast cancer in her mother; Lung cancer in her maternal grandmother.    Social History     Tobacco Use   • Smoking status: Former Smoker     Packs/day: 0.50     Years: 40.00     Pack years: 20.00     Quit date:      Years since quittin.3   • Smokeless tobacco: Former User   Substance Use Topics   • Alcohol use: Yes     Comment: Occasionally; 1-2 glasses monthly   • Drug use: No       Review of Systems   Constitutional: Negative.  Negative for chills and fever.   HENT: Negative.  Negative for congestion, ear discharge, ear pain, hearing loss, nosebleeds, postnasal drip, rhinorrhea, sinus pressure, sneezing, sore throat, tinnitus and trouble swallowing.        Objective    /73   Pulse 92   Temp 36.8 °C (98.2 °F) (Temporal)   Ht 1.6 m (5' 3\")   Wt 105 kg (231 lb 8 oz)   BMI 41.01 kg/m²     General: Well-developed overweight female in no acute distress.    Ears: Left external ear external auditory canal and tympanic membrane appear normal.  No residual hemotympanum or external auditory canal blood on the left.  On the right patient is status post canal wall down tympanomastoidectomy.  See right ear microscopy below.    Ear microscopy:  The right ear was examined with the operating microscope and speculum.  There is a pretty large clot of hard dried blood still filling the mastoid bowl.  Attempts to remove this with alligator forceps and a sharp pick to tease it away from the tissues and pull it out were stopped due to patient vertigo and nausea.  There is no pus, granulation or other evidence of inflammatory problem.    Diagnosis    1.  History of right mastoidectomy    Recommendations    Patient with some dried blood in the right mastoid cavity.  Attempted to remove this once again caused some vertigo and nausea for the patient.  Attempts were halted.  She is " symptomatically doing fine and there is no evidence of acute or chronic inflammation or other concerning findings.  Recommend follow-up with me in 3 months for repeat check, sooner as needed for problems.          Portions of this patient note were documented using voice to text software technology.  Some errors related to this technology may have been missed during the editing process.

## 2021-05-17 DIAGNOSIS — F32.A DEPRESSIVE DISORDER: ICD-10-CM

## 2021-05-17 RX ORDER — NORTRIPTYLINE HYDROCHLORIDE 50 MG/1
50 CAPSULE ORAL DAILY
Qty: 90 CAPSULE | Refills: 3 | Status: SHIPPED | OUTPATIENT
Start: 2021-05-17 | End: 2022-06-01 | Stop reason: SDUPTHER

## 2021-05-20 ENCOUNTER — ANESTHESIA EVENT (OUTPATIENT)
Dept: GASTROENTEROLOGY | Facility: HOSPITAL | Age: 76
End: 2021-05-20
Payer: MEDICARE

## 2021-05-20 NOTE — ANESTHESIA PREPROCEDURE EVALUATION
"Pre-Procedure Assessment    Patient: Consuelo Singh, female, 75 y.o.    Ht Readings from Last 1 Encounters:   21 1.6 m (5' 3\")     Wt Readings from Last 1 Encounters:   21 102.1 kg (225 lb)       Last Vitals  /84 (21)    Temp 36.6 °C (97.9 °F) (21)    Pulse 92 (21)   Resp 18 (21)    SpO2 96 % (21)    Pain Score         Problem list reviewed and Medical history reviewed           Airway   Mallampati: III  TM distance: >3 FB  Neck ROM: full      Dental    (+) upper dentures and lower dentures    Comment: Lower dentures out.    Pulmonary - normal exam    breath sounds clear to auscultation  (+) pneumonia (Hx of.),   Cardiovascular - normal exam  (+) hypertension well controlled, PVD (bilateral legs hx), DVT    ECG reviewed  Rhythm: regular  Rate: normal  ROS comment: EKG 2020      Mental Status/Neuro/Psych    Pt is alert.      (+) arthritis, peripheral neuropathy (diabetic),     GI/Hepatic/Renal    (+) GERD well controlled, renal disease (stage 3) CRI,     Endo/Other    (+) diabetes mellitus type 2 poorly controlled using insulin,     Comments: A1c 7.2  2021  Morbid obesity, bmi 41  Abdominal  - normal exam          Social History     Tobacco Use   • Smoking status: Former Smoker     Packs/day: 0.50     Years: 40.00     Pack years: 20.00     Quit date:      Years since quittin.4   • Smokeless tobacco: Never Used   Substance Use Topics   • Alcohol use: Yes     Comment: Occasionally; 1-2 glasses monthly      Hematology   WBC   Date Value Ref Range Status   2021 11.6 (H) 4.5 - 10.5 10*3/uL Final     RBC   Date Value Ref Range Status   2021 5.09 3.70 - 5.30 10*6/µL Final     MCV   Date Value Ref Range Status   2021 88.5 81.0 - 97.0 fL Final     Hemoglobin   Date Value Ref Range Status   2021 15.0 11.5 - 15.5 g/dL Final     Hematocrit   Date Value Ref Range Status   2021 45.0 34.0 - 45.0 % Final "     Platelets   Date Value Ref Range Status   01/18/2021 235 140 - 350 10*3/uL Final      Coagulation No results found for: PT, APTT, INR   General Chemistry   Calcium   Date Value Ref Range Status   04/12/2021 9.4 8.6 - 10.3 mg/dL Final     BUN   Date Value Ref Range Status   04/12/2021 28 (H) 7 - 25 mg/dL Final     Creatinine   Date Value Ref Range Status   04/12/2021 1.08 0.60 - 1.10 mg/dL Final     Glucose   Date Value Ref Range Status   04/12/2021 145 (H) 70 - 105 mg/dL Final     Sodium   Date Value Ref Range Status   04/12/2021 138 135 - 145 mmol/L Final     Potassium   Date Value Ref Range Status   04/12/2021 4.2 3.5 - 5.1 mmol/L Final     Magnesium   Date Value Ref Range Status   05/02/2017 1.8 1.8 - 2.4 mg/dL      CO2   Date Value Ref Range Status   04/12/2021 29 21 - 32 mmol/L Final     Chloride   Date Value Ref Range Status   04/12/2021 99 98 - 107 mmol/L Final     Anesthesia Plan    ASA 3   NPO status reviewed: > 8 hours    MAC         Induction: intravenous                 Anesthetic plan and risks discussed with patient.

## 2021-05-21 NOTE — PRE-PROCEDURE INSTRUCTIONS
"Pre-Surgery Instructions:   Medication Instructions   • nortriptyline (PAMELOR) 50 mg capsule Do not take morning of surgery/procedure   • pravastatin (PRAVACHOL) 20 mg tablet Do not take morning of surgery/procedure   • insulin glargine U-300 conc (Toujeo Max U-300 SoloStar) 300 unit/mL (3 mL) insulin pen insulin pen Do not take morning of surgery/procedure   • lutein 20 mg tablet Do not take morning of surgery/procedure   • multivitamin (THERAGRAN) tablet tablet Stop taking 1 week prior to surgery/procedure   • omeprazole (PriLOSEC) 40 mg capsule Do not take morning of surgery/procedure   • NovoLOG Flexpen U-100 Insulin 100 unit/mL (3 mL) insulin pen Do not take morning of surgery/procedure   • amLODIPine (Norvasc) 2.5 mg tablet Take morning of surgery/procedure   • metoprolol succinate XL (TOPROL-XL) 100 mg 24 hr tablet Take morning of surgery/procedure   • clobetasoL (TEMOVATE) 0.05 % ointment Do not take morning of surgery/procedure   • losartan (COZAAR) 100 mg tablet Take morning of surgery/procedure   • hydroCHLOROthiazide (HYDRODIURIL) 25 mg tablet Take morning of surgery/procedure   • ondansetron ODT (Zofran ODT) 4 mg disintegrating tablet PRN morning of surgery/procedure   • meloxicam (MOBIC) 15 mg tablet Stop taking 1 week prior to surgery/procedure   • aspirin 325 mg tablet Stop taking 1 week prior to surgery/procedure   • pen needle, diabetic 31 gauge x 1/4\" needle Do not take morning of surgery/procedure   • blood-glucose sensor device Do not take morning of surgery/procedure   • blood-glucose meter,continuous (Dexcom G6 ) misc Do not take morning of surgery/procedure   • blood-glucose transmitter (Dexcom G6 Transmitter) device Do not take morning of surgery/procedure   • blood sugar diagnostic (ONETOUCH ULTRA TEST) strip Do not take morning of surgery/procedure     Colonoscopy:        YOUR COLONOSCOPY IS SCHEDULED _________________________________________     _____[facility]        [facility] will contact you with your exact arrival time and advise you on which medications to take and which ones to hold prior to your colonoscopy  MIRALAX AND DULCOLAX BOWEL PREP FOR COLONOSCOPY  To prepare:  • If you take diabetic medications or blood thinners please ask your doctor for specific instructions. You may be instructed to stop your medication or your dosage may be adjusted prior to your colonoscopy.  • Please find someone to accompany you the day of your colonoscopy to drive you home afterwards.  • If you require antibiotics before procedures due to a heart murmur, valvar heart disease, or a joint replacement, please contact your cardiologist or orthopedic doctor to discuss if they want you to be on antibiotics prior to your colonoscopy  • If you have a colostomy please bring along extra supplies the day of your colonoscopy.  • Do not take aspirin products for one week prior to your colonoscopy  • No nuts or seeds for one week prior to your colonoscopy  • Please follow a low reside/ low fiber diet (see attached list) 3 days before your colonoscopy  Low fiber diet dates __________________________________________________________  Stock up on clear liquids to drink during your bowel prep. You will need at least 64 ounces of a clear liquid to mix with your bowel prep medication.  No clear liquids that are red, blue or purple colored.  1. Clear juices- (apple, white grape, peach)  2. Gatorade  3. Chicken, beef, or vegetable broth  4. Jell-O  5. Black Coffee or tea  6. Corey-Aid  7. Soda  8. Popsicles  •  your bowel prep medications from any pharmacy. They are over the counter medications and no prescription is needed. Ask the pharmacist for help if you are not able to find what you need. Generic substitutes are okay. You will be using these medications on the day before your colonoscopy to get your colon cleaned out in preparation for your colonoscopy.  1. Miralax-   (8.3 ounces or 238 grams)- this is a powder  2. Dulcolax or Bisacodyl Tablets 5 mg- you will need 4 tablets      On the day before your colonoscopy:___________________________________  1. Drink only clear liquids from the list provided on the previous page. No solid foods or milk products of any kind during the day. You may suck on hard candy.  2. It is very important to drink plenty of water during your bowel prep. It will help enhance your prep and make sure your colon gets completely cleaned out for your procedure.  3. In the morning mix the entire jug of Miralax powder with 64 ounces of any clear liquid and refrigerate.  4. Follow the time table below for instructions on when to drink your Miralax solution and when to take your Dulcolax/Biscodyl tablets.    3:00pm- Take 2 Dulcolax/Biscodyl tablets with a glass of water  4:30pm- Start to drink the Miralax solution. Drink until half of the solution is gone-        you should be drinking four, 8 ounce glasses to finish half of the solution; try        to finish half the solution in around an hour. Then return the rest of        the Miralax solution to the fridge.         6:30pm- Take another 2 Dulcolax/Biscodyl tablets with water. Drink at least three         additional glasses of clear liquid before you go to bed.   8:00pm- Drink the other half of Miralax solution-until all of the solution is gone.     IT IS VERY IMPORTANT THAT YOU DRINK ALL 64 OUNCES OF YOUR MIRALAX SOLUTION.     The Day of Your Colonoscopy:_____________________________________  • You may drink clear liquids up to 2 hours prior to your colonoscopy:____________  • NO FOOD until after your colonoscopy is done  • Please refrain from smoking, chewing tobacco, and chewing gum  • Remember you will need an adult to take you home after your procedure is done. You will not be able to leave by yourself and it will not be safe for you to drive for 24 hrs.    IF YOU HAVE CONCERNS THAT YOU ARE NOT COMPLETELY  CLEANED OUT FROM YOUR BOWEL PREP MEDICATIONS PLEASE CALL THE ANSWERING SERVICE AT [phone] AND ASK FOR THE ON CALL GENERAL SURGEON IN [place].          Important information  Surgery center will call you 2 buisness days prior to your procedure for a HERE pallavi and when to stop drinking water    On arrival to the Surgery Center please call [phone]  Staff will direct you where to go from there.  She will ask you and your support person(if you have one ) Covid-19 questions  Both required to wear a mask.  If you dont have a mask it will be supplied  One support person can come with you into the building and can remain with you until time to go back for the procedure  Support person will go back to their car and will be contacted by the Dr and then discharge nurse when pt is ready to go home                        Low Residue / Low Fiber Diet    Foods Allowed:  • White Bread or rolls without nuts and seeds  • Plain white pasta, noodles, macaroni  • Crackers  • Refined cereals such as Cream of Wheat, Cheerios, Rice Krispies  • Pancakes or Waffles  • Fruit and vegetable juice with little/no pulp  • Tender meat, poultry, fish, sausage, martínez  • Eggs, Tofu, creamy peanut butter  • Milk and foods made from milk - such as yogurt (without fruit added), pudding, ice cream, cheeses, cottage cheese, sour cream  • Butter, margarine, oils, and salad dressings without seeds or nuts  • Cheese pizza, spaghetti with no chunks of veggies  • White rice (polished)  • Applesauce or Pear sauce  • Instant or whipped potato with no skin  • Desserts with no whole grains, seeds, nuts, raisins or coconut    Foods to Avoid:   • Whole wheat/whole grain breads, cereals, and pasta  • Whole grains (oats, kasha, barley, quinoa)  • Beans, peas and lentils  • Seeds, nuts, popcorn  • Brown and wild rice  • Fruits and vegetables  • Tough fibrous meats with gristle, raw clams and oysters  • Coconut      Pt has a dexcom        Diet instructions for  surgery:  Clear liquids (no pulp) up to 2 hours before surgery.    Preop questionnaire:  Preop Questionairre  Does patient have physical restrictions or limitations?: No  Has patient had an upper respiratory tract infection in the last 2 weeks?: No  Home Oxygen: No    Preop instructions:  Pre-op Phone Call  Surgery Location Verified: Yes  Instructed to shower within 12 hours: Yes  Does Patient Require Bowel Prep for Procedure?: Yes  Type: Miralax/Bisocodyl  Instructed to remove/not apply makeup, petroleum products, lotion, powder, scents, or deodorant on the morning of surgery: Yes  Recommend eye glasses for day of surgery, contact lenses must be removed prior to surgery:: N/A  Instructed to bring CPAP mask: N/A  Instructed to remove all jewelry, including piercings, and leave at home.: Yes  Instructed to leave all valuables at home: Yes  Instructed to leave all medications at home: Yes  Instructed to bring a valid photo ID and insurance card:: Yes  Instruct patient to ensure transportation is available following surgery/procedure:: Yes  Instruct patient that a caregiver must stay with the patient 24 hours following anesthesia:: Yes

## 2021-05-28 ENCOUNTER — ANESTHESIA (OUTPATIENT)
Dept: GASTROENTEROLOGY | Facility: HOSPITAL | Age: 76
End: 2021-05-28
Payer: MEDICARE

## 2021-05-28 ENCOUNTER — HOSPITAL ENCOUNTER (OUTPATIENT)
Facility: HOSPITAL | Age: 76
Setting detail: OUTPATIENT SURGERY
Discharge: HOME | End: 2021-05-28
Attending: SURGERY | Admitting: SURGERY
Payer: MEDICARE

## 2021-05-28 VITALS
WEIGHT: 225 LBS | TEMPERATURE: 97.3 F | HEIGHT: 63 IN | BODY MASS INDEX: 39.87 KG/M2 | RESPIRATION RATE: 15 BRPM | HEART RATE: 74 BPM | DIASTOLIC BLOOD PRESSURE: 75 MMHG | OXYGEN SATURATION: 94 % | SYSTOLIC BLOOD PRESSURE: 151 MMHG

## 2021-05-28 DIAGNOSIS — Z12.11 SCREENING FOR MALIGNANT NEOPLASM OF COLON: ICD-10-CM

## 2021-05-28 PROCEDURE — 00811 ANES LWR INTST NDSC NOS: CPT | Mod: PT | Performed by: NURSE ANESTHETIST, CERTIFIED REGISTERED

## 2021-05-28 PROCEDURE — 2580000300 HC RX 258: Performed by: SURGERY

## 2021-05-28 PROCEDURE — (BLANK) HC MAC ANESTHESIA FACILITY CHARGE 1ST 15 MIN: Performed by: SURGERY

## 2021-05-28 PROCEDURE — (BLANK) HC MAC ANESTHESIA FACILITY CHARGE EACH ADDITIONAL MIN: Performed by: SURGERY

## 2021-05-28 PROCEDURE — 45380 COLONOSCOPY AND BIOPSY: CPT | Mod: PT | Performed by: SURGERY

## 2021-05-28 PROCEDURE — (BLANK) HC RECOVERY PHASE-2 1ST 1/2 HOUR ACUITY LEVEL 1: Performed by: SURGERY

## 2021-05-28 PROCEDURE — 99100 ANES PT EXTEME AGE<1 YR&>70: CPT | Performed by: NURSE ANESTHETIST, CERTIFIED REGISTERED

## 2021-05-28 PROCEDURE — 6360000200 HC RX 636 W HCPCS (ALT 250 FOR IP): Performed by: NURSE ANESTHETIST, CERTIFIED REGISTERED

## 2021-05-28 PROCEDURE — 88305 TISSUE EXAM BY PATHOLOGIST: CPT

## 2021-05-28 PROCEDURE — (BLANK): Performed by: SURGERY

## 2021-05-28 RX ORDER — LIDOCAINE HYDROCHLORIDE 20 MG/ML
INJECTION, SOLUTION EPIDURAL; INFILTRATION; INTRACAUDAL; PERINEURAL AS NEEDED
Status: DISCONTINUED | OUTPATIENT
Start: 2021-05-28 | End: 2021-05-28 | Stop reason: SURG

## 2021-05-28 RX ORDER — SODIUM CHLORIDE, SODIUM LACTATE, POTASSIUM CHLORIDE, CALCIUM CHLORIDE 600; 310; 30; 20 MG/100ML; MG/100ML; MG/100ML; MG/100ML
75 INJECTION, SOLUTION INTRAVENOUS CONTINUOUS
Status: DISCONTINUED | OUTPATIENT
Start: 2021-05-28 | End: 2021-05-28 | Stop reason: HOSPADM

## 2021-05-28 RX ORDER — PROPOFOL 10 MG/ML
INJECTION, EMULSION INTRAVENOUS AS NEEDED
Status: DISCONTINUED | OUTPATIENT
Start: 2021-05-28 | End: 2021-05-28 | Stop reason: SURG

## 2021-05-28 RX ADMIN — PROPOFOL 40 MG: 10 INJECTION, EMULSION INTRAVENOUS at 06:56

## 2021-05-28 RX ADMIN — PROPOFOL 20 MG: 10 INJECTION, EMULSION INTRAVENOUS at 07:13

## 2021-05-28 RX ADMIN — PROPOFOL 40 MG: 10 INJECTION, EMULSION INTRAVENOUS at 06:57

## 2021-05-28 RX ADMIN — PROPOFOL 50 MG: 10 INJECTION, EMULSION INTRAVENOUS at 06:51

## 2021-05-28 RX ADMIN — PROPOFOL 10 MG: 10 INJECTION, EMULSION INTRAVENOUS at 07:00

## 2021-05-28 RX ADMIN — PROPOFOL 50 MG: 10 INJECTION, EMULSION INTRAVENOUS at 06:48

## 2021-05-28 RX ADMIN — PROPOFOL 50 MG: 10 INJECTION, EMULSION INTRAVENOUS at 07:06

## 2021-05-28 RX ADMIN — PROPOFOL 20 MG: 10 INJECTION, EMULSION INTRAVENOUS at 07:03

## 2021-05-28 RX ADMIN — PROPOFOL 10 MG: 10 INJECTION, EMULSION INTRAVENOUS at 07:08

## 2021-05-28 RX ADMIN — LIDOCAINE HYDROCHLORIDE 100 MG: 20 INJECTION, SOLUTION EPIDURAL; INFILTRATION; INTRACAUDAL; PERINEURAL at 06:48

## 2021-05-28 RX ADMIN — PROPOFOL 20 MG: 10 INJECTION, EMULSION INTRAVENOUS at 07:15

## 2021-05-28 RX ADMIN — PROPOFOL 20 MG: 10 INJECTION, EMULSION INTRAVENOUS at 07:10

## 2021-05-28 RX ADMIN — PROPOFOL 10 MG: 10 INJECTION, EMULSION INTRAVENOUS at 06:49

## 2021-05-28 RX ADMIN — PROPOFOL 20 MG: 10 INJECTION, EMULSION INTRAVENOUS at 06:59

## 2021-05-28 RX ADMIN — PROPOFOL 60 MG: 10 INJECTION, EMULSION INTRAVENOUS at 06:53

## 2021-05-28 RX ADMIN — SODIUM CHLORIDE, POTASSIUM CHLORIDE, SODIUM LACTATE AND CALCIUM CHLORIDE 75 ML/HR: 600; 310; 30; 20 INJECTION, SOLUTION INTRAVENOUS at 06:28

## 2021-05-28 RX ADMIN — PROPOFOL 60 MG: 10 INJECTION, EMULSION INTRAVENOUS at 07:17

## 2021-05-28 RX ADMIN — PROPOFOL 20 MG: 10 INJECTION, EMULSION INTRAVENOUS at 07:07

## 2021-05-28 NOTE — H&P
No chief complaint on file.      HPI:  Consuelo Singh is an 75 y.o. female. Who presents for colonoscopy. Asymptomatic person at high risk: Personal history of adenomatous polyps. Last colonoscopy in 2015.    Past Medical History:   Diagnosis Date   • Arthritis    • Blood clot in vein     shani legs   • Deep vein thrombosis (CMS/HCC) (HCC)     hx of   • Diabetes (CMS/HCC) (HCC)     type 2   • Eczema    • Fibromyalgia    • GERD (gastroesophageal reflux disease)    • Heart murmur    • Hyperlipidemia    • Hypertension    • Peripheral neuropathy     toes   • Pneumonia    • Psoriasis    • Scoliosis    • Wears dentures     full set        Past Surgical History:   Procedure Laterality Date   • CHOLECYSTECTOMY     • COLONOSCOPY  03/11/2016    5 yr  follow up   • HYSTERECTOMY      With BSO   • OTHER SURGICAL HISTORY      Rt. radical mastoidectomy, rebuilt canal from growth       No current outpatient medications on file.    Allergies   Allergen Reactions   • Aloe Vera    • Bacitracin      ZINC   • Erythromycin Base    • Formaldehyde    • Gramicidins      OPHTHALMIC   • Hydrocortisone      ACETATE   • Latex    • Levofloxacin    • Metformin    • Neomycin    • Neomycin-Bacitracnzn-Polymyxnb    • Polymyxin B    • Prednisolone    • Prednisone    • Statins-Hmg-Coa Reductase Inhibitors    • Sulfamethoxazole    • Sulfamethoxazole-Trimethoprim    • Thimerosal    • Tresiba Flextouch U-100 [Insulin Degludec] Diarrhea and Other (see comments)     Abdominal pain   • Trimethoprim        Family History   Problem Relation Age of Onset   • Breast cancer Mother    • Lung cancer Maternal Grandmother    • Bone cancer Maternal Grandfather        Social History     Socioeconomic History   • Marital status:      Spouse name: Not on file   • Number of children: Not on file   • Years of education: Not on file   • Highest education level: Not on file   Occupational History   • Not on file   Tobacco Use   • Smoking status: Former Smoker      "Packs/day: 0.50     Years: 40.00     Pack years: 20.00     Quit date:      Years since quittin.4   • Smokeless tobacco: Never Used   Substance and Sexual Activity   • Alcohol use: Yes     Comment: Occasionally; 1-2 glasses monthly   • Drug use: No   • Sexual activity: Defer   Other Topics Concern   • Not on file   Social History Narrative   • Not on file     Social Determinants of Health     Financial Resource Strain:    • Difficulty of Paying Living Expenses:    Food Insecurity:    • Worried About Running Out of Food in the Last Year:    • Ran Out of Food in the Last Year:    Transportation Needs:    • Lack of Transportation (Medical):    • Lack of Transportation (Non-Medical):    Physical Activity:    • Days of Exercise per Week:    • Minutes of Exercise per Session:    Stress:    • Feeling of Stress :    Social Connections:    • Frequency of Communication with Friends and Family:    • Frequency of Social Gatherings with Friends and Family:    • Attends [employer] Services:    • Active Member of Clubs or Organizations:    • Attends Club or Organization Meetings:    • Marital Status:    Intimate Partner Violence:    • Fear of Current or Ex-Partner:    • Emotionally Abused:    • Physically Abused:    • Sexually Abused:        Complete 9 point ROS performed and negative other than known medical condiations    /84   Pulse 92   Temp 36.6 °C (97.9 °F) (Temporal)   Resp 18   Ht 1.6 m (5' 3\")   Wt 102.1 kg (225 lb)   SpO2 96%   BMI 39.86 kg/m²     Physical:  Gen: alert, oriented  Heart: RRR  Lungs: CTA bilaterally  Abdomen: soft, non tender    Assessment/Plan   Risks and benefits of colonoscopy reviewed including bleeding, perforation, reaction to medications or injury to other structures. Will plan to proceed. CRNA sedation        "

## 2021-05-28 NOTE — OP NOTE
Consuelo Singh  5/28/2021      Pre procedure Diagnosis: previous adenomatous polyps    Post Procedure Diagnosis: multiple small polyps      Procedure:   complete Colonoscopy  Polypectomies x3    Indications:  previous adenomatous polyp    Anesthesia:  Patient administered sedation with CRNA and propofol anesthesia    Staff:   Circulator: Melina Sellers RN  Endo Technician: Jennifer Aviles LPN    Estimated Blood Loss:  No blood loss documented.    Specimens:  No specimens collected during this procedure.      Findings:  -Normal colonoscopy to the cecum, with no evidence of neoplasia, diverticular disease, or mucosal abnormality.        Procedure:   The patient was brought to the endoscopy suite and placed in a left lateral decubitus position on the operative table. A time out to patient and procedure was performed. After adequate sedation a digital rectal exam was negative without mass, lesions or tenderness. The colonoscope was then introduced and advanced to the level of the cecum using a water immersion and CO2 insufflation technique. Cecum was confirmed with identification of the  the ileocecal valve and appendiceal orifice . did not intubated the terminal ileum. The scope was then slowly withdrawn and mucosal surfaces inspected. The scope was retroflexed in the rectum and absence of anal verge pathology was noted.  The prep was adequate and visualization was adequate. Withdrawal time 16 min. Findings on withdrawal are as follows:   polyp(s) #1, 2 mm in size, located in the cecum removed by cold biopsy and sent for pathology  #2, 2 mm in size, located in the ascending colon removed by cold biopsy and sent for pathology  #3, 3 mm in size, located in the descending colon removed by cold biopsy and sent for pathology.     Complications:  None    Lane Laboy MD  Phone Number: [phone]    Plan:  -If adenoma is present, repeat colonoscopy in 3 years.    Date: 5/28/2021  Time: 6:46 AM

## 2021-05-28 NOTE — ANESTHESIA POSTPROCEDURE EVALUATION
Patient: Consuelo Singh    Procedure Summary     Date: 05/28/21 Room / Location: [facility] ENDO 01 / [facility]    Anesthesia Start: 0646 Anesthesia Stop: 0729    Procedure: COLONOSCOPY WITH POLYPECTOMY AND ANESTHESIA (N/A Anus) Diagnosis:       Screening for malignant neoplasm of colon      History of colon polyps      (polyps)    Providers: Lane Laboy MD Responsible Provider: Kim Serrano CRNA    Anesthesia Type: MAC ASA Status: 3          Anesthesia Type: MAC    Last vitals  Vitals Value Taken Time   /55 05/28/21 0723   Temp 36.3 °C (97.3 °F) 05/28/21 0723   Pulse 73 05/28/21 0723   Resp 15 05/28/21 0723   SpO2 93 % 05/28/21 0723   0-10 Pain Score         Anesthesia Post Evaluation    Patient location during evaluation: PACU  Patient participation: complete - patient participated  Level of consciousness: awake and alert  Pain management: adequate  Airway patency: patent  Anesthetic complications: no  Cardiovascular status: acceptable  Respiratory status: acceptable  Hydration status: acceptable  May dismiss recovered patient based on consultation with the appropriate physicians and/or meeting appropriate discharge criteria      Cosmetic?  This procedure is not cosmetic.

## 2021-07-26 ENCOUNTER — APPOINTMENT (OUTPATIENT)
Dept: LAB | Facility: CLINIC | Age: 76
End: 2021-07-26
Payer: MEDICARE

## 2021-07-26 ENCOUNTER — OFFICE VISIT (OUTPATIENT)
Dept: FAMILY MEDICINE | Facility: CLINIC | Age: 76
End: 2021-07-26
Payer: MEDICARE

## 2021-07-26 VITALS
TEMPERATURE: 97.8 F | DIASTOLIC BLOOD PRESSURE: 76 MMHG | WEIGHT: 233 LBS | RESPIRATION RATE: 16 BRPM | HEIGHT: 63 IN | BODY MASS INDEX: 41.29 KG/M2 | SYSTOLIC BLOOD PRESSURE: 138 MMHG | HEART RATE: 64 BPM | OXYGEN SATURATION: 98 %

## 2021-07-26 DIAGNOSIS — E11.9 DIABETES MELLITUS WITHOUT COMPLICATION (CMS/HCC): ICD-10-CM

## 2021-07-26 DIAGNOSIS — E11.21 TYPE 2 DIABETES MELLITUS WITH DIABETIC NEPHROPATHY, WITH LONG-TERM CURRENT USE OF INSULIN (CMS/HCC): ICD-10-CM

## 2021-07-26 DIAGNOSIS — Z23 IMMUNIZATION DUE: ICD-10-CM

## 2021-07-26 DIAGNOSIS — E11.21 TYPE 2 DIABETES MELLITUS WITH DIABETIC NEPHROPATHY, WITH LONG-TERM CURRENT USE OF INSULIN (CMS/HCC): Primary | Chronic | ICD-10-CM

## 2021-07-26 DIAGNOSIS — E66.01 MORBID OBESITY (CMS/HCC): ICD-10-CM

## 2021-07-26 DIAGNOSIS — Z79.4 TYPE 2 DIABETES MELLITUS WITH DIABETIC NEPHROPATHY, WITH LONG-TERM CURRENT USE OF INSULIN (CMS/HCC): ICD-10-CM

## 2021-07-26 DIAGNOSIS — E78.00 PURE HYPERCHOLESTEROLEMIA: ICD-10-CM

## 2021-07-26 DIAGNOSIS — N18.31 STAGE 3A CHRONIC KIDNEY DISEASE (CMS/HCC): Chronic | ICD-10-CM

## 2021-07-26 DIAGNOSIS — Z79.4 TYPE 2 DIABETES MELLITUS WITH DIABETIC NEPHROPATHY, WITH LONG-TERM CURRENT USE OF INSULIN (CMS/HCC): Primary | Chronic | ICD-10-CM

## 2021-07-26 PROBLEM — N18.30 CKD (CHRONIC KIDNEY DISEASE) STAGE 3, GFR 30-59 ML/MIN (CMS/HCC): Chronic | Status: ACTIVE | Noted: 2019-05-24

## 2021-07-26 LAB
ALBUMIN SERPL-MCNC: 4 G/DL (ref 3.5–5.3)
ALP SERPL-CCNC: 113 U/L (ref 55–142)
ALT SERPL-CCNC: 36 U/L (ref 7–52)
ANION GAP SERPL CALC-SCNC: 6 MMOL/L (ref 3–11)
AST SERPL-CCNC: 27 U/L
BILIRUB SERPL-MCNC: 0.32 MG/DL (ref 0.2–1.4)
BUN SERPL-MCNC: 17 MG/DL (ref 7–25)
CALCIUM ALBUM COR SERPL-MCNC: 9.2 MG/DL (ref 8.6–10.3)
CALCIUM SERPL-MCNC: 9.2 MG/DL (ref 8.6–10.3)
CHLORIDE SERPL-SCNC: 103 MMOL/L (ref 98–107)
CO2 SERPL-SCNC: 28 MMOL/L (ref 21–32)
CREAT SERPL-MCNC: 1.02 MG/DL (ref 0.6–1.1)
EST. AVERAGE GLUCOSE BLD GHB EST-MCNC: 171.4 MG/DL
GFR SERPL CREATININE-BSD FRML MDRD: 54 ML/MIN/1.73M*2
GLUCOSE SERPL-MCNC: 151 MG/DL (ref 70–105)
HBA1C MFR BLD: 7.6 % (ref 4–6)
POTASSIUM SERPL-SCNC: 4.2 MMOL/L (ref 3.5–5.1)
PROT SERPL-MCNC: 6.2 G/DL (ref 6–8.3)
SODIUM SERPL-SCNC: 137 MMOL/L (ref 135–145)

## 2021-07-26 PROCEDURE — G0463 HOSPITAL OUTPT CLINIC VISIT: HCPCS | Performed by: FAMILY MEDICINE

## 2021-07-26 PROCEDURE — 83036 HEMOGLOBIN GLYCOSYLATED A1C: CPT

## 2021-07-26 PROCEDURE — 90732 PPSV23 VACC 2 YRS+ SUBQ/IM: CPT

## 2021-07-26 PROCEDURE — 99214 OFFICE O/P EST MOD 30 MIN: CPT | Performed by: FAMILY MEDICINE

## 2021-07-26 PROCEDURE — 80053 COMPREHEN METABOLIC PANEL: CPT

## 2021-07-26 PROCEDURE — 36415 COLL VENOUS BLD VENIPUNCTURE: CPT

## 2021-07-26 RX ORDER — PRAVASTATIN SODIUM 20 MG/1
20 TABLET ORAL 3 TIMES WEEKLY
Qty: 36 TABLET | Refills: 3 | Status: SHIPPED | OUTPATIENT
Start: 2021-07-26 | End: 2022-06-01 | Stop reason: SDUPTHER

## 2021-07-26 RX ORDER — PEN NEEDLE, DIABETIC 29 G X1/2"
NEEDLE, DISPOSABLE MISCELLANEOUS
Qty: 300 EACH | Refills: 2 | Status: SHIPPED | OUTPATIENT
Start: 2021-07-26 | End: 2022-02-21 | Stop reason: SDUPTHER

## 2021-07-26 ASSESSMENT — ENCOUNTER SYMPTOMS
CONSTITUTIONAL NEGATIVE: 1
CARDIOVASCULAR NEGATIVE: 1
RESPIRATORY NEGATIVE: 1
GASTROINTESTINAL NEGATIVE: 1

## 2021-07-26 NOTE — PROGRESS NOTES
"Subjective      Consuelo Singh is a 75 y.o. female who presents for Follow-up (New patient; Type 2 diabetes;)      Patient presents today to [facility] care and diabetic. Patient is a type 2 diabetic and uses toujeo 120 units a day and novolog. She reports her insurance does not like to cover novolog and will need a letter stating that she uses this due to she is allergic to humalog. She utilizes a dexcom to monitor her sugars. She denies any neuropathy or ulcerations.     She has hypertension and stage 3 chronic kidney disease. She uses losartan, metoprolol, hydrochlorothiazide and amlodipine.    She takes nortriptyline for fibromyalgia.       The following have been reviewed and updated as appropriate in this visit:  Allergies  Problems  Med Hx  Surg Hx  Fam Hx         Allergies   Allergen Reactions   • Aloe Vera    • Bacitracin      ZINC   • Erythromycin Base    • Formaldehyde    • Gramicidins      OPHTHALMIC   • Hydrocortisone      ACETATE   • Latex    • Levofloxacin    • Metformin    • Neomycin    • Neomycin-Bacitracnzn-Polymyxnb    • Polymyxin B    • Prednisolone    • Prednisone    • Statins-Hmg-Coa Reductase Inhibitors    • Sulfamethoxazole    • Sulfamethoxazole-Trimethoprim    • Thimerosal    • Tresiba Flextouch U-100 [Insulin Degludec] Diarrhea and Other (see comments)     Abdominal pain   • Trimethoprim      Current Outpatient Medications   Medication Sig Dispense Refill   • pen needle, diabetic 31 gauge x 1/4\" needle USE 1 NEEDLE TO INJECT UNDER THE SKIN 4 TIMES DAILY 300 each 2   • pravastatin (PRAVACHOL) 20 mg tablet Take 1 tablet (20 mg total) by mouth 3 (three) times a week 36 tablet 3   • nortriptyline (PAMELOR) 50 mg capsule Take 1 capsule (50 mg total) by mouth daily 90 capsule 3   • insulin glargine U-300 conc (Toujeo Max U-300 SoloStar) 300 unit/mL (3 mL) insulin pen insulin pen Inject 120 Units under the skin daily 15 mL 5   • lutein 20 mg tablet Take 1 tab/cap by mouth daily     • " multivitamin (THERAGRAN) tablet tablet Take 1 tablet by mouth daily     • omeprazole (PriLOSEC) 40 mg capsule Take 1 capsule by mouth once daily 90 capsule 1   • NovoLOG Flexpen U-100 Insulin 100 unit/mL (3 mL) insulin pen INJECT 8 UNITS SUBCUTANEOUSLY DAILY IN THE MORNING, 10 UNITS AT NOON AND 20 UNITS IN THE EVENING 30 mL 2   • amLODIPine (Norvasc) 2.5 mg tablet Take 1 tablet (2.5 mg total) by mouth daily 90 tablet 3   • metoprolol succinate XL (TOPROL-XL) 100 mg 24 hr tablet Take 1 tablet (100 mg total) by mouth nightly 90 tablet 3   • clobetasoL (TEMOVATE) 0.05 % ointment Apply 1 application topically 2 (two) times a day as needed (Rash) 60 g 1   • losartan (COZAAR) 100 mg tablet Take 1 tablet (100 mg total) by mouth daily 90 tablet 3   • hydroCHLOROthiazide (HYDRODIURIL) 25 mg tablet Take 1 tablet (25 mg total) by mouth daily 90 tablet 3   • ondansetron ODT (Zofran ODT) 4 mg disintegrating tablet Take 1 tablet (4 mg total) by mouth every 8 (eight) hours as needed for nausea or vomiting 20 tablet 0   • blood-glucose sensor device Use 1 sensor every 10 days. 3 Device 11   • blood-glucose meter,continuous (Dexcom G6 ) misc Use as directed. 1 each 0   • blood-glucose transmitter (Dexcom G6 Transmitter) device Use as directed.  Change every 3 months. 1 Device 4   • blood sugar diagnostic (ONETOUCH ULTRA TEST) strip Use one strip to check glucose  strip 3   • aspirin 325 mg tablet Take 325 mg by mouth daily.         No current facility-administered medications for this visit.     Past Medical History:   Diagnosis Date   • Arthritis    • Blood clot in vein     shani legs   • Deep vein thrombosis (CMS/HCC) (HCC)     hx of   • Diabetes (CMS/HCC) (HCC)     type 2   • Eczema    • Fibromyalgia    • GERD (gastroesophageal reflux disease)    • Heart murmur    • Hyperlipidemia    • Hypertension    • Peripheral neuropathy     toes   • Pneumonia    • Psoriasis    • Scoliosis    • Wears dentures     full set  "    Past Surgical History:   Procedure Laterality Date   • CHOLECYSTECTOMY     • COLONOSCOPY  2016    5 yr  follow up   • COLONOSCOPY N/A 2021    Please call the patient regarding her abnormal result. Repeat colonoscopy in 5 year due to 2 tubular adenomas if patient otherwise healthy at that time. Also reasonable to discontinue further colonoscopy--Dr Laboy   • HYSTERECTOMY      With BSO   • OTHER SURGICAL HISTORY      Rt. radical mastoidectomy, rebuilt canal from growth     Family History   Problem Relation Age of Onset   • Breast cancer Mother    • Lung cancer Maternal Grandmother    • Bone cancer Maternal Grandfather    • Pancreatic cancer Mother's Brother    • Breast cancer Mother's Sister    • Lung cancer Mother's Sister      Social History     Occupational History   • Not on file   Tobacco Use   • Smoking status: Former Smoker     Packs/day: 0.50     Years: 40.00     Pack years: 20.00     Quit date:      Years since quittin.5   • Smokeless tobacco: Never Used   Substance and Sexual Activity   • Alcohol use: Yes     Comment: Occasionally; 1-2 glasses monthly   • Drug use: No   • Sexual activity: Defer   Social History Narrative   • Not on file       Review of Systems   Constitutional: Negative.    HENT: Negative.    Respiratory: Negative.    Cardiovascular: Negative.    Gastrointestinal: Negative.    Genitourinary: Negative.        Objective     VITAL SIGNS  /76 (BP Location: Left arm, Patient Position: Sitting, Cuff Size: Large Adult)   Pulse 64   Temp 36.6 °C (97.8 °F) (Temporal)   Resp 16   Ht 1.6 m (5' 3\")   Wt 105.7 kg (233 lb)   SpO2 98%   BMI 41.27 kg/m²     Physical Exam  Vitals and nursing note reviewed.   HENT:      Head: Normocephalic.   Cardiovascular:      Pulses:           Dorsalis pedis pulses are 2+ on the right side and 2+ on the left side.        Posterior tibial pulses are 2+ on the right side and 2+ on the left side.   Musculoskeletal:      Right foot: " Normal range of motion. No deformity.      Left foot: Normal range of motion. No deformity.   Feet:      Right foot:      Protective Sensation: 10 sites tested. 10 sites sensed.      Skin integrity: Skin integrity normal.      Toenail Condition: Right toenails are normal.      Left foot:      Protective Sensation: 10 sites tested. 10 sites sensed.      Skin integrity: Skin integrity normal.      Toenail Condition: Left toenails are normal.   Skin:     General: Skin is warm.   Neurological:      Mental Status: She is alert and oriented to person, place, and time.   Psychiatric:         Mood and Affect: Mood normal.         Behavior: Behavior normal.         Lab Results   Component Value Date    GLUCOSE 151 (H) 07/26/2021    CALCIUM 9.2 07/26/2021     07/26/2021    K 4.2 07/26/2021    CO2 28 07/26/2021     07/26/2021    BUN 17 07/26/2021    CREATININE 1.02 07/26/2021    ANIONGAP 6 07/26/2021     Lab Results   Component Value Date    WBC 11.6 (H) 01/18/2021    HGB 15.0 01/18/2021    HCT 45.0 01/18/2021    MCV 88.5 01/18/2021     01/18/2021     No results found for: TSH  Lab Results   Component Value Date    HGBA1C 7.6 (H) 07/26/2021      Lab Results   Component Value Date    CHOL 178 09/16/2020    HDL 60 09/16/2020    LDLCALC 88 09/16/2020    TRIG 152 (H) 09/16/2020     No results found for: SEDRATE    Assessment/Plan   Problem List Items Addressed This Visit        Cardiovascular and Mediastinum    Pure hypercholesterolemia    Relevant Medications    pravastatin (PRAVACHOL) 20 mg tablet       Digestive    Morbid obesity (CMS/formerly [facility]) ([facility])       Genitourinary    Type 2 diabetes mellitus with diabetic nephropathy, with long-term current use of insulin (CMS/HCC) (formerly [facility]) - Primary (Chronic)    CKD (chronic kidney disease) stage 3, GFR 30-59 ml/min (CMS/HCC) (formerly [facility]) (Chronic)      Other Visit Diagnoses     Immunization due        Relevant Orders    Pneumococcal polysaccharide vaccine 23-valent greater than or  equal to 3yo subcutaneous/IM (Completed)        Discussed will have the patient try splitting her Toujeo dose to 60 units in the morning and 60 units in the evening. She will do this for 3 months then will check her a1c at that time and readdress this.  Did download the results of her last few months of blood sugar readings through her Dexcom.  This does show a fairly persistent elevation in the evening time.  We will likely adjust the dose of her insulin dose of her last meal each day on the next visit if readings do not decrease in the next few months.    Recommend the pneumovax 23 and Shingles vaccination. Did discuss medicare does not currently cover the shingles vaccination. She would like to wait on the shingles vaccination    Patient utilizes Dexcom multiple times daily and does benefit from this.  Recommend continuation of this device.    Refilled pravastatin today.  Cholesterol seems reasonably well controlled.    Margarito Glass MD    Portions of this note were documented by Maryjo Monae as I performed the exam and collected the information from Consuelo Singh. I attest that I have reviewed the information as documented, verified the accuracy of the documentation and added additional information as needed

## 2021-07-27 ENCOUNTER — DOCUMENTATION (OUTPATIENT)
Dept: FAMILY MEDICINE | Facility: CLINIC | Age: 76
End: 2021-07-27

## 2021-07-27 NOTE — PROGRESS NOTES
Patient requested that her visit from 7/26/21 with Dr. Glass be sent to [facility]. Chart was successfully [facility] faxed on 7/27/21.

## 2021-08-04 DIAGNOSIS — Z79.4 TYPE 2 DIABETES MELLITUS WITH DIABETIC NEPHROPATHY, WITH LONG-TERM CURRENT USE OF INSULIN (CMS/HCC): ICD-10-CM

## 2021-08-04 DIAGNOSIS — E11.21 TYPE 2 DIABETES MELLITUS WITH DIABETIC NEPHROPATHY, WITH LONG-TERM CURRENT USE OF INSULIN (CMS/HCC): ICD-10-CM

## 2021-08-04 NOTE — TELEPHONE ENCOUNTER
No new care gaps identified.  Powered by [employer]. Reference number: [id number]. 8/04/2021 11:07:52 AM ANITA

## 2021-08-05 RX ORDER — INSULIN ASPART 100 [IU]/ML
INJECTION, SOLUTION INTRAVENOUS; SUBCUTANEOUS
Qty: 30 ML | Refills: 2 | Status: SHIPPED | OUTPATIENT
Start: 2021-08-05 | End: 2021-10-25 | Stop reason: SDUPTHER

## 2021-08-09 ENCOUNTER — OFFICE VISIT (OUTPATIENT)
Dept: OTOLARYNGOLOGY | Facility: CLINIC | Age: 76
End: 2021-08-09
Payer: MEDICARE

## 2021-08-09 VITALS
OXYGEN SATURATION: 97 % | TEMPERATURE: 97.1 F | SYSTOLIC BLOOD PRESSURE: 184 MMHG | WEIGHT: 230.5 LBS | HEART RATE: 81 BPM | BODY MASS INDEX: 40.83 KG/M2 | DIASTOLIC BLOOD PRESSURE: 76 MMHG

## 2021-08-09 DIAGNOSIS — H61.21 IMPACTED CERUMEN OF RIGHT EAR: ICD-10-CM

## 2021-08-09 DIAGNOSIS — Z90.89 HISTORY OF RIGHT MASTOIDECTOMY: Primary | ICD-10-CM

## 2021-08-09 PROCEDURE — 99213 OFFICE O/P EST LOW 20 MIN: CPT | Mod: 25 | Performed by: OTOLARYNGOLOGY

## 2021-08-09 PROCEDURE — G0463 HOSPITAL OUTPT CLINIC VISIT: HCPCS | Performed by: OTOLARYNGOLOGY

## 2021-08-09 PROCEDURE — 69220 CLEAN OUT MASTOID CAVITY: CPT | Performed by: OTOLARYNGOLOGY

## 2021-08-09 PROCEDURE — 69222 CLEAN OUT MASTOID CAVITY: CPT | Performed by: OTOLARYNGOLOGY

## 2021-08-09 ASSESSMENT — ENCOUNTER SYMPTOMS
CONSTITUTIONAL NEGATIVE: 1
SORE THROAT: 0
RHINORRHEA: 0
SINUS PRESSURE: 0
CHILLS: 0
FEVER: 0
TROUBLE SWALLOWING: 0

## 2021-08-09 NOTE — PROGRESS NOTES
"Subjective    CC: Mastoid cleaning    HPI: Consuelo Singh is a 75 y.o. female who sees me periodically for cleaning of her right mastoid bowl.  She had mastoidectomy many years ago.  Over the last few visits with me we have been struggling to get some dried blood out of the mastoid bowl.  She experiences vertigo during the cleaning and lots of cases that requires abandonment of the procedure.  Today she states her ear has been feeling well and has not been giving her any trouble or symptoms or problems that she has noticed at all.    Past Medical History:   Diagnosis Date   • Arthritis    • Blood clot in vein     shani legs   • Deep vein thrombosis (CMS/HCC) (HCC)     hx of   • Diabetes (CMS/HCC) (HCC)     type 2   • Eczema    • Fibromyalgia    • GERD (gastroesophageal reflux disease)    • Heart murmur    • Hyperlipidemia    • Hypertension    • Peripheral neuropathy     toes   • Pneumonia    • Psoriasis    • Scoliosis    • Wears dentures     full set       Past Surgical History:   Procedure Laterality Date   • CHOLECYSTECTOMY     • COLONOSCOPY  03/11/2016    5 yr  follow up   • COLONOSCOPY N/A 05/28/2021    Please call the patient regarding her abnormal result. Repeat colonoscopy in 5 year due to 2 tubular adenomas if patient otherwise healthy at that time. Also reasonable to discontinue further colonoscopy--Dr Laboy   • HYSTERECTOMY      With BSO   • OTHER SURGICAL HISTORY      Rt. radical mastoidectomy, rebuilt canal from growth         Current Outpatient Medications:   •  NovoLOG Flexpen U-100 Insulin 100 unit/mL (3 mL) insulin pen, INJECT 8 UNITS SUBCUTANEOUSLY DAILY IN THE MORNING, 10 UNITS AT NOON AND 20 UNITS IN THE EVENING, Disp: 30 mL, Rfl: 2  •  pen needle, diabetic 31 gauge x 1/4\" needle, USE 1 NEEDLE TO INJECT UNDER THE SKIN 4 TIMES DAILY, Disp: 300 each, Rfl: 2  •  pravastatin (PRAVACHOL) 20 mg tablet, Take 1 tablet (20 mg total) by mouth 3 (three) times a week, Disp: 36 tablet, Rfl: 3  •  " nortriptyline (PAMELOR) 50 mg capsule, Take 1 capsule (50 mg total) by mouth daily, Disp: 90 capsule, Rfl: 3  •  insulin glargine U-300 conc (Toujeo Max U-300 SoloStar) 300 unit/mL (3 mL) insulin pen insulin pen, Inject 120 Units under the skin daily, Disp: 15 mL, Rfl: 5  •  lutein 20 mg tablet, Take 1 tab/cap by mouth daily, Disp: , Rfl:   •  multivitamin (THERAGRAN) tablet tablet, Take 1 tablet by mouth daily, Disp: , Rfl:   •  omeprazole (PriLOSEC) 40 mg capsule, Take 1 capsule by mouth once daily, Disp: 90 capsule, Rfl: 1  •  amLODIPine (Norvasc) 2.5 mg tablet, Take 1 tablet (2.5 mg total) by mouth daily, Disp: 90 tablet, Rfl: 3  •  metoprolol succinate XL (TOPROL-XL) 100 mg 24 hr tablet, Take 1 tablet (100 mg total) by mouth nightly, Disp: 90 tablet, Rfl: 3  •  clobetasoL (TEMOVATE) 0.05 % ointment, Apply 1 application topically 2 (two) times a day as needed (Rash), Disp: 60 g, Rfl: 1  •  losartan (COZAAR) 100 mg tablet, Take 1 tablet (100 mg total) by mouth daily, Disp: 90 tablet, Rfl: 3  •  hydroCHLOROthiazide (HYDRODIURIL) 25 mg tablet, Take 1 tablet (25 mg total) by mouth daily, Disp: 90 tablet, Rfl: 3  •  ondansetron ODT (Zofran ODT) 4 mg disintegrating tablet, Take 1 tablet (4 mg total) by mouth every 8 (eight) hours as needed for nausea or vomiting, Disp: 20 tablet, Rfl: 0  •  blood-glucose sensor device, Use 1 sensor every 10 days., Disp: 3 Device, Rfl: 11  •  blood-glucose meter,continuous (Dexcom G6 ) misc, Use as directed., Disp: 1 each, Rfl: 0  •  blood-glucose transmitter (Dexcom G6 Transmitter) device, Use as directed.  Change every 3 months., Disp: 1 Device, Rfl: 4  •  blood sugar diagnostic (ONETOUCH ULTRA TEST) strip, Use one strip to check glucose TID, Disp: 300 strip, Rfl: 3  •  aspirin 325 mg tablet, Take 325 mg by mouth daily.  , Disp: , Rfl:     Allergies   Allergen Reactions   • Aloe Vera    • Bacitracin      ZINC   • Erythromycin Base    • Formaldehyde    • Gramicidins       OPHTHALMIC   • Hydrocortisone      ACETATE   • Latex    • Levofloxacin    • Metformin    • Neomycin    • Neomycin-Bacitracnzn-Polymyxnb    • Polymyxin B    • Prednisolone    • Prednisone    • Statins-Hmg-Coa Reductase Inhibitors    • Sulfamethoxazole    • Sulfamethoxazole-Trimethoprim    • Thimerosal    • Tresiba Flextouch U-100 [Insulin Degludec] Diarrhea and Other (see comments)     Abdominal pain   • Trimethoprim        family history includes Bone cancer in her maternal grandfather; Breast cancer in her mother and mother's sister; Lung cancer in her maternal grandmother and mother's sister; Pancreatic cancer in her mother's brother.    Social History     Tobacco Use   • Smoking status: Former Smoker     Packs/day: 0.50     Years: 40.00     Pack years: 20.00     Quit date:      Years since quittin.6   • Smokeless tobacco: Never Used   Substance Use Topics   • Alcohol use: Yes     Comment: Occasionally; 1-2 glasses monthly   • Drug use: No       Review of Systems   Constitutional: Negative.  Negative for chills and fever.   HENT: Negative.  Negative for congestion, ear discharge, ear pain, hearing loss, nosebleeds, postnasal drip, rhinorrhea, sinus pressure, sneezing, sore throat, tinnitus and trouble swallowing.        Objective    BP (!) 184/76 (BP Location: Left arm, Patient Position: Sitting, Cuff Size: Long Adult)   Pulse 81   Temp 36.2 °C (97.1 °F) (Temporal)   Wt 104.6 kg (230 lb 8 oz)   SpO2 97%   BMI 40.83 kg/m²     General: Well-developed well-nourished female no acute distress.    Ears: Normal external ear external auditory canal and tympanic membrane on the left.  On the right the external auditory canal is status post nicely patent meatal plasty.    Procedure: Right mastoid debridement.  The right ear was visualized with the microscope and speculum.  There is a large amount of impacted cerumen and dried blood that are covering the facial ridge and mastoid cavity.  Using a combination of  curette and alligator forceps this was successfully and completely removed.  Underlying tissues appear normal deep in the ear although there is 1 small spot of granulation tissue laterally just at the opening of the mastoid cavity.    Diagnosis    1. History of right mastoidectomy    2. Impacted cerumen of right ear        Recommendations    Patient with successful complete cleaning of right mastoid cavity today.  There is a small amount of granulation tissue for which I will give her Cipro HC drops to use for 7 days twice daily.  Follow-up with me in 3 months for routine cleaning or sooner as needed for problems.

## 2021-08-30 DIAGNOSIS — I10 ESSENTIAL HYPERTENSION: ICD-10-CM

## 2021-08-30 DIAGNOSIS — K21.9 CHRONIC GERD: ICD-10-CM

## 2021-08-30 NOTE — TELEPHONE ENCOUNTER
No new care gaps identified.  Powered by [employer]. Reference number: [id number]. 8/30/2021 2:06:36 PM ANITA

## 2021-08-30 NOTE — TELEPHONE ENCOUNTER
Care Due:                  Date            Visit Type   Department     Provider  --------------------------------------------------------------------------------                                           [address]  Last Visit: 07-      NEW PATIENT  MEDICINE       LORA TRAN                              [facility]  Next Visit: 10-      PATIENT      MEDICINE       LORA TRAN                                                            Last  Test          Frequency    Reason                     Performed    Due Date  --------------------------------------------------------------------------------  Lipid Panel.  12 months..  pravastatin..............  09- 09-    Powered by [facility] by [employer]. Reference number: [id number]. 8/30/2021 10:21:43 AM ANITA

## 2021-08-31 RX ORDER — LOSARTAN POTASSIUM 100 MG/1
TABLET ORAL
Qty: 90 TABLET | Refills: 0 | Status: SHIPPED | OUTPATIENT
Start: 2021-08-31 | End: 2021-10-25 | Stop reason: SDUPTHER

## 2021-08-31 RX ORDER — LOSARTAN POTASSIUM 100 MG/1
100 TABLET ORAL DAILY
OUTPATIENT
Start: 2021-08-31

## 2021-08-31 RX ORDER — OMEPRAZOLE 40 MG/1
CAPSULE, DELAYED RELEASE ORAL
Qty: 90 CAPSULE | Refills: 0 | Status: SHIPPED | OUTPATIENT
Start: 2021-08-31 | End: 2021-10-25 | Stop reason: SDUPTHER

## 2021-08-31 RX ORDER — OMEPRAZOLE 40 MG/1
CAPSULE, DELAYED RELEASE ORAL
OUTPATIENT
Start: 2021-08-31

## 2021-08-31 NOTE — TELEPHONE ENCOUNTER
Medication refill request:  Medication(s):  losartan and omeprazole not filled due to (route to Nurse Pool) Request has not been filled by current PCP listed, please review

## 2021-10-07 ENCOUNTER — TELEPHONE (OUTPATIENT)
Dept: FAMILY MEDICINE | Facility: CLINIC | Age: 76
End: 2021-10-07

## 2021-10-07 ENCOUNTER — OFFICE VISIT (OUTPATIENT)
Dept: URGENT CARE | Facility: CLINIC | Age: 76
End: 2021-10-07
Payer: MEDICARE

## 2021-10-07 VITALS
DIASTOLIC BLOOD PRESSURE: 68 MMHG | TEMPERATURE: 97.2 F | HEART RATE: 84 BPM | SYSTOLIC BLOOD PRESSURE: 140 MMHG | OXYGEN SATURATION: 95 % | BODY MASS INDEX: 41.1 KG/M2 | WEIGHT: 232 LBS | RESPIRATION RATE: 20 BRPM

## 2021-10-07 DIAGNOSIS — R05.9 COUGH: Primary | ICD-10-CM

## 2021-10-07 DIAGNOSIS — R06.02 SOB (SHORTNESS OF BREATH): ICD-10-CM

## 2021-10-07 DIAGNOSIS — J40 BRONCHITIS: ICD-10-CM

## 2021-10-07 LAB — SARS-COV-2 RDRP RESP QL NAA+PROBE: NORMAL

## 2021-10-07 PROCEDURE — 99213 OFFICE O/P EST LOW 20 MIN: CPT | Mod: CS

## 2021-10-07 PROCEDURE — C9803 HOPD COVID-19 SPEC COLLECT: HCPCS | Mod: CS

## 2021-10-07 PROCEDURE — G0463 HOSPITAL OUTPT CLINIC VISIT: HCPCS

## 2021-10-07 RX ORDER — DOXYCYCLINE 100 MG/1
100 CAPSULE ORAL 2 TIMES DAILY
Qty: 20 CAPSULE | Refills: 0 | Status: SHIPPED | OUTPATIENT
Start: 2021-10-07 | End: 2021-10-17

## 2021-10-07 RX ORDER — SYRINGE-NEEDLE,INSULIN,0.5 ML 30 GX5/16"
SYRINGE, EMPTY DISPOSABLE MISCELLANEOUS
COMMUNITY
Start: 2021-09-24 | End: 2021-10-07

## 2021-10-07 RX ORDER — ALBUTEROL SULFATE 90 UG/1
2 INHALANT RESPIRATORY (INHALATION) EVERY 4 HOURS PRN
Qty: 1 G | Refills: 0 | Status: SHIPPED | OUTPATIENT
Start: 2021-10-07 | End: 2021-10-25 | Stop reason: ALTCHOICE

## 2021-10-07 RX ORDER — BENZONATATE 200 MG/1
200 CAPSULE ORAL 3 TIMES DAILY PRN
Qty: 30 CAPSULE | Refills: 0 | Status: SHIPPED | OUTPATIENT
Start: 2021-10-07 | End: 2021-10-17

## 2021-10-07 ASSESSMENT — PAIN SCALES - GENERAL: PAINLEVEL: 0-NO PAIN

## 2021-10-07 NOTE — TELEPHONE ENCOUNTER
[employer] sent over a fax on 8/30 regarding a report that needs to be filled out by Dr Glass.  Phone number and fax our listed on form.

## 2021-10-07 NOTE — PATIENT INSTRUCTIONS
Patient Education     Acute Bronchitis, Adult    Acute bronchitis is when air tubes in the lungs (bronchi) suddenly get swollen. The condition can make it hard for you to breathe. In adults, acute bronchitis usually goes away within 2 weeks. A cough caused by bronchitis may last up to 3 weeks. Smoking, allergies, and asthma can make the condition worse.  What are the causes?  This condition is caused by:  · Cold and flu viruses. The most common cause of this condition is the virus that causes the common cold.  · Bacteria.  · Substances that irritate the lungs, including:  ? Smoke from cigarettes and other types of tobacco.  ? Dust and pollen.  ? Fumes from chemicals, gases, or burned fuel.  ? Other materials that pollute indoor or outdoor air.  · Close contact with someone who has acute bronchitis.  What increases the risk?  The following factors may make you more likely to develop this condition:  · A weak body's defense system. This is also called the immune system.  · Any condition that affects your lungs and breathing, such as asthma.  What are the signs or symptoms?  Symptoms of this condition include:  · A cough.  · Coughing up clear, yellow, or green mucus.  · Wheezing.  · Chest congestion.  · Shortness of breath.  · A fever.  · Body aches.  · Chills.  · A sore throat.  How is this treated?  Acute bronchitis may go away over time without treatment. Your doctor may recommend:  · Drinking more fluids.  · Taking a medicine for a fever or cough.  · Using a device that gets medicine into your lungs (inhaler).  · Using a vaporizer or a humidifier. These are machines that add water or moisture in the air to help with coughing and poor breathing.  Follow these instructions at home:    Activity  · Get a lot of rest.  · Avoid places where there are fumes from chemicals.  · Return to your normal activities as told by your doctor. Ask your doctor what activities are safe for you.  Lifestyle  · Drink enough fluids to  keep your pee (urine) pale yellow.  · Do not drink alcohol.  · Do not use any products that contain nicotine or tobacco, such as cigarettes, e-cigarettes, and chewing tobacco. If you need help quitting, ask your doctor. Be aware that:  ? Your bronchitis will get worse if you smoke or breathe in other people's smoke (secondhand smoke).  ? Your lungs will heal faster if you quit smoking.  General instructions  · Take over-the-counter and prescription medicines only as told by your doctor.  · Use an inhaler, cool mist vaporizer, or humidifier as told by your doctor.  · Rinse your mouth often with salt water. To make salt water, dissolve ½-1 tsp (3-6 g) of salt in 1 cup (237 mL) of warm water.  · Keep all follow-up visits as told by your doctor. This is important.  How is this prevented?  To lower your risk of getting this condition again:  · Wash your hands often with soap and water. If soap and water are not available, use hand .  · Avoid contact with people who have cold symptoms.  · Try not to touch your mouth, nose, or eyes with your hands.  · Make sure to get the flu shot every year.  Contact a doctor if:  · Your symptoms do not get better in 2 weeks.  · You vomit more than once or twice.  · You have symptoms of loss of fluid from your body (dehydration). These include:  ? Dark urine.  ? Dry skin or eyes.  ? Increased thirst.  ? Headaches.  ? Confusion.  ? Muscle cramps.  Get help right away if:  · You cough up blood.  · You have chest pain.  · You have very bad shortness of breath.  · You become dehydrated.  · You faint or keep feeling like you are going to faint.  · You keep vomiting.  · You have a very bad headache.  · Your fever or chills get worse.  These symptoms may be an emergency. Do not wait to see if the symptoms will go away. Get medical help right away. Call your local emergency services (911 in the [place] Do not drive yourself to the hospital.  Summary  · Acute bronchitis is when air tubes  in the lungs (bronchi) suddenly get swollen. In adults, acute bronchitis usually goes away within 2 weeks.  · Take over-the-counter and prescription medicines only as told by your doctor.  · Drink enough fluid to keep your pee (urine) pale yellow.  · Contact a doctor if your symptoms do not improve after 2 weeks of treatment.  · Get help right away if you cough up blood, faint, or have chest pain or shortness of breath.  This information is not intended to replace advice given to you by your health care provider. Make sure you discuss any questions you have with your health care provider.  Document Revised: 07/10/2020 Document Reviewed: 07/10/2020  [employer] Patient Education © 2021 [employer]

## 2021-10-08 NOTE — PROGRESS NOTES
"Subjective      Consuelo Singh is a 76 y.o. female who presents for   Chief Complaint   Patient presents with   • Nasal Congestion     First noticed about 5 days ago. Initial symptom.   • Sore Throat     Resolved.   • Cough     Dry. \"feels congested\".   • Shortness of Breath     With exertion.        HPI   Patient presents for congestion, sore throat, cough, and SOB with exertion x 5 days. Patient states she initially thought it was a cold last week but is having a dry non productive cough and some increased SOB that has lingered since. Patient has a hx of bronchitis and pneumonia. Patient feels that she has not been trying any OTC remedies as she has so many allergies to meds. Patient states the cough is disrupting her sleep. Patient denies fevers, chills, resp distress, NVD, abd pain, chest pain, or palpitations.     The following have been reviewed and updated as appropriate in this visit:  Tobacco  Allergies  Meds  Problems  Med Hx  Surg Hx  Fam Hx         Allergies   Allergen Reactions   • Aloe Vera    • Bacitracin      ZINC   • Erythromycin Base    • Formaldehyde    • Gramicidins      OPHTHALMIC   • Hydrocortisone      ACETATE   • Latex    • Levofloxacin    • Metformin    • Neomycin    • Neomycin-Bacitracnzn-Polymyxnb    • Polymyxin B    • Prednisolone    • Prednisone    • Statins-Hmg-Coa Reductase Inhibitors    • Sulfamethoxazole    • Sulfamethoxazole-Trimethoprim    • Thimerosal    • Tresiba Flextouch U-100 [Insulin Degludec] Diarrhea and Other (see comments)     Abdominal pain   • Trimethoprim      Current Outpatient Medications   Medication Sig Dispense Refill   • losartan (COZAAR) 100 mg tablet Take 1 tablet by mouth once daily 90 tablet 0   • omeprazole (PriLOSEC) 40 mg capsule Take 1 capsule by mouth once daily 90 capsule 0   • NovoLOG Flexpen U-100 Insulin 100 unit/mL (3 mL) insulin pen INJECT 8 UNITS SUBCUTANEOUSLY DAILY IN THE MORNING, 10 UNITS AT NOON AND 20 UNITS IN THE EVENING 30 mL 2   • " "pen needle, diabetic 31 gauge x 1/4\" needle USE 1 NEEDLE TO INJECT UNDER THE SKIN 4 TIMES DAILY 300 each 2   • pravastatin (PRAVACHOL) 20 mg tablet Take 1 tablet (20 mg total) by mouth 3 (three) times a week 36 tablet 3   • nortriptyline (PAMELOR) 50 mg capsule Take 1 capsule (50 mg total) by mouth daily 90 capsule 3   • insulin glargine U-300 conc (Toujeo Max U-300 SoloStar) 300 unit/mL (3 mL) insulin pen insulin pen Inject 120 Units under the skin daily 15 mL 5   • lutein 20 mg tablet Take 1 tab/cap by mouth daily     • multivitamin (THERAGRAN) tablet tablet Take 1 tablet by mouth daily     • amLODIPine (Norvasc) 2.5 mg tablet Take 1 tablet (2.5 mg total) by mouth daily 90 tablet 3   • metoprolol succinate XL (TOPROL-XL) 100 mg 24 hr tablet Take 1 tablet (100 mg total) by mouth nightly 90 tablet 3   • hydroCHLOROthiazide (HYDRODIURIL) 25 mg tablet Take 1 tablet (25 mg total) by mouth daily 90 tablet 3   • blood-glucose sensor device Use 1 sensor every 10 days. 3 Device 11   • blood-glucose meter,continuous (Dexcom G6 ) misc Use as directed. 1 each 0   • blood-glucose transmitter (Dexcom G6 Transmitter) device Use as directed.  Change every 3 months. 1 Device 4   • blood sugar diagnostic (ONETOUCH ULTRA TEST) strip Use one strip to check glucose  strip 3   • aspirin 325 mg tablet Take 325 mg by mouth daily.       • albuterol HFA 90 mcg/actuation inhaler Inhale 2 puffs every 4 (four) hours as needed for wheezing or shortness of breath 1 g 0   • doxycycline (MONODOX) 100 mg capsule Take 1 capsule (100 mg total) by mouth 2 (two) times a day for 10 days 20 capsule 0   • benzonatate (TESSALON) 200 mg capsule Take 1 capsule (200 mg total) by mouth 3 (three) times a day as needed for cough for up to 10 days 30 capsule 0   • clobetasoL (TEMOVATE) 0.05 % ointment Apply 1 application topically 2 (two) times a day as needed (Rash) 60 g 1   • ondansetron ODT (Zofran ODT) 4 mg disintegrating tablet Take 1 tablet " (4 mg total) by mouth every 8 (eight) hours as needed for nausea or vomiting (Patient not taking: Reported on 10/7/2021 ) 20 tablet 0     No current facility-administered medications for this visit.     Past Medical History:   Diagnosis Date   • Arthritis    • Blood clot in vein     shani legs   • Deep vein thrombosis (CMS/HCC) (HCC)     hx of   • Diabetes (CMS/HCC) (HCC)     type 2   • Eczema    • Fibromyalgia    • GERD (gastroesophageal reflux disease)    • Heart murmur    • Hyperlipidemia    • Hypertension    • Peripheral neuropathy     toes   • Pneumonia    • Psoriasis    • Scoliosis    • Wears dentures     full set     Past Surgical History:   Procedure Laterality Date   • CHOLECYSTECTOMY     • COLONOSCOPY  2016    5 yr  follow up   • COLONOSCOPY N/A 2021    Please call the patient regarding her abnormal result. Repeat colonoscopy in 5 year due to 2 tubular adenomas if patient otherwise healthy at that time. Also reasonable to discontinue further colonoscopy--Dr Laboy   • HYSTERECTOMY      With BSO   • OTHER SURGICAL HISTORY      Rt. radical mastoidectomy, rebuilt canal from growth     Family History   Problem Relation Age of Onset   • Breast cancer Mother    • Lung cancer Maternal Grandmother    • Bone cancer Maternal Grandfather    • Pancreatic cancer Mother's Brother    • Breast cancer Mother's Sister    • Lung cancer Mother's Sister      Social History     Occupational History   • Not on file   Tobacco Use   • Smoking status: Former Smoker     Packs/day: 0.50     Years: 40.00     Pack years: 20.00     Quit date:      Years since quittin.7   • Smokeless tobacco: Never Used   Substance and Sexual Activity   • Alcohol use: Yes     Comment: Occasionally; 1-2 glasses monthly   • Drug use: No   • Sexual activity: Defer   Social History Narrative   • Not on file       Review of Systems  As stated in HPI    Objective   Vitals:    10/07/21 1356   BP: 140/68   Temp: 36.2 °C (97.2 °F)   TempSrc:  Temporal   Pulse: 84   Resp: 20   SpO2: 95%   Weight: 105.2 kg (232 lb)   PainSc: 0-No pain   Patient Position: Sitting        Physical Exam  Vitals and nursing note reviewed.   Constitutional:       General: She is not in acute distress.     Appearance: Normal appearance. She is well-developed. She is ill-appearing.   HENT:      Head: Normocephalic and atraumatic.      Right Ear: Tympanic membrane, ear canal and external ear normal.      Left Ear: Tympanic membrane, ear canal and external ear normal.      Nose: Congestion and rhinorrhea present.      Mouth/Throat:      Mouth: Mucous membranes are moist.      Pharynx: No oropharyngeal exudate or posterior oropharyngeal erythema.   Eyes:      General:         Right eye: No discharge.         Left eye: No discharge.      Extraocular Movements: Extraocular movements intact.      Conjunctiva/sclera: Conjunctivae normal.      Pupils: Pupils are equal, round, and reactive to light.   Cardiovascular:      Rate and Rhythm: Normal rate and regular rhythm.      Pulses: Normal pulses.      Heart sounds: Normal heart sounds. No murmur heard.   No friction rub. No gallop.    Pulmonary:      Effort: Pulmonary effort is normal.      Breath sounds: Normal breath sounds. No wheezing, rhonchi or rales.      Comments: Dry cough present on exam   Lymphadenopathy:      Cervical: No cervical adenopathy.   Skin:     General: Skin is warm and dry.      Capillary Refill: Capillary refill takes less than 2 seconds.      Coloration: Skin is not pale.      Findings: No erythema or rash.   Neurological:      Mental Status: She is alert and oriented to person, place, and time.   Psychiatric:         Mood and Affect: Mood normal.         Behavior: Behavior normal.         Recent Results (from the past 24 hour(s))   POCT COVID-19, IDNOW PCR    Collection Time: 10/07/21  2:44 PM   Result Value Ref Range    COVID-19 PCR  Negative     Negative - For Healthcare Providers:  https://www.fda.gov/media/769834/download   For Patients: https://www.fda.media/392784/download       Assessment/Plan     Diagnoses and all orders for this visit:    Cough  -     POCT COVID-19, IDNOW PCR  -     benzonatate (TESSALON) 200 mg capsule; Take 1 capsule (200 mg total) by mouth 3 (three) times a day as needed for cough for up to 10 days    Bronchitis  -     albuterol HFA 90 mcg/actuation inhaler; Inhale 2 puffs every 4 (four) hours as needed for wheezing or shortness of breath  -     doxycycline (MONODOX) 100 mg capsule; Take 1 capsule (100 mg total) by mouth 2 (two) times a day for 10 days    SOB (shortness of breath)  -     albuterol HFA 90 mcg/actuation inhaler; Inhale 2 puffs every 4 (four) hours as needed for wheezing or shortness of breath    Will treat empirically for CAP vs bronchitis. Due to multiple allergies, med choices are limited. Rx was given for doxycycline, albuterol and tessalon pearles. Pt was educated on dose, frequency, and duration. Continue use of OTC atihistamine to help dry out secretions.  May use Tylenol or Motrin as needed for discomfort. Robitussin and Mucinex for cough and the keep secretions thin. Increase fluid intake to keep secretions thin. Use humidifier or warm showers as discussed. Balance rest and activity. Follow-up with primary care provider or return to urgent care for worsening symptoms or no improvement in 7-10 days or sooner with fever of 100.4 or greater, chills, dyspnea, or chest pain. Pt may need labs and chest x ray if symptoms not improved despite treatment. Pt verbalized understanding of plan.       Patient Instructions     Patient Education     Acute Bronchitis, Adult    Acute bronchitis is when air tubes in the lungs (bronchi) suddenly get swollen. The condition can make it hard for you to breathe. In adults, acute bronchitis usually goes away within 2 weeks. A cough caused by bronchitis may last up to 3 weeks. Smoking, allergies, and asthma can make the  condition worse.  What are the causes?  This condition is caused by:  · Cold and flu viruses. The most common cause of this condition is the virus that causes the common cold.  · Bacteria.  · Substances that irritate the lungs, including:  ? Smoke from cigarettes and other types of tobacco.  ? Dust and pollen.  ? Fumes from chemicals, gases, or burned fuel.  ? Other materials that pollute indoor or outdoor air.  · Close contact with someone who has acute bronchitis.  What increases the risk?  The following factors may make you more likely to develop this condition:  · A weak body's defense system. This is also called the immune system.  · Any condition that affects your lungs and breathing, such as asthma.  What are the signs or symptoms?  Symptoms of this condition include:  · A cough.  · Coughing up clear, yellow, or green mucus.  · Wheezing.  · Chest congestion.  · Shortness of breath.  · A fever.  · Body aches.  · Chills.  · A sore throat.  How is this treated?  Acute bronchitis may go away over time without treatment. Your doctor may recommend:  · Drinking more fluids.  · Taking a medicine for a fever or cough.  · Using a device that gets medicine into your lungs (inhaler).  · Using a vaporizer or a humidifier. These are machines that add water or moisture in the air to help with coughing and poor breathing.  Follow these instructions at home:    Activity  · Get a lot of rest.  · Avoid places where there are fumes from chemicals.  · Return to your normal activities as told by your doctor. Ask your doctor what activities are safe for you.  Lifestyle  · Drink enough fluids to keep your pee (urine) pale yellow.  · Do not drink alcohol.  · Do not use any products that contain nicotine or tobacco, such as cigarettes, e-cigarettes, and chewing tobacco. If you need help quitting, ask your doctor. Be aware that:  ? Your bronchitis will get worse if you smoke or breathe in other people's smoke (secondhand  smoke).  ? Your lungs will heal faster if you quit smoking.  General instructions  · Take over-the-counter and prescription medicines only as told by your doctor.  · Use an inhaler, cool mist vaporizer, or humidifier as told by your doctor.  · Rinse your mouth often with salt water. To make salt water, dissolve ½-1 tsp (3-6 g) of salt in 1 cup (237 mL) of warm water.  · Keep all follow-up visits as told by your doctor. This is important.  How is this prevented?  To lower your risk of getting this condition again:  · Wash your hands often with soap and water. If soap and water are not available, use hand .  · Avoid contact with people who have cold symptoms.  · Try not to touch your mouth, nose, or eyes with your hands.  · Make sure to get the flu shot every year.  Contact a doctor if:  · Your symptoms do not get better in 2 weeks.  · You vomit more than once or twice.  · You have symptoms of loss of fluid from your body (dehydration). These include:  ? Dark urine.  ? Dry skin or eyes.  ? Increased thirst.  ? Headaches.  ? Confusion.  ? Muscle cramps.  Get help right away if:  · You cough up blood.  · You have chest pain.  · You have very bad shortness of breath.  · You become dehydrated.  · You faint or keep feeling like you are going to faint.  · You keep vomiting.  · You have a very bad headache.  · Your fever or chills get worse.  These symptoms may be an emergency. Do not wait to see if the symptoms will go away. Get medical help right away. Call your local emergency services (911 in the [place] Do not drive yourself to the hospital.  Summary  · Acute bronchitis is when air tubes in the lungs (bronchi) suddenly get swollen. In adults, acute bronchitis usually goes away within 2 weeks.  · Take over-the-counter and prescription medicines only as told by your doctor.  · Drink enough fluid to keep your pee (urine) pale yellow.  · Contact a doctor if your symptoms do not improve after 2 weeks of  treatment.  · Get help right away if you cough up blood, faint, or have chest pain or shortness of breath.  This information is not intended to replace advice given to you by your health care provider. Make sure you discuss any questions you have with your health care provider.  Document Revised: 07/10/2020 Document Reviewed: 07/10/2020  [employer] Patient Education © 2021 [employer]           Ivelisse Loomis, CNP

## 2021-10-12 DIAGNOSIS — I10 ESSENTIAL HYPERTENSION: Primary | ICD-10-CM

## 2021-10-12 DIAGNOSIS — E11.21 TYPE 2 DIABETES MELLITUS WITH DIABETIC NEPHROPATHY, WITH LONG-TERM CURRENT USE OF INSULIN (CMS/HCC): ICD-10-CM

## 2021-10-12 DIAGNOSIS — Z79.4 TYPE 2 DIABETES MELLITUS WITH DIABETIC NEPHROPATHY, WITH LONG-TERM CURRENT USE OF INSULIN (CMS/HCC): ICD-10-CM

## 2021-10-25 ENCOUNTER — APPOINTMENT (OUTPATIENT)
Dept: LAB | Facility: CLINIC | Age: 76
End: 2021-10-25
Payer: MEDICARE

## 2021-10-25 ENCOUNTER — OFFICE VISIT (OUTPATIENT)
Dept: FAMILY MEDICINE | Facility: CLINIC | Age: 76
End: 2021-10-25
Payer: MEDICARE

## 2021-10-25 VITALS
WEIGHT: 233 LBS | SYSTOLIC BLOOD PRESSURE: 151 MMHG | TEMPERATURE: 97.4 F | HEART RATE: 88 BPM | BODY MASS INDEX: 41.27 KG/M2 | DIASTOLIC BLOOD PRESSURE: 78 MMHG | OXYGEN SATURATION: 95 %

## 2021-10-25 DIAGNOSIS — K21.9 CHRONIC GERD: ICD-10-CM

## 2021-10-25 DIAGNOSIS — E11.21 TYPE 2 DIABETES MELLITUS WITH DIABETIC NEPHROPATHY, WITH LONG-TERM CURRENT USE OF INSULIN (CMS/HCC): ICD-10-CM

## 2021-10-25 DIAGNOSIS — E66.01 MORBID OBESITY (CMS/HCC): ICD-10-CM

## 2021-10-25 DIAGNOSIS — E11.21 TYPE 2 DIABETES MELLITUS WITH DIABETIC NEPHROPATHY, WITH LONG-TERM CURRENT USE OF INSULIN (CMS/HCC): Primary | ICD-10-CM

## 2021-10-25 DIAGNOSIS — I10 ESSENTIAL HYPERTENSION: ICD-10-CM

## 2021-10-25 DIAGNOSIS — R60.9 EDEMA, UNSPECIFIED TYPE: ICD-10-CM

## 2021-10-25 DIAGNOSIS — R19.7 BLOODY DIARRHEA: ICD-10-CM

## 2021-10-25 DIAGNOSIS — Z79.4 TYPE 2 DIABETES MELLITUS WITH DIABETIC NEPHROPATHY, WITH LONG-TERM CURRENT USE OF INSULIN (CMS/HCC): Primary | ICD-10-CM

## 2021-10-25 DIAGNOSIS — Z79.4 TYPE 2 DIABETES MELLITUS WITH DIABETIC NEPHROPATHY, WITH LONG-TERM CURRENT USE OF INSULIN (CMS/HCC): ICD-10-CM

## 2021-10-25 DIAGNOSIS — N18.31 STAGE 3A CHRONIC KIDNEY DISEASE (CMS/HCC): Chronic | ICD-10-CM

## 2021-10-25 LAB
ALBUMIN SERPL-MCNC: 3.9 G/DL (ref 3.5–5.3)
ALP SERPL-CCNC: 104 U/L (ref 55–142)
ALT SERPL-CCNC: 46 U/L (ref 7–52)
ANION GAP SERPL CALC-SCNC: 9 MMOL/L (ref 3–11)
AST SERPL-CCNC: 44 U/L
BILIRUB SERPL-MCNC: 0.43 MG/DL (ref 0.2–1.4)
BUN SERPL-MCNC: 21 MG/DL (ref 7–25)
CALCIUM ALBUM COR SERPL-MCNC: 9.3 MG/DL (ref 8.6–10.3)
CALCIUM SERPL-MCNC: 9.2 MG/DL (ref 8.6–10.3)
CHLORIDE SERPL-SCNC: 100 MMOL/L (ref 98–107)
CO2 SERPL-SCNC: 30 MMOL/L (ref 21–32)
CREAT SERPL-MCNC: 0.96 MG/DL (ref 0.6–1.1)
EST. AVERAGE GLUCOSE BLD GHB EST-MCNC: 165.7 MG/DL
GFR SERPL CREATININE-BSD FRML MDRD: 58 ML/MIN/1.73M*2
GLUCOSE SERPL-MCNC: 114 MG/DL (ref 70–105)
HBA1C MFR BLD: 7.4 % (ref 4–6)
POTASSIUM SERPL-SCNC: 3.6 MMOL/L (ref 3.5–5.1)
PROT SERPL-MCNC: 6.4 G/DL (ref 6–8.3)
SODIUM SERPL-SCNC: 139 MMOL/L (ref 135–145)

## 2021-10-25 PROCEDURE — 99214 OFFICE O/P EST MOD 30 MIN: CPT | Performed by: FAMILY MEDICINE

## 2021-10-25 PROCEDURE — G0463 HOSPITAL OUTPT CLINIC VISIT: HCPCS | Performed by: FAMILY MEDICINE

## 2021-10-25 PROCEDURE — 36415 COLL VENOUS BLD VENIPUNCTURE: CPT

## 2021-10-25 PROCEDURE — 83036 HEMOGLOBIN GLYCOSYLATED A1C: CPT

## 2021-10-25 PROCEDURE — 80053 COMPREHEN METABOLIC PANEL: CPT

## 2021-10-25 RX ORDER — INSULIN ASPART 100 [IU]/ML
INJECTION, SOLUTION INTRAVENOUS; SUBCUTANEOUS
Qty: 36 ML | Refills: 11 | Status: SHIPPED | OUTPATIENT
Start: 2021-10-25 | End: 2022-01-31 | Stop reason: SDUPTHER

## 2021-10-25 RX ORDER — AMLODIPINE BESYLATE 5 MG/1
5 TABLET ORAL DAILY
Qty: 90 TABLET | Refills: 3 | Status: SHIPPED | OUTPATIENT
Start: 2021-10-25 | End: 2022-11-20 | Stop reason: SDUPTHER

## 2021-10-25 RX ORDER — LOSARTAN POTASSIUM 100 MG/1
100 TABLET ORAL DAILY
Qty: 90 TABLET | Refills: 3 | Status: SHIPPED | OUTPATIENT
Start: 2021-10-25 | End: 2022-11-20 | Stop reason: SDUPTHER

## 2021-10-25 RX ORDER — HYDROCHLOROTHIAZIDE 25 MG/1
37.5 TABLET ORAL DAILY
Qty: 135 TABLET | Refills: 3 | Status: SHIPPED | OUTPATIENT
Start: 2021-10-25 | End: 2022-09-21 | Stop reason: ALTCHOICE

## 2021-10-25 RX ORDER — OMEPRAZOLE 40 MG/1
40 CAPSULE, DELAYED RELEASE ORAL DAILY
Qty: 90 CAPSULE | Refills: 3 | Status: SHIPPED | OUTPATIENT
Start: 2021-10-25 | End: 2022-11-20 | Stop reason: SDUPTHER

## 2021-10-28 ASSESSMENT — ENCOUNTER SYMPTOMS
RESPIRATORY NEGATIVE: 1
CARDIOVASCULAR NEGATIVE: 1
ARTHRALGIAS: 1
GASTROINTESTINAL NEGATIVE: 1
CONSTITUTIONAL NEGATIVE: 1

## 2021-10-28 NOTE — PROGRESS NOTES
Subjective      Consuelo Singh is a 76 y.o. female who presents for Diabetes and Flu Vaccine (Flu vaccine was offered patient denied. )      Patient is a type 2 diabetic and uses toujeo 120 units a day and novolog.  She has divided the Toujeo into 60 in the morning and 60 in the evening given the high dose of this.  She utilizes a dexcom to monitor her sugars. She denies any neuropathy or ulcerations.     She has hypertension and stage 3 chronic kidney disease. She uses losartan, metoprolol, hydrochlorothiazide and amlodipine.    She takes nortriptyline for fibromyalgia.     Diabetes  She presents for her follow-up diabetic visit. She has type 2 diabetes mellitus. Current diabetic treatment includes diet, intensive insulin program and insulin injections. She is compliant with treatment all of the time. She is currently taking insulin pre-breakfast and pre-lunch. Insulin injections are given by patient. Rotation sites for injection include the abdominal wall. Her weight is stable. Meal planning includes avoidance of concentrated sweets. She never participates in exercise. An ACE inhibitor/angiotensin II receptor blocker is being taken. Eye exam is current.       The following have been reviewed and updated as appropriate in this visit:         Allergies   Allergen Reactions   • Aloe Vera    • Bacitracin      ZINC   • Erythromycin Base    • Formaldehyde    • Gramicidins      OPHTHALMIC   • Hydrocortisone      ACETATE   • Latex    • Levofloxacin    • Metformin    • Neomycin    • Neomycin-Bacitracnzn-Polymyxnb    • Polymyxin B    • Prednisolone    • Prednisone    • Statins-Hmg-Coa Reductase Inhibitors    • Sulfamethoxazole    • Sulfamethoxazole-Trimethoprim    • Thimerosal    • Tresiba Flextouch U-100 [Insulin Degludec] Diarrhea and Other (see comments)     Abdominal pain   • Trimethoprim      Current Outpatient Medications   Medication Sig Dispense Refill   • omeprazole (PriLOSEC) 40 mg capsule Take 1 capsule (40 mg  "total) by mouth daily 90 capsule 3   • amLODIPine (Norvasc) 5 mg tablet Take 1 tablet (5 mg total) by mouth daily 90 tablet 3   • losartan (COZAAR) 100 mg tablet Take 1 tablet (100 mg total) by mouth daily 90 tablet 3   • hydroCHLOROthiazide (HYDRODIURIL) 25 mg tablet Take 1.5 tablets (37.5 mg total) by mouth daily 135 tablet 3   • NovoLOG Flexpen U-100 Insulin 100 unit/mL (3 mL) insulin pen Inject 16 units in the morning, 20 units at noon, 30 units in the evening 36 mL 11   • pen needle, diabetic 31 gauge x 1/4\" needle USE 1 NEEDLE TO INJECT UNDER THE SKIN 4 TIMES DAILY 300 each 2   • pravastatin (PRAVACHOL) 20 mg tablet Take 1 tablet (20 mg total) by mouth 3 (three) times a week 36 tablet 3   • nortriptyline (PAMELOR) 50 mg capsule Take 1 capsule (50 mg total) by mouth daily 90 capsule 3   • insulin glargine U-300 conc (Toujeo Max U-300 SoloStar) 300 unit/mL (3 mL) insulin pen insulin pen Inject 120 Units under the skin daily 15 mL 5   • lutein 20 mg tablet Take 1 tab/cap by mouth daily     • multivitamin (THERAGRAN) tablet tablet Take 1 tablet by mouth daily     • metoprolol succinate XL (TOPROL-XL) 100 mg 24 hr tablet Take 1 tablet (100 mg total) by mouth nightly 90 tablet 3   • clobetasoL (TEMOVATE) 0.05 % ointment Apply 1 application topically 2 (two) times a day as needed (Rash) 60 g 1   • blood-glucose sensor device Use 1 sensor every 10 days. 3 Device 11   • blood-glucose meter,continuous (Dexcom G6 ) misc Use as directed. 1 each 0   • blood-glucose transmitter (Dexcom G6 Transmitter) device Use as directed.  Change every 3 months. 1 Device 4   • aspirin 325 mg tablet Take 325 mg by mouth daily.       • ondansetron ODT (Zofran ODT) 4 mg disintegrating tablet Take 1 tablet (4 mg total) by mouth every 8 (eight) hours as needed for nausea or vomiting (Patient not taking: Reported on 10/7/2021 ) 20 tablet 0   • blood sugar diagnostic (ONETOUCH ULTRA TEST) strip Use one strip to check glucose TID " (Patient not taking: Reported on 10/25/2021 ) 300 strip 3     No current facility-administered medications for this visit.     Past Medical History:   Diagnosis Date   • Arthritis    • Blood clot in vein     shani legs   • Deep vein thrombosis (CMS/HCC) (HCC)     hx of   • Diabetes (CMS/HCC) (HCC)     type 2   • Eczema    • Fibromyalgia    • GERD (gastroesophageal reflux disease)    • Heart murmur    • Hyperlipidemia    • Hypertension    • Peripheral neuropathy     toes   • Pneumonia    • Psoriasis    • Scoliosis    • Wears dentures     full set     Past Surgical History:   Procedure Laterality Date   • CHOLECYSTECTOMY     • COLONOSCOPY  2016    5 yr  follow up   • COLONOSCOPY N/A 2021    Please call the patient regarding her abnormal result. Repeat colonoscopy in 5 year due to 2 tubular adenomas if patient otherwise healthy at that time. Also reasonable to discontinue further colonoscopy--Dr Laboy   • HYSTERECTOMY      With BSO   • OTHER SURGICAL HISTORY      Rt. radical mastoidectomy, rebuilt canal from growth     Family History   Problem Relation Age of Onset   • Breast cancer Mother    • Lung cancer Maternal Grandmother    • Bone cancer Maternal Grandfather    • Pancreatic cancer Mother's Brother    • Breast cancer Mother's Sister    • Lung cancer Mother's Sister      Social History     Occupational History   • Not on file   Tobacco Use   • Smoking status: Former Smoker     Packs/day: 0.50     Years: 40.00     Pack years: 20.00     Quit date:      Years since quittin.8   • Smokeless tobacco: Never Used   Substance and Sexual Activity   • Alcohol use: Yes     Comment: Occasionally; 1-2 glasses monthly   • Drug use: No   • Sexual activity: Defer   Social History Narrative   • Not on file       Review of Systems   Constitutional: Negative.    HENT: Negative.    Respiratory: Negative.    Cardiovascular: Negative.    Gastrointestinal: Negative.    Genitourinary: Negative.    Musculoskeletal:  Positive for arthralgias.       Objective     VITAL SIGNS  /78 (BP Location: Left arm, Patient Position: Sitting, Cuff Size: Regular Adult)   Pulse 88   Temp 36.3 °C (97.4 °F) (Temporal)   Wt 105.7 kg (233 lb)   SpO2 95%   BMI 41.27 kg/m²     Physical Exam  Vitals and nursing note reviewed.   Constitutional:       General: She is not in acute distress.     Appearance: She is well-developed.   HENT:      Head: Normocephalic and atraumatic.   Cardiovascular:      Rate and Rhythm: Normal rate and regular rhythm.      Heart sounds: Normal heart sounds.   Pulmonary:      Effort: Pulmonary effort is normal.      Breath sounds: Normal breath sounds.   Abdominal:      General: There is no distension.      Palpations: Abdomen is soft.      Tenderness: There is no abdominal tenderness.   Musculoskeletal:      Right lower leg: No edema.      Left lower leg: No edema.   Skin:     General: Skin is warm.      Capillary Refill: Capillary refill takes less than 2 seconds.   Neurological:      Mental Status: She is alert and oriented to person, place, and time.         Lab Results   Component Value Date    GLUCOSE 114 (H) 10/25/2021    CALCIUM 9.2 10/25/2021     10/25/2021    K 3.6 10/25/2021    CO2 30 10/25/2021     10/25/2021    BUN 21 10/25/2021    CREATININE 0.96 10/25/2021    ANIONGAP 9 10/25/2021     Lab Results   Component Value Date    WBC 11.6 (H) 01/18/2021    HGB 15.0 01/18/2021    HCT 45.0 01/18/2021    MCV 88.5 01/18/2021     01/18/2021     No results found for: TSH  Lab Results   Component Value Date    HGBA1C 7.4 (H) 10/25/2021      Lab Results   Component Value Date    CHOL 178 09/16/2020    HDL 60 09/16/2020    LDLCALC 88 09/16/2020    TRIG 152 (H) 09/16/2020     No results found for: SEDRATE    Assessment/Plan   Problem List Items Addressed This Visit        Circulatory    Essential hypertension    Relevant Medications    amLODIPine (Norvasc) 5 mg tablet    losartan (COZAAR) 100 mg  tablet    hydroCHLOROthiazide (HYDRODIURIL) 25 mg tablet       Digestive    Morbid obesity (CMS/HCC) (HCC)       Genitourinary    Type 2 diabetes mellitus with diabetic nephropathy, with long-term current use of insulin (CMS/[facility]) ([facility]) - Primary (Chronic)    Relevant Medications    losartan (COZAAR) 100 mg tablet    hydroCHLOROthiazide (HYDRODIURIL) 25 mg tablet    NovoLOG Flexpen U-100 Insulin 100 unit/mL (3 mL) insulin pen    Other Relevant Orders    Hemoglobin A1c (glycosylated) Blood, Venous    Comprehensive metabolic panel Blood, Venous    CKD (chronic kidney disease) stage 3, GFR 30-59 ml/min (CMS/HCC) ([facility]) (Chronic)    Relevant Medications    losartan (COZAAR) 100 mg tablet    hydroCHLOROthiazide (HYDRODIURIL) 25 mg tablet       Other    Edema      Other Visit Diagnoses     Chronic GERD        Relevant Medications    omeprazole (PriLOSEC) 40 mg capsule    Bloody diarrhea            Had a very recent colonoscopy earlier this year which found a tubular adenoma with low-grade dysplasia in the cecum and the ascending colon.  Recommend softening stools to assure that there is no evidence of hemorrhoids.  If not improving over the next month we will likely proceed with further work-up.  Recommend that she treat her GERD more effectively with omeprazole.  Suspect her bleeding is hemorrhoidal in nature however.  We discussed consideration for psyllium daily as this will likely potentially improve sugars and improve stools.    From a diabetic standpoint we reviewed her sugars from her meter.  In general I would recommend increasing her mealtime insulin as she has very low doses compared to her Toujeo.  We will increase mealtime insulin to 16 units with breakfast, 20 units at lunch and 30 in the evening with dinner.  Continue with a diabetic diet.  Follow-up in 3 months for recheck.    Blood pressure is elevated today.  Medication change as above.  Avoid excessive salt.Recommend increasing exercise.        Margarito Glass  MD

## 2021-11-01 ENCOUNTER — OFFICE VISIT (OUTPATIENT)
Dept: OTOLARYNGOLOGY | Facility: CLINIC | Age: 76
End: 2021-11-01
Payer: MEDICARE

## 2021-11-01 VITALS
DIASTOLIC BLOOD PRESSURE: 80 MMHG | OXYGEN SATURATION: 95 % | SYSTOLIC BLOOD PRESSURE: 170 MMHG | HEART RATE: 82 BPM | TEMPERATURE: 97 F

## 2021-11-01 DIAGNOSIS — Z90.89 HISTORY OF RIGHT MASTOIDECTOMY: Primary | ICD-10-CM

## 2021-11-01 DIAGNOSIS — H61.21 IMPACTED CERUMEN OF RIGHT EAR: ICD-10-CM

## 2021-11-01 PROCEDURE — G0463 HOSPITAL OUTPT CLINIC VISIT: HCPCS | Performed by: OTOLARYNGOLOGY

## 2021-11-01 PROCEDURE — 69220 CLEAN OUT MASTOID CAVITY: CPT | Performed by: OTOLARYNGOLOGY

## 2021-11-01 ASSESSMENT — ENCOUNTER SYMPTOMS
SORE THROAT: 0
RHINORRHEA: 0
FEVER: 0
CONSTITUTIONAL NEGATIVE: 1
SINUS PRESSURE: 0
CHILLS: 0
TROUBLE SWALLOWING: 0

## 2021-11-01 NOTE — PROGRESS NOTES
"Subjective    CC: Mastoid cleaning    HPI: Consuelo Singh is a 76 y.o. female who comes in every 3 months for a right mastoid bowl exam she is doing well.  She has not had any trouble with the ears since her last visit with me.    Past Medical History:   Diagnosis Date   • Arthritis    • Blood clot in vein     shani legs   • Deep vein thrombosis (CMS/HCC) (HCC)     hx of   • Diabetes (CMS/HCC) (HCC)     type 2   • Eczema    • Fibromyalgia    • GERD (gastroesophageal reflux disease)    • Heart murmur    • Hyperlipidemia    • Hypertension    • Peripheral neuropathy     toes   • Pneumonia    • Psoriasis    • Scoliosis    • Wears dentures     full set       Past Surgical History:   Procedure Laterality Date   • CHOLECYSTECTOMY     • COLONOSCOPY  03/11/2016    5 yr  follow up   • COLONOSCOPY N/A 05/28/2021    Please call the patient regarding her abnormal result. Repeat colonoscopy in 5 year due to 2 tubular adenomas if patient otherwise healthy at that time. Also reasonable to discontinue further colonoscopy--Dr Laboy   • HYSTERECTOMY      With BSO   • OTHER SURGICAL HISTORY      Rt. radical mastoidectomy, rebuilt canal from growth         Current Outpatient Medications:   •  omeprazole (PriLOSEC) 40 mg capsule, Take 1 capsule (40 mg total) by mouth daily, Disp: 90 capsule, Rfl: 3  •  amLODIPine (Norvasc) 5 mg tablet, Take 1 tablet (5 mg total) by mouth daily, Disp: 90 tablet, Rfl: 3  •  losartan (COZAAR) 100 mg tablet, Take 1 tablet (100 mg total) by mouth daily, Disp: 90 tablet, Rfl: 3  •  hydroCHLOROthiazide (HYDRODIURIL) 25 mg tablet, Take 1.5 tablets (37.5 mg total) by mouth daily, Disp: 135 tablet, Rfl: 3  •  NovoLOG Flexpen U-100 Insulin 100 unit/mL (3 mL) insulin pen, Inject 16 units in the morning, 20 units at noon, 30 units in the evening, Disp: 36 mL, Rfl: 11  •  pen needle, diabetic 31 gauge x 1/4\" needle, USE 1 NEEDLE TO INJECT UNDER THE SKIN 4 TIMES DAILY, Disp: 300 each, Rfl: 2  •  pravastatin " (PRAVACHOL) 20 mg tablet, Take 1 tablet (20 mg total) by mouth 3 (three) times a week, Disp: 36 tablet, Rfl: 3  •  nortriptyline (PAMELOR) 50 mg capsule, Take 1 capsule (50 mg total) by mouth daily, Disp: 90 capsule, Rfl: 3  •  insulin glargine U-300 conc (Toujeo Max U-300 SoloStar) 300 unit/mL (3 mL) insulin pen insulin pen, Inject 120 Units under the skin daily, Disp: 15 mL, Rfl: 5  •  lutein 20 mg tablet, Take 1 tab/cap by mouth daily, Disp: , Rfl:   •  multivitamin (THERAGRAN) tablet tablet, Take 1 tablet by mouth daily, Disp: , Rfl:   •  metoprolol succinate XL (TOPROL-XL) 100 mg 24 hr tablet, Take 1 tablet (100 mg total) by mouth nightly, Disp: 90 tablet, Rfl: 3  •  clobetasoL (TEMOVATE) 0.05 % ointment, Apply 1 application topically 2 (two) times a day as needed (Rash), Disp: 60 g, Rfl: 1  •  ondansetron ODT (Zofran ODT) 4 mg disintegrating tablet, Take 1 tablet (4 mg total) by mouth every 8 (eight) hours as needed for nausea or vomiting (Patient not taking: Reported on 10/7/2021 ), Disp: 20 tablet, Rfl: 0  •  blood-glucose sensor device, Use 1 sensor every 10 days., Disp: 3 Device, Rfl: 11  •  blood-glucose meter,continuous (Dexcom G6 ) misc, Use as directed., Disp: 1 each, Rfl: 0  •  blood-glucose transmitter (Dexcom G6 Transmitter) device, Use as directed.  Change every 3 months., Disp: 1 Device, Rfl: 4  •  blood sugar diagnostic (ONETOUCH ULTRA TEST) strip, Use one strip to check glucose TID (Patient not taking: Reported on 10/25/2021 ), Disp: 300 strip, Rfl: 3  •  aspirin 325 mg tablet, Take 325 mg by mouth daily.  , Disp: , Rfl:     Allergies   Allergen Reactions   • Aloe Vera    • Bacitracin      ZINC   • Erythromycin Base    • Formaldehyde    • Gramicidins      OPHTHALMIC   • Hydrocortisone      ACETATE   • Latex    • Levofloxacin    • Metformin    • Neomycin    • Neomycin-Bacitracnzn-Polymyxnb    • Polymyxin B    • Prednisolone    • Prednisone    • Statins-Hmg-Coa Reductase Inhibitors    •  Sulfamethoxazole    • Sulfamethoxazole-Trimethoprim    • Thimerosal    • Tresiba Flextouch U-100 [Insulin Degludec] Diarrhea and Other (see comments)     Abdominal pain   • Trimethoprim        family history includes Bone cancer in her maternal grandfather; Breast cancer in her mother and mother's sister; Lung cancer in her maternal grandmother and mother's sister; Pancreatic cancer in her mother's brother.    Social History     Tobacco Use   • Smoking status: Former Smoker     Packs/day: 0.50     Years: 40.00     Pack years: 20.00     Quit date:      Years since quittin.8   • Smokeless tobacco: Never Used   Substance Use Topics   • Alcohol use: Yes     Comment: Occasionally; 1-2 glasses monthly   • Drug use: No       Review of Systems   Constitutional: Negative.  Negative for chills and fever.   HENT: Negative.  Negative for congestion, ear discharge, ear pain, hearing loss, nosebleeds, postnasal drip, rhinorrhea, sinus pressure, sneezing, sore throat, tinnitus and trouble swallowing.        Objective    /80   Pulse 82   Temp 36.1 °C (97 °F) (Temporal)   SpO2 95%     General: Well-developed well-nourished female no acute distress.    Ears: Left external ear, external auditory canal and tympanic membrane normal.  On the right patient is status post canal wall down tympanomastoidectomy.    Procedure: Right ear mastoid debridement.  The right ear was examined with the operating microscope.  The mastoid bowl has some dependent dried crusty cerumen that was removed with alligator forceps.  Underlying tissues appear healthy, slightly abraded in the mastoid bowl from the removal of the cerumen.  The facial ridge, tegmen tympani and residual tympanic membrane all appear normal.  No evidence of infection or granulation.    Diagnosis    1. History of right mastoidectomy    2. Impacted cerumen of right ear        Recommendations    1.  Patient with healthy appearing right mastoid cavity.  Debridement done  today.  Follow-up in 3 months for scheduled periodic debridement or sooner as needed for problems.  She should do Cipro HC drops for 3 days to the right ear.

## 2021-11-22 DIAGNOSIS — E11.9 DIABETES MELLITUS WITHOUT COMPLICATION (CMS/HCC): ICD-10-CM

## 2021-11-22 RX ORDER — INSULIN GLARGINE 300 U/ML
120 INJECTION, SOLUTION SUBCUTANEOUS DAILY
Qty: 15 ML | Refills: 5 | Status: SHIPPED | OUTPATIENT
Start: 2021-11-22 | End: 2021-11-23 | Stop reason: SDUPTHER

## 2021-11-22 NOTE — TELEPHONE ENCOUNTER
Care Due:                  Date            Visit Type   Department     Provider  --------------------------------------------------------------------------------                              [facility]   [facility]  Last Visit: 10-      PATIENT      MEDICINE       LORA TRAN                              ESTABLISHED   [facility]  Next Visit: 01-      PATIENT      MEDICINE       LORA TRAN                                                            Last  Test          Frequency    Reason                     Performed    Due Date  --------------------------------------------------------------------------------  Lipid Panel.  12 months..  pravastatin..............  Not Found    Overdue    Powered by [employer]. Reference number: [id number]. 11/22/2021 9:59:32 AM MST

## 2021-11-23 DIAGNOSIS — E11.9 DIABETES MELLITUS WITHOUT COMPLICATION (CMS/HCC): ICD-10-CM

## 2021-11-23 RX ORDER — INSULIN GLARGINE 300 U/ML
120 INJECTION, SOLUTION SUBCUTANEOUS DAILY
Qty: 15 ML | Refills: 5 | Status: SHIPPED | OUTPATIENT
Start: 2021-11-23 | End: 2022-06-01 | Stop reason: SDUPTHER

## 2021-11-30 ENCOUNTER — DOCUMENTATION (OUTPATIENT)
Dept: FAMILY MEDICINE | Facility: CLINIC | Age: 76
End: 2021-11-30
Payer: MEDICARE

## 2022-01-17 ENCOUNTER — APPOINTMENT (OUTPATIENT)
Dept: LAB | Facility: CLINIC | Age: 77
End: 2022-01-17
Payer: MEDICARE

## 2022-01-17 DIAGNOSIS — Z20.822 CONTACT WITH AND (SUSPECTED) EXPOSURE TO COVID-19: ICD-10-CM

## 2022-01-17 PROCEDURE — C9803 HOPD COVID-19 SPEC COLLECT: HCPCS | Mod: CS

## 2022-01-17 PROCEDURE — 87635 SARS-COV-2 COVID-19 AMP PRB: CPT

## 2022-01-18 LAB — SARS-COV-2 RNA RESP QL NAA+PROBE: NEGATIVE

## 2022-01-31 ENCOUNTER — OFFICE VISIT (OUTPATIENT)
Dept: FAMILY MEDICINE | Facility: CLINIC | Age: 77
End: 2022-01-31
Payer: MEDICARE

## 2022-01-31 VITALS
TEMPERATURE: 97.6 F | DIASTOLIC BLOOD PRESSURE: 80 MMHG | HEART RATE: 80 BPM | WEIGHT: 233.6 LBS | RESPIRATION RATE: 16 BRPM | HEIGHT: 63 IN | OXYGEN SATURATION: 94 % | SYSTOLIC BLOOD PRESSURE: 160 MMHG | BODY MASS INDEX: 41.39 KG/M2

## 2022-01-31 DIAGNOSIS — E11.21 TYPE 2 DIABETES MELLITUS WITH DIABETIC NEPHROPATHY, WITH LONG-TERM CURRENT USE OF INSULIN (CMS/HCC): ICD-10-CM

## 2022-01-31 DIAGNOSIS — Z79.4 TYPE 2 DIABETES MELLITUS WITH DIABETIC NEPHROPATHY, WITH LONG-TERM CURRENT USE OF INSULIN (CMS/HCC): ICD-10-CM

## 2022-01-31 DIAGNOSIS — L03.113 CELLULITIS OF RIGHT HAND: Primary | ICD-10-CM

## 2022-01-31 PROCEDURE — 99213 OFFICE O/P EST LOW 20 MIN: CPT | Performed by: FAMILY MEDICINE

## 2022-01-31 PROCEDURE — G0463 HOSPITAL OUTPT CLINIC VISIT: HCPCS | Performed by: FAMILY MEDICINE

## 2022-01-31 RX ORDER — MUPIROCIN 20 MG/G
1 OINTMENT TOPICAL 2 TIMES DAILY
Qty: 30 G | Refills: 0 | Status: SHIPPED | OUTPATIENT
Start: 2022-01-31 | End: 2022-06-13 | Stop reason: ALTCHOICE

## 2022-01-31 RX ORDER — INSULIN ASPART 100 [IU]/ML
INJECTION, SOLUTION INTRAVENOUS; SUBCUTANEOUS
Qty: 45 ML | Refills: 1 | Status: SHIPPED | OUTPATIENT
Start: 2022-01-31 | End: 2022-06-01 | Stop reason: SDUPTHER

## 2022-01-31 RX ORDER — CEPHALEXIN 500 MG/1
500 CAPSULE ORAL 3 TIMES DAILY
Qty: 30 CAPSULE | Refills: 0 | Status: SHIPPED | OUTPATIENT
Start: 2022-01-31 | End: 2022-02-10

## 2022-01-31 ASSESSMENT — ENCOUNTER SYMPTOMS: WOUND: 1

## 2022-01-31 NOTE — PROGRESS NOTES
"Office Note    SUBJECTIVE     Consuelo Singh is a 76 y.o. female who presents for a wound infection.    HPI  Patient is a 76 year old female who is present today for a wound infection.     Patient has a hx of eczema. Patient believes she has an infection on her right hand. She reports weeping. She compares the pain to \"knives hitting her.\" Patient has been washing the affected area with basic soap. Patient states her prescription eczema cream is not effective for her symptoms. She states she is unable to go to dermatology until June. She states OTC creams are not effective.     The following have been reviewed and updated as appropriate in this visit:          Allergies   Allergen Reactions   • Aloe Vera    • Bacitracin      ZINC   • Erythromycin Base    • Formaldehyde    • Gramicidins      OPHTHALMIC   • Hydrocortisone      ACETATE   • Latex    • Levofloxacin    • Metformin    • Neomycin    • Neomycin-Bacitracnzn-Polymyxnb    • Polymyxin B    • Prednisolone    • Prednisone    • Statins-Hmg-Coa Reductase Inhibitors    • Sulfamethoxazole    • Sulfamethoxazole-Trimethoprim    • Thimerosal    • Tresiba Flextouch U-100 [Insulin Degludec] Diarrhea and Other (see comments)     Abdominal pain   • Trimethoprim      Current Outpatient Medications   Medication Sig Dispense Refill   • insulin glargine U-300 conc (Toujeo Max U-300 SoloStar) 300 unit/mL (3 mL) insulin pen insulin pen Inject 120 Units under the skin daily NO SUBSTITUTIONS AS PATIENT IS ALLERGIC TO OTHER INSULINS 15 mL 5   • omeprazole (PriLOSEC) 40 mg capsule Take 1 capsule (40 mg total) by mouth daily 90 capsule 3   • amLODIPine (Norvasc) 5 mg tablet Take 1 tablet (5 mg total) by mouth daily 90 tablet 3   • hydroCHLOROthiazide (HYDRODIURIL) 25 mg tablet Take 1.5 tablets (37.5 mg total) by mouth daily 135 tablet 3   • pen needle, diabetic 31 gauge x 1/4\" needle USE 1 NEEDLE TO INJECT UNDER THE SKIN 4 TIMES DAILY 300 each 2   • pravastatin (PRAVACHOL) 20 mg tablet " Take 1 tablet (20 mg total) by mouth 3 (three) times a week 36 tablet 3   • nortriptyline (PAMELOR) 50 mg capsule Take 1 capsule (50 mg total) by mouth daily 90 capsule 3   • lutein 20 mg tablet Take 1 tab/cap by mouth daily     • multivitamin (THERAGRAN) tablet tablet Take 1 tablet by mouth daily     • metoprolol succinate XL (TOPROL-XL) 100 mg 24 hr tablet Take 1 tablet (100 mg total) by mouth nightly 90 tablet 3   • clobetasoL (TEMOVATE) 0.05 % ointment Apply 1 application topically 2 (two) times a day as needed (Rash) 60 g 1   • blood-glucose sensor device Use 1 sensor every 10 days. 3 Device 11   • blood-glucose meter,continuous (Dexcom G6 ) misc Use as directed. 1 each 0   • blood-glucose transmitter (Dexcom G6 Transmitter) device Use as directed.  Change every 3 months. 1 Device 4   • aspirin 325 mg tablet Take 325 mg by mouth daily.       • NovoLOG Flexpen U-100 Insulin 100 unit/mL (3 mL) insulin pen Inject 16 units in the morning, 20 units at noon, 30 units in the evening 45 mL 1   • mupirocin (BACTROBAN) 2 % ointment Apply 1 application topically 2 (two) times a day 30 g 0   • cephalexin (KEFLEX) 500 mg capsule Take 1 capsule (500 mg total) by mouth 3 (three) times a day for 10 days 30 capsule 0   • losartan (COZAAR) 100 mg tablet Take 1 tablet (100 mg total) by mouth daily 90 tablet 3   • ondansetron ODT (Zofran ODT) 4 mg disintegrating tablet Take 1 tablet (4 mg total) by mouth every 8 (eight) hours as needed for nausea or vomiting (Patient not taking: Reported on 10/7/2021 ) 20 tablet 0   • blood sugar diagnostic (ONETOUCH ULTRA TEST) strip Use one strip to check glucose TID (Patient not taking: Reported on 10/25/2021 ) 300 strip 3     No current facility-administered medications for this visit.     Past Medical History:   Diagnosis Date   • Arthritis    • Blood clot in vein     shani legs   • Deep vein thrombosis (CMS/HCC) (HCC)     hx of   • Diabetes (CMS/HCC) (HCC)     type 2   • Eczema    •  "Fibromyalgia    • GERD (gastroesophageal reflux disease)    • Heart murmur    • Hyperlipidemia    • Hypertension    • Peripheral neuropathy     toes   • Pneumonia    • Psoriasis    • Scoliosis    • Wears dentures     full set     Past Surgical History:   Procedure Laterality Date   • CHOLECYSTECTOMY     • COLONOSCOPY  03/11/2016    5 yr  follow up   • COLONOSCOPY N/A 05/28/2021    Please call the patient regarding her abnormal result. Repeat colonoscopy in 5 year due to 2 tubular adenomas if patient otherwise healthy at that time. Also reasonable to discontinue further colonoscopy--Dr Laboy   • HYSTERECTOMY      With BSO   • OTHER SURGICAL HISTORY      Rt. radical mastoidectomy, rebuilt canal from growth     Family History   Problem Relation Age of Onset   • Breast cancer Mother    • Lung cancer Maternal Grandmother    • Bone cancer Maternal Grandfather    • Pancreatic cancer Mother's Brother    • Breast cancer Mother's Sister    • Lung cancer Mother's Sister      Social History     Occupational History   • Not on file   Tobacco Use   • Smoking status: Former Smoker     Packs/day: 0.50     Years: 40.00     Pack years: 20.00     Quit date: 2007     Years since quitting: 15.0   • Smokeless tobacco: Never Used   Substance and Sexual Activity   • Alcohol use: Yes     Comment: Occasionally; 1-2 glasses monthly   • Drug use: No   • Sexual activity: Defer   Social History Narrative   • Not on file       Review of Systems  Review of Systems   Skin: Positive for wound.       OBJECTIVE  /80   Pulse 80   Temp 36.4 °C (97.6 °F) (Temporal)   Resp 16   Ht 1.6 m (5' 3\")   Wt 106 kg (233 lb 9.6 oz)   SpO2 94%   BMI 41.38 kg/m²     Exam  Physical Exam  Eyes:      Comments: Patient has corrected vision (glasses)   Cardiovascular:      Rate and Rhythm: Normal rate and regular rhythm.   Pulmonary:      Effort: Pulmonary effort is normal.      Breath sounds: Normal breath sounds.   Musculoskeletal:        " Hands:          Assessment and Plan   Diagnosis Plan   1. Cellulitis of right hand  mupirocin (BACTROBAN) 2 % ointment    cephalexin (KEFLEX) 500 mg capsule     Patient was started on Keflex and mupirocin for cellulitis of right hand once treated pt may be able to resume clobetasol for eczema history.     Orders  No orders of the defined types were placed in this encounter.      ______________________________  Chriss Portillo DO  02/01/22    Portions of this note were documented by Jose Malagon as I performed the exam and collected the information from Consuelo Singh. I attest that I have reviewed the information as documented, verified the accuracy of the documentation and added additional information as needed.      Portions of this report was dictated with the use of voice recognition software.  It may contain inadvertent spelling or grammatical errors which were not detected in the editing process.

## 2022-01-31 NOTE — TELEPHONE ENCOUNTER
Care Due:                  Date            Visit Type   Department     Provider  --------------------------------------------------------------------------------                                           [facility]  Last Visit: 01-      SAME DAY     MEDICINE       BRE HERNANDEZ                              ESTABLISHED   [facility]  Next Visit: 02-      PATIENT      MEDICINE       LORA TRAN                                                            Last  Test          Frequency    Reason                     Performed    Due Date  --------------------------------------------------------------------------------  Lipid Panel.  12 months..  pravastatin..............  Not Found    Overdue    Powered by [employer] by [employer]. Reference number: [id number]. 1/31/2022 10:28:33 AM MST

## 2022-02-18 DIAGNOSIS — I10 ESSENTIAL HYPERTENSION: ICD-10-CM

## 2022-02-18 RX ORDER — METOPROLOL SUCCINATE 100 MG/1
TABLET, EXTENDED RELEASE ORAL
Qty: 90 TABLET | Refills: 2 | Status: SHIPPED | OUTPATIENT
Start: 2022-02-18 | End: 2022-09-08 | Stop reason: SDUPTHER

## 2022-02-28 ENCOUNTER — APPOINTMENT (OUTPATIENT)
Dept: LAB | Facility: CLINIC | Age: 77
End: 2022-02-28
Payer: MEDICARE

## 2022-02-28 ENCOUNTER — OFFICE VISIT (OUTPATIENT)
Dept: FAMILY MEDICINE | Facility: CLINIC | Age: 77
End: 2022-02-28
Payer: MEDICARE

## 2022-02-28 VITALS
RESPIRATION RATE: 18 BRPM | BODY MASS INDEX: 40.74 KG/M2 | OXYGEN SATURATION: 97 % | HEART RATE: 73 BPM | TEMPERATURE: 97.5 F | WEIGHT: 230 LBS | SYSTOLIC BLOOD PRESSURE: 142 MMHG | DIASTOLIC BLOOD PRESSURE: 60 MMHG

## 2022-02-28 DIAGNOSIS — R21 RASH: ICD-10-CM

## 2022-02-28 DIAGNOSIS — Z79.4 TYPE 2 DIABETES MELLITUS WITH DIABETIC NEPHROPATHY, WITH LONG-TERM CURRENT USE OF INSULIN (CMS/HCC): ICD-10-CM

## 2022-02-28 DIAGNOSIS — Z79.4 TYPE 2 DIABETES MELLITUS WITH DIABETIC NEPHROPATHY, WITH LONG-TERM CURRENT USE OF INSULIN (CMS/HCC): Primary | ICD-10-CM

## 2022-02-28 DIAGNOSIS — E11.21 TYPE 2 DIABETES MELLITUS WITH DIABETIC NEPHROPATHY, WITH LONG-TERM CURRENT USE OF INSULIN (CMS/HCC): Primary | ICD-10-CM

## 2022-02-28 DIAGNOSIS — E11.21 TYPE 2 DIABETES MELLITUS WITH DIABETIC NEPHROPATHY, WITH LONG-TERM CURRENT USE OF INSULIN (CMS/HCC): ICD-10-CM

## 2022-02-28 PROBLEM — I10 ESSENTIAL HYPERTENSION: Chronic | Status: ACTIVE | Noted: 2018-01-14

## 2022-02-28 PROBLEM — E78.00 PURE HYPERCHOLESTEROLEMIA: Chronic | Status: ACTIVE | Noted: 2018-01-14

## 2022-02-28 LAB
ALBUMIN SERPL-MCNC: 3.8 G/DL (ref 3.5–5.3)
ALP SERPL-CCNC: 100 U/L (ref 55–142)
ALT SERPL-CCNC: 44 U/L (ref 7–52)
ANION GAP SERPL CALC-SCNC: 9 MMOL/L (ref 3–11)
AST SERPL-CCNC: 40 U/L
BILIRUB SERPL-MCNC: 0.43 MG/DL (ref 0.2–1.4)
BUN SERPL-MCNC: 28 MG/DL (ref 7–25)
CALCIUM ALBUM COR SERPL-MCNC: 9.5 MG/DL (ref 8.6–10.3)
CALCIUM SERPL-MCNC: 9.3 MG/DL (ref 8.6–10.3)
CHLORIDE SERPL-SCNC: 103 MMOL/L (ref 98–107)
CO2 SERPL-SCNC: 26 MMOL/L (ref 21–32)
CREAT SERPL-MCNC: 1.2 MG/DL (ref 0.6–1.1)
EST. AVERAGE GLUCOSE BLD GHB EST-MCNC: 171.4 MG/DL
GFR SERPL CREATININE-BSD FRML MDRD: 47 ML/MIN/1.73M*2
GLUCOSE SERPL-MCNC: 96 MG/DL (ref 70–105)
HBA1C MFR BLD: 7.6 % (ref 4–6)
POTASSIUM SERPL-SCNC: 4.2 MMOL/L (ref 3.5–5.1)
PROT SERPL-MCNC: 6.3 G/DL (ref 6–8.3)
SODIUM SERPL-SCNC: 138 MMOL/L (ref 135–145)

## 2022-02-28 PROCEDURE — 80053 COMPREHEN METABOLIC PANEL: CPT

## 2022-02-28 PROCEDURE — G0463 HOSPITAL OUTPT CLINIC VISIT: HCPCS | Performed by: FAMILY MEDICINE

## 2022-02-28 PROCEDURE — 36415 COLL VENOUS BLD VENIPUNCTURE: CPT

## 2022-02-28 PROCEDURE — 83036 HEMOGLOBIN GLYCOSYLATED A1C: CPT

## 2022-02-28 PROCEDURE — 99214 OFFICE O/P EST MOD 30 MIN: CPT | Performed by: FAMILY MEDICINE

## 2022-02-28 RX ORDER — CLOBETASOL PROPIONATE 0.5 MG/G
1 OINTMENT TOPICAL 2 TIMES DAILY PRN
Qty: 30 G | Refills: 2 | Status: SHIPPED | OUTPATIENT
Start: 2022-02-28

## 2022-02-28 ASSESSMENT — ENCOUNTER SYMPTOMS
FEVER: 0
DIABETIC ASSOCIATED SYMPTOMS: 0
POLYPHAGIA: 0
PSYCHIATRIC NEGATIVE: 1
CONSTIPATION: 0
CHILLS: 0
POLYDIPSIA: 0
PALPITATIONS: 0
SHORTNESS OF BREATH: 0
MUSCULOSKELETAL NEGATIVE: 1
NAUSEA: 0
DIARRHEA: 0
ABDOMINAL PAIN: 0
FATIGUE: 0
VOMITING: 0

## 2022-02-28 NOTE — PROGRESS NOTES
Subjective      Consuelo Singh is a 76 y.o. female who presents for Diabetes.    Patient reports she did have have a bacterial infection on her right hand and wants it checked. She was seen on 1/31/2022 for this. She was started on Keflex and bactroban. She is currently using the bactroban nightly. Blood sugars have been running high.     Diabetes  She presents for her follow-up diabetic visit. She has type 2 diabetes mellitus. Her disease course has been fluctuating. There are no hypoglycemic associated symptoms. There are no diabetic associated symptoms. Pertinent negatives for diabetes include no chest pain, no fatigue, no polydipsia, no polyphagia and no polyuria. There are no hypoglycemic complications. Symptoms are stable. There are no diabetic complications. Risk factors for coronary artery disease include obesity, post-menopausal, diabetes mellitus, hypertension, tobacco exposure and dyslipidemia. Current diabetic treatment includes insulin injections. She is compliant with treatment all of the time. Insulin dose schedule: taking 60 units in the am and 60 units in the evening of glargine. She is following a generally healthy diet. Home blood sugar record trend: Uses a CGM. An ACE inhibitor/angiotensin II receptor blocker is being taken.       The following have been reviewed and updated as appropriate in this visit:          Allergies   Allergen Reactions   • Aloe Vera    • Bacitracin      ZINC   • Erythromycin Base    • Formaldehyde    • Gramicidins      OPHTHALMIC   • Hydrocortisone      ACETATE   • Latex    • Levofloxacin    • Metformin    • Neomycin    • Neomycin-Bacitracnzn-Polymyxnb    • Polymyxin B    • Prednisolone    • Prednisone    • Statins-Hmg-Coa Reductase Inhibitors    • Sulfamethoxazole    • Sulfamethoxazole-Trimethoprim    • Thimerosal    • Tresiba Flextouch U-100 [Insulin Degludec] Diarrhea and Other (see comments)     Abdominal pain   • Trimethoprim      Current Outpatient Medications  "  Medication Sig Dispense Refill   • pen needle, diabetic 31 gauge x 1/4\" needle USE 1 NEEDLE TO INJECT UNDER THE SKIN 4 TIMES DAILY Type 2 diabetes with long term insuline use. E11.9 300 each 1   • metoprolol succinate XL (TOPROL-XL) 100 mg 24 hr tablet Take 1 tablet by mouth nightly 90 tablet 2   • NovoLOG Flexpen U-100 Insulin 100 unit/mL (3 mL) insulin pen Inject 16 units in the morning, 20 units at noon, 30 units in the evening 45 mL 1   • mupirocin (BACTROBAN) 2 % ointment Apply 1 application topically 2 (two) times a day 30 g 0   • insulin glargine U-300 conc (Toujeo Max U-300 SoloStar) 300 unit/mL (3 mL) insulin pen insulin pen Inject 120 Units under the skin daily NO SUBSTITUTIONS AS PATIENT IS ALLERGIC TO OTHER INSULINS 15 mL 5   • omeprazole (PriLOSEC) 40 mg capsule Take 1 capsule (40 mg total) by mouth daily 90 capsule 3   • amLODIPine (Norvasc) 5 mg tablet Take 1 tablet (5 mg total) by mouth daily 90 tablet 3   • losartan (COZAAR) 100 mg tablet Take 1 tablet (100 mg total) by mouth daily 90 tablet 3   • hydroCHLOROthiazide (HYDRODIURIL) 25 mg tablet Take 1.5 tablets (37.5 mg total) by mouth daily 135 tablet 3   • pravastatin (PRAVACHOL) 20 mg tablet Take 1 tablet (20 mg total) by mouth 3 (three) times a week 36 tablet 3   • nortriptyline (PAMELOR) 50 mg capsule Take 1 capsule (50 mg total) by mouth daily 90 capsule 3   • lutein 20 mg tablet Take 1 tab/cap by mouth daily     • multivitamin (THERAGRAN) tablet tablet Take 1 tablet by mouth daily     • blood-glucose sensor device Use 1 sensor every 10 days. 3 Device 11   • blood-glucose meter,continuous (Dexcom G6 ) misc Use as directed. 1 each 0   • blood-glucose transmitter (Dexcom G6 Transmitter) device Use as directed.  Change every 3 months. 1 Device 4   • aspirin 325 mg tablet Take 325 mg by mouth daily.       • clobetasoL (TEMOVATE) 0.05 % ointment Apply 1 application topically 2 (two) times a day as needed (Rash) 30 g 2   • ondansetron ODT " (Zofran ODT) 4 mg disintegrating tablet Take 1 tablet (4 mg total) by mouth every 8 (eight) hours as needed for nausea or vomiting (Patient not taking: No sig reported) 20 tablet 0   • blood sugar diagnostic (ONETOUCH ULTRA TEST) strip Use one strip to check glucose TID (Patient not taking: No sig reported) 300 strip 3     No current facility-administered medications for this visit.     Past Medical History:   Diagnosis Date   • Arthritis    • Blood clot in vein     shani legs   • Deep vein thrombosis (CMS/HCC) (HCC)     hx of   • Diabetes (CMS/HCC) (HCC)     type 2   • Eczema    • Fibromyalgia    • GERD (gastroesophageal reflux disease)    • Heart murmur    • Hyperlipidemia    • Hypertension    • Peripheral neuropathy     toes   • Pneumonia    • Psoriasis    • Scoliosis    • Wears dentures     full set     Past Surgical History:   Procedure Laterality Date   • CHOLECYSTECTOMY     • COLONOSCOPY  03/11/2016    5 yr  follow up   • COLONOSCOPY N/A 05/28/2021    Please call the patient regarding her abnormal result. Repeat colonoscopy in 5 year due to 2 tubular adenomas if patient otherwise healthy at that time. Also reasonable to discontinue further colonoscopy--Dr Laboy   • HYSTERECTOMY      With BSO   • OTHER SURGICAL HISTORY      Rt. radical mastoidectomy, rebuilt canal from growth     Family History   Problem Relation Age of Onset   • Breast cancer Mother    • Lung cancer Maternal Grandmother    • Bone cancer Maternal Grandfather    • Pancreatic cancer Mother's Brother    • Breast cancer Mother's Sister    • Lung cancer Mother's Sister      Social History     Socioeconomic History   • Marital status:    Tobacco Use   • Smoking status: Former Smoker     Packs/day: 0.50     Years: 40.00     Pack years: 20.00     Quit date: 2007     Years since quitting: 15.1   • Smokeless tobacco: Never Used   Substance and Sexual Activity   • Alcohol use: Yes     Comment: Occasionally; 1-2 glasses monthly   • Drug use: No    • Sexual activity: Defer     Social Determinants of Health     Tobacco Use: Medium Risk   • Smoking Tobacco Use: Former Smoker   • Smokeless Tobacco Use: Never Used       Review of Systems   Constitutional: Negative for chills, fatigue and fever.   Respiratory: Negative for shortness of breath.    Cardiovascular: Negative for chest pain and palpitations.   Gastrointestinal: Negative for abdominal pain, constipation, diarrhea, nausea and vomiting.   Endocrine: Negative for polydipsia, polyphagia and polyuria.   Genitourinary: Negative.    Musculoskeletal: Negative.    Skin: Positive for rash.   Psychiatric/Behavioral: Negative.        Objective     VITAL SIGNS  /60 (BP Location: Left arm, Patient Position: Sitting, Cuff Size: Large Adult)   Pulse 73   Temp 36.4 °C (97.5 °F)   Resp 18   Wt 104.3 kg (230 lb)   SpO2 97%   BMI 40.74 kg/m²     Physical Exam  Vitals and nursing note reviewed.   Constitutional:       Appearance: Normal appearance. She is obese.   HENT:      Head: Normocephalic.   Cardiovascular:      Rate and Rhythm: Normal rate and regular rhythm.      Heart sounds: Normal heart sounds. No murmur heard.    No friction rub. No gallop.   Pulmonary:      Effort: Pulmonary effort is normal. No respiratory distress.      Breath sounds: Normal breath sounds. No stridor. No wheezing, rhonchi or rales.   Skin:     General: Skin is warm.      Findings: Rash present.          Neurological:      Mental Status: She is alert and oriented to person, place, and time.   Psychiatric:         Mood and Affect: Mood normal.         Behavior: Behavior normal.         Lab Results   Component Value Date    GLUCOSE 96 02/28/2022    CALCIUM 9.3 02/28/2022     02/28/2022    K 4.2 02/28/2022    CO2 26 02/28/2022     02/28/2022    BUN 28 (H) 02/28/2022    CREATININE 1.20 (H) 02/28/2022    ANIONGAP 9 02/28/2022     Lab Results   Component Value Date    WBC 11.6 (H) 01/18/2021    HGB 15.0 01/18/2021    HCT  45.0 01/18/2021    MCV 88.5 01/18/2021     01/18/2021     No results found for: TSH  Lab Results   Component Value Date    HGBA1C 7.6 (H) 02/28/2022      Lab Results   Component Value Date    CHOL 178 09/16/2020    HDL 60 09/16/2020    LDLCALC 88 09/16/2020    TRIG 152 (H) 09/16/2020     No results found for: SEDRATE    Assessment/Plan   Problem List Items Addressed This Visit        Endocrine and Metabolic    Type 2 diabetes mellitus with diabetic nephropathy, with long-term current use of insulin (CMS/[facility]) ([facility]) - Primary (Chronic)    Relevant Orders    Comprehensive metabolic panel Blood, Venous    Hemoglobin A1c (glycosylated) Blood, Venous    Lipid panel Blood, Venous    Urine Albumin/Creatinine Ratio Urine, Clean Catch      Other Visit Diagnoses     Rash        Relevant Medications    clobetasoL (TEMOVATE) 0.05 % ointment        Will have her discontinue the Bactroban ointment and start using clobetasol ointment two times daily.     A1c has increased mildly this is likely in response to increased stress.will not make any changes to her insulins today. Will recheck her A1c in 3 months.    She will follow up in 3 months.     Margarito Glass MD    Portions of this note were documented by Maryjo Monae as I performed the exam and collected the information from Consuelo Singh. I attest that I have reviewed the information as documented, verified the accuracy of the documentation and added additional information as needed

## 2022-03-01 ENCOUNTER — OFFICE VISIT (OUTPATIENT)
Dept: OTOLARYNGOLOGY | Facility: CLINIC | Age: 77
End: 2022-03-01
Payer: MEDICARE

## 2022-03-01 VITALS
DIASTOLIC BLOOD PRESSURE: 70 MMHG | OXYGEN SATURATION: 96 % | HEIGHT: 63 IN | SYSTOLIC BLOOD PRESSURE: 166 MMHG | TEMPERATURE: 97.8 F | BODY MASS INDEX: 40.75 KG/M2 | WEIGHT: 230 LBS | HEART RATE: 84 BPM

## 2022-03-01 DIAGNOSIS — Z90.89 HISTORY OF RIGHT MASTOIDECTOMY: Primary | ICD-10-CM

## 2022-03-01 DIAGNOSIS — H61.21 IMPACTED CERUMEN OF RIGHT EAR: ICD-10-CM

## 2022-03-01 PROCEDURE — G0463 HOSPITAL OUTPT CLINIC VISIT: HCPCS | Performed by: OTOLARYNGOLOGY

## 2022-03-01 PROCEDURE — 69220 CLEAN OUT MASTOID CAVITY: CPT | Performed by: OTOLARYNGOLOGY

## 2022-03-01 ASSESSMENT — ENCOUNTER SYMPTOMS
SINUS PRESSURE: 0
CHILLS: 0
TROUBLE SWALLOWING: 0
RHINORRHEA: 0
FEVER: 0
SORE THROAT: 0
CONSTITUTIONAL NEGATIVE: 1

## 2022-03-01 NOTE — PROGRESS NOTES
"Subjective    CC: Mastoid debridement    HPI: Consuelo Singh is a 76 y.o. female who comes in every 3 months for debridement of her right canal wall down mastoid cavity.  She is doing well without any new issues to report.  The ear feels fine.    Past Medical History:   Diagnosis Date   • Arthritis    • Blood clot in vein     shani legs   • Deep vein thrombosis (CMS/HCC) (HCC)     hx of   • Diabetes (CMS/HCC) (HCC)     type 2   • Eczema    • Fibromyalgia    • GERD (gastroesophageal reflux disease)    • Heart murmur    • Hyperlipidemia    • Hypertension    • Peripheral neuropathy     toes   • Pneumonia    • Psoriasis    • Scoliosis    • Wears dentures     full set       Past Surgical History:   Procedure Laterality Date   • CHOLECYSTECTOMY     • COLONOSCOPY  03/11/2016    5 yr  follow up   • COLONOSCOPY N/A 05/28/2021    Please call the patient regarding her abnormal result. Repeat colonoscopy in 5 year due to 2 tubular adenomas if patient otherwise healthy at that time. Also reasonable to discontinue further colonoscopy--Dr Laboy   • HYSTERECTOMY      With BSO   • OTHER SURGICAL HISTORY      Rt. radical mastoidectomy, rebuilt canal from growth         Current Outpatient Medications:   •  clobetasoL (TEMOVATE) 0.05 % ointment, Apply 1 application topically 2 (two) times a day as needed (Rash), Disp: 30 g, Rfl: 2  •  pen needle, diabetic 31 gauge x 1/4\" needle, USE 1 NEEDLE TO INJECT UNDER THE SKIN 4 TIMES DAILY Type 2 diabetes with long term insuline use. E11.9, Disp: 300 each, Rfl: 1  •  metoprolol succinate XL (TOPROL-XL) 100 mg 24 hr tablet, Take 1 tablet by mouth nightly, Disp: 90 tablet, Rfl: 2  •  NovoLOG Flexpen U-100 Insulin 100 unit/mL (3 mL) insulin pen, Inject 16 units in the morning, 20 units at noon, 30 units in the evening, Disp: 45 mL, Rfl: 1  •  mupirocin (BACTROBAN) 2 % ointment, Apply 1 application topically 2 (two) times a day, Disp: 30 g, Rfl: 0  •  insulin glargine U-300 conc (Toujeo Max U-300 " SoloStar) 300 unit/mL (3 mL) insulin pen insulin pen, Inject 120 Units under the skin daily NO SUBSTITUTIONS AS PATIENT IS ALLERGIC TO OTHER INSULINS, Disp: 15 mL, Rfl: 5  •  omeprazole (PriLOSEC) 40 mg capsule, Take 1 capsule (40 mg total) by mouth daily, Disp: 90 capsule, Rfl: 3  •  amLODIPine (Norvasc) 5 mg tablet, Take 1 tablet (5 mg total) by mouth daily, Disp: 90 tablet, Rfl: 3  •  losartan (COZAAR) 100 mg tablet, Take 1 tablet (100 mg total) by mouth daily, Disp: 90 tablet, Rfl: 3  •  hydroCHLOROthiazide (HYDRODIURIL) 25 mg tablet, Take 1.5 tablets (37.5 mg total) by mouth daily, Disp: 135 tablet, Rfl: 3  •  pravastatin (PRAVACHOL) 20 mg tablet, Take 1 tablet (20 mg total) by mouth 3 (three) times a week, Disp: 36 tablet, Rfl: 3  •  nortriptyline (PAMELOR) 50 mg capsule, Take 1 capsule (50 mg total) by mouth daily, Disp: 90 capsule, Rfl: 3  •  lutein 20 mg tablet, Take 1 tab/cap by mouth daily, Disp: , Rfl:   •  multivitamin (THERAGRAN) tablet tablet, Take 1 tablet by mouth daily, Disp: , Rfl:   •  ondansetron ODT (Zofran ODT) 4 mg disintegrating tablet, Take 1 tablet (4 mg total) by mouth every 8 (eight) hours as needed for nausea or vomiting (Patient not taking: No sig reported), Disp: 20 tablet, Rfl: 0  •  blood-glucose sensor device, Use 1 sensor every 10 days., Disp: 3 Device, Rfl: 11  •  blood-glucose meter,continuous (Dexcom G6 ) misc, Use as directed., Disp: 1 each, Rfl: 0  •  blood-glucose transmitter (Dexcom G6 Transmitter) device, Use as directed.  Change every 3 months., Disp: 1 Device, Rfl: 4  •  blood sugar diagnostic (ONETOUCH ULTRA TEST) strip, Use one strip to check glucose TID (Patient not taking: No sig reported), Disp: 300 strip, Rfl: 3  •  aspirin 325 mg tablet, Take 325 mg by mouth daily.  , Disp: , Rfl:     Allergies   Allergen Reactions   • Aloe Vera    • Bacitracin      ZINC   • Erythromycin Base    • Formaldehyde    • Gramicidins      OPHTHALMIC   • Hydrocortisone       "ACETATE   • Latex    • Levofloxacin    • Metformin    • Neomycin    • Neomycin-Bacitracnzn-Polymyxnb    • Polymyxin B    • Prednisolone    • Prednisone    • Statins-Hmg-Coa Reductase Inhibitors    • Sulfamethoxazole    • Sulfamethoxazole-Trimethoprim    • Thimerosal    • Tresiba Flextouch U-100 [Insulin Degludec] Diarrhea and Other (see comments)     Abdominal pain   • Trimethoprim        family history includes Bone cancer in her maternal grandfather; Breast cancer in her mother and mother's sister; Lung cancer in her maternal grandmother and mother's sister; Pancreatic cancer in her mother's brother.    Social History     Tobacco Use   • Smoking status: Former Smoker     Packs/day: 0.50     Years: 40.00     Pack years: 20.00     Quit date: 2007     Years since quitting: 15.1   • Smokeless tobacco: Never Used   Substance Use Topics   • Alcohol use: Yes     Comment: Occasionally; 1-2 glasses monthly   • Drug use: No       Review of Systems   Constitutional: Negative.  Negative for chills and fever.   HENT: Negative.  Negative for congestion, ear discharge, ear pain, hearing loss, nosebleeds, postnasal drip, rhinorrhea, sinus pressure, sneezing, sore throat, tinnitus and trouble swallowing.        Objective    /70 (BP Location: Right arm, Patient Position: Sitting, Cuff Size: Long Adult)   Pulse 84   Temp 36.6 °C (97.8 °F) (Temporal)   Ht 1.6 m (5' 3\")   Wt 104.3 kg (230 lb)   SpO2 96%   BMI 40.74 kg/m²     General: Well-developed well-nourished female no acute distress.    Ears: Left external ear, external auditory canal and tympanic membrane normal.  On the right the patient is status post canal wall down mastoidectomy.  Meatus is widely patent.  Mastoid cavity with some significant amount of impacted cerumen.  This was removed with a curette and forceps.  No purulence or granulation.    Procedure: Mastoid debridement, right.  The right ear was examined with the hand-held otoscope.  A combination of " curette and forceps were used to debride the dried cerumen that was impacted in the mastoid cavity.  This took about 15 minutes total.    Diagnosis    1. History of right mastoidectomy    2. Impacted cerumen of right ear        Recommendations    Mastoid debridement done today.  Follow-up in 3 months for repeat.  Sooner as needed for problems.

## 2022-03-14 ENCOUNTER — ANCILLARY PROCEDURE (OUTPATIENT)
Dept: MAMMOGRAPHY | Facility: CLINIC | Age: 77
End: 2022-03-14
Payer: MEDICARE

## 2022-03-14 DIAGNOSIS — Z12.31 SCREENING MAMMOGRAM, ENCOUNTER FOR: ICD-10-CM

## 2022-03-14 PROCEDURE — 77063 BREAST TOMOSYNTHESIS BI: CPT

## 2022-03-14 PROCEDURE — 77063 BREAST TOMOSYNTHESIS BI: CPT | Mod: 26 | Performed by: RADIOLOGY

## 2022-03-14 PROCEDURE — 77067 SCR MAMMO BI INCL CAD: CPT | Mod: 26 | Performed by: RADIOLOGY

## 2022-03-28 ENCOUNTER — OFFICE VISIT (OUTPATIENT)
Dept: URGENT CARE | Facility: CLINIC | Age: 77
End: 2022-03-28
Payer: MEDICARE

## 2022-03-28 ENCOUNTER — TELEPHONE (OUTPATIENT)
Dept: FAMILY MEDICINE | Facility: CLINIC | Age: 77
End: 2022-03-28

## 2022-03-28 ENCOUNTER — ANCILLARY PROCEDURE (OUTPATIENT)
Dept: RADIOLOGY | Facility: CLINIC | Age: 77
End: 2022-03-28
Payer: MEDICARE

## 2022-03-28 VITALS
OXYGEN SATURATION: 92 % | DIASTOLIC BLOOD PRESSURE: 80 MMHG | BODY MASS INDEX: 40.03 KG/M2 | TEMPERATURE: 97.7 F | WEIGHT: 226 LBS | SYSTOLIC BLOOD PRESSURE: 201 MMHG | RESPIRATION RATE: 20 BRPM | HEART RATE: 71 BPM

## 2022-03-28 DIAGNOSIS — R09.81 NASAL CONGESTION: ICD-10-CM

## 2022-03-28 DIAGNOSIS — R05.9 COUGH: ICD-10-CM

## 2022-03-28 DIAGNOSIS — R06.2 WHEEZING: Primary | ICD-10-CM

## 2022-03-28 DIAGNOSIS — B37.9 ANTIBIOTIC-INDUCED YEAST INFECTION: ICD-10-CM

## 2022-03-28 DIAGNOSIS — T36.95XA ANTIBIOTIC-INDUCED YEAST INFECTION: ICD-10-CM

## 2022-03-28 LAB — SARS-COV-2 RDRP RESP QL NAA+PROBE: NORMAL

## 2022-03-28 PROCEDURE — 99213 OFFICE O/P EST LOW 20 MIN: CPT | Mod: CS | Performed by: NURSE PRACTITIONER

## 2022-03-28 PROCEDURE — 71046 X-RAY EXAM CHEST 2 VIEWS: CPT

## 2022-03-28 PROCEDURE — 71046 X-RAY EXAM CHEST 2 VIEWS: CPT | Mod: 26,CMS | Performed by: INTERNAL MEDICINE

## 2022-03-28 PROCEDURE — C9803 HOPD COVID-19 SPEC COLLECT: HCPCS | Mod: CS | Performed by: NURSE PRACTITIONER

## 2022-03-28 PROCEDURE — G0463 HOSPITAL OUTPT CLINIC VISIT: HCPCS | Performed by: NURSE PRACTITIONER

## 2022-03-28 RX ORDER — DOXYCYCLINE 100 MG/1
100 CAPSULE ORAL 2 TIMES DAILY
Qty: 14 CAPSULE | Refills: 0 | Status: SHIPPED | OUTPATIENT
Start: 2022-03-28 | End: 2022-04-04

## 2022-03-28 RX ORDER — FLUCONAZOLE 150 MG/1
150 TABLET ORAL ONCE
Qty: 1 TABLET | Refills: 1 | Status: SHIPPED | OUTPATIENT
Start: 2022-03-28 | End: 2022-03-28

## 2022-03-28 RX ORDER — PEN NEEDLE, DIABETIC 32GX 5/32"
NEEDLE, DISPOSABLE MISCELLANEOUS
COMMUNITY
Start: 2022-02-23 | End: 2022-06-17 | Stop reason: SDUPTHER

## 2022-03-28 RX ORDER — AMOXICILLIN 500 MG/1
1000 CAPSULE ORAL 3 TIMES DAILY
Qty: 42 CAPSULE | Refills: 0 | Status: SHIPPED | OUTPATIENT
Start: 2022-03-28 | End: 2022-04-04

## 2022-03-28 RX ORDER — CODEINE PHOSPHATE AND GUAIFENESIN 10; 100 MG/5ML; MG/5ML
5 SOLUTION ORAL 3 TIMES DAILY PRN
Qty: 120 ML | Refills: 0 | Status: SHIPPED | OUTPATIENT
Start: 2022-03-28 | End: 2022-06-13

## 2022-03-28 ASSESSMENT — ENCOUNTER SYMPTOMS
SHORTNESS OF BREATH: 1
FEVER: 0
COUGH: 1
SINUS PRESSURE: 1
CHILLS: 0

## 2022-03-28 NOTE — TELEPHONE ENCOUNTER
Discussed with pharmacist to fill both prescriptions due to treating patient for community acquired pneumonia.

## 2022-03-28 NOTE — PROGRESS NOTES
"Subjective      Consuelo Singh is a 76 y.o. female who presents for   Chief Complaint   Patient presents with   • Cough     X6 days. Productive cough, yellow to clear. Negative home COVID test on Thursday.    • Nasal Congestion     X6 days, taking cough medication with no improvement.         HPI   Consuelo presents today for cough, nasal congestion, postnasal drip, and shortness of breath with exertion has been present for approximately 6 days.  She reports that she is prone to pneumonias.    The following have been reviewed and updated as appropriate in this visit:   Allergies  Meds  Problems  Med Hx  Surg Hx  Fam Hx         Allergies   Allergen Reactions   • Aloe Vera    • Bacitracin      ZINC   • Erythromycin Base    • Formaldehyde    • Gramicidins      OPHTHALMIC   • Hydrocortisone      ACETATE   • Latex    • Levofloxacin    • Metformin    • Neomycin    • Neomycin-Bacitracnzn-Polymyxnb    • Polymyxin B    • Prednisolone    • Prednisone    • Statins-Hmg-Coa Reductase Inhibitors    • Sulfamethoxazole    • Sulfamethoxazole-Trimethoprim    • Thimerosal    • Tresiba Flextouch U-100 [Insulin Degludec] Diarrhea and Other (see comments)     Abdominal pain   • Trimethoprim      Current Outpatient Medications   Medication Sig Dispense Refill   • pen needle, diabetic 31 gauge x 15/64\" needle SMARTSIG:SUB-Q     • clobetasoL (TEMOVATE) 0.05 % ointment Apply 1 application topically 2 (two) times a day as needed (Rash) 30 g 2   • pen needle, diabetic 31 gauge x 1/4\" needle USE 1 NEEDLE TO INJECT UNDER THE SKIN 4 TIMES DAILY Type 2 diabetes with long term insuline use. E11.9 300 each 1   • metoprolol succinate XL (TOPROL-XL) 100 mg 24 hr tablet Take 1 tablet by mouth nightly 90 tablet 2   • NovoLOG Flexpen U-100 Insulin 100 unit/mL (3 mL) insulin pen Inject 16 units in the morning, 20 units at noon, 30 units in the evening 45 mL 1   • mupirocin (BACTROBAN) 2 % ointment Apply 1 application topically 2 (two) times a day 30 g " 0   • insulin glargine U-300 conc (Toujeo Max U-300 SoloStar) 300 unit/mL (3 mL) insulin pen insulin pen Inject 120 Units under the skin daily NO SUBSTITUTIONS AS PATIENT IS ALLERGIC TO OTHER INSULINS 15 mL 5   • omeprazole (PriLOSEC) 40 mg capsule Take 1 capsule (40 mg total) by mouth daily 90 capsule 3   • amLODIPine (Norvasc) 5 mg tablet Take 1 tablet (5 mg total) by mouth daily 90 tablet 3   • losartan (COZAAR) 100 mg tablet Take 1 tablet (100 mg total) by mouth daily 90 tablet 3   • hydroCHLOROthiazide (HYDRODIURIL) 25 mg tablet Take 1.5 tablets (37.5 mg total) by mouth daily 135 tablet 3   • pravastatin (PRAVACHOL) 20 mg tablet Take 1 tablet (20 mg total) by mouth 3 (three) times a week 36 tablet 3   • nortriptyline (PAMELOR) 50 mg capsule Take 1 capsule (50 mg total) by mouth daily 90 capsule 3   • lutein 20 mg tablet Take 1 tab/cap by mouth daily     • multivitamin (THERAGRAN) tablet tablet Take 1 tablet by mouth daily     • blood-glucose sensor device Use 1 sensor every 10 days. 3 Device 11   • blood-glucose meter,continuous (Dexcom G6 ) misc Use as directed. 1 each 0   • blood-glucose transmitter (Dexcom G6 Transmitter) device Use as directed.  Change every 3 months. 1 Device 4   • aspirin 325 mg tablet Take 325 mg by mouth daily.       • amoxicillin (AMOXIL) 500 mg capsule Take 2 capsules (1,000 mg total) by mouth 3 (three) times a day for 7 days 42 capsule 0   • doxycycline (MONODOX) 100 mg capsule Take 1 capsule (100 mg total) by mouth 2 (two) times a day for 7 days 14 capsule 0   • fluconazole (Diflucan) 150 mg tablet Take 1 tablet (150 mg total) by mouth once for 1 dose 1 tablet 1   • ondansetron ODT (Zofran ODT) 4 mg disintegrating tablet Take 1 tablet (4 mg total) by mouth every 8 (eight) hours as needed for nausea or vomiting (Patient not taking: No sig reported) 20 tablet 0   • blood sugar diagnostic (ONETOUCH ULTRA TEST) strip Use one strip to check glucose TID (Patient not taking: No sig  reported) 300 strip 3     No current facility-administered medications for this visit.     Past Medical History:   Diagnosis Date   • Arthritis    • Blood clot in vein     shani legs   • Deep vein thrombosis (CMS/HCC) (HCC)     hx of   • Diabetes (CMS/HCC) (HCC)     type 2   • Eczema    • Fibromyalgia    • GERD (gastroesophageal reflux disease)    • Heart murmur    • Hyperlipidemia    • Hypertension    • Peripheral neuropathy     toes   • Pneumonia    • Psoriasis    • Scoliosis    • Wears dentures     full set     Past Surgical History:   Procedure Laterality Date   • CHOLECYSTECTOMY     • COLONOSCOPY  03/11/2016    5 yr  follow up   • COLONOSCOPY N/A 05/28/2021    Please call the patient regarding her abnormal result. Repeat colonoscopy in 5 year due to 2 tubular adenomas if patient otherwise healthy at that time. Also reasonable to discontinue further colonoscopy--Dr Laboy   • HYSTERECTOMY      With BSO   • OTHER SURGICAL HISTORY      Rt. radical mastoidectomy, rebuilt canal from growth     Family History   Problem Relation Age of Onset   • Breast cancer Mother    • Lung cancer Maternal Grandmother    • Bone cancer Maternal Grandfather    • Pancreatic cancer Mother's Brother    • Breast cancer Mother's Sister    • Lung cancer Mother's Sister      Social History     Socioeconomic History   • Marital status:    Tobacco Use   • Smoking status: Former Smoker     Packs/day: 0.50     Years: 40.00     Pack years: 20.00     Quit date: 2007     Years since quitting: 15.2   • Smokeless tobacco: Never Used   Substance and Sexual Activity   • Alcohol use: Yes     Comment: Occasionally; 1-2 glasses monthly   • Drug use: No   • Sexual activity: Defer     Social Determinants of Health     Tobacco Use: Medium Risk   • Smoking Tobacco Use: Former Smoker   • Smokeless Tobacco Use: Never Used       Review of Systems   Constitutional: Negative for chills and fever.   HENT: Positive for congestion, postnasal drip and sinus  pressure. Negative for ear pain.    Respiratory: Positive for cough and shortness of breath.    Cardiovascular: Negative for chest pain.       Objective   Vitals:    03/28/22 1220   BP: (!) 201/80  Comment: Did not take BP meds this morning.   Temp: 36.5 °C (97.7 °F)   TempSrc: Temporal   Pulse: 71   Resp: 20   SpO2: 92%   Weight: 102.5 kg (226 lb)   Patient Position: Sitting        Physical Exam  Vitals and nursing note reviewed.   Constitutional:       General: She is not in acute distress.     Appearance: Normal appearance.   HENT:      Head: Normocephalic.      Right Ear: Tympanic membrane normal.      Left Ear: Tympanic membrane normal.      Nose: Congestion present.      Mouth/Throat:      Mouth: Mucous membranes are moist.      Pharynx: Posterior oropharyngeal erythema (Evidence of postnasal drip present in the posterior pharynx) present.   Eyes:      Conjunctiva/sclera: Conjunctivae normal.      Pupils: Pupils are equal, round, and reactive to light.   Cardiovascular:      Rate and Rhythm: Normal rate and regular rhythm.      Heart sounds: Normal heart sounds.   Pulmonary:      Effort: Pulmonary effort is normal.      Breath sounds: Wheezing present.   Skin:     General: Skin is warm and dry.   Neurological:      Mental Status: She is alert and oriented to person, place, and time.         Recent Results (from the past 24 hour(s))   POCT COVID-19, IDNOW PCR    Collection Time: 03/28/22 12:49 PM   Result Value Ref Range    COVID-19 PCR  Negative     Negative - For Healthcare Providers: https://www.fda.gov/media/355418/download   For Patients: https://www.fda.media/517010/download       Assessment/Plan   Diagnoses and all orders for this visit:    Wheezing  -     X-ray chest 2 views  -     amoxicillin (AMOXIL) 500 mg capsule; Take 2 capsules (1,000 mg total) by mouth 3 (three) times a day for 7 days  -     doxycycline (MONODOX) 100 mg capsule; Take 1 capsule (100 mg total) by mouth 2 (two) times a day for 7  days    Cough  -     POCT COVID-19, IDNOW PCR    Antibiotic-induced yeast infection  -     fluconazole (Diflucan) 150 mg tablet; Take 1 tablet (150 mg total) by mouth once for 1 dose    Nasal congestion    Consuelo presented today with an elevated blood pressure.  She reports that she did not take her BP medications and took them immediately.     Covid test was negative.  X-ray was reviewed with patient during visit. Based on presentation and exam findings that are consistent with a probable pneumonia will treat with amoxicillin and doxycycline as prescribed.  She reports a history of antibiotic-induced yeast infections so Diflucan was also prescribed.  She was given Robitussin-AC for cough as she has not had success with Tessalon Perles in the past.  May take Tylenol for discomfort or fever.  Drink plenty of fluids and rest.  Return to urgent care with any new or worsening symptoms.  She expressed agreement with this plan.     There are no Patient Instructions on file for this visit.    Deandra Cho, DNP

## 2022-03-28 NOTE — TELEPHONE ENCOUNTER
They are calling in because she will be now taking doxycyline and amoxicillin and they think it is kind of a repeat of medication. Please call and advice.

## 2022-05-11 ENCOUNTER — OFFICE VISIT (OUTPATIENT)
Dept: FAMILY MEDICINE | Facility: CLINIC | Age: 77
End: 2022-05-11
Payer: MEDICARE

## 2022-05-11 VITALS
SYSTOLIC BLOOD PRESSURE: 110 MMHG | TEMPERATURE: 97.8 F | HEART RATE: 78 BPM | OXYGEN SATURATION: 95 % | DIASTOLIC BLOOD PRESSURE: 66 MMHG | RESPIRATION RATE: 18 BRPM

## 2022-05-11 DIAGNOSIS — J01.90 ACUTE RHINOSINUSITIS: ICD-10-CM

## 2022-05-11 DIAGNOSIS — R05.9 COUGH: Primary | ICD-10-CM

## 2022-05-11 LAB — SARS-COV-2 RDRP RESP QL NAA+PROBE: NORMAL

## 2022-05-11 PROCEDURE — 99213 OFFICE O/P EST LOW 20 MIN: CPT | Performed by: FAMILY MEDICINE

## 2022-05-11 PROCEDURE — 87635 SARS-COV-2 COVID-19 AMP PRB: CPT | Mod: QW | Performed by: FAMILY MEDICINE

## 2022-05-11 PROCEDURE — G0463 HOSPITAL OUTPT CLINIC VISIT: HCPCS | Performed by: FAMILY MEDICINE

## 2022-05-11 RX ORDER — BENZONATATE 200 MG/1
200 CAPSULE ORAL 3 TIMES DAILY PRN
Qty: 30 CAPSULE | Refills: 0 | Status: SHIPPED | OUTPATIENT
Start: 2022-05-11 | End: 2022-05-21

## 2022-05-11 ASSESSMENT — PAIN SCALES - GENERAL: PAINLEVEL: 0-NO PAIN

## 2022-05-11 NOTE — PROGRESS NOTES
SUBJECTIVE:   Consuelo Singh is a 76 y.o. female who complains of productive cough for 21 days. She denies a history of chills, dizziness, fatigue, fevers, myalgias, nausea, sweats, vomiting and weakness and denies a history of asthma.  Previous tobacco user.  Does note occasional shortness of breath symptoms.     OBJECTIVE:  She appears well, vital signs are as noted. Ears normal.  Throat and pharynx normal.  Neck supple. No adenopathy in the neck. Nose is congested. Sinuses non tender. The chest is clear, without wheezes or rales.    ASSESSMENT:   bronchitis  Rhinosinusitis    PLAN:  Symptomatic therapy suggested: push fluids, rest, gargle warm salt water, use vaporizer or mist prn, use antihistamine-decongestant of choice, cough suppressant of choice prn, apply heat to sinuses prn and return office visit prn if symptoms persist or worsen. Lack of antibiotic effectiveness discussed with her. Call or return to clinic prn if these symptoms worsen or fail to improve as anticipated.  Prescription for benzonatate was provided.  Notify us if not improving over the next 1 to 2 weeks.  Negative COVID testing

## 2022-05-31 ENCOUNTER — APPOINTMENT (OUTPATIENT)
Dept: LAB | Facility: CLINIC | Age: 77
End: 2022-05-31
Payer: MEDICARE

## 2022-05-31 DIAGNOSIS — Z79.4 TYPE 2 DIABETES MELLITUS WITH DIABETIC NEPHROPATHY, WITH LONG-TERM CURRENT USE OF INSULIN (CMS/HCC): ICD-10-CM

## 2022-05-31 DIAGNOSIS — E11.21 TYPE 2 DIABETES MELLITUS WITH DIABETIC NEPHROPATHY, WITH LONG-TERM CURRENT USE OF INSULIN (CMS/HCC): ICD-10-CM

## 2022-05-31 LAB
ALBUMIN SERPL-MCNC: 4.2 G/DL (ref 3.5–5.3)
ALP SERPL-CCNC: 104 U/L (ref 55–142)
ALT SERPL-CCNC: 39 U/L (ref 7–52)
ANION GAP SERPL CALC-SCNC: 9 MMOL/L (ref 3–11)
AST SERPL-CCNC: 32 U/L
BILIRUB SERPL-MCNC: 0.49 MG/DL (ref 0.2–1.4)
BUN SERPL-MCNC: 31 MG/DL (ref 7–25)
CALCIUM ALBUM COR SERPL-MCNC: 9.3 MG/DL (ref 8.6–10.3)
CALCIUM SERPL-MCNC: 9.5 MG/DL (ref 8.6–10.3)
CHLORIDE SERPL-SCNC: 101 MMOL/L (ref 98–107)
CHOLEST SERPL-MCNC: 181 MG/DL (ref 0–199)
CO2 SERPL-SCNC: 28 MMOL/L (ref 21–32)
CREAT SERPL-MCNC: 1.17 MG/DL (ref 0.6–1.1)
CREAT UR-MCNC: 174.3 MG/DL
EST. AVERAGE GLUCOSE BLD GHB EST-MCNC: 171.4 MG/DL
FASTING STATUS PATIENT QL REPORTED: YES
GFR SERPL CREATININE-BSD FRML MDRD: 49 ML/MIN/1.73M*2
GLUCOSE SERPL-MCNC: 145 MG/DL (ref 70–105)
HBA1C MFR BLD: 7.6 % (ref 4–6)
HDLC SERPL-MCNC: 61 MG/DL
LDLC SERPL CALC-MCNC: 86 MG/DL (ref 20–99)
MICROALBUMIN UR-MCNC: 513.3 MG/L (ref 0–30)
MICROALBUMIN/CREAT UR: 294.5 MG/G CREAT (ref 0–34)
POTASSIUM SERPL-SCNC: 4.4 MMOL/L (ref 3.5–5.1)
PROT SERPL-MCNC: 6.9 G/DL (ref 6–8.3)
SODIUM SERPL-SCNC: 138 MMOL/L (ref 135–145)
TRIGL SERPL-MCNC: 170 MG/DL

## 2022-05-31 PROCEDURE — 36415 COLL VENOUS BLD VENIPUNCTURE: CPT

## 2022-05-31 PROCEDURE — 83036 HEMOGLOBIN GLYCOSYLATED A1C: CPT

## 2022-05-31 PROCEDURE — 82570 ASSAY OF URINE CREATININE: CPT

## 2022-05-31 PROCEDURE — 80061 LIPID PANEL: CPT

## 2022-05-31 PROCEDURE — 80053 COMPREHEN METABOLIC PANEL: CPT

## 2022-06-01 ENCOUNTER — OFFICE VISIT (OUTPATIENT)
Dept: FAMILY MEDICINE | Facility: CLINIC | Age: 77
End: 2022-06-01
Payer: MEDICARE

## 2022-06-01 VITALS
BODY MASS INDEX: 41.63 KG/M2 | RESPIRATION RATE: 18 BRPM | SYSTOLIC BLOOD PRESSURE: 128 MMHG | WEIGHT: 235 LBS | TEMPERATURE: 97.1 F | HEART RATE: 75 BPM | OXYGEN SATURATION: 96 % | DIASTOLIC BLOOD PRESSURE: 64 MMHG

## 2022-06-01 DIAGNOSIS — F32.A DEPRESSIVE DISORDER: ICD-10-CM

## 2022-06-01 DIAGNOSIS — E78.00 PURE HYPERCHOLESTEROLEMIA: ICD-10-CM

## 2022-06-01 DIAGNOSIS — E11.21 TYPE 2 DIABETES MELLITUS WITH DIABETIC NEPHROPATHY, WITH LONG-TERM CURRENT USE OF INSULIN (CMS/HCC): Primary | ICD-10-CM

## 2022-06-01 DIAGNOSIS — N18.31 STAGE 3A CHRONIC KIDNEY DISEASE (CMS/HCC): Chronic | ICD-10-CM

## 2022-06-01 DIAGNOSIS — Z79.4 TYPE 2 DIABETES MELLITUS WITH DIABETIC NEPHROPATHY, WITH LONG-TERM CURRENT USE OF INSULIN (CMS/HCC): Primary | ICD-10-CM

## 2022-06-01 DIAGNOSIS — I10 ESSENTIAL HYPERTENSION: Chronic | ICD-10-CM

## 2022-06-01 DIAGNOSIS — E11.9 DIABETES MELLITUS WITHOUT COMPLICATION (CMS/HCC): ICD-10-CM

## 2022-06-01 PROCEDURE — G0463 HOSPITAL OUTPT CLINIC VISIT: HCPCS | Performed by: FAMILY MEDICINE

## 2022-06-01 PROCEDURE — 99214 OFFICE O/P EST MOD 30 MIN: CPT | Performed by: FAMILY MEDICINE

## 2022-06-01 RX ORDER — PRAVASTATIN SODIUM 20 MG/1
20 TABLET ORAL 3 TIMES WEEKLY
Qty: 36 TABLET | Refills: 3 | Status: SHIPPED | OUTPATIENT
Start: 2022-06-01 | End: 2023-03-03 | Stop reason: SDUPTHER

## 2022-06-01 RX ORDER — INSULIN GLARGINE 300 U/ML
65 INJECTION, SOLUTION SUBCUTANEOUS 2 TIMES DAILY
Qty: 33 ML | Refills: 11 | Status: SHIPPED | OUTPATIENT
Start: 2022-06-01 | End: 2022-08-31 | Stop reason: DRUGHIGH

## 2022-06-01 RX ORDER — NORTRIPTYLINE HYDROCHLORIDE 50 MG/1
50 CAPSULE ORAL DAILY
Qty: 90 CAPSULE | Refills: 3 | Status: SHIPPED | OUTPATIENT
Start: 2022-06-01 | End: 2023-03-03 | Stop reason: SDUPTHER

## 2022-06-01 RX ORDER — INSULIN ASPART 100 [IU]/ML
INJECTION, SOLUTION INTRAVENOUS; SUBCUTANEOUS
Qty: 45 ML | Refills: 11 | Status: SHIPPED | OUTPATIENT
Start: 2022-06-01 | End: 2022-09-21 | Stop reason: SDUPTHER

## 2022-06-01 ASSESSMENT — ENCOUNTER SYMPTOMS
POLYDIPSIA: 0
POLYPHAGIA: 0
DIABETIC ASSOCIATED SYMPTOMS: 0
FATIGUE: 0

## 2022-06-01 NOTE — PROGRESS NOTES
"Subjective      Consuelo Singh is a 76 y.o. female who presents for Follow-up (Diabetic recheck; uses dexcom) and Med Refill      Diabetes  She presents for her follow-up diabetic visit. She has type 2 diabetes mellitus. Her disease course has been fluctuating. There are no hypoglycemic associated symptoms. There are no diabetic associated symptoms. Pertinent negatives for diabetes include no chest pain, no fatigue, no polydipsia, no polyphagia and no polyuria. There are no hypoglycemic complications. Symptoms are stable. There are no diabetic complications. Risk factors for coronary artery disease include obesity, post-menopausal, diabetes mellitus, hypertension, tobacco exposure and dyslipidemia. Current diabetic treatment includes insulin injections. She is compliant with treatment all of the time. Insulin dose schedule: novolog 16 units in the morning, 20 units at noon, 30 units in the evening; toujeo 60 units in the am and 60 units in the pm. She is following a generally healthy diet. Home blood sugar record trend: Uses a CGM. An ACE inhibitor/angiotensin II receptor blocker is being taken.       The following have been reviewed and updated as appropriate in this visit:   Tobacco  Allergies  Meds  Problems  Med Hx  Surg Hx  Fam Hx           Allergies   Allergen Reactions   • Aloe Vera    • Bacitracin      ZINC   • Erythromycin Base    • Formaldehyde    • Gramicidins      OPHTHALMIC   • Hydrocortisone      ACETATE   • Latex    • Levofloxacin    • Metformin    • Neomycin    • Neomycin-Bacitracnzn-Polymyxnb    • Polymyxin B    • Prednisolone    • Prednisone    • Statins-Hmg-Coa Reductase Inhibitors    • Sulfamethoxazole    • Sulfamethoxazole-Trimethoprim    • Thimerosal    • Tresiba Flextouch U-100 [Insulin Degludec] Diarrhea and Other (see comments)     Abdominal pain   • Trimethoprim      Current Outpatient Medications   Medication Sig Dispense Refill   • pen needle, diabetic 31 gauge x 15/64\" needle " "SMARTSIG:SUB-Q     • clobetasoL (TEMOVATE) 0.05 % ointment Apply 1 application topically 2 (two) times a day as needed (Rash) 30 g 2   • pen needle, diabetic 31 gauge x 1/4\" needle USE 1 NEEDLE TO INJECT UNDER THE SKIN 4 TIMES DAILY Type 2 diabetes with long term insuline use. E11.9 300 each 1   • metoprolol succinate XL (TOPROL-XL) 100 mg 24 hr tablet Take 1 tablet by mouth nightly 90 tablet 2   • mupirocin (BACTROBAN) 2 % ointment Apply 1 application topically 2 (two) times a day 30 g 0   • omeprazole (PriLOSEC) 40 mg capsule Take 1 capsule (40 mg total) by mouth daily 90 capsule 3   • amLODIPine (Norvasc) 5 mg tablet Take 1 tablet (5 mg total) by mouth daily 90 tablet 3   • losartan (COZAAR) 100 mg tablet Take 1 tablet (100 mg total) by mouth daily 90 tablet 3   • hydroCHLOROthiazide (HYDRODIURIL) 25 mg tablet Take 1.5 tablets (37.5 mg total) by mouth daily 135 tablet 3   • lutein 20 mg tablet Take 1 tab/cap by mouth daily     • multivitamin (THERAGRAN) tablet tablet Take 1 tablet by mouth daily     • blood-glucose sensor device Use 1 sensor every 10 days. 3 Device 11   • blood-glucose meter,continuous (Dexcom G6 ) misc Use as directed. 1 each 0   • blood-glucose transmitter (Dexcom G6 Transmitter) device Use as directed.  Change every 3 months. 1 Device 4   • blood sugar diagnostic (ONETOUCH ULTRA TEST) strip Use one strip to check glucose  strip 3   • aspirin 325 mg tablet Take 325 mg by mouth daily.       • nortriptyline (PAMELOR) 50 mg capsule Take 1 capsule (50 mg total) by mouth daily 90 capsule 3   • pravastatin (PRAVACHOL) 20 mg tablet Take 1 tablet (20 mg total) by mouth 3 (three) times a week 36 tablet 3   • insulin glargine U-300 conc (Toujeo Max U-300 SoloStar) 300 unit/mL (3 mL) insulin pen insulin pen Inject 65 Units under the skin 2 (two) times a day NO SUBSTITUTIONS AS PATIENT IS ALLERGIC TO OTHER INSULINS 33 mL 11   • NovoLOG Flexpen U-100 Insulin 100 unit/mL (3 mL) insulin pen " Inject 16 units in the morning, 23 units at noon, 33 units in the evening 45 mL 11   • codeine-guaifenesin (ROBITUSSIN-AC)  mg/5 mL liquid Take 5 mL by mouth 3 (three) times a day as needed for cough Max Daily Amount: 15 mL (Patient not taking: No sig reported) 120 mL 0   • ondansetron ODT (Zofran ODT) 4 mg disintegrating tablet Take 1 tablet (4 mg total) by mouth every 8 (eight) hours as needed for nausea or vomiting (Patient not taking: No sig reported) 20 tablet 0     No current facility-administered medications for this visit.     Past Medical History:   Diagnosis Date   • Arthritis    • Blood clot in vein     shani legs   • Deep vein thrombosis (CMS/HCC) (HCC)     hx of   • Diabetes (CMS/HCC) (HCC)     type 2   • Eczema    • Fibromyalgia    • GERD (gastroesophageal reflux disease)    • Heart murmur    • Hyperlipidemia    • Hypertension    • Peripheral neuropathy     toes   • Pneumonia    • Psoriasis    • Scoliosis    • Wears dentures     full set     Past Surgical History:   Procedure Laterality Date   • CHOLECYSTECTOMY     • COLONOSCOPY  03/11/2016    5 yr  follow up   • COLONOSCOPY N/A 05/28/2021    Please call the patient regarding her abnormal result. Repeat colonoscopy in 5 year due to 2 tubular adenomas if patient otherwise healthy at that time. Also reasonable to discontinue further colonoscopy--Dr Laboy   • HYSTERECTOMY      With BSO   • OTHER SURGICAL HISTORY      Rt. radical mastoidectomy, rebuilt canal from growth     Family History   Problem Relation Age of Onset   • Breast cancer Mother    • Lung cancer Maternal Grandmother    • Bone cancer Maternal Grandfather    • Pancreatic cancer Mother's Brother    • Breast cancer Mother's Sister    • Lung cancer Mother's Sister      Social History     Socioeconomic History   • Marital status:    Tobacco Use   • Smoking status: Former Smoker     Packs/day: 0.50     Years: 40.00     Pack years: 20.00     Quit date: 2007     Years since quitting:  15.4   • Smokeless tobacco: Never Used   Substance and Sexual Activity   • Alcohol use: Yes     Comment: Occasionally; 1-2 glasses monthly   • Drug use: No   • Sexual activity: Defer     Social Determinants of Health     Tobacco Use: Medium Risk   • Smoking Tobacco Use: Former Smoker   • Smokeless Tobacco Use: Never Used       Review of Systems   Constitutional: Negative for fatigue.   Cardiovascular: Negative for chest pain.   Endocrine: Negative for polydipsia, polyphagia and polyuria.       Objective     VITAL SIGNS  /64   Pulse 75   Temp 36.2 °C (97.1 °F) (Temporal)   Resp 18   Wt 106.6 kg (235 lb)   SpO2 96%   BMI 41.63 kg/m²     Physical Exam  Vitals and nursing note reviewed.   HENT:      Head: Normocephalic and atraumatic.      Mouth/Throat:      Mouth: Mucous membranes are moist.   Cardiovascular:      Rate and Rhythm: Normal rate.   Pulmonary:      Effort: Pulmonary effort is normal.      Breath sounds: Normal breath sounds.   Neurological:      Mental Status: She is alert and oriented to person, place, and time.   Psychiatric:         Mood and Affect: Mood normal.         Thought Content: Thought content normal.         Lab Results   Component Value Date    GLUCOSE 145 (H) 05/31/2022    CALCIUM 9.5 05/31/2022     05/31/2022    K 4.4 05/31/2022    CO2 28 05/31/2022     05/31/2022    BUN 31 (H) 05/31/2022    CREATININE 1.17 (H) 05/31/2022    ANIONGAP 9 05/31/2022     Lab Results   Component Value Date    WBC 11.6 (H) 01/18/2021    HGB 15.0 01/18/2021    HCT 45.0 01/18/2021    MCV 88.5 01/18/2021     01/18/2021     No results found for: TSH  Lab Results   Component Value Date    HGBA1C 7.6 (H) 05/31/2022      Lab Results   Component Value Date    CHOL 181 05/31/2022    HDL 61 05/31/2022    LDLCALC 86 05/31/2022    TRIG 170 (H) 05/31/2022     No results found for: SEDRATE    Assessment/Plan   Problem List Items Addressed This Visit        Cardiac and Vasculature    Essential  hypertension (Chronic)    Pure hypercholesterolemia (Chronic)    Relevant Medications    pravastatin (PRAVACHOL) 20 mg tablet       Endocrine and Metabolic    Type 2 diabetes mellitus with diabetic nephropathy, with long-term current use of insulin (CMS/[facility]) ([facility]) - Primary (Chronic)    Relevant Medications    insulin glargine U-300 conc (Toujeo Max U-300 SoloStar) 300 unit/mL (3 mL) insulin pen insulin pen    NovoLOG Flexpen U-100 Insulin 100 unit/mL (3 mL) insulin pen    Other Relevant Orders    Hemoglobin A1c (glycosylated) Blood, Venous    Comprehensive metabolic panel Blood, Venous       Genitourinary and Reproductive    CKD (chronic kidney disease) stage 3, GFR 30-59 ml/min (CMS/[facility]) ([facility]) (Chronic)      Other Visit Diagnoses     Depressive disorder        Relevant Medications    nortriptyline (PAMELOR) 50 mg capsule    Diabetes mellitus without complication (CMS/[facility]) ([facility])        Relevant Medications    insulin glargine U-300 conc (Toujeo Max U-300 SoloStar) 300 unit/mL (3 mL) insulin pen insulin pen    NovoLOG Flexpen U-100 Insulin 100 unit/mL (3 mL) insulin pen        A1c is fairly stable. Will increase her short acting and long acting insulin. Novolog will be 16 units in the am, 23 units at noon, and 33 units in the evening. She will increase the toujeo to 65 units in the am and 65 units in the pm.    Refill of nortriptyline as above.  This is working reasonably well for her.  Mood is stable.    She will follow up in 3 months with labs.     Margarito Glass MD    Portions of this note were documented by Maryjo Monae as I performed the exam and collected the information from Consuelo Singh. I attest that I have reviewed the information as documented, verified the accuracy of the documentation and added additional information as needed

## 2022-06-06 ENCOUNTER — OFFICE VISIT (OUTPATIENT)
Dept: OTOLARYNGOLOGY | Facility: CLINIC | Age: 77
End: 2022-06-06
Payer: MEDICARE

## 2022-06-06 VITALS
HEART RATE: 78 BPM | DIASTOLIC BLOOD PRESSURE: 80 MMHG | TEMPERATURE: 97.8 F | OXYGEN SATURATION: 96 % | BODY MASS INDEX: 41.63 KG/M2 | WEIGHT: 235 LBS | SYSTOLIC BLOOD PRESSURE: 140 MMHG

## 2022-06-06 DIAGNOSIS — H61.21 IMPACTED CERUMEN OF RIGHT EAR: ICD-10-CM

## 2022-06-06 DIAGNOSIS — Z90.89 HISTORY OF RIGHT MASTOIDECTOMY: Primary | ICD-10-CM

## 2022-06-06 PROCEDURE — G0463 HOSPITAL OUTPT CLINIC VISIT: HCPCS | Performed by: OTOLARYNGOLOGY

## 2022-06-06 PROCEDURE — 69220 CLEAN OUT MASTOID CAVITY: CPT | Performed by: OTOLARYNGOLOGY

## 2022-06-06 ASSESSMENT — ENCOUNTER SYMPTOMS
CHILLS: 0
SORE THROAT: 0
RHINORRHEA: 0
TROUBLE SWALLOWING: 0
CONSTITUTIONAL NEGATIVE: 1
FEVER: 0
SINUS PRESSURE: 0

## 2022-06-06 NOTE — PROGRESS NOTES
"Subjective    CC:     HPI: Consuelo Singh is a 76 y.o. female    Past Medical History:   Diagnosis Date   • Arthritis    • Blood clot in vein     shani legs   • Deep vein thrombosis (CMS/HCC) (HCC)     hx of   • Diabetes (CMS/HCC) (HCC)     type 2   • Eczema    • Fibromyalgia    • GERD (gastroesophageal reflux disease)    • Heart murmur    • Hyperlipidemia    • Hypertension    • Peripheral neuropathy     toes   • Pneumonia    • Psoriasis    • Scoliosis    • Wears dentures     full set       Past Surgical History:   Procedure Laterality Date   • CHOLECYSTECTOMY     • COLONOSCOPY  03/11/2016    5 yr  follow up   • COLONOSCOPY N/A 05/28/2021    Please call the patient regarding her abnormal result. Repeat colonoscopy in 5 year due to 2 tubular adenomas if patient otherwise healthy at that time. Also reasonable to discontinue further colonoscopy--Dr Laboy   • HYSTERECTOMY      With BSO   • OTHER SURGICAL HISTORY      Rt. radical mastoidectomy, rebuilt canal from growth         Current Outpatient Medications:   •  nortriptyline (PAMELOR) 50 mg capsule, Take 1 capsule (50 mg total) by mouth daily, Disp: 90 capsule, Rfl: 3  •  pravastatin (PRAVACHOL) 20 mg tablet, Take 1 tablet (20 mg total) by mouth 3 (three) times a week, Disp: 36 tablet, Rfl: 3  •  insulin glargine U-300 conc (Toujeo Max U-300 SoloStar) 300 unit/mL (3 mL) insulin pen insulin pen, Inject 65 Units under the skin 2 (two) times a day NO SUBSTITUTIONS AS PATIENT IS ALLERGIC TO OTHER INSULINS, Disp: 33 mL, Rfl: 11  •  NovoLOG Flexpen U-100 Insulin 100 unit/mL (3 mL) insulin pen, Inject 16 units in the morning, 23 units at noon, 33 units in the evening, Disp: 45 mL, Rfl: 11  •  pen needle, diabetic 31 gauge x 15/64\" needle, SMARTSIG:SUB-Q, Disp: , Rfl:   •  codeine-guaifenesin (ROBITUSSIN-AC)  mg/5 mL liquid, Take 5 mL by mouth 3 (three) times a day as needed for cough Max Daily Amount: 15 mL (Patient not taking: No sig reported), Disp: 120 mL, Rfl: " "0  •  clobetasoL (TEMOVATE) 0.05 % ointment, Apply 1 application topically 2 (two) times a day as needed (Rash), Disp: 30 g, Rfl: 2  •  pen needle, diabetic 31 gauge x 1/4\" needle, USE 1 NEEDLE TO INJECT UNDER THE SKIN 4 TIMES DAILY Type 2 diabetes with long term insuline use. E11.9, Disp: 300 each, Rfl: 1  •  metoprolol succinate XL (TOPROL-XL) 100 mg 24 hr tablet, Take 1 tablet by mouth nightly, Disp: 90 tablet, Rfl: 2  •  mupirocin (BACTROBAN) 2 % ointment, Apply 1 application topically 2 (two) times a day, Disp: 30 g, Rfl: 0  •  omeprazole (PriLOSEC) 40 mg capsule, Take 1 capsule (40 mg total) by mouth daily, Disp: 90 capsule, Rfl: 3  •  amLODIPine (Norvasc) 5 mg tablet, Take 1 tablet (5 mg total) by mouth daily, Disp: 90 tablet, Rfl: 3  •  losartan (COZAAR) 100 mg tablet, Take 1 tablet (100 mg total) by mouth daily, Disp: 90 tablet, Rfl: 3  •  hydroCHLOROthiazide (HYDRODIURIL) 25 mg tablet, Take 1.5 tablets (37.5 mg total) by mouth daily, Disp: 135 tablet, Rfl: 3  •  lutein 20 mg tablet, Take 1 tab/cap by mouth daily, Disp: , Rfl:   •  multivitamin (THERAGRAN) tablet tablet, Take 1 tablet by mouth daily, Disp: , Rfl:   •  ondansetron ODT (Zofran ODT) 4 mg disintegrating tablet, Take 1 tablet (4 mg total) by mouth every 8 (eight) hours as needed for nausea or vomiting (Patient not taking: No sig reported), Disp: 20 tablet, Rfl: 0  •  blood-glucose sensor device, Use 1 sensor every 10 days., Disp: 3 Device, Rfl: 11  •  blood-glucose meter,continuous (Dexcom G6 ) misc, Use as directed., Disp: 1 each, Rfl: 0  •  blood-glucose transmitter (Dexcom G6 Transmitter) device, Use as directed.  Change every 3 months., Disp: 1 Device, Rfl: 4  •  blood sugar diagnostic (ONETOUCH ULTRA TEST) strip, Use one strip to check glucose TID, Disp: 300 strip, Rfl: 3  •  aspirin 325 mg tablet, Take 325 mg by mouth daily.  , Disp: , Rfl:     Allergies   Allergen Reactions   • Aloe Vera    • Bacitracin      ZINC   • Erythromycin " Base    • Formaldehyde    • Gramicidins      OPHTHALMIC   • Hydrocortisone      ACETATE   • Latex    • Levofloxacin    • Metformin    • Neomycin    • Neomycin-Bacitracnzn-Polymyxnb    • Polymyxin B    • Prednisolone    • Prednisone    • Statins-Hmg-Coa Reductase Inhibitors    • Sulfamethoxazole    • Sulfamethoxazole-Trimethoprim    • Thimerosal    • Tresiba Flextouch U-100 [Insulin Degludec] Diarrhea and Other (see comments)     Abdominal pain   • Trimethoprim        family history includes Bone cancer in her maternal grandfather; Breast cancer in her mother and mother's sister; Lung cancer in her maternal grandmother and mother's sister; Pancreatic cancer in her mother's brother.    Social History     Tobacco Use   • Smoking status: Former Smoker     Packs/day: 0.50     Years: 40.00     Pack years: 20.00     Quit date: 2007     Years since quitting: 15.4   • Smokeless tobacco: Never Used   Substance Use Topics   • Alcohol use: Yes     Comment: Occasionally; 1-2 glasses monthly   • Drug use: No       Review of Systems   Constitutional: Negative.  Negative for chills and fever.   HENT: Negative.  Negative for congestion, ear discharge, ear pain, hearing loss, nosebleeds, postnasal drip, rhinorrhea, sinus pressure, sneezing, sore throat, tinnitus and trouble swallowing.        Objective    /80   Pulse 78   Temp 36.6 °C (97.8 °F) (Temporal)   Wt 106.6 kg (235 lb)   SpO2 96%   BMI 41.63 kg/m²         Diagnosis    No diagnosis found.    Recommendations

## 2022-06-13 ENCOUNTER — OFFICE VISIT (OUTPATIENT)
Dept: DERMATOLOGY | Facility: CLINIC | Age: 77
End: 2022-06-13
Payer: MEDICARE

## 2022-06-13 VITALS
DIASTOLIC BLOOD PRESSURE: 66 MMHG | SYSTOLIC BLOOD PRESSURE: 152 MMHG | HEIGHT: 63 IN | OXYGEN SATURATION: 98 % | WEIGHT: 235 LBS | HEART RATE: 68 BPM | BODY MASS INDEX: 41.64 KG/M2

## 2022-06-13 DIAGNOSIS — L72.9 CYST OF SKIN: ICD-10-CM

## 2022-06-13 DIAGNOSIS — L82.1 SEBORRHEIC KERATOSIS: Primary | ICD-10-CM

## 2022-06-13 DIAGNOSIS — L81.4 SOLAR LENTIGO: ICD-10-CM

## 2022-06-13 DIAGNOSIS — L30.4 INTERTRIGO: ICD-10-CM

## 2022-06-13 PROCEDURE — G0463 HOSPITAL OUTPT CLINIC VISIT: HCPCS | Mod: PO | Performed by: DERMATOLOGY

## 2022-06-13 PROCEDURE — 99214 OFFICE O/P EST MOD 30 MIN: CPT | Performed by: DERMATOLOGY

## 2022-06-13 ASSESSMENT — PAIN SCALES - GENERAL: PAINLEVEL: 0-NO PAIN

## 2022-06-13 NOTE — PROGRESS NOTES
[facility]      CC: Skin check    HPI     Consuelo Singh is a 76 y.o. female who is an established patient with a history of warts, eczema (clobetasol) EIC, porokeratosis, angioma, and lentigo.  Patient last seen in this clinic by RYNE Velasquez CNP on 11/18/20 at which time she received cryo treatment to inflamed SK and a filiform wart.  Patient presents today for a kin exam.  She reports some lesions on her scalp which have been assessed before that she would like examined again; however, they are asymptomatic.  She continues use of clobetasol ointment for eczema flares and accepts gown for waist up exam today.      Patient problems have been reviewed and documented as below:  Patient Active Problem List   Diagnosis   • Diverticular disease   • Eczema   • Essential hypertension   • Gastroesophageal reflux disease   • Morbid obesity (CMS/[facility]) ([facility])   • Pure hypercholesterolemia   • Type 2 diabetes mellitus with diabetic nephropathy, with long-term current use of insulin (CMS/[facility]) ([facility])   • Chronic constipation   • Chronic pain of right knee   • CKD (chronic kidney disease) stage 3, GFR 30-59 ml/min (CMS/[facility]) ([facility])   • Edema   • History of colon polyps   • Screening for malignant neoplasm of colon       Patient's social and family history have been reviewed and documented as below:  Social History     Socioeconomic History   • Marital status:      Spouse name: Not on file   • Number of children: Not on file   • Years of education: Not on file   • Highest education level: Not on file   Occupational History   • Not on file   Tobacco Use   • Smoking status: Former Smoker     Packs/day: 0.50     Years: 40.00     Pack years: 20.00     Quit date: 2007     Years since quitting: 15.4   • Smokeless tobacco: Never Used   Substance and Sexual Activity   • Alcohol use: Yes     Comment: Occasionally; 1-2 glasses monthly   • Drug use: No   • Sexual activity: Defer   Other Topics Concern   • Not on  file   Social History Narrative   • Not on file     Social Determinants of Health     Financial Resource Strain: Not on file   Food Insecurity: Not on file   Transportation Needs: Not on file   Physical Activity: Not on file   Stress: Not on file   Social Connections: Not on file   Intimate Partner Violence: Not on file   Housing Stability: Not on file     Family History   Problem Relation Age of Onset   • Breast cancer Mother    • Lung cancer Maternal Grandmother    • Bone cancer Maternal Grandfather    • Pancreatic cancer Mother's Brother    • Breast cancer Mother's Sister    • Lung cancer Mother's Sister        Pertinent review of systems are listed below.  Other pertinent negatives and positives are as listed in the HPI.    Review of Systems    All allergies have been reviewed and documented as below:  Allergies   Allergen Reactions   • Aloe Vera    • Bacitracin      ZINC   • Erythromycin Base    • Formaldehyde    • Gramicidins      OPHTHALMIC   • Hydrocortisone      ACETATE   • Latex    • Levofloxacin    • Metformin    • Neomycin    • Neomycin-Bacitracnzn-Polymyxnb    • Polymyxin B    • Prednisolone    • Prednisone    • Statins-Hmg-Coa Reductase Inhibitors    • Sulfamethoxazole    • Sulfamethoxazole-Trimethoprim    • Thimerosal    • Tresiba Flextouch U-100 [Insulin Degludec] Diarrhea and Other (see comments)     Abdominal pain   • Trimethoprim        All medications have been reviewed and documented as currently taking as below:    Current Outpatient Medications:   •  nortriptyline (PAMELOR) 50 mg capsule, Take 1 capsule (50 mg total) by mouth daily, Disp: 90 capsule, Rfl: 3  •  pravastatin (PRAVACHOL) 20 mg tablet, Take 1 tablet (20 mg total) by mouth 3 (three) times a week, Disp: 36 tablet, Rfl: 3  •  insulin glargine U-300 conc (Toujeo Max U-300 SoloStar) 300 unit/mL (3 mL) insulin pen insulin pen, Inject 65 Units under the skin 2 (two) times a day NO SUBSTITUTIONS AS PATIENT IS ALLERGIC TO OTHER INSULINS,  "Disp: 33 mL, Rfl: 11  •  NovoLOG Flexpen U-100 Insulin 100 unit/mL (3 mL) insulin pen, Inject 16 units in the morning, 23 units at noon, 33 units in the evening, Disp: 45 mL, Rfl: 11  •  pen needle, diabetic 31 gauge x 15/64\" needle, SMARTSIG:SUB-Q, Disp: , Rfl:   •  codeine-guaifenesin (ROBITUSSIN-AC)  mg/5 mL liquid, Take 5 mL by mouth 3 (three) times a day as needed for cough Max Daily Amount: 15 mL (Patient not taking: No sig reported), Disp: 120 mL, Rfl: 0  •  clobetasoL (TEMOVATE) 0.05 % ointment, Apply 1 application topically 2 (two) times a day as needed (Rash), Disp: 30 g, Rfl: 2  •  pen needle, diabetic 31 gauge x 1/4\" needle, USE 1 NEEDLE TO INJECT UNDER THE SKIN 4 TIMES DAILY Type 2 diabetes with long term insuline use. E11.9, Disp: 300 each, Rfl: 1  •  metoprolol succinate XL (TOPROL-XL) 100 mg 24 hr tablet, Take 1 tablet by mouth nightly, Disp: 90 tablet, Rfl: 2  •  mupirocin (BACTROBAN) 2 % ointment, Apply 1 application topically 2 (two) times a day, Disp: 30 g, Rfl: 0  •  omeprazole (PriLOSEC) 40 mg capsule, Take 1 capsule (40 mg total) by mouth daily, Disp: 90 capsule, Rfl: 3  •  amLODIPine (Norvasc) 5 mg tablet, Take 1 tablet (5 mg total) by mouth daily, Disp: 90 tablet, Rfl: 3  •  losartan (COZAAR) 100 mg tablet, Take 1 tablet (100 mg total) by mouth daily, Disp: 90 tablet, Rfl: 3  •  hydroCHLOROthiazide (HYDRODIURIL) 25 mg tablet, Take 1.5 tablets (37.5 mg total) by mouth daily, Disp: 135 tablet, Rfl: 3  •  lutein 20 mg tablet, Take 1 tab/cap by mouth daily, Disp: , Rfl:   •  multivitamin (THERAGRAN) tablet tablet, Take 1 tablet by mouth daily, Disp: , Rfl:   •  ondansetron ODT (Zofran ODT) 4 mg disintegrating tablet, Take 1 tablet (4 mg total) by mouth every 8 (eight) hours as needed for nausea or vomiting (Patient not taking: No sig reported), Disp: 20 tablet, Rfl: 0  •  blood-glucose sensor device, Use 1 sensor every 10 days., Disp: 3 Device, Rfl: 11  •  blood-glucose meter,continuous " (Dexcom G6 ) misc, Use as directed., Disp: 1 each, Rfl: 0  •  blood-glucose transmitter (Dexcom G6 Transmitter) device, Use as directed.  Change every 3 months., Disp: 1 Device, Rfl: 4  •  blood sugar diagnostic (ONETOUCH ULTRA TEST) strip, Use one strip to check glucose TID, Disp: 300 strip, Rfl: 3  •  aspirin 325 mg tablet, Take 325 mg by mouth daily.  , Disp: , Rfl:     Objective     Vital signs have been reviewed and documented as below:  There were no vitals filed for this visit.    Physical Exam:  ----------------------  Constitutional: General Appearance: healthy-appearing, well-nourished, and well-developed. Level of Distress: NAD. Ambulation: ambulating normally.  Psychiatric: Insight: good judgement. Mental Status: normal mood and affect and active and alert. Orientation: to time, place, and person. Memory: recent memory normal and remote memory normal.  Head: normocephalic and atraumatic.  Eyes: Lids and Conjunctivae: no discharge or pallor and non-injected. Lens: clear. Sclerae: non-icteric.  Neurologic: Gait and Station: normal gait and station. Cranial Nerves: grossly intact. Coordination and Cerebellum: no tremor.  Skin: a cutaneous examination was performed including: left upper extremity and lower extremity; right upper extremity and lower extremity; and scalp, face, ears, neck, chest, abdomen, and back, and were found to be within normal limits with the exceptions of the findings listed below:  Skin: Cystic nodule central punctum on the central upper back.  Scattered solar lentigines on the upper trunk and extremities.  Scattered brown waxy papules on the scalp trunk and lower extremities.  Diffuse xerosis.  In the infra abdominal fold there are scattered erythematous waxy plaques.  There are also maceration and thin red macerated plaques.    Derm Physical Exam    Procedures  Assessment and Plan     1. Seborrheic keratosis  I discussed the diagnosis and treatment options with the patient.   As the lesion/these lesions is/are asymptomatic, no further treatment is required or requested by the patient.  There are few inflamed lesions in the infra abdominal fold.  Would expect improvement with treatment of the intertrigo as below.    2. Cyst of skin  I discussed the diagnosis and treatment options with the patient.  As the lesion/these lesions is/are asymptomatic, no further treatment is required or requested by the patient.  Discussed surgical excision if the lesion increases in size or become symptomatic.    3. Solar lentigo  I discussed the diagnosis and treatment options with the patient.  As the lesion/these lesions is/are asymptomatic, no further treatment is required or requested by the patient.  Photoprotection measures    4. Intertrigo  Mild at present  Discussed diagnosis and treatment options with the patient.  Recommend starting OTC antifungal in conjunction with OTC 1% hydrocortisone cream, both twice daily as needed flares.  After flare subsides would switch to antifungal powder daily.  Can restart creams as needed for flares.

## 2022-06-17 DIAGNOSIS — Z79.4 TYPE 2 DIABETES MELLITUS WITH DIABETIC NEPHROPATHY, WITH LONG-TERM CURRENT USE OF INSULIN (CMS/HCC): Primary | ICD-10-CM

## 2022-06-17 DIAGNOSIS — E11.21 TYPE 2 DIABETES MELLITUS WITH DIABETIC NEPHROPATHY, WITH LONG-TERM CURRENT USE OF INSULIN (CMS/HCC): Primary | ICD-10-CM

## 2022-06-17 NOTE — TELEPHONE ENCOUNTER
Care Due:                  Date            Visit Type   Department     Provider  --------------------------------------------------------------------------------                              ESTABLISHED   [facility]  Last Visit: 06-      PATIENT      MEDICINE       LORA TRAN                              ESTABLISHED   [address]  Next Visit: 08-      PATIENT      MEDICINE       LORA TRAN                                                            Last  Test          Frequency    Reason                     Performed    Due Date  --------------------------------------------------------------------------------  Mg Level....  12 months..  hydroCHLOROthiazide......  Not Found    Overdue    [employer] Embedded Care Gaps. Reference number: [id number]. 6/17/2022 12:33:47 PM ANITA

## 2022-06-18 NOTE — TELEPHONE ENCOUNTER
Medication refill request:  Medication(s):  Pen Needle not filled due to (route to Nurse Pool) Historical Provider listed, please update to PCP if appropriate to fill

## 2022-06-20 RX ORDER — PEN NEEDLE, DIABETIC 32GX 5/32"
1 NEEDLE, DISPOSABLE MISCELLANEOUS 3 TIMES DAILY
Qty: 200 EACH | Refills: 11 | Status: SHIPPED | OUTPATIENT
Start: 2022-06-20 | End: 2022-08-31 | Stop reason: ALTCHOICE

## 2022-06-21 DIAGNOSIS — E11.9 DIABETES MELLITUS WITHOUT COMPLICATION (CMS/HCC): ICD-10-CM

## 2022-06-21 NOTE — TELEPHONE ENCOUNTER
No new care gaps identified.  [facility] Embedded Care Gaps. Reference number: [id number]. 6/21/2022 11:30:10 AM ANITA

## 2022-06-22 RX ORDER — PEN NEEDLE, DIABETIC 29 G X1/2"
NEEDLE, DISPOSABLE MISCELLANEOUS
OUTPATIENT
Start: 2022-06-22

## 2022-06-22 NOTE — TELEPHONE ENCOUNTER
Medication(s) pen needle diabetic 31 ga refill refused due to DUPLICATE. Recently filled on 6/20/22; same pharmacy, receipt confirmed.  Dispense amount:  200, Refill: 3,  3 month supply.  Refills or Rx should be available at preferred pharmacy.

## 2022-08-09 DIAGNOSIS — E11.9 DIABETES MELLITUS WITHOUT COMPLICATION (CMS/HCC): ICD-10-CM

## 2022-08-09 RX ORDER — PEN NEEDLE, DIABETIC 29 G X1/2"
NEEDLE, DISPOSABLE MISCELLANEOUS
Qty: 200 EACH | Refills: 11 | Status: SHIPPED | OUTPATIENT
Start: 2022-08-09 | End: 2023-09-27

## 2022-08-29 ENCOUNTER — OFFICE VISIT (OUTPATIENT)
Dept: OTOLARYNGOLOGY | Facility: CLINIC | Age: 77
End: 2022-08-29
Payer: MEDICARE

## 2022-08-29 VITALS
DIASTOLIC BLOOD PRESSURE: 74 MMHG | BODY MASS INDEX: 41.11 KG/M2 | TEMPERATURE: 97.9 F | HEART RATE: 95 BPM | SYSTOLIC BLOOD PRESSURE: 173 MMHG | OXYGEN SATURATION: 94 % | HEIGHT: 63 IN | WEIGHT: 232 LBS

## 2022-08-29 DIAGNOSIS — H61.21 IMPACTED CERUMEN OF RIGHT EAR: ICD-10-CM

## 2022-08-29 DIAGNOSIS — Z90.89 HISTORY OF RIGHT MASTOIDECTOMY: Primary | ICD-10-CM

## 2022-08-29 PROCEDURE — 69210 REMOVE IMPACTED EAR WAX UNI: CPT | Performed by: OTOLARYNGOLOGY

## 2022-08-29 PROCEDURE — G0463 HOSPITAL OUTPT CLINIC VISIT: HCPCS | Performed by: OTOLARYNGOLOGY

## 2022-08-29 PROCEDURE — 69222 CLEAN OUT MASTOID CAVITY: CPT | Performed by: OTOLARYNGOLOGY

## 2022-08-29 ASSESSMENT — ENCOUNTER SYMPTOMS
RHINORRHEA: 0
CHILLS: 0
CONSTITUTIONAL NEGATIVE: 1
SINUS PRESSURE: 0
TROUBLE SWALLOWING: 0
FEVER: 0
SORE THROAT: 0

## 2022-08-29 NOTE — LETTER
[facility] EAR/NOSE/THROAT  [address] [place] [address]  [phone]  Dept: [phone]  August 29, 2022     Consuelo Singh      Patient: Consuelo Singh   YOB: 1945   Date of Visit: 8/29/2022       To Whom it May Concern:    Consuelo Singh was seen in my clinic on  8/29/2022 at [facility] EAR/NOSE/THROAT. Please excuse Consuelo for her absence for this appointment.           Sincerely,         KALYAN NICHOLSON MD    Electronically signed by KALYAN NICHOLSON MD at 2:58 PM        CC: Margarito Glass MD

## 2022-08-29 NOTE — PROGRESS NOTES
"Subjective    CC: Periodic mastoid debridement    HPI: Consuelo Singh is a 76 y.o. female with history of canal wall down mastoidectomy on the right about 45 years ago.  She is here for her quarterly debridement.  No complaints to report.    Past Medical History:   Diagnosis Date    Arthritis     Blood clot in vein     sahni legs    Deep vein thrombosis (CMS/HCC) (HCC)     hx of    Diabetes (CMS/HCC) (HCC)     type 2    Eczema     Fibromyalgia     GERD (gastroesophageal reflux disease)     Heart murmur     Hyperlipidemia     Hypertension     Peripheral neuropathy     toes    Pneumonia     Psoriasis     Scoliosis     Wears dentures     full set       Past Surgical History:   Procedure Laterality Date    CHOLECYSTECTOMY      COLONOSCOPY  03/11/2016    5 yr  follow up    COLONOSCOPY N/A 05/28/2021    Please call the patient regarding her abnormal result. Repeat colonoscopy in 5 year due to 2 tubular adenomas if patient otherwise healthy at that time. Also reasonable to discontinue further colonoscopy--Dr Laboy    HYSTERECTOMY      With BSO    OTHER SURGICAL HISTORY      Rt. radical mastoidectomy, rebuilt canal from growth         Current Outpatient Medications:     pen needle, diabetic 31 gauge x 1/4\" needle, USE 1 NEEDLE TO INJECT UNDER THE SKIN 5 TIMES DAILY Type 2 diabetes with long term insuline use. E11.9, Disp: 200 each, Rfl: 11    pen needle, diabetic 31 gauge x 15/64\" needle, 1 each by Other route 3 (three) times a day Use to administer insulin, Disp: 200 each, Rfl: 11    nortriptyline (PAMELOR) 50 mg capsule, Take 1 capsule (50 mg total) by mouth daily, Disp: 90 capsule, Rfl: 3    pravastatin (PRAVACHOL) 20 mg tablet, Take 1 tablet (20 mg total) by mouth 3 (three) times a week, Disp: 36 tablet, Rfl: 3    insulin glargine U-300 conc (Toujeo Max U-300 SoloStar) 300 unit/mL (3 mL) insulin pen insulin pen, Inject 65 Units under the skin 2 (two) times a day NO SUBSTITUTIONS AS PATIENT IS ALLERGIC TO OTHER " INSULINS, Disp: 33 mL, Rfl: 11    NovoLOG Flexpen U-100 Insulin 100 unit/mL (3 mL) insulin pen, Inject 16 units in the morning, 23 units at noon, 33 units in the evening, Disp: 45 mL, Rfl: 11    clobetasoL (TEMOVATE) 0.05 % ointment, Apply 1 application topically 2 (two) times a day as needed (Rash), Disp: 30 g, Rfl: 2    metoprolol succinate XL (TOPROL-XL) 100 mg 24 hr tablet, Take 1 tablet by mouth nightly, Disp: 90 tablet, Rfl: 2    omeprazole (PriLOSEC) 40 mg capsule, Take 1 capsule (40 mg total) by mouth daily, Disp: 90 capsule, Rfl: 3    amLODIPine (Norvasc) 5 mg tablet, Take 1 tablet (5 mg total) by mouth daily, Disp: 90 tablet, Rfl: 3    losartan (COZAAR) 100 mg tablet, Take 1 tablet (100 mg total) by mouth daily, Disp: 90 tablet, Rfl: 3    hydroCHLOROthiazide (HYDRODIURIL) 25 mg tablet, Take 1.5 tablets (37.5 mg total) by mouth daily, Disp: 135 tablet, Rfl: 3    lutein 20 mg tablet, Take 1 tab/cap by mouth daily, Disp: , Rfl:     multivitamin (THERAGRAN) tablet tablet, Take 1 tablet by mouth daily, Disp: , Rfl:     blood-glucose sensor device, Use 1 sensor every 10 days., Disp: 3 Device, Rfl: 11    blood-glucose meter,continuous (Dexcom G6 ) misc, Use as directed., Disp: 1 each, Rfl: 0    blood-glucose transmitter (Dexcom G6 Transmitter) device, Use as directed.  Change every 3 months., Disp: 1 Device, Rfl: 4    blood sugar diagnostic (ONETOUCH ULTRA TEST) strip, Use one strip to check glucose TID, Disp: 300 strip, Rfl: 3    aspirin 325 mg tablet, Take 325 mg by mouth daily.  , Disp: , Rfl:     Allergies   Allergen Reactions    Aloe Vera     Bacitracin      ZINC    Erythromycin Base     Formaldehyde     Gramicidins      OPHTHALMIC    Hydrocortisone      ACETATE    Latex     Levofloxacin     Metformin     Neomycin     Neomycin-Bacitracnzn-Polymyxnb     Polymyxin B     Prednisolone     Prednisone     Statins-Hmg-Coa Reductase Inhibitors     Sulfamethoxazole     Sulfamethoxazole-Trimethoprim      "Thimerosal     Tresiba Flextouch U-100 [Insulin Degludec] Diarrhea and Other (see comments)     Abdominal pain    Trimethoprim        family history includes Bone cancer in her maternal grandfather; Breast cancer in her mother and mother's sister; Lung cancer in her maternal grandmother and mother's sister; Pancreatic cancer in her mother's brother.    Social History     Tobacco Use    Smoking status: Former     Packs/day: 0.50     Years: 40.00     Pack years: 20.00     Types: Cigarettes     Quit date: 2007     Years since quitting: 15.6    Smokeless tobacco: Never   Substance Use Topics    Alcohol use: Yes     Comment: Occasionally; 1-2 glasses monthly    Drug use: No       Review of Systems   Constitutional: Negative.  Negative for chills and fever.   HENT: Negative.  Negative for congestion, ear discharge, ear pain, hearing loss, nosebleeds, postnasal drip, rhinorrhea, sinus pressure, sneezing, sore throat, tinnitus and trouble swallowing.      Objective    /74 (BP Location: Left arm, Patient Position: Sitting, Cuff Size: Long Adult)   Pulse 95   Temp 36.6 °C (97.9 °F) (Temporal)   Ht 1.6 m (5' 3\")   Wt 105.2 kg (232 lb)   SpO2 94%   BMI 41.10 kg/m²     PHYSICAL EXAMINATION:      GENERAL:  Well-developed, well-nourished.  The patient is alert, oriented and in no acute distress.        PSYCH: Normal mood and affect.        SKIN: No evidence of significant rash or concerning skin lesion on the head or neck.      HEAD/FACE:  Normally formed without evidence of mass or trauma.  The sinuses are nontender.        EARS: Bilateral external ears normal.  Right external auditory canal status post an adequate meatoplasty.  Right mastoid cavity and tympanic membrane appear healthy with some cerumen buildup.  Left external auditory canal and tympanic membrane normal.     Procedure: Right mastoid debridement.  The right ear was visualized with the operating microscope and a speculum.  Using a cerumen loop I " elevated the impacted cerumen from its lateral edge working medially from there.  This was taken up over the facial ridge and into the mastoid bowl.  Once it was mostly freed up alligator forceps were used to grasp it and remove it.  Underlying tissues appeared healthy.  No pus or polyps.  Patient tolerated this well without evidence of significant problems during the procedure.      Diagnosis    1. History of right mastoidectomy    2. Impacted cerumen of right ear        Recommendations    Right mastoid debridement done today.  Patient tolerated this well.  Follow-up in 3 months for repeat, sooner as needed for problems.

## 2022-08-29 NOTE — LETTER
[facility] EAR/NOSE/THROAT  [address] [place] [address]  [phone]  Dept: [phone]    August 29, 2022     Patient: Consuelo Singh   YOB: 1945   Date of Visit: 8/29/2022       To Whom it May Concern:    Consuelo Singh was seen in my clinic on 8/29/2022 at [facility] EAR/NOSE/THROAT  [address] [place] [address] [place] [address]  [phone]. Please excuse Consuelo for her absence from school on this day to make the appointment.    If you have any questions or concerns, please don't hesitate to call.         Sincerely,         KALYAN NICHOLSON MD        CC: Margarito Glass MD

## 2022-08-31 ENCOUNTER — OFFICE VISIT (OUTPATIENT)
Dept: FAMILY MEDICINE | Facility: CLINIC | Age: 77
End: 2022-08-31
Payer: MEDICARE

## 2022-08-31 ENCOUNTER — APPOINTMENT (OUTPATIENT)
Dept: LAB | Facility: CLINIC | Age: 77
End: 2022-08-31
Payer: MEDICARE

## 2022-08-31 VITALS — TEMPERATURE: 97.6 F | WEIGHT: 230.2 LBS | HEART RATE: 80 BPM | OXYGEN SATURATION: 94 % | BODY MASS INDEX: 40.78 KG/M2

## 2022-08-31 DIAGNOSIS — E66.01 MORBID OBESITY (CMS/HCC): ICD-10-CM

## 2022-08-31 DIAGNOSIS — E11.21 TYPE 2 DIABETES MELLITUS WITH DIABETIC NEPHROPATHY, WITH LONG-TERM CURRENT USE OF INSULIN (CMS/HCC): Primary | ICD-10-CM

## 2022-08-31 DIAGNOSIS — E11.21 TYPE 2 DIABETES MELLITUS WITH DIABETIC NEPHROPATHY, WITH LONG-TERM CURRENT USE OF INSULIN (CMS/HCC): ICD-10-CM

## 2022-08-31 DIAGNOSIS — Z79.4 TYPE 2 DIABETES MELLITUS WITH DIABETIC NEPHROPATHY, WITH LONG-TERM CURRENT USE OF INSULIN (CMS/HCC): ICD-10-CM

## 2022-08-31 DIAGNOSIS — E11.9 DIABETES MELLITUS WITHOUT COMPLICATION (CMS/HCC): ICD-10-CM

## 2022-08-31 DIAGNOSIS — I10 ESSENTIAL HYPERTENSION: Chronic | ICD-10-CM

## 2022-08-31 DIAGNOSIS — Z79.4 TYPE 2 DIABETES MELLITUS WITH DIABETIC NEPHROPATHY, WITH LONG-TERM CURRENT USE OF INSULIN (CMS/HCC): Primary | ICD-10-CM

## 2022-08-31 DIAGNOSIS — N18.31 STAGE 3A CHRONIC KIDNEY DISEASE (CMS/HCC): Chronic | ICD-10-CM

## 2022-08-31 LAB
ALBUMIN SERPL-MCNC: 4.2 G/DL (ref 3.5–5.3)
ALP SERPL-CCNC: 103 U/L (ref 55–142)
ALT SERPL-CCNC: 28 U/L (ref 7–52)
ANION GAP SERPL CALC-SCNC: 10 MMOL/L (ref 3–11)
AST SERPL-CCNC: 25 U/L
BILIRUB SERPL-MCNC: 0.48 MG/DL (ref 0.2–1.4)
BUN SERPL-MCNC: 29 MG/DL (ref 7–25)
CALCIUM ALBUM COR SERPL-MCNC: 9.5 MG/DL (ref 8.6–10.3)
CALCIUM SERPL-MCNC: 9.7 MG/DL (ref 8.6–10.3)
CHLORIDE SERPL-SCNC: 99 MMOL/L (ref 98–107)
CO2 SERPL-SCNC: 30 MMOL/L (ref 21–32)
CREAT SERPL-MCNC: 1.12 MG/DL (ref 0.6–1.1)
EST. AVERAGE GLUCOSE BLD GHB EST-MCNC: 177.2 MG/DL
GFR SERPL CREATININE-BSD FRML MDRD: 51 ML/MIN/1.73M*2
GLUCOSE SERPL-MCNC: 107 MG/DL (ref 70–105)
HBA1C MFR BLD: 7.8 % (ref 4–6)
POTASSIUM SERPL-SCNC: 4.2 MMOL/L (ref 3.5–5.1)
PROT SERPL-MCNC: 7 G/DL (ref 6–8.3)
SODIUM SERPL-SCNC: 139 MMOL/L (ref 135–145)

## 2022-08-31 PROCEDURE — 99214 OFFICE O/P EST MOD 30 MIN: CPT | Performed by: FAMILY MEDICINE

## 2022-08-31 PROCEDURE — 83036 HEMOGLOBIN GLYCOSYLATED A1C: CPT

## 2022-08-31 PROCEDURE — G0463 HOSPITAL OUTPT CLINIC VISIT: HCPCS | Performed by: FAMILY MEDICINE

## 2022-08-31 PROCEDURE — 80053 COMPREHEN METABOLIC PANEL: CPT

## 2022-08-31 PROCEDURE — 36415 COLL VENOUS BLD VENIPUNCTURE: CPT

## 2022-08-31 RX ORDER — INSULIN GLARGINE 300 U/ML
66 INJECTION, SOLUTION SUBCUTANEOUS 2 TIMES DAILY
Qty: 33 ML | Refills: 11
Start: 2022-08-31 | End: 2022-09-21 | Stop reason: SDUPTHER

## 2022-08-31 ASSESSMENT — ENCOUNTER SYMPTOMS
POLYDIPSIA: 0
FATIGUE: 0
POLYPHAGIA: 0
HYPERTENSION: 1
DIABETIC ASSOCIATED SYMPTOMS: 0

## 2022-08-31 NOTE — PROGRESS NOTES
Subjective      Consuelo Singh is a 76 y.o. female who presents for Diabetes (3 month Dm follow up- review lab work./)    Presents today for follow-up.  Has had a lot of issues lately.  Is dealing with vertigo currently.  She also has some dental issues.  She notes that she really only can eat foods like pasta on mashed potatoes currently which is why her blood sugar is much higher.  She checks her blood sugars on a very consistent basis as she has a CGM.  Uses her insulin consistently.    Diabetes  She presents for her follow-up diabetic visit. She has type 2 diabetes mellitus. Her disease course has been fluctuating. There are no hypoglycemic associated symptoms. There are no diabetic associated symptoms. Pertinent negatives for diabetes include no chest pain, no fatigue, no polydipsia, no polyphagia and no polyuria. There are no hypoglycemic complications. Symptoms are stable. There are no diabetic complications. Risk factors for coronary artery disease include obesity, post-menopausal, diabetes mellitus, hypertension, tobacco exposure and dyslipidemia. Current diabetic treatment includes insulin injections. She is compliant with treatment all of the time. Insulin dose schedule: novolog 16 units in the morning, 20 units at noon, 30 units in the evening; toujeo 60 units in the am and 60 units in the pm. She is following a generally healthy diet. Home blood sugar record trend: Uses a CGM. An ACE inhibitor/angiotensin II receptor blocker is being taken.   Hypertension  This is a chronic problem. Pertinent negatives include no anxiety or chest pain.     The following have been reviewed and updated as appropriate in this visit:          Allergies   Allergen Reactions    Aloe Vera     Bacitracin      ZINC    Erythromycin Base     Formaldehyde     Gramicidins      OPHTHALMIC    Hydrocortisone      ACETATE    Latex     Levofloxacin     Metformin     Neomycin     Neomycin-Bacitracnzn-Polymyxnb     Polymyxin B      "Prednisolone     Prednisone     Statins-Hmg-Coa Reductase Inhibitors     Sulfamethoxazole     Sulfamethoxazole-Trimethoprim     Thimerosal     Tresiba Flextouch U-100 [Insulin Degludec] Diarrhea and Other (see comments)     Abdominal pain    Trimethoprim      Current Outpatient Medications   Medication Sig Dispense Refill    pen needle, diabetic 31 gauge x 1/4\" needle USE 1 NEEDLE TO INJECT UNDER THE SKIN 5 TIMES DAILY Type 2 diabetes with long term insuline use. E11.9 200 each 11    nortriptyline (PAMELOR) 50 mg capsule Take 1 capsule (50 mg total) by mouth daily 90 capsule 3    pravastatin (PRAVACHOL) 20 mg tablet Take 1 tablet (20 mg total) by mouth 3 (three) times a week 36 tablet 3    insulin glargine U-300 conc (Toujeo Max U-300 SoloStar) 300 unit/mL (3 mL) insulin pen insulin pen Inject 65 Units under the skin 2 (two) times a day NO SUBSTITUTIONS AS PATIENT IS ALLERGIC TO OTHER INSULINS 33 mL 11    NovoLOG Flexpen U-100 Insulin 100 unit/mL (3 mL) insulin pen Inject 16 units in the morning, 23 units at noon, 33 units in the evening 45 mL 11    clobetasoL (TEMOVATE) 0.05 % ointment Apply 1 application topically 2 (two) times a day as needed (Rash) 30 g 2    metoprolol succinate XL (TOPROL-XL) 100 mg 24 hr tablet Take 1 tablet by mouth nightly 90 tablet 2    omeprazole (PriLOSEC) 40 mg capsule Take 1 capsule (40 mg total) by mouth daily 90 capsule 3    amLODIPine (Norvasc) 5 mg tablet Take 1 tablet (5 mg total) by mouth daily 90 tablet 3    losartan (COZAAR) 100 mg tablet Take 1 tablet (100 mg total) by mouth daily 90 tablet 3    hydroCHLOROthiazide (HYDRODIURIL) 25 mg tablet Take 1.5 tablets (37.5 mg total) by mouth daily 135 tablet 3    lutein 20 mg tablet Take 1 tab/cap by mouth daily      multivitamin (THERAGRAN) tablet tablet Take 1 tablet by mouth daily      blood-glucose sensor device Use 1 sensor every 10 days. 3 Device 11    blood-glucose meter,continuous (Dexcom G6 ) misc Use as directed. 1 " "each 0    blood-glucose transmitter (Dexcom G6 Transmitter) device Use as directed.  Change every 3 months. 1 Device 4    aspirin 325 mg tablet Take 325 mg by mouth daily.        pen needle, diabetic 31 gauge x 15/64\" needle 1 each by Other route 3 (three) times a day Use to administer insulin 200 each 11    blood sugar diagnostic (ONETOUCH ULTRA TEST) strip Use one strip to check glucose TID (Patient not taking: Reported on 8/31/2022) 300 strip 3     No current facility-administered medications for this visit.     Past Medical History:   Diagnosis Date    Arthritis     Blood clot in vein     shani legs    Deep vein thrombosis (CMS/HCC) (HCC)     hx of    Diabetes (CMS/HCC) (HCC)     type 2    Eczema     Fibromyalgia     GERD (gastroesophageal reflux disease)     Heart murmur     Hyperlipidemia     Hypertension     Peripheral neuropathy     toes    Pneumonia     Psoriasis     Scoliosis     Wears dentures     full set     Past Surgical History:   Procedure Laterality Date    CHOLECYSTECTOMY      COLONOSCOPY  03/11/2016    5 yr  follow up    COLONOSCOPY N/A 05/28/2021    Please call the patient regarding her abnormal result. Repeat colonoscopy in 5 year due to 2 tubular adenomas if patient otherwise healthy at that time. Also reasonable to discontinue further colonoscopy--Dr Laboy    HYSTERECTOMY      With BSO    OTHER SURGICAL HISTORY      Rt. radical mastoidectomy, rebuilt canal from growth     Family History   Problem Relation Age of Onset    Breast cancer Mother     Lung cancer Maternal Grandmother     Bone cancer Maternal Grandfather     Pancreatic cancer Mother's Brother     Breast cancer Mother's Sister     Lung cancer Mother's Sister      Social History     Socioeconomic History    Marital status:    Tobacco Use    Smoking status: Former     Packs/day: 0.50     Years: 40.00     Pack years: 20.00     Types: Cigarettes     Quit date: 2007     Years since quitting: 15.6    Smokeless tobacco: Never "   Substance and Sexual Activity    Alcohol use: Yes     Comment: Occasionally; 1-2 glasses monthly    Drug use: No    Sexual activity: Defer     Social Determinants of Health     Tobacco Use: Medium Risk    Smoking Tobacco Use: Former    Smokeless Tobacco Use: Never       Review of Systems   Constitutional:  Negative for fatigue.   Cardiovascular:  Negative for chest pain.   Endocrine: Negative for polydipsia, polyphagia and polyuria.     Objective     VITAL SIGNS  Pulse 80   Temp 36.4 °C (97.6 °F) (Temporal)   Wt 104.4 kg (230 lb 3.2 oz)   SpO2 94%   BMI 40.78 kg/m²     Physical Exam  Vitals and nursing note reviewed.   Constitutional:       General: She is not in acute distress.     Appearance: She is well-developed.   HENT:      Head: Normocephalic and atraumatic.   Eyes:      Extraocular Movements: Extraocular movements intact.   Cardiovascular:      Rate and Rhythm: Normal rate and regular rhythm.      Heart sounds: Normal heart sounds.   Pulmonary:      Effort: Pulmonary effort is normal.      Breath sounds: Normal breath sounds.   Abdominal:      General: There is no distension.   Skin:     General: Skin is warm.   Neurological:      Mental Status: She is alert and oriented to person, place, and time.       Lab Results   Component Value Date    GLUCOSE 107 (H) 08/31/2022    CALCIUM 9.7 08/31/2022     08/31/2022    K 4.2 08/31/2022    CO2 30 08/31/2022    CL 99 08/31/2022    BUN 29 (H) 08/31/2022    CREATININE 1.12 (H) 08/31/2022    ANIONGAP 10 08/31/2022     Lab Results   Component Value Date    WBC 11.6 (H) 01/18/2021    HGB 15.0 01/18/2021    HCT 45.0 01/18/2021    MCV 88.5 01/18/2021     01/18/2021     No results found for: TSH  Lab Results   Component Value Date    HGBA1C 7.8 (H) 08/31/2022      Lab Results   Component Value Date    CHOL 181 05/31/2022    HDL 61 05/31/2022    LDLCALC 86 05/31/2022    TRIG 170 (H) 05/31/2022     No results found for: SEDRATE    Assessment/Plan   Problem  List Items Addressed This Visit          Cardiac and Vasculature    Essential hypertension (Chronic)       Endocrine and Metabolic    Type 2 diabetes mellitus with diabetic nephropathy, with long-term current use of insulin (CMS/HCC) ([facility]) - Primary (Chronic)    Morbid obesity (CMS/HCC) ([facility])       Genitourinary and Reproductive    CKD (chronic kidney disease) stage 3, GFR 30-59 ml/min (CMS/HCC) ([facility]) (Chronic)     Blood pressure is borderline today.  At this point she seems to be going through a lot at this time and likely this is contributing to her elevated blood pressure reading.  For now we will continue with current medications and her doses and follow-up in around 3 months for recheck.    From a diabetic standpoint her A1c did increase but we know likely why this happened that she has been unable to eat a healthier diet due to her various issues.  We will recheck an A1c in 3 months.  In the meantime continue to monitor blood sugars and stick to a low-carb diet when able.    Margarito Glass MD

## 2022-09-08 ENCOUNTER — HOSPITAL ENCOUNTER (EMERGENCY)
Facility: HOSPITAL | Age: 77
Discharge: HOME | End: 2022-09-08
Payer: MEDICARE

## 2022-09-08 ENCOUNTER — APPOINTMENT (OUTPATIENT)
Dept: RADIOLOGY | Facility: HOSPITAL | Age: 77
End: 2022-09-08
Payer: MEDICARE

## 2022-09-08 ENCOUNTER — OFFICE VISIT (OUTPATIENT)
Dept: URGENT CARE | Facility: CLINIC | Age: 77
End: 2022-09-08
Payer: MEDICARE

## 2022-09-08 VITALS
BODY MASS INDEX: 40.75 KG/M2 | HEIGHT: 63 IN | TEMPERATURE: 98.1 F | HEART RATE: 96 BPM | OXYGEN SATURATION: 92 % | DIASTOLIC BLOOD PRESSURE: 81 MMHG | RESPIRATION RATE: 21 BRPM | SYSTOLIC BLOOD PRESSURE: 168 MMHG | WEIGHT: 230 LBS

## 2022-09-08 VITALS
SYSTOLIC BLOOD PRESSURE: 191 MMHG | RESPIRATION RATE: 20 BRPM | DIASTOLIC BLOOD PRESSURE: 82 MMHG | OXYGEN SATURATION: 95 % | HEART RATE: 125 BPM

## 2022-09-08 DIAGNOSIS — E86.0 HYPOVOLEMIA DUE TO DEHYDRATION: Primary | ICD-10-CM

## 2022-09-08 DIAGNOSIS — I10 ESSENTIAL HYPERTENSION: ICD-10-CM

## 2022-09-08 DIAGNOSIS — R00.0 SINUS TACHYCARDIA: ICD-10-CM

## 2022-09-08 DIAGNOSIS — R00.0 RACING HEART BEAT: Primary | ICD-10-CM

## 2022-09-08 DIAGNOSIS — E86.1 HYPOVOLEMIA DUE TO DEHYDRATION: Primary | ICD-10-CM

## 2022-09-08 LAB
ALBUMIN SERPL-MCNC: 4.5 G/DL (ref 3.5–5.3)
ALP SERPL-CCNC: 99 U/L (ref 55–142)
ALT SERPL-CCNC: 38 U/L (ref 7–52)
ANION GAP SERPL CALC-SCNC: 13 MMOL/L (ref 3–11)
AST SERPL-CCNC: 38 U/L
BILIRUB SERPL-MCNC: 0.6 MG/DL (ref 0.2–1.4)
BUN SERPL-MCNC: 27 MG/DL (ref 7–25)
CALCIUM ALBUM COR SERPL-MCNC: 9.5 MG/DL (ref 8.6–10.3)
CALCIUM SERPL-MCNC: 9.9 MG/DL (ref 8.6–10.3)
CHLORIDE SERPL-SCNC: 98 MMOL/L (ref 98–107)
CO2 SERPL-SCNC: 24 MMOL/L (ref 21–32)
CREAT SERPL-MCNC: 1.46 MG/DL (ref 0.6–1.1)
ERYTHROCYTE [DISTWIDTH] IN BLOOD BY AUTOMATED COUNT: 14 % (ref 11.5–14)
GFR SERPL CREATININE-BSD FRML MDRD: 37 ML/MIN/1.73M*2
GLUCOSE SERPL-MCNC: 193 MG/DL (ref 70–105)
HCT VFR BLD AUTO: 42 % (ref 34–45)
HGB BLD-MCNC: 13.8 G/DL (ref 11.5–15.5)
MAGNESIUM SERPL-MCNC: 1.4 MG/DL (ref 1.8–2.4)
MCH RBC QN AUTO: 29.5 PG (ref 28–33)
MCHC RBC AUTO-ENTMCNC: 32.8 G/DL (ref 32–36)
MCV RBC AUTO: 89.9 FL (ref 81–97)
PLATELET # BLD AUTO: 312 10*3/UL (ref 140–350)
PMV BLD AUTO: 7.4 FL (ref 6.9–10.8)
POTASSIUM SERPL-SCNC: 4.4 MMOL/L (ref 3.5–5.1)
PROT SERPL-MCNC: 7.5 G/DL (ref 6–8.3)
RBC # BLD AUTO: 4.67 10*6/ΜL (ref 3.7–5.3)
SODIUM SERPL-SCNC: 135 MMOL/L (ref 135–145)
TROPONIN I SERPL-MCNC: 6.5 PG/ML
TSH SERPL DL<=0.05 MIU/L-ACNC: 3.12 UIU/ML (ref 0.34–4.82)
WBC # BLD AUTO: 12.1 10*3/UL (ref 4.5–10.5)

## 2022-09-08 PROCEDURE — 85027 COMPLETE CBC AUTOMATED: CPT | Performed by: FAMILY MEDICINE

## 2022-09-08 PROCEDURE — 96365 THER/PROPH/DIAG IV INF INIT: CPT

## 2022-09-08 PROCEDURE — 83735 ASSAY OF MAGNESIUM: CPT | Performed by: FAMILY MEDICINE

## 2022-09-08 PROCEDURE — 71045 X-RAY EXAM CHEST 1 VIEW: CPT | Mod: 26,CMS | Performed by: RADIOLOGY

## 2022-09-08 PROCEDURE — 84484 ASSAY OF TROPONIN QUANT: CPT | Performed by: FAMILY MEDICINE

## 2022-09-08 PROCEDURE — 71045 X-RAY EXAM CHEST 1 VIEW: CPT

## 2022-09-08 PROCEDURE — 36415 COLL VENOUS BLD VENIPUNCTURE: CPT | Performed by: FAMILY MEDICINE

## 2022-09-08 PROCEDURE — 2500000200 HC RX 250 WO HCPCS: Performed by: STUDENT IN AN ORGANIZED HEALTH CARE EDUCATION/TRAINING PROGRAM

## 2022-09-08 PROCEDURE — 93010 ELECTROCARDIOGRAM REPORT: CPT | Performed by: INTERNAL MEDICINE

## 2022-09-08 PROCEDURE — 80053 COMPREHEN METABOLIC PANEL: CPT | Performed by: FAMILY MEDICINE

## 2022-09-08 PROCEDURE — 2580000300 HC RX 258: Performed by: STUDENT IN AN ORGANIZED HEALTH CARE EDUCATION/TRAINING PROGRAM

## 2022-09-08 PROCEDURE — 99284 EMERGENCY DEPT VISIT MOD MDM: CPT | Mod: GC | Performed by: STUDENT IN AN ORGANIZED HEALTH CARE EDUCATION/TRAINING PROGRAM

## 2022-09-08 PROCEDURE — 84443 ASSAY THYROID STIM HORMONE: CPT | Performed by: STUDENT IN AN ORGANIZED HEALTH CARE EDUCATION/TRAINING PROGRAM

## 2022-09-08 PROCEDURE — 99283 EMERGENCY DEPT VISIT LOW MDM: CPT

## 2022-09-08 PROCEDURE — 96375 TX/PRO/DX INJ NEW DRUG ADDON: CPT

## 2022-09-08 PROCEDURE — 93005 ELECTROCARDIOGRAM TRACING: CPT | Performed by: FAMILY MEDICINE

## 2022-09-08 PROCEDURE — 6360000200 HC RX 636 W HCPCS (ALT 250 FOR IP): Performed by: STUDENT IN AN ORGANIZED HEALTH CARE EDUCATION/TRAINING PROGRAM

## 2022-09-08 RX ORDER — METOPROLOL TARTRATE 1 MG/ML
5 INJECTION, SOLUTION INTRAVENOUS ONCE
Status: COMPLETED | OUTPATIENT
Start: 2022-09-08 | End: 2022-09-08

## 2022-09-08 RX ORDER — MAGNESIUM SULFATE HEPTAHYDRATE 40 MG/ML
2 INJECTION, SOLUTION INTRAVENOUS ONCE
Status: COMPLETED | OUTPATIENT
Start: 2022-09-08 | End: 2022-09-08

## 2022-09-08 RX ORDER — METOPROLOL SUCCINATE 100 MG/1
150 TABLET, EXTENDED RELEASE ORAL NIGHTLY
Refills: 0
Start: 2022-09-08 | End: 2022-09-21 | Stop reason: SDUPTHER

## 2022-09-08 RX ORDER — SODIUM CHLORIDE 9 MG/ML
1000 INJECTION, SOLUTION INTRAVENOUS ONCE
Status: COMPLETED | OUTPATIENT
Start: 2022-09-08 | End: 2022-09-08

## 2022-09-08 RX ADMIN — METOPROLOL TARTRATE 5 MG: 5 INJECTION INTRAVENOUS at 14:27

## 2022-09-08 RX ADMIN — SODIUM CHLORIDE 1000 ML: 9 INJECTION, SOLUTION INTRAVENOUS at 14:22

## 2022-09-08 RX ADMIN — MAGNESIUM SULFATE HEPTAHYDRATE 2 G: 40 INJECTION, SOLUTION INTRAVENOUS at 14:33

## 2022-09-08 ASSESSMENT — ENCOUNTER SYMPTOMS
DIAPHORESIS: 0
WEAKNESS: 0
FEVER: 0
COUGH: 0
VOMITING: 0
SHORTNESS OF BREATH: 0
DIZZINESS: 0
NAUSEA: 0
CHILLS: 0
CONSTIPATION: 1
DIARRHEA: 0
PALPITATIONS: 1
LIGHT-HEADEDNESS: 0
HEADACHES: 0

## 2022-09-08 NOTE — ED PROVIDER NOTES
"    HPI:  Chief Complaint   Patient presents with    Palpitations     Pt states that she has had \"flutters\" in her heart x 2 days       Patient is a 76-year-old female with a PMH significant for HLD, HTN, T2DM, GERD who presents with a 2 to 3-day history of heart palpitations.    She reports that she has been feeling \"off\" for the last 2 to 3 days.  She reports that she has been experiencing fluttering of her heart.  She states that symptoms are intermittent.  She came in today due to heart palpitations following evaluation at urgent care and inability to monitor heart rate.  Unsure what precipitates symptoms.  She denies fevers, chills, nausea, vomiting, lightheadedness, dizziness, blurred or double vision, headaches, shortness of breath, chest pain.  She also denies heat or cold intolerance, dry or wet skin, diarrhea.  She does report chronic constipation that has not changed.  She does state that she has a family history of thyroid disease.  She does report that she has had elevated heart rates in the past and they need to adjust her metoprolol at that time.  She does have hard to control blood pressure and is on losartan, HCTZ, metoprolol, and amlodipine.      HISTORY:  Past Medical History:   Diagnosis Date    Arthritis     Blood clot in vein     shani legs    Deep vein thrombosis (CMS/HCC) (HCC)     hx of    Diabetes (CMS/HCC) (HCC)     type 2    Eczema     Fibromyalgia     GERD (gastroesophageal reflux disease)     Heart murmur     Hyperlipidemia     Hypertension     Peripheral neuropathy     toes    Pneumonia     Psoriasis     Scoliosis     Wears dentures     full set       Past Surgical History:   Procedure Laterality Date    CHOLECYSTECTOMY      COLONOSCOPY  03/11/2016    5 yr  follow up    COLONOSCOPY N/A 05/28/2021    Please call the patient regarding her abnormal result. Repeat colonoscopy in 5 year due to 2 tubular adenomas if patient otherwise healthy at that time. Also reasonable to discontinue further " colonoscopy--Dr Laboy    HYSTERECTOMY      With BSO    OTHER SURGICAL HISTORY      Rt. radical mastoidectomy, rebuilt canal from growth       Family History   Problem Relation Age of Onset    Breast cancer Mother     Lung cancer Maternal Grandmother     Bone cancer Maternal Grandfather     Pancreatic cancer Mother's Brother     Breast cancer Mother's Sister     Lung cancer Mother's Sister        Social History     Tobacco Use    Smoking status: Former     Packs/day: 0.50     Years: 40.00     Pack years: 20.00     Types: Cigarettes     Quit date: 2007     Years since quitting: 15.6    Smokeless tobacco: Never   Substance Use Topics    Alcohol use: Yes     Comment: Occasionally; 1-2 glasses monthly    Drug use: No         ROS:  Review of Systems   Constitutional:  Negative for chills, diaphoresis and fever.   Eyes:  Negative for visual disturbance.   Respiratory:  Negative for cough and shortness of breath.    Cardiovascular:  Positive for palpitations. Negative for chest pain and leg swelling.   Gastrointestinal:  Positive for constipation. Negative for diarrhea, nausea and vomiting.   Skin:  Negative for rash.   Neurological:  Negative for dizziness, weakness, light-headedness and headaches.     PE:  ED Triage Vitals   Temp Heart Rate Resp BP SpO2   09/08/22 1326 09/08/22 1326 09/08/22 1326 09/08/22 1326 09/08/22 1325   36.7 °C (98.1 °F) 124 18 (!) 187/88 95 %      Mean BP (mmHg) Temp Source Heart Rate Source Patient Position BP Location   -- 09/08/22 1326 -- -- --    Oral         FiO2 (%)       --                  Physical Exam  Constitutional:       General: She is not in acute distress.     Appearance: Normal appearance. She is not ill-appearing.   HENT:      Mouth/Throat:      Mouth: Mucous membranes are dry.      Pharynx: No oropharyngeal exudate or posterior oropharyngeal erythema.   Eyes:      Extraocular Movements: Extraocular movements intact.      Pupils: Pupils are equal, round, and reactive to light.    Cardiovascular:      Rate and Rhythm: Regular rhythm. Tachycardia present.      Pulses: Normal pulses.      Heart sounds: Normal heart sounds. No murmur heard.    No gallop.   Pulmonary:      Effort: Pulmonary effort is normal. No respiratory distress.      Breath sounds: Normal breath sounds. No wheezing or rales.   Abdominal:      General: Abdomen is flat. Bowel sounds are normal. There is no distension.      Palpations: Abdomen is soft.      Tenderness: There is no abdominal tenderness. There is no guarding.   Musculoskeletal:      Right lower leg: No edema.      Left lower leg: No edema.   Skin:     General: Skin is warm.      Capillary Refill: Capillary refill takes less than 2 seconds.   Neurological:      General: No focal deficit present.      Mental Status: She is alert and oriented to person, place, and time.      Cranial Nerves: No cranial nerve deficit.   Psychiatric:         Mood and Affect: Mood normal.       ED LABS:  Labs Reviewed   MAGNESIUM - Abnormal       Result Value    Magnesium 1.4 (*)    CBC - Abnormal    WBC 12.1 (*)     RBC 4.67      Hemoglobin 13.8      Hematocrit 42.0      MCV 89.9      MCH 29.5      MCHC 32.8      RDW 14.0      Platelets 312      MPV 7.4     COMPREHENSIVE METABOLIC PANEL - Abnormal    Sodium 135      Potassium 4.4      Chloride 98      CO2 24      Anion Gap 13 (*)     BUN 27 (*)     Creatinine 1.46 (*)     Glucose 193 (*)     Calcium 9.9      AST 38      ALT (SGPT) 38      Alkaline Phosphatase 99      Total Protein 7.5      Albumin 4.5      Total Bilirubin 0.60      Corrected Calcium 9.5      eGFR 37 (*)     Narrative:     Calculation based on the 2021 Chronic Kidney Disease Epidemiology Collaboration (CKD-EPI) equation refit without adjustment for race.   HIGH SENSITIVE TROPONIN I - Normal    hsTnI 0 Hour 6.5     TSH - Normal    TSH 3.118     HIGH SENSITIVE TROPONIN I PANEL (0HR, 1HR, 2HR)    Narrative:     The following orders were created for panel order HS Troponin  "I Panel (0HR, 1HR, 2HR) Blood, Venous.  Procedure                               Abnormality         Status                     ---------                               -----------         ------                     HS Troponin I[[id number]]                 Normal              Final result               1HR High Sensitive Trop I[[id number]]                                                    2HR High Sensitive Tropon...[[id number]]                                                   Please view results for these tests on the individual orders.         ED IMAGES:  XR chest portable 1 view   Final Result   IMPRESSION:   Bibasilar atelectasis..          ED COURSE:  ED Course as of 09/08/22 1629   Thu Sep 08, 2022   1419 hsTnI 0 Hour: 6.5 [MO]      ED Course User Index  [MO] Desmond Meyers MD     Patient with a 2 to 3-day history of heart palpitations and \"feeling off\".  EKG here demonstrates sinus tachycardia with rate into the 120s.  She is also hypertensive with initial blood pressure 180s over 80s.  Differential includes hypovolemia, thyroid disorder, ACS, electrolyte abnormality, or acute illness including infection.  Will obtain a CBC, CMP, TSH, EKG, chest x-ray, troponin, and mag to further evaluate.  EKG without evidence of ischemia and troponin 6.5 and no anginal type symptoms.  Magnesium mildly low at 1.4 will replace with 2 g of IV mag.  Mildly elevated white blood cell count at 12.1 although patient is afebrile there is no source of infection.  She does have an increase in her creatinine at 1.46 with an elevated BUN that would suggest volume depletion.  We will give 5 mg of Lopressor, 1 L normal saline and reevaluate patient    Following Lopressor and 1 L normal saline heart rate improved to between 70s and 90s.  Patient reports that her symptoms improved significantly.  Blood pressure lowered to 160s over 70s/80s.    Suspect tachycardia from hypovolemia due to dehydration.  Discussed adequate fluid intake.  I " recommend she increase her metoprolol dosing to 150 mg daily.  Patient to follow-up with primary care doctor in the next 1 to 2 weeks for repeat magnesium and to monitor heart rate.  Patient to return should she experience lightheadedness, dizziness, chest pain, shortness of breath.    MDM:  MDM     Amount and/or Complexity of Data Reviewed  Decide to obtain previous medical records or to obtain history from someone other than the patient: yes        Final diagnoses:   [E86.1, E86.0] Hypovolemia due to dehydration   [R00.0] Sinus tachycardia   [I10] Essential hypertension     9/8/2022  3:36 PM                   Desmond Meyers MD  Resident  09/08/22 8002

## 2022-09-08 NOTE — PROGRESS NOTES
Patient presents to the urgent care with racing heart rate.  She has not had this problem before.  She notes that her heart rate has gone up into the 120s.  We do not have monitoring capabilities in the urgent care.  She was advised to back to the emergency department after triage with the nurse.  Patient was not seen by provider here.

## 2022-09-08 NOTE — PROGRESS NOTES
Patient presents to  with complaint of racing heart and occasional dizziness x2 days. Patient states she has been more active than usual and occasionally has tremors. Patient's blood pressure was 191/82, heart rate ranged from 110-125bpm with irregularities. Patient triaged to ED for higher level of care. Report called through [facility], patient going to ED via POV.

## 2022-09-08 NOTE — DISCHARGE INSTRUCTIONS
Make sure you are drinking enough fluids throughout the day. You should drink approximately 50-60 ounces of water a day. I recommend increasing your metoprolol to 150mg daily. You can take 1.5 tablets of your metoprolol. Follow up with your primary care doc in the next 1-2 weeks for evaluation.

## 2022-09-19 ENCOUNTER — OFFICE VISIT (OUTPATIENT)
Dept: ORTHOPEDIC SURGERY | Facility: CLINIC | Age: 77
End: 2022-09-19
Payer: MEDICARE

## 2022-09-19 ENCOUNTER — ANCILLARY PROCEDURE (OUTPATIENT)
Dept: RADIOLOGY | Facility: CLINIC | Age: 77
End: 2022-09-19
Payer: MEDICARE

## 2022-09-19 VITALS — DIASTOLIC BLOOD PRESSURE: 74 MMHG | SYSTOLIC BLOOD PRESSURE: 144 MMHG

## 2022-09-19 DIAGNOSIS — M25.561 CHRONIC PAIN OF RIGHT KNEE: Primary | ICD-10-CM

## 2022-09-19 DIAGNOSIS — M17.11 PRIMARY OSTEOARTHRITIS OF RIGHT KNEE: ICD-10-CM

## 2022-09-19 DIAGNOSIS — G89.29 CHRONIC PAIN OF RIGHT KNEE: Primary | ICD-10-CM

## 2022-09-19 PROCEDURE — 73564 X-RAY EXAM KNEE 4 OR MORE: CPT | Mod: 26,RT | Performed by: RADIOLOGY

## 2022-09-19 PROCEDURE — G0463 HOSPITAL OUTPT CLINIC VISIT: HCPCS | Mod: PO | Performed by: PHYSICIAN ASSISTANT

## 2022-09-19 PROCEDURE — 96372 THER/PROPH/DIAG INJ SC/IM: CPT | Mod: PO | Performed by: PHYSICIAN ASSISTANT

## 2022-09-19 PROCEDURE — 6360000200 HC RX 636 W HCPCS (ALT 250 FOR IP): Mod: PO | Performed by: PHYSICIAN ASSISTANT

## 2022-09-19 PROCEDURE — 99213 OFFICE O/P EST LOW 20 MIN: CPT | Mod: 25 | Performed by: PHYSICIAN ASSISTANT

## 2022-09-19 PROCEDURE — 20610 DRAIN/INJ JOINT/BURSA W/O US: CPT | Mod: PO,RT | Performed by: PHYSICIAN ASSISTANT

## 2022-09-19 PROCEDURE — 73564 X-RAY EXAM KNEE 4 OR MORE: CPT | Mod: RT,PO,FY

## 2022-09-19 RX ORDER — ROPIVACAINE HYDROCHLORIDE 5 MG/ML
3 INJECTION, SOLUTION EPIDURAL; INFILTRATION; PERINEURAL
Status: COMPLETED | OUTPATIENT
Start: 2022-09-19 | End: 2022-09-19

## 2022-09-19 RX ORDER — METHYLPREDNISOLONE ACETATE 80 MG/ML
80 INJECTION, SUSPENSION INTRA-ARTICULAR; INTRALESIONAL; INTRAMUSCULAR; SOFT TISSUE
Status: COMPLETED | OUTPATIENT
Start: 2022-09-19 | End: 2022-09-19

## 2022-09-19 RX ADMIN — ROPIVACAINE HYDROCHLORIDE 3 ML: 5 INJECTION EPIDURAL; INFILTRATION; PERINEURAL at 10:59

## 2022-09-19 RX ADMIN — METHYLPREDNISOLONE ACETATE 80 MG: 80 INJECTION, SUSPENSION INTRA-ARTICULAR; INTRALESIONAL; INTRAMUSCULAR; SOFT TISSUE at 10:59

## 2022-09-19 NOTE — PROGRESS NOTES
Right Knee Steroid Injection  Date/Time: 9/19/2022 10:59 AM  Performed by: CHEYANNE Noland       Consent  Verbal consent was obtained from the patient. Written consent was not obtained from the patient. Risks, benefits, and alternatives were discussed. Consent given by patient. The patient states understanding of the procedure being performed. Patient ID confirmed verbally with the patient.     Location Details  An injection was given to Consuelo in her knee. The injection site was the R intraarticular.    Procedure Details   Patient was prepped and draped in the usual sterile fashion.   A 22 gauge needle was inserted into the R intraarticular using a anterolateral approach . Aspirate was not obtained.   An injection was given in the  R intraarticular site(s). The site was injected with 80 mg methylPREDNISolone acetate 80 mg/mL, 3 mL ropivacaine 5 mg/mL (0.5 %).       Post-Procedure  Patient tolerated the procedure well with no immediate complications. A standard bandage was applied. Advised patient to avoid strenous activity for 24-48 hours. Advised patient to use ice, NSAIDs or acetaminophen for pain as needed.     Procedure Comments  80 mg Depomedrol and 3 ml of 0.5% ropivacaine right knee

## 2022-09-19 NOTE — PROGRESS NOTES
Pain of the Right Knee (Presents with right knee pain. Previous right knee injection 80 mg Depomedrol and 3 ml of 1% lidocaine without epinephrine in 2019.) and Pain (States she has been doing well over the past few years. Pain today 2/10, she has tried icy hot and aleve for pain. She would like to talk about an injection today. She is a diabetic. )      HPI  Patient comes in for evaluation treatment of her right knee pain.  She has known osteoarthritis and has done well previously with injections.  She had a cortisone injection here on 12/13/2019.  That injection did provide good relief for her.  She has not had any new injury or falls on the knee.  She reports that the last month or so she has had little bit more pain.  Past Medical History:   Diagnosis Date    Allergic Do not remember    Arthritis     Blood clot in vein     shani legs    Cataract     Deep vein thrombosis (CMS/HCC) (HCC)     hx of    Diabetes (CMS/HCC) (HCC)     type 2    Eczema     Fibromyalgia     GERD (gastroesophageal reflux disease)     Heart murmur     Hyperlipidemia     Hypertension     Obesity In my twenties    Peripheral neuropathy     toes    Pneumonia     Psoriasis     Scoliosis     Skin abnormalities 1985    Visual impairment When i was 17    Wears dentures     full set     Past Surgical History:   Procedure Laterality Date    CHOLECYSTECTOMY      COLONOSCOPY  03/11/2016    5 yr  follow up    COLONOSCOPY N/A 05/28/2021    Please call the patient regarding her abnormal result. Repeat colonoscopy in 5 year due to 2 tubular adenomas if patient otherwise healthy at that time. Also reasonable to discontinue further colonoscopy--Dr Laboy    EYE SURGERY  I believe 2020    HYSTERECTOMY      With BSO    OTHER SURGICAL HISTORY      Rt. radical mastoidectomy, rebuilt canal from growth     Prior to Admission medications    Medication Sig Start Date End Date Taking? Authorizing Provider   metoprolol succinate XL (TOPROL-XL) 100 mg 24 hr tablet  "Take 1.5 tablets (150 mg total) by mouth nightly 9/8/22   Desmond Meyers MD   insulin glargine U-300 conc (Toujeo Max U-300 SoloStar) 300 unit/mL (3 mL) insulin pen insulin pen Inject 66 Units under the skin 2 (two) times a day NO SUBSTITUTIONS AS PATIENT IS ALLERGIC TO OTHER INSULINS 8/31/22 8/31/23  Margarito Glass MD   pen needle, diabetic 31 gauge x 1/4\" needle USE 1 NEEDLE TO INJECT UNDER THE SKIN 5 TIMES DAILY Type 2 diabetes with long term insuline use. E11.9 8/9/22   Margarito Glass MD   nortriptyline (PAMELOR) 50 mg capsule Take 1 capsule (50 mg total) by mouth daily 6/1/22 6/1/23  Margarito Glass MD   pravastatin (PRAVACHOL) 20 mg tablet Take 1 tablet (20 mg total) by mouth 3 (three) times a week 6/1/22 6/1/23  Margarito Glass MD   NovoLOG Flexpen U-100 Insulin 100 unit/mL (3 mL) insulin pen Inject 16 units in the morning, 23 units at noon, 33 units in the evening 6/1/22   Margarito Glass MD   clobetasoL (TEMOVATE) 0.05 % ointment Apply 1 application topically 2 (two) times a day as needed (Rash) 2/28/22   Margarito Glass MD   omeprazole (PriLOSEC) 40 mg capsule Take 1 capsule (40 mg total) by mouth daily 10/25/21 10/25/22  Margarito Glass MD   amLODIPine (Norvasc) 5 mg tablet Take 1 tablet (5 mg total) by mouth daily 10/25/21 10/25/22  Margarito Glass MD   losartan (COZAAR) 100 mg tablet Take 1 tablet (100 mg total) by mouth daily 10/25/21 10/25/22  Margarito Glass MD   hydroCHLOROthiazide (HYDRODIURIL) 25 mg tablet Take 1.5 tablets (37.5 mg total) by mouth daily 10/25/21 10/25/22  Margarito Glass MD   lutein 20 mg tablet Take 1 tab/cap by mouth daily    Historical Provider, MD   multivitamin (THERAGRAN) tablet tablet Take 1 tablet by mouth daily    Historical Provider, MD   blood-glucose sensor device Use 1 sensor every 10 days. 10/10/19   Frantz Boyer MD   blood-glucose meter,continuous (Dexcom G6 ) misc Use as directed. 10/10/19   Frantz Boyer MD   blood-glucose transmitter (Dexcom G6 Transmitter) device Use as directed.  Change every 3 " months. 10/10/19   Frantz Boyer MD   aspirin 325 mg tablet Take 325 mg by mouth daily.   3/6/15   Conversion Provider     Allergies   Allergen Reactions    Aloe Vera     Bacitracin      ZINC    Erythromycin Base     Formaldehyde     Gramicidins      OPHTHALMIC    Hydrocortisone      ACETATE    Latex     Levofloxacin     Metformin     Neomycin     Neomycin-Bacitracnzn-Polymyxnb     Polymyxin B     Prednisolone     Prednisone     Statins-Hmg-Coa Reductase Inhibitors     Sulfamethoxazole     Sulfamethoxazole-Trimethoprim     Thimerosal     Tresiba Flextouch U-100 [Insulin Degludec] Diarrhea and Other (see comments)     Abdominal pain    Trimethoprim      Social History     Socioeconomic History    Marital status:      Spouse name: Not on file    Number of children: Not on file    Years of education: Not on file    Highest education level: Not on file   Occupational History    Not on file   Tobacco Use    Smoking status: Former     Packs/day: 0.50     Years: 40.00     Pack years: 20.00     Types: Cigarettes     Quit date: 1/1/2007     Years since quitting: 15.7    Smokeless tobacco: Former   Substance and Sexual Activity    Alcohol use: Yes     Comment: Maybe once a month    Drug use: No    Sexual activity: Defer   Other Topics Concern    Not on file   Social History Narrative    Not on file     Social Determinants of Health     Financial Resource Strain: Not on file   Food Insecurity: Not on file   Transportation Needs: Not on file   Physical Activity: Not on file   Stress: Not on file   Social Connections: Not on file   Intimate Partner Violence: Not on file   Housing Stability: Not on file     Family History   Problem Relation Age of Onset    Breast cancer Mother     Cancer Mother     Diabetes Father     Lung cancer Maternal Grandmother     Bone cancer Maternal Grandfather     Pancreatic cancer Mother's Brother     Breast cancer Mother's Sister     Lung cancer Mother's Sister        /74 (BP Location:  Left arm, Patient Position: Sitting, Cuff Size: Regular Adult)     Ortho Exam  General: Patient well-developed well-nourished 76-year-old female no acute distress.  ANO x3  Gait: Patient is ambulating without an assistive device no significant antalgia.  Right knee: Patient has no swelling or joint effusion, no erythema.  Skin is all intact.  No significant tenderness to palpation throughout the knee.  Mild crepitus in the patellofemoral joint.  Range of motion is 0 to 125 degrees quite easily with no pain or problems.  No instability with varus or valgus stress test, negative anterior and posterior drawer, negative Lachman's.  Assessment/Plan   Right knee pain with known osteoarthritis.  Patient has had cortisone injections in the past her last 1 was done here on 12/13/2019.  She had really good response to that injection.   she is gradually started having increased pain in that knee about a month ago with no recent injury or trauma.    X-rays obtained today show no acute findings mild to moderate degenerative joint disease primarily in the medial compartment this is mildly progressed since her last x-rays.  Plan:  Reviewed x-rays discussed general treatment of degenerative joint disease.  Patient would like to have a cortisone injection.  I discussed with her that this is going to increase her blood sugars as she is a diabetic.  Will continue to see patient as needed.

## 2022-09-21 ENCOUNTER — OFFICE VISIT (OUTPATIENT)
Dept: FAMILY MEDICINE | Facility: CLINIC | Age: 77
End: 2022-09-21
Payer: MEDICARE

## 2022-09-21 ENCOUNTER — APPOINTMENT (OUTPATIENT)
Dept: LAB | Facility: CLINIC | Age: 77
End: 2022-09-21
Payer: MEDICARE

## 2022-09-21 VITALS
WEIGHT: 234.4 LBS | OXYGEN SATURATION: 97 % | TEMPERATURE: 97.9 F | SYSTOLIC BLOOD PRESSURE: 140 MMHG | DIASTOLIC BLOOD PRESSURE: 84 MMHG | BODY MASS INDEX: 41.52 KG/M2 | HEART RATE: 70 BPM

## 2022-09-21 DIAGNOSIS — Z79.4 TYPE 2 DIABETES MELLITUS WITH DIABETIC NEPHROPATHY, WITH LONG-TERM CURRENT USE OF INSULIN (CMS/HCC): ICD-10-CM

## 2022-09-21 DIAGNOSIS — E11.9 DIABETES MELLITUS WITHOUT COMPLICATION (CMS/HCC): ICD-10-CM

## 2022-09-21 DIAGNOSIS — R00.0 TACHYCARDIA: ICD-10-CM

## 2022-09-21 DIAGNOSIS — I10 ESSENTIAL HYPERTENSION: ICD-10-CM

## 2022-09-21 DIAGNOSIS — E83.42 HYPOMAGNESEMIA: ICD-10-CM

## 2022-09-21 DIAGNOSIS — E66.01 MORBID OBESITY (CMS/HCC): Primary | ICD-10-CM

## 2022-09-21 DIAGNOSIS — E11.21 TYPE 2 DIABETES MELLITUS WITH DIABETIC NEPHROPATHY, WITH LONG-TERM CURRENT USE OF INSULIN (CMS/HCC): ICD-10-CM

## 2022-09-21 DIAGNOSIS — N17.9 AKI (ACUTE KIDNEY INJURY) (CMS/HCC): ICD-10-CM

## 2022-09-21 DIAGNOSIS — Z11.59 ENCOUNTER FOR HEPATITIS C SCREENING TEST FOR LOW RISK PATIENT: ICD-10-CM

## 2022-09-21 DIAGNOSIS — D72.829 LEUKOCYTOSIS, UNSPECIFIED TYPE: ICD-10-CM

## 2022-09-21 DIAGNOSIS — N18.31 STAGE 3A CHRONIC KIDNEY DISEASE (CMS/HCC): ICD-10-CM

## 2022-09-21 LAB
ANION GAP SERPL CALC-SCNC: 8 MMOL/L (ref 3–11)
BASOPHILS # BLD AUTO: 0.1 10*3/UL
BASOPHILS NFR BLD AUTO: 0.5 % (ref 0–2)
BUN SERPL-MCNC: 38 MG/DL (ref 7–25)
CALCIUM SERPL-MCNC: 9.6 MG/DL (ref 8.6–10.3)
CHLORIDE SERPL-SCNC: 101 MMOL/L (ref 98–107)
CO2 SERPL-SCNC: 26 MMOL/L (ref 21–32)
CREAT SERPL-MCNC: 1.14 MG/DL (ref 0.6–1.1)
EOSINOPHIL # BLD AUTO: 0.1 10*3/UL
EOSINOPHIL NFR BLD AUTO: 0.8 % (ref 0–3)
ERYTHROCYTE [DISTWIDTH] IN BLOOD BY AUTOMATED COUNT: 14.1 % (ref 11.5–14)
EST. AVERAGE GLUCOSE BLD GHB EST-MCNC: 168.6 MG/DL
GFR SERPL CREATININE-BSD FRML MDRD: 50 ML/MIN/1.73M*2
GLUCOSE SERPL-MCNC: 245 MG/DL (ref 70–105)
HBA1C MFR BLD: 7.5 % (ref 4–6)
HCT VFR BLD AUTO: 40.4 % (ref 34–45)
HCV AB SER QL: NORMAL
HGB BLD-MCNC: 13.2 G/DL (ref 11.5–15.5)
LYMPHOCYTES # BLD AUTO: 2.8 10*3/UL
LYMPHOCYTES NFR BLD AUTO: 18.1 % (ref 11–47)
MAGNESIUM SERPL-MCNC: 1.9 MG/DL (ref 1.8–2.4)
MCH RBC QN AUTO: 29.6 PG (ref 28–33)
MCHC RBC AUTO-ENTMCNC: 32.8 G/DL (ref 32–36)
MCV RBC AUTO: 90.2 FL (ref 81–97)
MONOCYTES # BLD AUTO: 0.9 10*3/UL
MONOCYTES NFR BLD AUTO: 6.1 % (ref 3–11)
NEUTROPHILS # BLD AUTO: 11.4 10*3/UL
NEUTROPHILS NFR BLD AUTO: 74.5 % (ref 41–81)
PLATELET # BLD AUTO: 254 10*3/UL (ref 140–350)
PMV BLD AUTO: 7.3 FL (ref 6.9–10.8)
POTASSIUM SERPL-SCNC: 4.3 MMOL/L (ref 3.5–5.1)
RBC # BLD AUTO: 4.48 10*6/ΜL (ref 3.7–5.3)
SODIUM SERPL-SCNC: 135 MMOL/L (ref 135–145)
WBC # BLD AUTO: 15.4 10*3/UL (ref 4.5–10.5)

## 2022-09-21 PROCEDURE — 83735 ASSAY OF MAGNESIUM: CPT | Performed by: FAMILY MEDICINE

## 2022-09-21 PROCEDURE — G0463 HOSPITAL OUTPT CLINIC VISIT: HCPCS | Performed by: FAMILY MEDICINE

## 2022-09-21 PROCEDURE — 86803 HEPATITIS C AB TEST: CPT | Performed by: FAMILY MEDICINE

## 2022-09-21 PROCEDURE — 80048 BASIC METABOLIC PNL TOTAL CA: CPT | Performed by: FAMILY MEDICINE

## 2022-09-21 PROCEDURE — 83036 HEMOGLOBIN GLYCOSYLATED A1C: CPT

## 2022-09-21 PROCEDURE — 85025 COMPLETE CBC W/AUTO DIFF WBC: CPT | Performed by: FAMILY MEDICINE

## 2022-09-21 PROCEDURE — 36415 COLL VENOUS BLD VENIPUNCTURE: CPT | Performed by: FAMILY MEDICINE

## 2022-09-21 PROCEDURE — 99214 OFFICE O/P EST MOD 30 MIN: CPT | Performed by: FAMILY MEDICINE

## 2022-09-21 RX ORDER — CHLORTHALIDONE 25 MG/1
25 TABLET ORAL DAILY
Qty: 30 TABLET | Refills: 11 | Status: SHIPPED | OUTPATIENT
Start: 2022-09-21 | End: 2023-09-11 | Stop reason: SDUPTHER

## 2022-09-21 RX ORDER — INSULIN ASPART 100 [IU]/ML
INJECTION, SOLUTION INTRAVENOUS; SUBCUTANEOUS
Qty: 24.9 ML | Refills: 11 | Status: SHIPPED | OUTPATIENT
Start: 2022-09-21 | End: 2023-06-08

## 2022-09-21 RX ORDER — METOPROLOL SUCCINATE 100 MG/1
150 TABLET, EXTENDED RELEASE ORAL NIGHTLY
Qty: 135 TABLET | Refills: 3 | Status: SHIPPED | OUTPATIENT
Start: 2022-09-21 | End: 2023-09-11 | Stop reason: SDUPTHER

## 2022-09-21 RX ORDER — INSULIN GLARGINE 300 U/ML
70 INJECTION, SOLUTION SUBCUTANEOUS 2 TIMES DAILY
Qty: 45 ML | Refills: 11 | Status: SHIPPED | OUTPATIENT
Start: 2022-09-21 | End: 2023-11-27

## 2022-09-21 ASSESSMENT — ENCOUNTER SYMPTOMS
POLYDIPSIA: 0
DIABETIC ASSOCIATED SYMPTOMS: 0
FATIGUE: 0
HYPERTENSION: 1
POLYPHAGIA: 0

## 2022-09-21 NOTE — PROGRESS NOTES
Subjective      Consuelo Singh is a 76 y.o. female who presents for Follow-up      Seen in the ER for tachycardia.  Did increase her metoprolol dose.  She was found to be dehydrated are thought to be.  States that she has been drinking a lot more water lately.  Feels well overall.    Diabetes  She presents for her follow-up diabetic visit. She has type 2 diabetes mellitus. Her disease course has been fluctuating. There are no hypoglycemic associated symptoms. There are no diabetic associated symptoms. Pertinent negatives for diabetes include no chest pain, no fatigue, no polydipsia, no polyphagia and no polyuria. There are no hypoglycemic complications. Symptoms are stable. There are no diabetic complications. Risk factors for coronary artery disease include obesity, post-menopausal, diabetes mellitus, hypertension, tobacco exposure and dyslipidemia. Current diabetic treatment includes insulin injections. She is compliant with treatment all of the time. Insulin dose schedule: novolog 16 units in the morning, 20 units at noon, 30 units in the evening; toujeo 60 units in the am and 60 units in the pm. She is following a generally healthy diet. Home blood sugar record trend: Uses a CGM. An ACE inhibitor/angiotensin II receptor blocker is being taken.   Hypertension  This is a chronic problem. The current episode started more than 1 month ago. The problem is uncontrolled. Associated symptoms include peripheral edema. Pertinent negatives include no anxiety or chest pain.       The following have been reviewed and updated as appropriate in this visit:   Allergies  Meds  Problems  Med Hx  Surg Hx  Fam Hx         Review of Systems   Constitutional:  Negative for fatigue.   Cardiovascular:  Negative for chest pain.   Endocrine: Negative for polydipsia, polyphagia and polyuria.     Objective     VITAL SIGNS  /84 (BP Location: Left arm, Patient Position: Sitting, Cuff Size: Regular Adult)   Pulse 70   Temp 36.6  °C (97.9 °F)   Wt 106.3 kg (234 lb 6.4 oz)   SpO2 97%   BMI 41.52 kg/m²     Physical Exam    Lab Results   Component Value Date    GLUCOSE 245 (H) 09/21/2022    CALCIUM 9.6 09/21/2022     09/21/2022    K 4.3 09/21/2022    CO2 26 09/21/2022     09/21/2022    BUN 38 (H) 09/21/2022    CREATININE 1.14 (H) 09/21/2022    ANIONGAP 8 09/21/2022     Lab Results   Component Value Date    WBC 15.4 (H) 09/21/2022    HGB 13.2 09/21/2022    HCT 40.4 09/21/2022    MCV 90.2 09/21/2022     09/21/2022     Lab Results   Component Value Date    TSH 3.118 09/08/2022     Lab Results   Component Value Date    HGBA1C 7.5 (H) 09/21/2022      Lab Results   Component Value Date    CHOL 181 05/31/2022    HDL 61 05/31/2022    LDLCALC 86 05/31/2022    TRIG 170 (H) 05/31/2022     No results found for: SEDRATE    Assessment/Plan   Problem List Items Addressed This Visit          Cardiac and Vasculature    Essential hypertension (Chronic)    Relevant Medications    metoprolol succinate XL (TOPROL-XL) 100 mg 24 hr tablet    chlorthalidone 25 mg tablet    Other Relevant Orders    CBC w/auto differential Blood, Venous (Completed)    Basic metabolic panel Blood, Venous (Completed)       Endocrine and Metabolic    Morbid obesity (CMS/[facility]) ([facility]) - Primary (Chronic)    Type 2 diabetes mellitus with diabetic nephropathy, with long-term current use of insulin (CMS/HCC) ([facility]) (Chronic)    Relevant Medications    chlorthalidone 25 mg tablet    NovoLOG Flexpen U-100 Insulin 100 unit/mL (3 mL) insulin pen    insulin glargine U-300 conc (Toujeo Max U-300 SoloStar) 300 unit/mL (3 mL) insulin pen insulin pen       Genitourinary and Reproductive    CKD (chronic kidney disease) stage 3, GFR 30-59 ml/min (CMS/[facility]) ([facility]) (Chronic)    Relevant Medications    chlorthalidone 25 mg tablet     Other Visit Diagnoses       Tachycardia        Relevant Orders    CBC w/auto differential Blood, Venous (Completed)    Basic metabolic panel Blood, Venous  (Completed)    Hypomagnesemia        Relevant Orders    Magnesium Blood, Venous (Completed)    Encounter for hepatitis C screening test for low risk patient        Relevant Orders    Hepatitis C antibody Blood, Venous (Completed)    SREENE (acute kidney injury) (CMS/HCC) (HCC)        Relevant Medications    chlorthalidone 25 mg tablet    Leukocytosis, unspecified type        Relevant Orders    Peripheral blood smear, Pathology    Diabetes mellitus without complication (CMS/HCC) (HCC)        Relevant Medications    NovoLOG Flexpen U-100 Insulin 100 unit/mL (3 mL) insulin pen    insulin glargine U-300 conc (Toujeo Max U-300 SoloStar) 300 unit/mL (3 mL) insulin pen insulin pen          We will switch from hydrochlorothiazide to chlorthalidone given the lack of control she has with her blood pressure at this time.  We will see if this improves this altogether.  Additional labs as above.  Continue with current metoprolol dosing.  Heart rates are well controlled today.    Increase insulin to Toujeo 70 units twice daily.  We will increase her mealtime insulin to 20 units in the morning, 26 with lunch, 37 with dinner.    Margarito Glass MD

## 2022-11-20 DIAGNOSIS — I10 ESSENTIAL HYPERTENSION: ICD-10-CM

## 2022-11-20 DIAGNOSIS — K21.9 CHRONIC GERD: ICD-10-CM

## 2022-11-20 NOTE — TELEPHONE ENCOUNTER
No new care gaps identified.  [facility] Embedded Care Gaps. Reference number: [id number]. 11/20/2022 1:58:25 PM MST

## 2022-11-21 RX ORDER — AMLODIPINE BESYLATE 5 MG/1
5 TABLET ORAL DAILY
Qty: 90 TABLET | Refills: 0 | Status: SHIPPED | OUTPATIENT
Start: 2022-11-21 | End: 2023-02-07

## 2022-11-21 RX ORDER — OMEPRAZOLE 40 MG/1
40 CAPSULE, DELAYED RELEASE ORAL DAILY
Qty: 90 CAPSULE | Refills: 0 | Status: SHIPPED | OUTPATIENT
Start: 2022-11-21 | End: 2023-02-07

## 2022-11-21 RX ORDER — LOSARTAN POTASSIUM 100 MG/1
100 TABLET ORAL DAILY
Qty: 90 TABLET | Refills: 0 | Status: SHIPPED | OUTPATIENT
Start: 2022-11-21 | End: 2023-02-07

## 2022-11-28 ENCOUNTER — OFFICE VISIT (OUTPATIENT)
Dept: OTOLARYNGOLOGY | Facility: CLINIC | Age: 77
End: 2022-11-28
Payer: MEDICARE

## 2022-11-28 VITALS
TEMPERATURE: 97.4 F | HEIGHT: 63 IN | HEART RATE: 81 BPM | SYSTOLIC BLOOD PRESSURE: 136 MMHG | OXYGEN SATURATION: 96 % | DIASTOLIC BLOOD PRESSURE: 82 MMHG | BODY MASS INDEX: 40.75 KG/M2 | WEIGHT: 230 LBS

## 2022-11-28 DIAGNOSIS — H61.21 IMPACTED CERUMEN OF RIGHT EAR: ICD-10-CM

## 2022-11-28 DIAGNOSIS — Z90.89 HISTORY OF RIGHT MASTOIDECTOMY: Primary | ICD-10-CM

## 2022-11-28 PROCEDURE — G0463 HOSPITAL OUTPT CLINIC VISIT: HCPCS | Performed by: OTOLARYNGOLOGY

## 2022-11-28 PROCEDURE — 69220 CLEAN OUT MASTOID CAVITY: CPT | Performed by: OTOLARYNGOLOGY

## 2022-11-28 ASSESSMENT — ENCOUNTER SYMPTOMS
TROUBLE SWALLOWING: 0
RHINORRHEA: 0
CHILLS: 0
SINUS PRESSURE: 0
SORE THROAT: 0
FEVER: 0
CONSTITUTIONAL NEGATIVE: 1

## 2022-11-28 NOTE — PROGRESS NOTES
Subjective    CC: Routine mastoid debridement    HPI: Consuelo Singh is a 77 y.o. female who is status post canal wall down mastoidectomy on the right in the 1970s.  She is doing well.  No complaints currently.  She presents for her quarterly mastoid debridement and maintenance exam.    Past Medical History:   Diagnosis Date    Allergic Do not remember    Arthritis     Blood clot in vein     shani legs    Cataract     Deep vein thrombosis (CMS/HCC) (HCC)     hx of    Diabetes (CMS/HCC) (HCC)     type 2    Eczema     Fibromyalgia     GERD (gastroesophageal reflux disease)     Heart murmur     Hyperlipidemia     Hypertension     Obesity In my twenties    Peripheral neuropathy     toes    Pneumonia     Psoriasis     Scoliosis     Skin abnormalities 1985    Visual impairment When i was 17    Wears dentures     full set       Past Surgical History:   Procedure Laterality Date    CHOLECYSTECTOMY      COLONOSCOPY  03/11/2016    5 yr  follow up    COLONOSCOPY N/A 05/28/2021    Please call the patient regarding her abnormal result. Repeat colonoscopy in 5 year due to 2 tubular adenomas if patient otherwise healthy at that time. Also reasonable to discontinue further colonoscopy--Dr Laboy    EYE SURGERY  I believe 2020    HYSTERECTOMY      With BSO    OTHER SURGICAL HISTORY      Rt. radical mastoidectomy, rebuilt canal from growth         Current Outpatient Medications:     omeprazole (PriLOSEC) 40 mg capsule, Take 1 capsule (40 mg total) by mouth daily, Disp: 90 capsule, Rfl: 0    amLODIPine (Norvasc) 5 mg tablet, Take 1 tablet (5 mg total) by mouth daily, Disp: 90 tablet, Rfl: 0    losartan (COZAAR) 100 mg tablet, Take 1 tablet (100 mg total) by mouth daily, Disp: 90 tablet, Rfl: 0    metoprolol succinate XL (TOPROL-XL) 100 mg 24 hr tablet, Take 1.5 tablets (150 mg total) by mouth nightly, Disp: 135 tablet, Rfl: 3    chlorthalidone 25 mg tablet, Take 1 tablet (25 mg total) by mouth daily, Disp: 30 tablet, Rfl: 11     "NovoLOG Flexpen U-100 Insulin 100 unit/mL (3 mL) insulin pen, Inject 20 Units under the skin daily with breakfast AND 26 Units daily before lunch AND 37 Units daily with dinner., Disp: 24.9 mL, Rfl: 11    insulin glargine U-300 conc (Toujeo Max U-300 SoloStar) 300 unit/mL (3 mL) insulin pen insulin pen, Inject 70 Units under the skin 2 (two) times a day NO SUBSTITUTIONS AS PATIENT IS ALLERGIC TO OTHER INSULINS, Disp: 45 mL, Rfl: 11    pen needle, diabetic 31 gauge x 1/4\" needle, USE 1 NEEDLE TO INJECT UNDER THE SKIN 5 TIMES DAILY Type 2 diabetes with long term insuline use. E11.9, Disp: 200 each, Rfl: 11    nortriptyline (PAMELOR) 50 mg capsule, Take 1 capsule (50 mg total) by mouth daily, Disp: 90 capsule, Rfl: 3    pravastatin (PRAVACHOL) 20 mg tablet, Take 1 tablet (20 mg total) by mouth 3 (three) times a week, Disp: 36 tablet, Rfl: 3    clobetasoL (TEMOVATE) 0.05 % ointment, Apply 1 application topically 2 (two) times a day as needed (Rash), Disp: 30 g, Rfl: 2    lutein 20 mg tablet, Take 1 tab/cap by mouth daily, Disp: , Rfl:     multivitamin (THERAGRAN) tablet tablet, Take 1 tablet by mouth daily, Disp: , Rfl:     blood-glucose sensor device, Use 1 sensor every 10 days., Disp: 3 Device, Rfl: 11    blood-glucose meter,continuous (Dexcom G6 ) misc, Use as directed., Disp: 1 each, Rfl: 0    blood-glucose transmitter (Dexcom G6 Transmitter) device, Use as directed.  Change every 3 months., Disp: 1 Device, Rfl: 4    aspirin 325 mg tablet, Take 325 mg by mouth daily.  , Disp: , Rfl:     Allergies   Allergen Reactions    Aloe Vera     Bacitracin      ZINC    Erythromycin Base     Formaldehyde     Gramicidins      OPHTHALMIC    Hydrocortisone      ACETATE    Latex     Levofloxacin     Metformin     Neomycin     Neomycin-Bacitracnzn-Polymyxnb     Polymyxin B     Prednisolone     Prednisone     Statins-Hmg-Coa Reductase Inhibitors     Sulfamethoxazole     Sulfamethoxazole-Trimethoprim     Thimerosal     Tresiba " "Flextouch U-100 [Insulin Degludec] Diarrhea and Other (see comments)     Abdominal pain    Trimethoprim        family history includes Bone cancer in her maternal grandfather; Breast cancer in her mother and mother's sister; Cancer in her mother; Diabetes in her father; Lung cancer in her maternal grandmother and mother's sister; Pancreatic cancer in her mother's brother.    Social History     Tobacco Use    Smoking status: Former     Packs/day: 0.50     Years: 40.00     Pack years: 20.00     Types: Cigarettes     Quit date: 1/1/2007     Years since quitting: 15.9    Smokeless tobacco: Former   Substance Use Topics    Alcohol use: Yes     Comment: Maybe once a month    Drug use: No       Review of Systems   Constitutional: Negative.  Negative for chills and fever.   HENT: Negative.  Negative for congestion, ear discharge, ear pain, hearing loss, nosebleeds, postnasal drip, rhinorrhea, sinus pressure, sneezing, sore throat, tinnitus and trouble swallowing.      Objective    /82 (BP Location: Left arm, Patient Position: Sitting, Cuff Size: Long Adult)   Pulse 81   Temp 36.3 °C (97.4 °F) (Temporal)   Ht 1.6 m (5' 3\")   Wt 104.3 kg (230 lb)   SpO2 96%   BMI 40.74 kg/m²     General: Well-developed well-nourished female no acute distress.    Ears: Left external ear, external auditory canal and tympanic membrane appear normal.  On the right the patient is status post canal wall down mastoidectomy with a widely patent meatoplasty.  Mastoid cavity appears healthy.  Moderate cerumen debris present.    Procedure: Mastoid debridement, right.  Using the handheld otoscope, cerumen curette and alligator forceps cerumen and debris were removed from the mastoid cavity and from along the facial ridge area.  The amount of debris present was quite minimal.  Everything appears healthy.    Diagnosis    1. History of right mastoidectomy    2. Impacted cerumen of right ear        Recommendations    Patient appears to be doing " well with the healthy appearing right mastoid cavity.  Follow-up in 3 months for repeat debridement, sooner as needed for problems.

## 2022-12-02 ENCOUNTER — OFFICE VISIT (OUTPATIENT)
Dept: FAMILY MEDICINE | Facility: CLINIC | Age: 77
End: 2022-12-02
Payer: MEDICARE

## 2022-12-02 ENCOUNTER — APPOINTMENT (OUTPATIENT)
Dept: LAB | Facility: CLINIC | Age: 77
End: 2022-12-02
Payer: MEDICARE

## 2022-12-02 VITALS
OXYGEN SATURATION: 96 % | WEIGHT: 232 LBS | BODY MASS INDEX: 41.11 KG/M2 | SYSTOLIC BLOOD PRESSURE: 128 MMHG | TEMPERATURE: 97.3 F | HEART RATE: 63 BPM | DIASTOLIC BLOOD PRESSURE: 68 MMHG | HEIGHT: 63 IN

## 2022-12-02 DIAGNOSIS — Z79.4 TYPE 2 DIABETES MELLITUS WITH DIABETIC NEPHROPATHY, WITH LONG-TERM CURRENT USE OF INSULIN (CMS/HCC): ICD-10-CM

## 2022-12-02 DIAGNOSIS — E11.21 TYPE 2 DIABETES MELLITUS WITH DIABETIC NEPHROPATHY, WITH LONG-TERM CURRENT USE OF INSULIN (CMS/HCC): ICD-10-CM

## 2022-12-02 DIAGNOSIS — D72.829 LEUKOCYTOSIS, UNSPECIFIED TYPE: ICD-10-CM

## 2022-12-02 DIAGNOSIS — N18.31 STAGE 3A CHRONIC KIDNEY DISEASE (CMS/HCC): Primary | ICD-10-CM

## 2022-12-02 LAB
ANION GAP SERPL CALC-SCNC: 9 MMOL/L (ref 3–11)
BASOPHILS # BLD AUTO: 0.1 10*3/UL
BASOPHILS NFR BLD AUTO: 0.6 % (ref 0–2)
BUN SERPL-MCNC: 31 MG/DL (ref 7–25)
CALCIUM SERPL-MCNC: 9.4 MG/DL (ref 8.6–10.3)
CHLORIDE SERPL-SCNC: 101 MMOL/L (ref 98–107)
CO2 SERPL-SCNC: 29 MMOL/L (ref 21–32)
CREAT SERPL-MCNC: 1.18 MG/DL (ref 0.6–1.1)
EOSINOPHIL # BLD AUTO: 0.3 10*3/UL
EOSINOPHIL NFR BLD AUTO: 3.2 % (ref 0–3)
ERYTHROCYTE [DISTWIDTH] IN BLOOD BY AUTOMATED COUNT: 15.1 % (ref 11.5–14)
EST. AVERAGE GLUCOSE BLD GHB EST-MCNC: 165.7 MG/DL
GFR SERPL CREATININE-BSD FRML MDRD: 48 ML/MIN/1.73M*2
GLUCOSE SERPL-MCNC: 122 MG/DL (ref 70–105)
HBA1C MFR BLD: 7.4 % (ref 4–6)
HCT VFR BLD AUTO: 42.8 % (ref 34–45)
HGB BLD-MCNC: 13.9 G/DL (ref 11.5–15.5)
LYMPHOCYTES # BLD AUTO: 2.8 10*3/UL
LYMPHOCYTES NFR BLD AUTO: 25.8 % (ref 11–47)
MCH RBC QN AUTO: 29.5 PG (ref 28–33)
MCHC RBC AUTO-ENTMCNC: 32.4 G/DL (ref 32–36)
MCV RBC AUTO: 91.2 FL (ref 81–97)
MONOCYTES # BLD AUTO: 0.8 10*3/UL
MONOCYTES NFR BLD AUTO: 7.3 % (ref 3–11)
NEUTROPHILS # BLD AUTO: 7 10*3/UL
NEUTROPHILS NFR BLD AUTO: 63.1 % (ref 41–81)
PLATELET # BLD AUTO: 263 10*3/UL (ref 140–350)
PMV BLD AUTO: 7.5 FL (ref 6.9–10.8)
POTASSIUM SERPL-SCNC: 3.9 MMOL/L (ref 3.5–5.1)
RBC # BLD AUTO: 4.7 10*6/ΜL (ref 3.7–5.3)
SODIUM SERPL-SCNC: 139 MMOL/L (ref 135–145)
WBC # BLD AUTO: 11 10*3/UL (ref 4.5–10.5)

## 2022-12-02 PROCEDURE — 85025 COMPLETE CBC W/AUTO DIFF WBC: CPT

## 2022-12-02 PROCEDURE — 80048 BASIC METABOLIC PNL TOTAL CA: CPT

## 2022-12-02 PROCEDURE — 83036 HEMOGLOBIN GLYCOSYLATED A1C: CPT

## 2022-12-02 PROCEDURE — 36415 COLL VENOUS BLD VENIPUNCTURE: CPT

## 2022-12-02 PROCEDURE — 99213 OFFICE O/P EST LOW 20 MIN: CPT

## 2022-12-02 PROCEDURE — G0463 HOSPITAL OUTPT CLINIC VISIT: HCPCS

## 2022-12-02 RX ORDER — PEN NEEDLE, DIABETIC 32GX 5/32"
NEEDLE, DISPOSABLE MISCELLANEOUS
COMMUNITY
Start: 2022-10-26

## 2022-12-02 ASSESSMENT — ENCOUNTER SYMPTOMS
SORE THROAT: 0
BACK PAIN: 0
CHILLS: 0
SEIZURES: 0
HEMATURIA: 0
ABDOMINAL PAIN: 0
COUGH: 0
DYSURIA: 0
VOMITING: 0
COLOR CHANGE: 0
EYE PAIN: 0
ARTHRALGIAS: 0
SHORTNESS OF BREATH: 0
FEVER: 0
PALPITATIONS: 0

## 2022-12-02 ASSESSMENT — PAIN SCALES - GENERAL: PAINLEVEL: 0-NO PAIN

## 2022-12-02 NOTE — PROGRESS NOTES
Subjective      Consuelo Singh is a 77 y.o. female who presents for diabetic (Has been charting her food to see the effect it has on her blood sugar and learning a lot. )      MYLENE Cordero presents for her follow-up diabetic visit. She has type 2 diabetes mellitus. Her disease course has been fluctuating. There are no hypoglycemic associated symptoms. There are no diabetic associated symptoms. Pertinent negatives for diabetes include no chest pain, no fatigue, no polydipsia, no polyphagia and no polyuria. There are no hypoglycemic complications. Symptoms are stable. There are no diabetic complications. Risk factors for coronary artery disease include obesity, post-menopausal, diabetes mellitus, hypertension, tobacco exposure and dyslipidemia. Current diabetic treatment includes insulin injections. She is compliant with treatment all of the time. Insulin dose schedule: Toujeo 70 units twice daily, mealtime insulin 20 units in the morning, 26 with lunch, 37 with dinner. She is following a generally healthy diet. Home blood sugar record trend: Uses a CGM.  A1c today is 7.4.  Basal metabolic panel demonstrated decreased kidney function, this is consistent with prior lab work.  Has a history of chronic kidney disease, stage III.  CBC demonstrates persistent leukocytosis, 11.  This is down from 2 months ago, 15.4.     At her last visit 3 months ago, she was switched from hydrochlorothiazide to chlorthalidone 25 mg given the lack of control she had with her blood pressure at this time.  She is also taking amlodipine 5 mg, metoprolol succinate 100 mg daily, and losartan 100 mg daily.  Blood pressure today in the clinic is 128/68.    The following have been reviewed and updated as appropriate in this visit:   Tobacco  Allergies  Meds  Problems  Med Hx  Surg Hx  Fam Hx         Allergies   Allergen Reactions    Aloe Vera     Bacitracin      ZINC    Erythromycin Base     Formaldehyde     Gramicidins      OPHTHALMIC     "Hydrocortisone      ACETATE    Latex     Levofloxacin     Metformin     Neomycin     Neomycin-Bacitracnzn-Polymyxnb     Polymyxin B     Prednisolone     Prednisone     Statins-Hmg-Coa Reductase Inhibitors     Sulfamethoxazole     Sulfamethoxazole-Trimethoprim     Thimerosal     Tresiba Flextouch U-100 [Insulin Degludec] Diarrhea and Other (see comments)     Abdominal pain    Trimethoprim      Current Outpatient Medications   Medication Sig Dispense Refill    pen needle, diabetic 31 gauge x 15/64\" needle SMARTSIG:SUB-Q      omeprazole (PriLOSEC) 40 mg capsule Take 1 capsule (40 mg total) by mouth daily 90 capsule 0    amLODIPine (Norvasc) 5 mg tablet Take 1 tablet (5 mg total) by mouth daily 90 tablet 0    losartan (COZAAR) 100 mg tablet Take 1 tablet (100 mg total) by mouth daily 90 tablet 0    metoprolol succinate XL (TOPROL-XL) 100 mg 24 hr tablet Take 1.5 tablets (150 mg total) by mouth nightly 135 tablet 3    chlorthalidone 25 mg tablet Take 1 tablet (25 mg total) by mouth daily 30 tablet 11    NovoLOG Flexpen U-100 Insulin 100 unit/mL (3 mL) insulin pen Inject 20 Units under the skin daily with breakfast AND 26 Units daily before lunch AND 37 Units daily with dinner. 24.9 mL 11    insulin glargine U-300 conc (Toujeo Max U-300 SoloStar) 300 unit/mL (3 mL) insulin pen insulin pen Inject 70 Units under the skin 2 (two) times a day NO SUBSTITUTIONS AS PATIENT IS ALLERGIC TO OTHER INSULINS 45 mL 11    pen needle, diabetic 31 gauge x 1/4\" needle USE 1 NEEDLE TO INJECT UNDER THE SKIN 5 TIMES DAILY Type 2 diabetes with long term insuline use. E11.9 200 each 11    nortriptyline (PAMELOR) 50 mg capsule Take 1 capsule (50 mg total) by mouth daily 90 capsule 3    pravastatin (PRAVACHOL) 20 mg tablet Take 1 tablet (20 mg total) by mouth 3 (three) times a week 36 tablet 3    clobetasoL (TEMOVATE) 0.05 % ointment Apply 1 application topically 2 (two) times a day as needed (Rash) 30 g 2    lutein 20 mg tablet Take 1 tab/cap by " mouth daily      multivitamin (THERAGRAN) tablet tablet Take 1 tablet by mouth daily      blood-glucose sensor device Use 1 sensor every 10 days. 3 Device 11    blood-glucose meter,continuous (Dexcom G6 ) misc Use as directed. 1 each 0    blood-glucose transmitter (Dexcom G6 Transmitter) device Use as directed.  Change every 3 months. 1 Device 4    aspirin 325 mg tablet Take 325 mg by mouth daily.         No current facility-administered medications for this visit.     Past Medical History:   Diagnosis Date    Allergic Do not remember    Arthritis     Blood clot in vein     shani legs    Cataract     Deep vein thrombosis (CMS/HCC) (HCC)     hx of    Diabetes (CMS/HCC) (HCC)     type 2    Eczema     Fibromyalgia     GERD (gastroesophageal reflux disease)     Heart murmur     Hyperlipidemia     Hypertension     Obesity In my twenties    Peripheral neuropathy     toes    Pneumonia     Psoriasis     Scoliosis     Skin abnormalities 1985    Visual impairment When i was 17    Wears dentures     full set     Past Surgical History:   Procedure Laterality Date    CHOLECYSTECTOMY      COLONOSCOPY  03/11/2016    5 yr  follow up    COLONOSCOPY N/A 05/28/2021    Please call the patient regarding her abnormal result. Repeat colonoscopy in 5 year due to 2 tubular adenomas if patient otherwise healthy at that time. Also reasonable to discontinue further colonoscopy--Dr Laboy    EYE SURGERY  I believe 2020    HYSTERECTOMY      With BSO    OTHER SURGICAL HISTORY      Rt. radical mastoidectomy, rebuilt canal from growth     Family History   Problem Relation Age of Onset    Breast cancer Mother     Cancer Mother     Diabetes Father     Lung cancer Maternal Grandmother     Bone cancer Maternal Grandfather     Pancreatic cancer Mother's Brother     Breast cancer Mother's Sister     Lung cancer Mother's Sister      Social History     Socioeconomic History    Marital status:    Tobacco Use    Smoking status: Former      "Packs/day: 0.50     Years: 40.00     Pack years: 20.00     Types: Cigarettes     Quit date: 1/1/2007     Years since quitting: 15.9    Smokeless tobacco: Former   Substance and Sexual Activity    Alcohol use: Yes     Comment: Maybe once a month    Drug use: No    Sexual activity: Defer     Social Determinants of Health     Tobacco Use: Medium Risk    Smoking Tobacco Use: Former    Smokeless Tobacco Use: Former       Review of Systems   Constitutional:  Negative for chills and fever.   HENT:  Negative for ear pain and sore throat.    Eyes:  Negative for pain and visual disturbance.   Respiratory:  Negative for cough and shortness of breath.    Cardiovascular:  Negative for chest pain and palpitations.   Gastrointestinal:  Negative for abdominal pain and vomiting.   Genitourinary:  Negative for dysuria and hematuria.   Musculoskeletal:  Negative for arthralgias and back pain.   Skin:  Negative for color change and rash.   Neurological:  Negative for seizures and syncope.   All other systems reviewed and are negative.    Objective     VITAL SIGNS  /68   Pulse 63   Temp 36.3 °C (97.3 °F) (Temporal)   Ht 1.6 m (5' 3\")   Wt 105.2 kg (232 lb)   SpO2 96%   BMI 41.10 kg/m²     Physical Exam  Vitals and nursing note reviewed.   Constitutional:       General: She is not in acute distress.     Appearance: She is well-developed. She is obese.   HENT:      Head: Normocephalic and atraumatic.   Eyes:      Extraocular Movements: Extraocular movements intact.   Cardiovascular:      Rate and Rhythm: Normal rate and regular rhythm.      Heart sounds: Normal heart sounds.   Pulmonary:      Effort: Pulmonary effort is normal.      Breath sounds: Normal breath sounds.   Abdominal:      General: There is no distension.   Skin:     General: Skin is warm.   Neurological:      Mental Status: She is alert and oriented to person, place, and time.       Lab Results   Component Value Date    GLUCOSE 122 (H) 12/02/2022    CALCIUM 9.4 " 12/02/2022     12/02/2022    K 3.9 12/02/2022    CO2 29 12/02/2022     12/02/2022    BUN 31 (H) 12/02/2022    CREATININE 1.18 (H) 12/02/2022    ANIONGAP 9 12/02/2022     Lab Results   Component Value Date    WBC 11.0 (H) 12/02/2022    HGB 13.9 12/02/2022    HCT 42.8 12/02/2022    MCV 91.2 12/02/2022     12/02/2022     Lab Results   Component Value Date    TSH 3.118 09/08/2022     Lab Results   Component Value Date    HGBA1C 7.4 (H) 12/02/2022      Lab Results   Component Value Date    CHOL 181 05/31/2022    HDL 61 05/31/2022    LDLCALC 86 05/31/2022    TRIG 170 (H) 05/31/2022     No results found for: SEDRATE    Assessment/Plan   Problem List Items Addressed This Visit          Genitourinary and Reproductive    CKD (chronic kidney disease) stage 3, GFR 30-59 ml/min (CMS/[facility]) ([facility]) - Primary (Chronic)    Relevant Orders    Basic metabolic panel Blood, Venous (Completed)     Other Visit Diagnoses       Leukocytosis, unspecified type        Relevant Orders    CBC w/auto differential Blood, Venous (Completed)        Consuelo was congratulated on her hard work with her diabetes control and her blood pressure.  She can follow-up with us in 3 months, sooner if needed.    CHEYANNE Peters

## 2022-12-29 ENCOUNTER — TELEPHONE (OUTPATIENT)
Dept: FAMILY MEDICINE | Facility: CLINIC | Age: 77
End: 2022-12-29
Payer: MEDICARE

## 2022-12-29 NOTE — TELEPHONE ENCOUNTER
Spoke with patient and she understands the prior auth will generate in January and we can get the ball rolling on approving her Novolog.

## 2023-02-07 DIAGNOSIS — I10 ESSENTIAL HYPERTENSION: ICD-10-CM

## 2023-02-07 DIAGNOSIS — K21.9 CHRONIC GERD: ICD-10-CM

## 2023-02-07 RX ORDER — AMLODIPINE BESYLATE 5 MG/1
5 TABLET ORAL DAILY
Qty: 90 TABLET | Refills: 0 | Status: SHIPPED | OUTPATIENT
Start: 2023-02-07 | End: 2023-03-03 | Stop reason: SDUPTHER

## 2023-02-07 RX ORDER — OMEPRAZOLE 40 MG/1
40 CAPSULE, DELAYED RELEASE ORAL DAILY
Qty: 90 CAPSULE | Refills: 0 | Status: SHIPPED | OUTPATIENT
Start: 2023-02-07 | End: 2023-03-03 | Stop reason: SDUPTHER

## 2023-02-07 RX ORDER — LOSARTAN POTASSIUM 100 MG/1
100 TABLET ORAL DAILY
Qty: 90 TABLET | Refills: 0 | Status: SHIPPED | OUTPATIENT
Start: 2023-02-07 | End: 2023-03-03 | Stop reason: SDUPTHER

## 2023-02-15 ENCOUNTER — TELEPHONE (OUTPATIENT)
Dept: FAMILY MEDICINE | Facility: CLINIC | Age: 78
End: 2023-02-15
Payer: MEDICARE

## 2023-02-15 NOTE — TELEPHONE ENCOUNTER
Consuelo stated that her insurance company is sending her a letter that they will not cover the Novolog any more.  I asked her to continue to get the prescription from the pharmacy and when the insurance will not cover the medication we will do a prior authorization at that time.  She verbalized understanding.

## 2023-02-25 DIAGNOSIS — E11.21 TYPE 2 DIABETES MELLITUS WITH DIABETIC NEPHROPATHY, WITH LONG-TERM CURRENT USE OF INSULIN (CMS/HCC): Primary | ICD-10-CM

## 2023-02-25 DIAGNOSIS — Z79.4 TYPE 2 DIABETES MELLITUS WITH DIABETIC NEPHROPATHY, WITH LONG-TERM CURRENT USE OF INSULIN (CMS/HCC): Primary | ICD-10-CM

## 2023-02-27 ENCOUNTER — OFFICE VISIT (OUTPATIENT)
Dept: OTOLARYNGOLOGY | Facility: CLINIC | Age: 78
End: 2023-02-27
Payer: MEDICARE

## 2023-02-27 VITALS
HEART RATE: 80 BPM | OXYGEN SATURATION: 94 % | TEMPERATURE: 97.6 F | BODY MASS INDEX: 40.66 KG/M2 | SYSTOLIC BLOOD PRESSURE: 142 MMHG | WEIGHT: 229.5 LBS | HEIGHT: 63 IN | DIASTOLIC BLOOD PRESSURE: 76 MMHG

## 2023-02-27 DIAGNOSIS — Z90.89 HISTORY OF RIGHT MASTOIDECTOMY: Primary | ICD-10-CM

## 2023-02-27 DIAGNOSIS — H61.21 IMPACTED CERUMEN OF RIGHT EAR: ICD-10-CM

## 2023-02-27 PROCEDURE — 69222 CLEAN OUT MASTOID CAVITY: CPT | Performed by: OTOLARYNGOLOGY

## 2023-02-27 PROCEDURE — G0463 HOSPITAL OUTPT CLINIC VISIT: HCPCS | Performed by: OTOLARYNGOLOGY

## 2023-02-27 ASSESSMENT — ENCOUNTER SYMPTOMS
CHILLS: 0
SORE THROAT: 0
TROUBLE SWALLOWING: 0
SINUS PRESSURE: 0
CONSTITUTIONAL NEGATIVE: 1
RHINORRHEA: 0
FEVER: 0

## 2023-02-27 ASSESSMENT — PAIN SCALES - GENERAL: PAINLEVEL: 0-NO PAIN

## 2023-02-27 NOTE — PROGRESS NOTES
"Subjective    CC: Mastoid cleaning    HPI: Consuelo Singh is a 77 y.o. female with history of canal wall down mastoidectomy on the right decades ago.  She comes in quarterly for debridement.  She states the ear feels fine now and she is just here for the routine cleaning.    Past Medical History:   Diagnosis Date    Allergic Do not remember    Arthritis     Blood clot in vein     shani legs    Cataract     Deep vein thrombosis (CMS/HCC) (HCC)     hx of    Diabetes (CMS/HCC) (HCC)     type 2    Eczema     Fibromyalgia     GERD (gastroesophageal reflux disease)     Heart murmur     Hyperlipidemia     Hypertension     Obesity In my twenties    Peripheral neuropathy     toes    Pneumonia     Psoriasis     Scoliosis     Skin abnormalities 1985    Visual impairment When i was 17    Wears dentures     full set       Past Surgical History:   Procedure Laterality Date    CHOLECYSTECTOMY      COLONOSCOPY  03/11/2016    5 yr  follow up    COLONOSCOPY N/A 05/28/2021    Please call the patient regarding her abnormal result. Repeat colonoscopy in 5 year due to 2 tubular adenomas if patient otherwise healthy at that time. Also reasonable to discontinue further colonoscopy--Dr Laboy    EYE SURGERY  I believe 2020    HYSTERECTOMY      With BSO    OTHER SURGICAL HISTORY      Rt. radical mastoidectomy, rebuilt canal from growth         Current Outpatient Medications:     amLODIPine (NORVASC) 5 mg tablet, Take 1 tablet (5 mg total) by mouth daily, Disp: 90 tablet, Rfl: 0    losartan (COZAAR) 100 mg tablet, Take 1 tablet (100 mg total) by mouth daily, Disp: 90 tablet, Rfl: 0    omeprazole (PriLOSEC) 40 mg capsule, Take 1 capsule (40 mg total) by mouth daily, Disp: 90 capsule, Rfl: 0    pen needle, diabetic 31 gauge x 15/64\" needle, SMARTSIG:SUB-Q, Disp: , Rfl:     metoprolol succinate XL (TOPROL-XL) 100 mg 24 hr tablet, Take 1.5 tablets (150 mg total) by mouth nightly, Disp: 135 tablet, Rfl: 3    chlorthalidone 25 mg tablet, Take 1 " "tablet (25 mg total) by mouth daily, Disp: 30 tablet, Rfl: 11    NovoLOG Flexpen U-100 Insulin 100 unit/mL (3 mL) insulin pen, Inject 20 Units under the skin daily with breakfast AND 26 Units daily before lunch AND 37 Units daily with dinner., Disp: 24.9 mL, Rfl: 11    insulin glargine U-300 conc (Toujeo Max U-300 SoloStar) 300 unit/mL (3 mL) insulin pen insulin pen, Inject 70 Units under the skin 2 (two) times a day NO SUBSTITUTIONS AS PATIENT IS ALLERGIC TO OTHER INSULINS, Disp: 45 mL, Rfl: 11    pen needle, diabetic 31 gauge x 1/4\" needle, USE 1 NEEDLE TO INJECT UNDER THE SKIN 5 TIMES DAILY Type 2 diabetes with long term insuline use. E11.9, Disp: 200 each, Rfl: 11    nortriptyline (PAMELOR) 50 mg capsule, Take 1 capsule (50 mg total) by mouth daily, Disp: 90 capsule, Rfl: 3    pravastatin (PRAVACHOL) 20 mg tablet, Take 1 tablet (20 mg total) by mouth 3 (three) times a week, Disp: 36 tablet, Rfl: 3    clobetasoL (TEMOVATE) 0.05 % ointment, Apply 1 application topically 2 (two) times a day as needed (Rash), Disp: 30 g, Rfl: 2    lutein 20 mg tablet, Take 1 tab/cap by mouth daily, Disp: , Rfl:     multivitamin (THERAGRAN) tablet tablet, Take 1 tablet by mouth daily, Disp: , Rfl:     blood-glucose sensor device, Use 1 sensor every 10 days., Disp: 3 Device, Rfl: 11    blood-glucose meter,continuous (Dexcom G6 ) misc, Use as directed., Disp: 1 each, Rfl: 0    blood-glucose transmitter (Dexcom G6 Transmitter) device, Use as directed.  Change every 3 months., Disp: 1 Device, Rfl: 4    aspirin 325 mg tablet, Take 325 mg by mouth daily.  , Disp: , Rfl:     Allergies   Allergen Reactions    Aloe Vera     Bacitracin      ZINC    Erythromycin Base     Formaldehyde     Gramicidins      OPHTHALMIC    Hydrocortisone      ACETATE    Latex     Levofloxacin     Metformin     Neomycin     Neomycin-Bacitracnzn-Polymyxnb     Polymyxin B     Prednisolone     Prednisone     Statins-Hmg-Coa Reductase Inhibitors     " "Sulfamethoxazole     Sulfamethoxazole-Trimethoprim     Thimerosal     Tresiba Flextouch U-100 [Insulin Degludec] Diarrhea and Other (see comments)     Abdominal pain    Trimethoprim        family history includes Bone cancer in her maternal grandfather; Breast cancer in her mother and mother's sister; Cancer in her mother; Diabetes in her father; Lung cancer in her maternal grandmother and mother's sister; Pancreatic cancer in her mother's brother.    Social History     Tobacco Use    Smoking status: Former     Packs/day: 0.50     Years: 40.00     Pack years: 20.00     Types: Cigarettes     Quit date: 2007     Years since quittin.1    Smokeless tobacco: Former   Substance Use Topics    Alcohol use: Yes     Comment: Maybe once a month    Drug use: No       Review of Systems   Constitutional: Negative.  Negative for chills and fever.   HENT: Negative.  Negative for congestion, ear discharge, ear pain, hearing loss, nosebleeds, postnasal drip, rhinorrhea, sinus pressure, sneezing, sore throat, tinnitus and trouble swallowing.      Objective    /76 (BP Location: Left arm, Patient Position: Sitting, Cuff Size: Large Adult)   Pulse 80   Temp 36.4 °C (97.6 °F) (Temporal)   Ht 1.6 m (5' 3\")   Wt 104.1 kg (229 lb 8 oz)   SpO2 94%   BMI 40.65 kg/m²     General: Well-developed well-nourished female no acute distress.    Ears: Left external ear, external auditory canal and tympanic membrane normal.  On the right the external ear is normal, external auditory canal status post wide open meatoplasty.  Some cerumen debris present.  Once this was removed mastoid bowl and remainder of ear appear healthy.    Procedure: Mastoid debridement, right.  The right ear was examined with the operating microscope and speculum.  Using cerumen loop by gently elevated the lateral extent of the impacted cerumen on a broad front lifting up 1 single large piece and then removing it with alligator forceps.  This was mostly " inferiorly and posteriorly based.  A small additional amount on the tegmen tympani was removed as well.  Overall the ear appears healthy with no granulation, moisture, otorrhea or evidence of cholesteatoma.    Diagnosis    1. History of right mastoidectomy    2. Impacted cerumen of right ear        Recommendations    Right mastoid debridement done today.  Follow-up in 3 months, sooner as needed.

## 2023-03-03 ENCOUNTER — TELEPHONE (OUTPATIENT)
Dept: FAMILY MEDICINE | Facility: CLINIC | Age: 78
End: 2023-03-03
Payer: MEDICARE

## 2023-03-03 ENCOUNTER — LAB (OUTPATIENT)
Dept: LAB | Facility: CLINIC | Age: 78
End: 2023-03-03
Payer: MEDICARE

## 2023-03-03 ENCOUNTER — CLINICAL SUPPORT (OUTPATIENT)
Dept: FAMILY MEDICINE | Facility: CLINIC | Age: 78
End: 2023-03-03
Payer: MEDICARE

## 2023-03-03 ENCOUNTER — OFFICE VISIT (OUTPATIENT)
Dept: FAMILY MEDICINE | Facility: CLINIC | Age: 78
End: 2023-03-03
Payer: MEDICARE

## 2023-03-03 VITALS
RESPIRATION RATE: 18 BRPM | HEART RATE: 77 BPM | SYSTOLIC BLOOD PRESSURE: 138 MMHG | BODY MASS INDEX: 40.21 KG/M2 | WEIGHT: 227 LBS | DIASTOLIC BLOOD PRESSURE: 60 MMHG | OXYGEN SATURATION: 95 % | TEMPERATURE: 97.1 F

## 2023-03-03 DIAGNOSIS — Z79.4 TYPE 2 DIABETES MELLITUS WITH DIABETIC NEPHROPATHY, WITH LONG-TERM CURRENT USE OF INSULIN (CMS/HCC): ICD-10-CM

## 2023-03-03 DIAGNOSIS — E11.21 TYPE 2 DIABETES MELLITUS WITH DIABETIC NEPHROPATHY, WITH LONG-TERM CURRENT USE OF INSULIN (CMS/HCC): Chronic | ICD-10-CM

## 2023-03-03 DIAGNOSIS — E78.00 PURE HYPERCHOLESTEROLEMIA: ICD-10-CM

## 2023-03-03 DIAGNOSIS — K21.9 CHRONIC GERD: ICD-10-CM

## 2023-03-03 DIAGNOSIS — Z23 ENCOUNTER FOR VACCINATION: Primary | ICD-10-CM

## 2023-03-03 DIAGNOSIS — E11.21 TYPE 2 DIABETES MELLITUS WITH DIABETIC NEPHROPATHY, WITH LONG-TERM CURRENT USE OF INSULIN (CMS/HCC): ICD-10-CM

## 2023-03-03 DIAGNOSIS — I10 ESSENTIAL HYPERTENSION: ICD-10-CM

## 2023-03-03 DIAGNOSIS — F32.A DEPRESSIVE DISORDER: ICD-10-CM

## 2023-03-03 DIAGNOSIS — R05.9 COUGH, UNSPECIFIED TYPE: ICD-10-CM

## 2023-03-03 DIAGNOSIS — E66.01 MORBID OBESITY (CMS/HCC): Primary | Chronic | ICD-10-CM

## 2023-03-03 DIAGNOSIS — Z79.4 TYPE 2 DIABETES MELLITUS WITH DIABETIC NEPHROPATHY, WITH LONG-TERM CURRENT USE OF INSULIN (CMS/HCC): Chronic | ICD-10-CM

## 2023-03-03 DIAGNOSIS — Z23 NEED FOR TDAP VACCINATION: ICD-10-CM

## 2023-03-03 DIAGNOSIS — Z23 NEED FOR SHINGLES VACCINE: ICD-10-CM

## 2023-03-03 LAB
ALBUMIN SERPL-MCNC: 4.4 G/DL (ref 3.5–5.3)
ALP SERPL-CCNC: 99 U/L (ref 55–142)
ALT SERPL-CCNC: 28 U/L (ref 7–52)
ANION GAP SERPL CALC-SCNC: 7 MMOL/L (ref 3–11)
AST SERPL-CCNC: 24 U/L
BILIRUB SERPL-MCNC: 0.51 MG/DL (ref 0.2–1.4)
BUN SERPL-MCNC: 28 MG/DL (ref 7–25)
CALCIUM ALBUM COR SERPL-MCNC: 9.8 MG/DL (ref 8.6–10.3)
CALCIUM SERPL-MCNC: 10.1 MG/DL (ref 8.6–10.3)
CHLORIDE SERPL-SCNC: 100 MMOL/L (ref 98–107)
CHOLEST SERPL-MCNC: 186 MG/DL (ref 0–199)
CO2 SERPL-SCNC: 31 MMOL/L (ref 21–32)
CREAT SERPL-MCNC: 1.1 MG/DL (ref 0.6–1.1)
EST. AVERAGE GLUCOSE BLD GHB EST-MCNC: 151.3 MG/DL
GFR SERPL CREATININE-BSD FRML MDRD: 52 ML/MIN/1.73M*2
GLUCOSE SERPL-MCNC: 79 MG/DL (ref 70–105)
HBA1C MFR BLD: 6.9 % (ref 4–6)
HDLC SERPL-MCNC: 57 MG/DL
LDLC SERPL CALC-MCNC: 98 MG/DL (ref 20–99)
POTASSIUM SERPL-SCNC: 4 MMOL/L (ref 3.5–5.1)
PROT SERPL-MCNC: 6.9 G/DL (ref 6–8.3)
SODIUM SERPL-SCNC: 138 MMOL/L (ref 135–145)
TRIGL SERPL-MCNC: 156 MG/DL

## 2023-03-03 PROCEDURE — 83036 HEMOGLOBIN GLYCOSYLATED A1C: CPT

## 2023-03-03 PROCEDURE — 99214 OFFICE O/P EST MOD 30 MIN: CPT | Performed by: FAMILY MEDICINE

## 2023-03-03 PROCEDURE — 80061 LIPID PANEL: CPT | Mod: NCP

## 2023-03-03 PROCEDURE — 91313 MODERNA BIVALENT BOOSTER SARS-COV-2 VACCINE (BOOSTER): CPT | Performed by: FAMILY MEDICINE

## 2023-03-03 PROCEDURE — 36415 COLL VENOUS BLD VENIPUNCTURE: CPT

## 2023-03-03 PROCEDURE — 80053 COMPREHEN METABOLIC PANEL: CPT

## 2023-03-03 PROCEDURE — G0463 HOSPITAL OUTPT CLINIC VISIT: HCPCS | Performed by: FAMILY MEDICINE

## 2023-03-03 RX ORDER — TETANUS TOXOID, REDUCED DIPHTHERIA TOXOID AND ACELLULAR PERTUSSIS VACCINE, ADSORBED 5; 2.5; 8; 8; 2.5 [IU]/.5ML; [IU]/.5ML; UG/.5ML; UG/.5ML; UG/.5ML
0.5 SUSPENSION INTRAMUSCULAR ONCE
Qty: 0.5 ML | Refills: 0 | Status: SHIPPED | OUTPATIENT
Start: 2023-03-03 | End: 2023-03-03

## 2023-03-03 RX ORDER — AMLODIPINE BESYLATE 5 MG/1
5 TABLET ORAL DAILY
Qty: 90 TABLET | Refills: 3 | Status: SHIPPED | OUTPATIENT
Start: 2023-03-03 | End: 2024-03-25 | Stop reason: SDUPTHER

## 2023-03-03 RX ORDER — LOSARTAN POTASSIUM 100 MG/1
100 TABLET ORAL DAILY
Qty: 90 TABLET | Refills: 3 | Status: SHIPPED | OUTPATIENT
Start: 2023-03-03 | End: 2024-03-25 | Stop reason: SDUPTHER

## 2023-03-03 RX ORDER — PRAVASTATIN SODIUM 20 MG/1
20 TABLET ORAL 3 TIMES WEEKLY
Qty: 36 TABLET | Refills: 3 | Status: SHIPPED | OUTPATIENT
Start: 2023-03-03 | End: 2024-03-25 | Stop reason: SDUPTHER

## 2023-03-03 RX ORDER — NORTRIPTYLINE HYDROCHLORIDE 50 MG/1
50 CAPSULE ORAL DAILY
Qty: 90 CAPSULE | Refills: 3 | Status: SHIPPED | OUTPATIENT
Start: 2023-03-03 | End: 2024-04-22

## 2023-03-03 RX ORDER — OMEPRAZOLE 40 MG/1
40 CAPSULE, DELAYED RELEASE ORAL DAILY
Qty: 90 CAPSULE | Refills: 3 | Status: SHIPPED | OUTPATIENT
Start: 2023-03-03 | End: 2024-05-06

## 2023-03-03 RX ORDER — ZOSTER VACCINE RECOMBINANT, ADJUVANTED 50 MCG/0.5
1 KIT INTRAMUSCULAR ONCE
Qty: 1 EACH | Refills: 1 | Status: SHIPPED | OUTPATIENT
Start: 2023-03-03 | End: 2023-03-03

## 2023-03-03 RX ORDER — BENZONATATE 200 MG/1
200 CAPSULE ORAL 3 TIMES DAILY PRN
Qty: 30 CAPSULE | Refills: 0 | Status: SHIPPED | OUTPATIENT
Start: 2023-03-03 | End: 2023-09-11 | Stop reason: SDUPTHER

## 2023-03-03 ASSESSMENT — ENCOUNTER SYMPTOMS
POLYPHAGIA: 0
DIABETIC ASSOCIATED SYMPTOMS: 0
FATIGUE: 0
HYPERTENSION: 1
POLYDIPSIA: 0

## 2023-03-03 NOTE — PROGRESS NOTES
Subjective      Consuelo Singh is a 77 y.o. female who presents for Diabetes (Diabetic check, her insurance company has denied the novolog but she is allergic to humalog )      Diabetes  She presents for her follow-up diabetic visit. She has type 2 diabetes mellitus. Her disease course has been fluctuating. There are no hypoglycemic associated symptoms. There are no diabetic associated symptoms. Pertinent negatives for diabetes include no chest pain, no fatigue, no polydipsia, no polyphagia and no polyuria. There are no hypoglycemic complications. Symptoms are stable. There are no diabetic complications. Risk factors for coronary artery disease include obesity, post-menopausal, diabetes mellitus, hypertension, tobacco exposure and dyslipidemia. Current diabetic treatment includes insulin injections. She is compliant with treatment all of the time. Insulin dose schedule: novolog 16 units in the morning, 20 units at noon, 30 units in the evening; toujeo 60 units in the am and 60 units in the pm. She is following a generally healthy diet. Home blood sugar record trend: Uses a CGM. An ACE inhibitor/angiotensin II receptor blocker is being taken.   Hypertension  This is a chronic problem. Pertinent negatives include no anxiety or chest pain.     The following have been reviewed and updated as appropriate in this visit:          Review of Systems   Constitutional:  Negative for fatigue.   Cardiovascular:  Negative for chest pain.   Endocrine: Negative for polydipsia, polyphagia and polyuria.     Current Outpatient Medications   Medication Sig Dispense Refill    pravastatin (PRAVACHOL) 20 mg tablet Take 1 tablet (20 mg total) by mouth 3 (three) times a week 36 tablet 3    omeprazole (PriLOSEC) 40 mg capsule Take 1 capsule (40 mg total) by mouth daily 90 capsule 3    nortriptyline (PAMELOR) 50 mg capsule Take 1 capsule (50 mg total) by mouth daily 90 capsule 3    losartan (COZAAR) 100 mg tablet Take 1 tablet (100 mg total)  "by mouth daily 90 tablet 3    amLODIPine (NORVASC) 5 mg tablet Take 1 tablet (5 mg total) by mouth daily 90 tablet 3    pen needle, diabetic 31 gauge x 15/64\" needle SMARTSIG:SUB-Q      metoprolol succinate XL (TOPROL-XL) 100 mg 24 hr tablet Take 1.5 tablets (150 mg total) by mouth nightly 135 tablet 3    chlorthalidone 25 mg tablet Take 1 tablet (25 mg total) by mouth daily 30 tablet 11    NovoLOG Flexpen U-100 Insulin 100 unit/mL (3 mL) insulin pen Inject 20 Units under the skin daily with breakfast AND 26 Units daily before lunch AND 37 Units daily with dinner. 24.9 mL 11    insulin glargine U-300 conc (Toujeo Max U-300 SoloStar) 300 unit/mL (3 mL) insulin pen insulin pen Inject 70 Units under the skin 2 (two) times a day NO SUBSTITUTIONS AS PATIENT IS ALLERGIC TO OTHER INSULINS 45 mL 11    pen needle, diabetic 31 gauge x 1/4\" needle USE 1 NEEDLE TO INJECT UNDER THE SKIN 5 TIMES DAILY Type 2 diabetes with long term insuline use. E11.9 200 each 11    clobetasoL (TEMOVATE) 0.05 % ointment Apply 1 application topically 2 (two) times a day as needed (Rash) 30 g 2    lutein 20 mg tablet Take 1 tab/cap by mouth daily      multivitamin (THERAGRAN) tablet tablet Take 1 tablet by mouth daily      blood-glucose sensor device Use 1 sensor every 10 days. 3 Device 11    blood-glucose meter,continuous (Dexcom G6 ) misc Use as directed. 1 each 0    blood-glucose transmitter (Dexcom G6 Transmitter) device Use as directed.  Change every 3 months. 1 Device 4    aspirin 325 mg tablet Take 325 mg by mouth daily.        varicella-zoster, vial 1 of 2, (Shingrix, PF,) 50 mcg/0.5 mL suspension for reconstitution vaccine Inject 0.5 mL into the shoulder, thigh, or buttocks once for 1 dose 1 each 1    benzonatate (TESSALON) 200 mg capsule Take 1 capsule (200 mg total) by mouth 3 (three) times a day as needed for cough 30 capsule 0     No current facility-administered medications for this visit.       Objective     VITAL SIGNS  BP " 138/60 (BP Location: Left arm, Patient Position: Sitting, Cuff Size: Large Adult)   Pulse 77   Temp 36.2 °C (97.1 °F) (Temporal)   Resp 18   Wt 103 kg (227 lb)   SpO2 95%   BMI 40.21 kg/m²     Physical Exam  Vitals and nursing note reviewed.   Constitutional:       General: She is not in acute distress.     Appearance: She is well-developed. She is obese.   HENT:      Head: Normocephalic and atraumatic.   Eyes:      Extraocular Movements: Extraocular movements intact.   Cardiovascular:      Rate and Rhythm: Normal rate and regular rhythm.      Heart sounds: Normal heart sounds.   Pulmonary:      Effort: Pulmonary effort is normal.      Breath sounds: Normal breath sounds.   Abdominal:      General: There is no distension.   Skin:     General: Skin is warm.   Neurological:      Mental Status: She is alert and oriented to person, place, and time.       Lab Results   Component Value Date    GLUCOSE 79 03/03/2023    CALCIUM 10.1 03/03/2023     03/03/2023    K 4.0 03/03/2023    CO2 31 03/03/2023     03/03/2023    BUN 28 (H) 03/03/2023    CREATININE 1.10 03/03/2023    ANIONGAP 7 03/03/2023     Lab Results   Component Value Date    WBC 11.0 (H) 12/02/2022    HGB 13.9 12/02/2022    HCT 42.8 12/02/2022    MCV 91.2 12/02/2022     12/02/2022     Lab Results   Component Value Date    TSH 3.118 09/08/2022     Lab Results   Component Value Date    HGBA1C 6.9 (H) 03/03/2023      Lab Results   Component Value Date    CHOL 181 05/31/2022    HDL 61 05/31/2022    LDLCALC 86 05/31/2022    TRIG 170 (H) 05/31/2022     No results found for: SEDRATE    Assessment/Plan   Problem List Items Addressed This Visit          Cardiac and Vasculature    Essential hypertension (Chronic)    Relevant Medications    losartan (COZAAR) 100 mg tablet    amLODIPine (NORVASC) 5 mg tablet    Other Relevant Orders    CBC w/auto differential Blood, Venous    Comprehensive metabolic panel Blood, Venous    Pure hypercholesterolemia  (Chronic)    Relevant Medications    pravastatin (PRAVACHOL) 20 mg tablet    Other Relevant Orders    Lipid panel Blood, Venous       Endocrine and Metabolic    Morbid obesity (CMS/[facility]) ([facility]) - Primary (Chronic)    Type 2 diabetes mellitus with diabetic nephropathy, with long-term current use of insulin (CMS/[facility]) ([facility]) (Chronic)    Relevant Medications    losartan (COZAAR) 100 mg tablet    Other Relevant Orders    Hemoglobin A1c (glycosylated) Blood, Venous    Urine Albumin/Creatinine Ratio Urine, Clean Catch     Other Visit Diagnoses       Chronic GERD        Relevant Medications    omeprazole (PriLOSEC) 40 mg capsule    Depressive disorder        Relevant Medications    nortriptyline (PAMELOR) 50 mg capsule    Need for shingles vaccine        Relevant Medications    varicella-zoster, vial 1 of 2, (Shingrix, PF,) 50 mcg/0.5 mL suspension for reconstitution vaccine    Cough, unspecified type        Relevant Medications    benzonatate (TESSALON) 200 mg capsule          Doing great from a diabetic and hypertensive standpoint.  Refills as above.  No changes to medications today.    Margarito Glass MD

## 2023-03-03 NOTE — TELEPHONE ENCOUNTER
Called [employer] and spoke with the denial department and submitted over the phone a reconsideration form for the Novolog. Patient has an allergy to Humalog, she previous took humalog in 2015 and patient reports she developed a rash from it and wasn't until she stopped the humalog that the rash cleared up and has been on Novolog since. This information has been submitted and will hear back from [employer] by fax if request is approved.

## 2023-03-27 ENCOUNTER — ANCILLARY PROCEDURE (OUTPATIENT)
Dept: MAMMOGRAPHY | Facility: CLINIC | Age: 78
End: 2023-03-27
Payer: MEDICARE

## 2023-03-27 DIAGNOSIS — Z12.31 SCREENING MAMMOGRAM, ENCOUNTER FOR: ICD-10-CM

## 2023-03-27 PROCEDURE — 77067 SCR MAMMO BI INCL CAD: CPT

## 2023-03-28 PROCEDURE — 77063 BREAST TOMOSYNTHESIS BI: CPT | Mod: 26 | Performed by: RADIOLOGY

## 2023-03-28 PROCEDURE — 77067 SCR MAMMO BI INCL CAD: CPT | Mod: 26 | Performed by: RADIOLOGY

## 2023-05-03 ENCOUNTER — OFFICE VISIT (OUTPATIENT)
Dept: URGENT CARE | Facility: CLINIC | Age: 78
End: 2023-05-03
Payer: MEDICARE

## 2023-05-03 ENCOUNTER — ANCILLARY PROCEDURE (OUTPATIENT)
Dept: RADIOLOGY | Facility: CLINIC | Age: 78
End: 2023-05-03
Payer: MEDICARE

## 2023-05-03 VITALS
SYSTOLIC BLOOD PRESSURE: 145 MMHG | WEIGHT: 231 LBS | OXYGEN SATURATION: 93 % | TEMPERATURE: 97.8 F | RESPIRATION RATE: 14 BRPM | BODY MASS INDEX: 40.92 KG/M2 | HEART RATE: 68 BPM | DIASTOLIC BLOOD PRESSURE: 63 MMHG

## 2023-05-03 DIAGNOSIS — M25.572 PAIN IN JOINT INVOLVING LEFT ANKLE AND FOOT: Primary | ICD-10-CM

## 2023-05-03 DIAGNOSIS — S92.032A CLOSED DISPLACED AVULSION FRACTURE OF TUBEROSITY OF LEFT CALCANEUS, INITIAL ENCOUNTER: ICD-10-CM

## 2023-05-03 PROCEDURE — 73630 X-RAY EXAM OF FOOT: CPT | Mod: LT

## 2023-05-03 PROCEDURE — G0463 HOSPITAL OUTPT CLINIC VISIT: HCPCS | Performed by: PHYSICIAN ASSISTANT

## 2023-05-03 PROCEDURE — 73610 X-RAY EXAM OF ANKLE: CPT | Mod: 26,LT | Performed by: RADIOLOGY

## 2023-05-03 PROCEDURE — 99213 OFFICE O/P EST LOW 20 MIN: CPT | Performed by: PHYSICIAN ASSISTANT

## 2023-05-03 PROCEDURE — 73610 X-RAY EXAM OF ANKLE: CPT | Mod: LT

## 2023-05-03 PROCEDURE — 73630 X-RAY EXAM OF FOOT: CPT | Mod: 26,LT | Performed by: RADIOLOGY

## 2023-05-03 ASSESSMENT — PAIN SCALES - GENERAL: PAINLEVEL: 0-NO PAIN

## 2023-05-03 NOTE — PATIENT INSTRUCTIONS
"X-rays viewed and discussed with patient. No acute findings noted.  Over read pending.  Will call with any changes to preliminary read.     Discussed RICE therapy including use of ice, heat, tylenol and ibuprofen.  A 3\" ace wrap was applied for compression.     If symptoms persist beyond 7-10 days and x-rays are normal, follow up with PCP.   "

## 2023-05-03 NOTE — PROGRESS NOTES
"Subjective      Consuelo Singh is a 77 y.o. female who presents for left foot pain.    Patient in with a complaint of left foot pain for 1 week.  She states she was on the toilet last week and her feet \"went to sleep\" and when she got up, she fell.  It doesn't hurt until she stands on it then the pain is a 4/10. She points along the lateral edge as the painful area.      The following have been reviewed and updated as appropriate in this visit:   Allergies  Meds         Review of Systems   Musculoskeletal:         Left foot pain.      Objective   /63   Pulse 68   Temp 36.6 °C (97.8 °F)   Resp 14   Wt 104.8 kg (231 lb)   SpO2 93%   BMI 40.92 kg/m²     Physical Exam  Vitals and nursing note reviewed.   Constitutional:       General: She is not in acute distress.     Appearance: Normal appearance.   HENT:      Head: Normocephalic.      Right Ear: External ear normal.      Left Ear: External ear normal.      Nose: Nose normal.   Eyes:      Extraocular Movements: Extraocular movements intact.      Conjunctiva/sclera: Conjunctivae normal.      Pupils: Pupils are equal, round, and reactive to light.   Pulmonary:      Effort: Pulmonary effort is normal.   Musculoskeletal:        Feet:    Skin:     General: Skin is warm and dry.   Neurological:      Mental Status: She is alert.   Psychiatric:         Mood and Affect: Mood normal.         Behavior: Behavior normal.         Thought Content: Thought content normal.         Judgment: Judgment normal.       Assessment/Plan   Diagnoses and all orders for this visit:    Pain in joint involving left ankle and foot  -     X-ray foot 3 or more views left  -     X-ray ankle 3 or more views left    Closed displaced avulsion fracture of tuberosity of left calcaneus, initial encounter      X-rays viewed and discussed with patient. No acute findings noted.  Over read pending.  Will call with any changes to preliminary read.     Discussed RICE therapy including use of ice, " "heat, tylenol and ibuprofen.  A 3\" ace wrap was applied for compression.     If symptoms persist beyond 7-10 days and x-rays are normal, follow up with PCP.     Addendum:  Dr. Fletcher, Podiatry, consulted.  She advised the ace wrap should be sufficient along with limiting activity.  If still symptomatic next week, she should return and be fitted with a walking boot and then repeat imaging in 4-6 weeks.    Patient called and advised and is in agreement with plan of care.         CHEYANNE CHRISTIANSON  "

## 2023-05-10 ENCOUNTER — OFFICE VISIT (OUTPATIENT)
Dept: URGENT CARE | Facility: CLINIC | Age: 78
End: 2023-05-10
Payer: MEDICARE

## 2023-05-10 ENCOUNTER — TELEPHONE (OUTPATIENT)
Dept: ORTHOPEDIC SURGERY | Facility: CLINIC | Age: 78
End: 2023-05-10
Payer: MEDICARE

## 2023-05-10 VITALS
DIASTOLIC BLOOD PRESSURE: 80 MMHG | HEART RATE: 105 BPM | TEMPERATURE: 96.6 F | OXYGEN SATURATION: 94 % | RESPIRATION RATE: 20 BRPM | SYSTOLIC BLOOD PRESSURE: 160 MMHG

## 2023-05-10 DIAGNOSIS — M79.672 LEFT FOOT PAIN: Primary | ICD-10-CM

## 2023-05-10 PROCEDURE — 99212 OFFICE O/P EST SF 10 MIN: CPT

## 2023-05-10 PROCEDURE — G0463 HOSPITAL OUTPT CLINIC VISIT: HCPCS

## 2023-05-10 ASSESSMENT — ENCOUNTER SYMPTOMS
NEUROLOGICAL NEGATIVE: 1
CARDIOVASCULAR NEGATIVE: 1
GASTROINTESTINAL NEGATIVE: 1
CONSTITUTIONAL NEGATIVE: 1
ALLERGIC/IMMUNOLOGIC NEGATIVE: 1
EYES NEGATIVE: 1
RESPIRATORY NEGATIVE: 1
ARTHRALGIAS: 1
ENDOCRINE NEGATIVE: 1

## 2023-05-10 NOTE — PROGRESS NOTES
"Subjective      Consuelo Singh is a 77 y.o. female who presents for left foot pain.  Patient was seen 1 week prior to the visit with an injury to her left foot.  She was diagnosed with an avulsion fracture.  She was advised to return for a cam walker boot if pain did not resolve in the next week and a half.  Patient states that the pain has not resolved and she is presenting today for said boot.    The following have been reviewed and updated as appropriate in this visit:          Allergies   Allergen Reactions    Aloe Vera     Bacitracin      ZINC    Erythromycin Base     Formaldehyde     Gramicidins      OPHTHALMIC    Hydrocortisone      ACETATE    Latex     Levofloxacin     Metformin     Neomycin     Neomycin-Bacitracnzn-Polymyxnb     Polymyxin B     Prednisolone     Prednisone     Statins-Hmg-Coa Reductase Inhibitors     Sulfamethoxazole     Sulfamethoxazole-Trimethoprim     Thimerosal     Tresiba Flextouch U-100 [Insulin Degludec] Diarrhea and Other (see comments)     Abdominal pain    Trimethoprim     Humalog Kwikpen Insulin [Insulin Lispro] Rash     Current Outpatient Medications   Medication Sig Dispense Refill    pravastatin (PRAVACHOL) 20 mg tablet Take 1 tablet (20 mg total) by mouth 3 (three) times a week 36 tablet 3    omeprazole (PriLOSEC) 40 mg capsule Take 1 capsule (40 mg total) by mouth daily 90 capsule 3    nortriptyline (PAMELOR) 50 mg capsule Take 1 capsule (50 mg total) by mouth daily 90 capsule 3    losartan (COZAAR) 100 mg tablet Take 1 tablet (100 mg total) by mouth daily 90 tablet 3    amLODIPine (NORVASC) 5 mg tablet Take 1 tablet (5 mg total) by mouth daily 90 tablet 3    benzonatate (TESSALON) 200 mg capsule Take 1 capsule (200 mg total) by mouth 3 (three) times a day as needed for cough 30 capsule 0    pen needle, diabetic 31 gauge x 15/64\" needle SMARTSIG:SUB-Q      metoprolol succinate XL (TOPROL-XL) 100 mg 24 hr tablet Take 1.5 tablets (150 mg total) by mouth nightly 135 tablet 3    " "chlorthalidone 25 mg tablet Take 1 tablet (25 mg total) by mouth daily 30 tablet 11    NovoLOG Flexpen U-100 Insulin 100 unit/mL (3 mL) insulin pen Inject 20 Units under the skin daily with breakfast AND 26 Units daily before lunch AND 37 Units daily with dinner. 24.9 mL 11    insulin glargine U-300 conc (Toujeo Max U-300 SoloStar) 300 unit/mL (3 mL) insulin pen insulin pen Inject 70 Units under the skin 2 (two) times a day NO SUBSTITUTIONS AS PATIENT IS ALLERGIC TO OTHER INSULINS 45 mL 11    pen needle, diabetic 31 gauge x 1/4\" needle USE 1 NEEDLE TO INJECT UNDER THE SKIN 5 TIMES DAILY Type 2 diabetes with long term insuline use. E11.9 200 each 11    clobetasoL (TEMOVATE) 0.05 % ointment Apply 1 application topically 2 (two) times a day as needed (Rash) 30 g 2    lutein 20 mg tablet Take 1 tab/cap by mouth daily      multivitamin (THERAGRAN) tablet tablet Take 1 tablet by mouth daily      blood-glucose sensor device Use 1 sensor every 10 days. 3 Device 11    blood-glucose meter,continuous (Dexcom G6 ) misc Use as directed. 1 each 0    blood-glucose transmitter (Dexcom G6 Transmitter) device Use as directed.  Change every 3 months. 1 Device 4    aspirin 325 mg tablet Take 1 tablet (325 mg total) by mouth daily       No current facility-administered medications for this visit.     Past Medical History:   Diagnosis Date    Allergic Do not remember    Arthritis     Blood clot in vein     shani legs    Cataract     Deep vein thrombosis (CMS/HCC)     hx of    Diabetes (CMS/HCC)     type 2    Eczema     Fibromyalgia     GERD (gastroesophageal reflux disease)     Heart murmur     Hyperlipidemia     Hypertension     Obesity In my twenties    Peripheral neuropathy     toes    Pneumonia     Psoriasis     Scoliosis     Skin abnormalities 1985    Visual impairment When i was 17    Wears dentures     full set     Past Surgical History:   Procedure Laterality Date    CHOLECYSTECTOMY      COLONOSCOPY  03/11/2016    5 yr  " follow up    COLONOSCOPY N/A 2021    Please call the patient regarding her abnormal result. Repeat colonoscopy in 5 year due to 2 tubular adenomas if patient otherwise healthy at that time. Also reasonable to discontinue further colonoscopy--Dr Laboy    EYE SURGERY  I believe 2020    HYSTERECTOMY      With BSO    OTHER SURGICAL HISTORY      Rt. radical mastoidectomy, rebuilt canal from growth     Family History   Problem Relation Age of Onset    Breast cancer Mother     Cancer Mother     Diabetes Father     Lung cancer Maternal Grandmother     Bone cancer Maternal Grandfather     Pancreatic cancer Mother's Brother     Breast cancer Mother's Sister     Lung cancer Mother's Sister      Social History     Socioeconomic History    Marital status:    Tobacco Use    Smoking status: Former     Packs/day: 0.50     Years: 40.00     Pack years: 20.00     Types: Cigarettes     Quit date: 2007     Years since quittin.3    Smokeless tobacco: Former   Substance and Sexual Activity    Alcohol use: Yes     Comment: Maybe once a month    Drug use: No    Sexual activity: Defer     Social Determinants of Health     Tobacco Use: Medium Risk (2022)    Patient History     Smoking Tobacco Use: Former     Smokeless Tobacco Use: Former       Review of Systems   Constitutional: Negative.    HENT: Negative.     Eyes: Negative.    Respiratory: Negative.     Cardiovascular: Negative.    Gastrointestinal: Negative.    Endocrine: Negative.    Genitourinary: Negative.    Musculoskeletal:  Positive for arthralgias.   Skin: Negative.    Allergic/Immunologic: Negative.    Neurological: Negative.        Objective   /80 (BP Location: Right arm, Patient Position: Sitting, Cuff Size: Large Adult)   Pulse 105   Temp (!) 35.9 °C (96.6 °F) (Temporal)   Resp 20   SpO2 94%     Physical Exam  Constitutional:       Appearance: Normal appearance. She is normal weight.   HENT:      Head: Normocephalic and atraumatic.    Cardiovascular:      Rate and Rhythm: Normal rate and regular rhythm.      Heart sounds: Normal heart sounds.   Pulmonary:      Effort: Pulmonary effort is normal.      Breath sounds: Normal breath sounds.   Musculoskeletal:         General: Tenderness and signs of injury present.   Skin:     General: Skin is warm and dry.      Capillary Refill: Capillary refill takes less than 2 seconds.   Neurological:      General: No focal deficit present.      Mental Status: She is alert and oriented to person, place, and time. Mental status is at baseline.         No results found for this or any previous visit (from the past 24 hour(s)).    Assessment/Plan   Diagnoses and all orders for this visit:    Left foot pain  -     Ambulatory referral to Orthopedic Surgery; Future  -     Walking boot        Patient appears overall nontoxic and in no acute distress.  Physical exam revealed normal heart and lung sounds.  Left ankle is mildly tender to palpation and slightly swollen.  No edema is noted.  She has good pulses distal to the injury.  She is neurovascularly intact.  Patient has full range of motion in the affected ankle and foot.  Patient was given walking boot as discussed at prior visit.  She has referred to orthopedic surgery for further care.  Patient expressed understanding of and willingness to follow care plan prior to discharge    CHEYANNE Fung

## 2023-05-12 ENCOUNTER — OFFICE VISIT (OUTPATIENT)
Dept: ORTHOPEDIC SURGERY | Facility: CLINIC | Age: 78
End: 2023-05-12
Payer: MEDICARE

## 2023-05-12 VITALS — DIASTOLIC BLOOD PRESSURE: 70 MMHG | SYSTOLIC BLOOD PRESSURE: 142 MMHG

## 2023-05-12 DIAGNOSIS — M79.672 LEFT FOOT PAIN: ICD-10-CM

## 2023-05-12 PROCEDURE — G0463 HOSPITAL OUTPT CLINIC VISIT: HCPCS | Mod: PO | Performed by: PHYSICIAN ASSISTANT

## 2023-05-12 PROCEDURE — 99213 OFFICE O/P EST LOW 20 MIN: CPT | Performed by: PHYSICIAN ASSISTANT

## 2023-05-12 ASSESSMENT — ENCOUNTER SYMPTOMS
WOUND: 0
ARTHRALGIAS: 1
JOINT SWELLING: 1
NUMBNESS: 0
ACTIVITY CHANGE: 1
FEVER: 0

## 2023-05-12 NOTE — PROGRESS NOTES
Subjective   Patient ID: Consuelo Singh is a 77 y.o. female.    Chief Complaint:  Chief Complaint   Patient presents with    Left Foot - Fracture     Presents with left foot fracture, on 5/3/23, she was getting off the toilet, and her feet fell asleep and when she stood up, she fell.  She was seen in , and given a cam boot.  She has more pain when she rests, and when she over does it, currently 0/10, but up to 6/10 when it is hurting.  She is taking aleve for pain.       HPI  Consuelo comes in today for evaluation treatment of her left foot and ankle injury.  She was initially seen in urgent care on 5/3/2023 and had x-rays.  There was a small avulsion fracture of the lateral calcaneus noted on x-rays.  Injury occurred on 2023 when Consuelo was sitting on the commode her legs went numb she got up and twisted her ankle.  She got a cam boot yesterday which she states helps her pain significantly.  She has been ambulating without an assistive device in the cam boot.  Otherwise she is using Aleve, elevating and using Ace wrap for pain and swelling.  Denies any current numbness or tingling.  Social History     Occupational History    Not on file   Tobacco Use    Smoking status: Former     Packs/day: 0.50     Years: 40.00     Pack years: 20.00     Types: Cigarettes     Quit date: 2007     Years since quittin.3    Smokeless tobacco: Former   Substance and Sexual Activity    Alcohol use: Yes     Comment: Maybe once a month    Drug use: No    Sexual activity: Defer      Review of Systems   Constitutional:  Positive for activity change. Negative for fever.   Musculoskeletal:  Positive for arthralgias, gait problem and joint swelling.   Skin:  Negative for wound.   Neurological:  Negative for numbness.             Objective     Ortho Exam     Constitutional: Patient is well-developed well-nourished, no acute distress.  A and O x3   HEENT: Head is normocephalic and atraumatic.  Eyes: EOMs are normal  Respiratory:  Normal respiratory effort with no shortness of breath  Neurologic: Normal affect, pleasant and cooperative, A and O x3  Skin: Warm and dry with no obvious rashes, open sores, lesions  Musculoskeletal:  Gait: Ambulates without an assistive device is walking in the cam boot with gait abnormality due to the boot   Left foot and ankle: Mild diffuse swelling in the foot and ankle mostly in the lateral aspect of the ankle with some older appearing ecchymosis in the lateral aspect of the foot.  Actively able to dorsiflex and plantarflex with some limitation is also circumducting the ankle.  There is no tenderness to palpation along the calf or Achilles tendon.  She does have tenderness to palpation around the area of the lateral malleolus and just distal to it.  No medial ankle pain to palpation.        Assessment   Left foot and ankle injury occurring on 5/2/2023.  Patient reports she had been sitting on the commode for a while her legs became somewhat numb she got up started ambulating and fell down she thinks she might of twisted the ankle but she is not sure.  Consuelo was seen initially in urgent care on 5/3/2023 with x-rays showing an avulsion fracture off the lateral calcaneus this is a very subtle fracture.  Most of her tenderness is over the fibulocalcaneal ligament area.   Consuelo's symptoms have improved significantly since her initial injury.  I did not repeat x-rays today.    Plan    Consuelo will continue to use the cam boot with ambulation she can have it off at rest.  I told her to start gradually weaning out of it in a couple of weeks.  Also instructed Consuelo in range of motion exercises to be done at least a couple times a day.  She will continue with Ace wrap as needed for pain and swelling, over-the-counter NSAIDs, icing and elevating.  We will plan a follow-up visit in approximately 4 weeks with x-rays at that time.

## 2023-06-05 ENCOUNTER — OFFICE VISIT (OUTPATIENT)
Dept: OTOLARYNGOLOGY | Facility: CLINIC | Age: 78
End: 2023-06-05
Payer: MEDICARE

## 2023-06-05 VITALS
OXYGEN SATURATION: 97 % | DIASTOLIC BLOOD PRESSURE: 78 MMHG | WEIGHT: 229.5 LBS | HEART RATE: 77 BPM | TEMPERATURE: 97.4 F | SYSTOLIC BLOOD PRESSURE: 160 MMHG | BODY MASS INDEX: 40.65 KG/M2

## 2023-06-05 DIAGNOSIS — Z90.89 HISTORY OF RIGHT MASTOIDECTOMY: Primary | ICD-10-CM

## 2023-06-05 DIAGNOSIS — H61.21 IMPACTED CERUMEN OF RIGHT EAR: ICD-10-CM

## 2023-06-05 PROCEDURE — 69220 CLEAN OUT MASTOID CAVITY: CPT | Performed by: OTOLARYNGOLOGY

## 2023-06-05 PROCEDURE — G0463 HOSPITAL OUTPT CLINIC VISIT: HCPCS | Performed by: OTOLARYNGOLOGY

## 2023-06-05 ASSESSMENT — PAIN SCALES - GENERAL: PAINLEVEL: 0-NO PAIN

## 2023-06-05 NOTE — PROGRESS NOTES
Subjective    CC: Mastoid debridement    HPI: Consuelo Singh is a 77 y.o. female here for routine mastoid debridement of her right canal wall down cavity.  No complaints today.    Past Medical History:   Diagnosis Date    Allergic Do not remember    Arthritis     Blood clot in vein     shani legs    Cataract     Deep vein thrombosis (CMS/HCC)     hx of    Diabetes (CMS/HCC)     type 2    Eczema     Fibromyalgia     GERD (gastroesophageal reflux disease)     Heart murmur     Hyperlipidemia     Hypertension     Obesity In my twenties    Peripheral neuropathy     toes    Pneumonia     Psoriasis     Scoliosis     Skin abnormalities 1985    Visual impairment When i was 17    Wears dentures     full set       Past Surgical History:   Procedure Laterality Date    CHOLECYSTECTOMY      COLONOSCOPY  03/11/2016    5 yr  follow up    COLONOSCOPY N/A 05/28/2021    Please call the patient regarding her abnormal result. Repeat colonoscopy in 5 year due to 2 tubular adenomas if patient otherwise healthy at that time. Also reasonable to discontinue further colonoscopy--Dr Laboy    EYE SURGERY  I believe 2020    HYSTERECTOMY      With BSO    OTHER SURGICAL HISTORY      Rt. radical mastoidectomy, rebuilt canal from growth         Current Outpatient Medications:     pravastatin (PRAVACHOL) 20 mg tablet, Take 1 tablet (20 mg total) by mouth 3 (three) times a week, Disp: 36 tablet, Rfl: 3    omeprazole (PriLOSEC) 40 mg capsule, Take 1 capsule (40 mg total) by mouth daily, Disp: 90 capsule, Rfl: 3    nortriptyline (PAMELOR) 50 mg capsule, Take 1 capsule (50 mg total) by mouth daily, Disp: 90 capsule, Rfl: 3    losartan (COZAAR) 100 mg tablet, Take 1 tablet (100 mg total) by mouth daily, Disp: 90 tablet, Rfl: 3    amLODIPine (NORVASC) 5 mg tablet, Take 1 tablet (5 mg total) by mouth daily, Disp: 90 tablet, Rfl: 3    benzonatate (TESSALON) 200 mg capsule, Take 1 capsule (200 mg total) by mouth 3 (three) times a day as needed for cough,  "Disp: 30 capsule, Rfl: 0    pen needle, diabetic 31 gauge x 15/64\" needle, SMARTSIG:SUB-Q, Disp: , Rfl:     metoprolol succinate XL (TOPROL-XL) 100 mg 24 hr tablet, Take 1.5 tablets (150 mg total) by mouth nightly, Disp: 135 tablet, Rfl: 3    chlorthalidone 25 mg tablet, Take 1 tablet (25 mg total) by mouth daily, Disp: 30 tablet, Rfl: 11    NovoLOG Flexpen U-100 Insulin 100 unit/mL (3 mL) insulin pen, Inject 20 Units under the skin daily with breakfast AND 26 Units daily before lunch AND 37 Units daily with dinner., Disp: 24.9 mL, Rfl: 11    insulin glargine U-300 conc (Toujeo Max U-300 SoloStar) 300 unit/mL (3 mL) insulin pen insulin pen, Inject 70 Units under the skin 2 (two) times a day NO SUBSTITUTIONS AS PATIENT IS ALLERGIC TO OTHER INSULINS, Disp: 45 mL, Rfl: 11    pen needle, diabetic 31 gauge x 1/4\" needle, USE 1 NEEDLE TO INJECT UNDER THE SKIN 5 TIMES DAILY Type 2 diabetes with long term insuline use. E11.9, Disp: 200 each, Rfl: 11    clobetasoL (TEMOVATE) 0.05 % ointment, Apply 1 application topically 2 (two) times a day as needed (Rash), Disp: 30 g, Rfl: 2    lutein 20 mg tablet, Take 1 tab/cap by mouth daily, Disp: , Rfl:     multivitamin (THERAGRAN) tablet tablet, Take 1 tablet by mouth daily, Disp: , Rfl:     blood-glucose sensor device, Use 1 sensor every 10 days., Disp: 3 Device, Rfl: 11    blood-glucose meter,continuous (Dexcom G6 ) misc, Use as directed., Disp: 1 each, Rfl: 0    blood-glucose transmitter (Dexcom G6 Transmitter) device, Use as directed.  Change every 3 months., Disp: 1 Device, Rfl: 4    aspirin 325 mg tablet, Take 1 tablet (325 mg total) by mouth daily, Disp: , Rfl:     Allergies   Allergen Reactions    Aloe Vera     Bacitracin      ZINC    Erythromycin Base     Formaldehyde     Gramicidins      OPHTHALMIC    Hydrocortisone      ACETATE    Latex     Levofloxacin     Metformin     Neomycin     Neomycin-Bacitracnzn-Polymyxnb     Polymyxin B     Prednisolone     Prednisone     " Statins-Hmg-Coa Reductase Inhibitors     Sulfamethoxazole     Sulfamethoxazole-Trimethoprim     Thimerosal     Tresiba Flextouch U-100 [Insulin Degludec] Diarrhea and Other (see comments)     Abdominal pain    Trimethoprim     Humalog Kwikpen Insulin [Insulin Lispro] Rash       family history includes Bone cancer in her maternal grandfather; Breast cancer in her mother and mother's sister; Cancer in her mother; Diabetes in her father; Lung cancer in her maternal grandmother and mother's sister; Pancreatic cancer in her mother's brother.    Social History     Tobacco Use    Smoking status: Former     Packs/day: 0.50     Years: 40.00     Pack years: 20.00     Types: Cigarettes     Quit date: 2007     Years since quittin.4    Smokeless tobacco: Former   Substance Use Topics    Alcohol use: Yes     Comment: Maybe once a month    Drug use: No       Review of Systems    Objective    /78 (BP Location: Left arm, Patient Position: Sitting, Cuff Size: Large Adult)   Pulse 77   Temp 36.3 °C (97.4 °F) (Temporal)   Wt 104.1 kg (229 lb 8 oz)   SpO2 97%   BMI 40.65 kg/m²     General: Well-developed well-nourished female no acute distress.    Ears: Left external ear, external auditory canal and tympanic membrane appear normal.  Right ear is status post canal wall down mastoidectomy.  Some cerumen buildup present.  No evidence of infection.    Procedure: Mastoid debridement, right  The right ear was examined with the operating microscope and speculum.  A thin layer of cerumen coating was removed from the mastoid bowl and the area overlying the lateral canal and stapes.  Patient encountered very slight dizziness during this that was transient and passed in a couple seconds.  Tissues appear healthy.    Diagnosis    1. History of right mastoidectomy    2. Impacted cerumen of right ear        Recommendations    Patient doing well with right tympanomastoidectomy wall down cavity appearing healthy today.  Follow-up in  3 months for repeat cerumen removal/mastoid debridement.

## 2023-06-08 DIAGNOSIS — E11.21 TYPE 2 DIABETES MELLITUS WITH DIABETIC NEPHROPATHY, WITH LONG-TERM CURRENT USE OF INSULIN (CMS/HCC): ICD-10-CM

## 2023-06-08 DIAGNOSIS — Z79.4 TYPE 2 DIABETES MELLITUS WITH DIABETIC NEPHROPATHY, WITH LONG-TERM CURRENT USE OF INSULIN (CMS/HCC): ICD-10-CM

## 2023-06-08 RX ORDER — INSULIN ASPART 100 [IU]/ML
INJECTION, SOLUTION INTRAVENOUS; SUBCUTANEOUS
Qty: 25 ML | Refills: 6 | Status: SHIPPED | OUTPATIENT
Start: 2023-06-08 | End: 2023-09-11 | Stop reason: SDUPTHER

## 2023-06-08 NOTE — TELEPHONE ENCOUNTER
No care due was identified.  [facility] Embedded Care Due Messages. Reference number: [id number]. 6/08/2023 2:39:28 PM MDT

## 2023-06-08 NOTE — TELEPHONE ENCOUNTER
Medication refill request:  Medication(s):  novolog not filled due to (route to Nurse Pool) Drug- Allergy alert, please review for refill

## 2023-06-19 ENCOUNTER — OFFICE VISIT (OUTPATIENT)
Dept: DERMATOLOGY | Facility: CLINIC | Age: 78
End: 2023-06-19
Payer: MEDICARE

## 2023-06-19 ENCOUNTER — ANCILLARY PROCEDURE (OUTPATIENT)
Dept: RADIOLOGY | Facility: CLINIC | Age: 78
End: 2023-06-19
Payer: MEDICARE

## 2023-06-19 ENCOUNTER — OFFICE VISIT (OUTPATIENT)
Dept: ORTHOPEDIC SURGERY | Facility: CLINIC | Age: 78
End: 2023-06-19
Payer: MEDICARE

## 2023-06-19 VITALS — WEIGHT: 229.5 LBS | BODY MASS INDEX: 40.66 KG/M2 | HEIGHT: 63 IN

## 2023-06-19 VITALS — SYSTOLIC BLOOD PRESSURE: 138 MMHG | DIASTOLIC BLOOD PRESSURE: 84 MMHG

## 2023-06-19 DIAGNOSIS — L70.0 BLACK HEAD: ICD-10-CM

## 2023-06-19 DIAGNOSIS — L82.1 SEBORRHEIC KERATOSES: Primary | ICD-10-CM

## 2023-06-19 DIAGNOSIS — L81.4 SOLAR LENTIGO: ICD-10-CM

## 2023-06-19 DIAGNOSIS — D18.01 CHERRY ANGIOMA: ICD-10-CM

## 2023-06-19 DIAGNOSIS — M79.672 LEFT FOOT PAIN: Primary | ICD-10-CM

## 2023-06-19 PROCEDURE — G0463 HOSPITAL OUTPT CLINIC VISIT: HCPCS | Mod: PO | Performed by: PHYSICIAN ASSISTANT

## 2023-06-19 PROCEDURE — 99212 OFFICE O/P EST SF 10 MIN: CPT

## 2023-06-19 PROCEDURE — 99213 OFFICE O/P EST LOW 20 MIN: CPT | Performed by: PHYSICIAN ASSISTANT

## 2023-06-19 PROCEDURE — G0463 HOSPITAL OUTPT CLINIC VISIT: HCPCS | Mod: PO

## 2023-06-19 PROCEDURE — 73610 X-RAY EXAM OF ANKLE: CPT | Mod: LT,PO

## 2023-06-19 PROCEDURE — 73610 X-RAY EXAM OF ANKLE: CPT | Mod: 26,LT | Performed by: INTERNAL MEDICINE

## 2023-06-19 ASSESSMENT — PAIN - FUNCTIONAL ASSESSMENT: PAIN_FUNCTIONAL_ASSESSMENT: NO/DENIES PAIN

## 2023-06-19 NOTE — PROGRESS NOTES
DERMATOLOGY, [place]  OFFICE VISIT    CC: Skin exam     HPI     Consuelo Singh is a 77 y.o. female who is an established patient with a history of eczema (clobetasol) EIC, porokeratosis, angioma, and lentigo, cyst of skin, sk, intertrigo; here for a full skin exam. Last seen in office on 22 by Dr Rose.    Patient problems have been reviewed and documented as below:  Patient Active Problem List   Diagnosis    Diverticular disease    Eczema    Essential hypertension    Gastroesophageal reflux disease    Morbid obesity (CMS/HCC) ([facility])    Pure hypercholesterolemia    Type 2 diabetes mellitus with diabetic nephropathy, with long-term current use of insulin (CMS/[facility]) ([facility])    Chronic constipation    Chronic pain of right knee    CKD (chronic kidney disease) stage 3, GFR 30-59 ml/min (CMS/[facility]) ([facility])    Edema    History of colon polyps    Screening for malignant neoplasm of colon       Patient's social and family history have been reviewed and documented as below:  Social History     Socioeconomic History    Marital status:      Spouse name: Not on file    Number of children: Not on file    Years of education: Not on file    Highest education level: Not on file   Occupational History    Not on file   Tobacco Use    Smoking status: Former     Packs/day: 0.50     Years: 40.00     Pack years: 20.00     Types: Cigarettes     Quit date: 2007     Years since quittin.4    Smokeless tobacco: Former   Substance and Sexual Activity    Alcohol use: Yes     Comment: Maybe once a month    Drug use: No    Sexual activity: Defer   Other Topics Concern    Not on file   Social History Narrative    Not on file     Social Determinants of Health     Financial Resource Strain: Not on file   Food Insecurity: Not on file   Transportation Needs: Not on file   Physical Activity: Not on file   Stress: Not on file   Social Connections: Not on file   Intimate Partner Violence: Not on file   Housing Stability: Not on file      Family History   Problem Relation Age of Onset    Breast cancer Mother     Cancer Mother     Diabetes Father     Lung cancer Maternal Grandmother     Bone cancer Maternal Grandfather     Pancreatic cancer Mother's Brother     Breast cancer Mother's Sister     Lung cancer Mother's Sister        Pertinent positive review of systems are listed below and are otherwise reviewed as negative from HPI:  Review of Systems   All other systems reviewed and are negative.      All allergies have been reviewed and documented as below:  Allergies   Allergen Reactions    Aloe Vera     Bacitracin      ZINC    Erythromycin Base     Formaldehyde     Gramicidins      OPHTHALMIC    Hydrocortisone      ACETATE    Latex     Levofloxacin     Metformin     Neomycin     Neomycin-Bacitracnzn-Polymyxnb     Polymyxin B     Prednisolone     Prednisone     Statins-Hmg-Coa Reductase Inhibitors     Sulfamethoxazole     Sulfamethoxazole-Trimethoprim     Thimerosal     Tresiba Flextouch U-100 [Insulin Degludec] Diarrhea and Other (see comments)     Abdominal pain    Trimethoprim     Humalog Kwikpen Insulin [Insulin Lispro] Rash       All medications have been reviewed and documented as currently taking as below:    Current Outpatient Medications:     NovoLOG FlexPen U-100 Insulin 100 unit/mL (3 mL) insulin pen, Inject 20 Units under the skin daily with breakfast AND 26 Units daily before lunch AND 37 Units daily with dinner, Disp: 25 mL, Rfl: 6    pravastatin (PRAVACHOL) 20 mg tablet, Take 1 tablet (20 mg total) by mouth 3 (three) times a week, Disp: 36 tablet, Rfl: 3    omeprazole (PriLOSEC) 40 mg capsule, Take 1 capsule (40 mg total) by mouth daily, Disp: 90 capsule, Rfl: 3    nortriptyline (PAMELOR) 50 mg capsule, Take 1 capsule (50 mg total) by mouth daily, Disp: 90 capsule, Rfl: 3    losartan (COZAAR) 100 mg tablet, Take 1 tablet (100 mg total) by mouth daily, Disp: 90 tablet, Rfl: 3    amLODIPine (NORVASC) 5 mg tablet, Take 1 tablet (5 mg  "total) by mouth daily, Disp: 90 tablet, Rfl: 3    benzonatate (TESSALON) 200 mg capsule, Take 1 capsule (200 mg total) by mouth 3 (three) times a day as needed for cough, Disp: 30 capsule, Rfl: 0    pen needle, diabetic 31 gauge x 15/64\" needle, SMARTSIG:SUB-Q, Disp: , Rfl:     metoprolol succinate XL (TOPROL-XL) 100 mg 24 hr tablet, Take 1.5 tablets (150 mg total) by mouth nightly, Disp: 135 tablet, Rfl: 3    chlorthalidone 25 mg tablet, Take 1 tablet (25 mg total) by mouth daily, Disp: 30 tablet, Rfl: 11    insulin glargine U-300 conc (Toujeo Max U-300 SoloStar) 300 unit/mL (3 mL) insulin pen insulin pen, Inject 70 Units under the skin 2 (two) times a day NO SUBSTITUTIONS AS PATIENT IS ALLERGIC TO OTHER INSULINS, Disp: 45 mL, Rfl: 11    pen needle, diabetic 31 gauge x 1/4\" needle, USE 1 NEEDLE TO INJECT UNDER THE SKIN 5 TIMES DAILY Type 2 diabetes with long term insuline use. E11.9, Disp: 200 each, Rfl: 11    clobetasoL (TEMOVATE) 0.05 % ointment, Apply 1 application topically 2 (two) times a day as needed (Rash), Disp: 30 g, Rfl: 2    lutein 20 mg tablet, Take 1 tab/cap by mouth daily, Disp: , Rfl:     multivitamin (THERAGRAN) tablet tablet, Take 1 tablet by mouth daily, Disp: , Rfl:     blood-glucose sensor device, Use 1 sensor every 10 days., Disp: 3 Device, Rfl: 11    blood-glucose meter,continuous (Dexcom G6 ) misc, Use as directed., Disp: 1 each, Rfl: 0    blood-glucose transmitter (Dexcom G6 Transmitter) device, Use as directed.  Change every 3 months., Disp: 1 Device, Rfl: 4    aspirin 325 mg tablet, Take 1 tablet (325 mg total) by mouth daily, Disp: , Rfl:     Objective     Vital signs have been reviewed and documented as below:  There were no vitals filed for this visit.    Physical Exam:  ----------------------  Constitutional: General Appearance: healthy-appearing, well-nourished, and well-developed. Level of Distress: NAD. Ambulation: ambulating normally.  Psychiatric: Insight: good judgement. " Mental Status: normal mood and affect and active and alert. Orientation: to time, place, and person. Memory: recent memory normal and remote memory normal.  Head: normocephalic and atraumatic.  Eyes: Lids and Conjunctivae: no discharge or pallor and non-injected. Lens: clear. Sclerae: non-icteric.  Neurologic: Gait and Station: normal gait and station. Cranial Nerves: grossly intact. Coordination and Cerebellum: no tremor.  Skin: a cutaneous examination was performed including: left upper extremity and lower extremity; right upper extremity and lower extremity; and scalp, face, ears, neck, chest, abdomen, and back, and were found to be within normal limits with the exceptions of the findings listed below:  Skin: Stuck on tan brown papules, well demarcated tan brown macules, bright red cherry papules noted to head, neck, trunk, arms, legs; few open comedones noted to central back and right abdomen    Procedures   Procedures    Derm Physical Exam    Assessment and Plan     1. Seborrheic keratoses  2. Black head  3. Cherry angioma  4. Solar lentigo  Education and counseling provided to patient on benign nature of lesions. I discussed the diagnosis and treatment options with the patient.  As the lesion/these lesions is/are asymptomatic, no further treatment is required or requested by the patient.    Discussed importance of daily sunscreen use with a broad spectrum SPF of 30-50, broad spectrum and those including zinc oxide and titanium dioxide as well as being water resistant.  Recommended wide brimmed hats.  Finally, we discussed danger signs of changing skin lesions including sores not healing and moles changing in size, shape or color.  Should any of these lesions occur before next appointment, I instructed patient to call sooner for evaluation.

## 2023-06-20 ASSESSMENT — ENCOUNTER SYMPTOMS
FEVER: 0
PSYCHIATRIC NEGATIVE: 1
COUGH: 0
ARTHRALGIAS: 0
ACTIVITY CHANGE: 1
JOINT SWELLING: 0
SHORTNESS OF BREATH: 0
SORE THROAT: 0
CHILLS: 0

## 2023-06-20 NOTE — PROGRESS NOTES
Subjective   Patient ID: Consuelo Singh is a 77 y.o. female.    Chief Complaint:  Chief Complaint   Patient presents with    Left Foot - Follow-up     Presents with left foot fracture from 5/3/23 when she fell. No pain today and does not take pain medication.        Patient returns clinic today for routine follow-up of left foot, lateral calcaneus avulsion fracture.  Initial date of injury was 5/3/2023 when she fell from a commode since her last office visit, she has weaned out of the cam walker and is full weightbearing without any significant pain through the foot.  She is not requiring any assistive devices to aid in ambulation and denies any paresthesias involving the left lower extremity.  She is not requiring any medications to manage her pain.        Social History     Occupational History    Not on file   Tobacco Use    Smoking status: Former     Packs/day: 0.50     Years: 40.00     Pack years: 20.00     Types: Cigarettes     Quit date: 2007     Years since quittin.4    Smokeless tobacco: Never   Substance and Sexual Activity    Alcohol use: Yes     Comment: Maybe once a month    Drug use: No    Sexual activity: Not Currently      Review of Systems   Constitutional:  Positive for activity change. Negative for chills and fever.   HENT:  Negative for congestion and sore throat.    Respiratory:  Negative for cough and shortness of breath.    Cardiovascular:  Negative for chest pain.   Musculoskeletal:  Negative for arthralgias, gait problem and joint swelling.   Skin: Negative.    Psychiatric/Behavioral: Negative.               Objective     Ortho Exam     General: 77-year-old female, unaccompanied in the office today, alert and oriented x3, no acute distress  Left foot:  No obvious deformity noted, very mild tenderness along the lateral aspect of the calcaneus  Active range of motion through the ankle is all pain-free and within normal limits for dorsiflexion, plantarflexion, inversion/eversion  Talar  tilt is negative  Anterior drawer is negative  Dorsalis pedis 2+  Sensation grossly intact to the toes with brisk capillary refill              Assessment   Diagnoses and all orders for this visit:    Left foot pain  -     X-ray ankle 3 or more views left              Plan    Left foot, calcaneal avulsion fracture:  X-rays were obtained today and compared to prior.  There does appear to be interval healing changes at the site of the avulsion fracture on the lateral calcaneus.  Clinically, her symptoms have nearly completely resolved with the exception of very mild tenderness to palpation over the lateral calcaneus.  I do feel its appropriate for her to continue to progress toward all normal activities without any specific limitations.  I will plan to see her back only if she has any questions or concerns or develops new or worsening pain.  I do not feel a referral to physical therapy was necessary at this time, but certainly could consider it if she has ongoing issues.

## 2023-06-21 ENCOUNTER — TELEPHONE (OUTPATIENT)
Dept: DIABETES SERVICES | Facility: CLINIC | Age: 78
End: 2023-06-21
Payer: MEDICARE

## 2023-06-21 DIAGNOSIS — E11.21 TYPE 2 DIABETES MELLITUS WITH DIABETIC NEPHROPATHY, WITH LONG-TERM CURRENT USE OF INSULIN (CMS/HCC): Primary | Chronic | ICD-10-CM

## 2023-06-21 DIAGNOSIS — Z79.4 TYPE 2 DIABETES MELLITUS WITH DIABETIC NEPHROPATHY, WITH LONG-TERM CURRENT USE OF INSULIN (CMS/HCC): Primary | Chronic | ICD-10-CM

## 2023-06-21 NOTE — TELEPHONE ENCOUNTER
Patient called stating she has received her DexCom G7 supplies and would like to come in on Friday for training. Will load the G7 daily prior to coming to appointment. Scheduled for 6/23/23 at 0900 with RN diabetes educator.

## 2023-06-23 ENCOUNTER — OFFICE VISIT NO LOS (OUTPATIENT)
Dept: DIABETES SERVICES | Facility: CLINIC | Age: 78
End: 2023-06-23
Payer: MEDICARE

## 2023-06-23 DIAGNOSIS — Z79.4 TYPE 2 DIABETES MELLITUS WITH DIABETIC NEPHROPATHY, WITH LONG-TERM CURRENT USE OF INSULIN (CMS/HCC): Chronic | ICD-10-CM

## 2023-06-23 DIAGNOSIS — E11.21 TYPE 2 DIABETES MELLITUS WITH DIABETIC NEPHROPATHY, WITH LONG-TERM CURRENT USE OF INSULIN (CMS/HCC): Chronic | ICD-10-CM

## 2023-06-23 NOTE — PROGRESS NOTES
Situation:  Personal Dexcom G7 training  Start time:  0900  End time:  0915    Background:  Patient is a 77 year old female with type 2 diabetes.  She currently manages her disease course with MDI and Dexcom G6 system.    Assessment:  Patient comes to the clinic today a willing participant in Dexcom G7 training.  First reviewed the supplies with patient.  Notified patient that sensors are good for 10 days.  Explained that the transmitter is not included and will be changed out with each sensor instead of every 3 months.  Consuelo did not get a  with her supplies.  She will use her phone.  She has already downloaded the G7 daily and is linked on clarity.  Reviewed alerts and alarms.  Low alert set at 70 mg/dl with no repeat and high alert set at 300 mg/dl with no repeat per patients request.  Rise and fall alerts left off.  Verbally walked Consuelo through placing sensor.  She then demonstrated proper placement.  I assisted her with placing the overlay tape.  She then placed Dexcom code and paired it.  Sensor entered half hour warm up period.  Explained to patient that sensor data is a little bit behind finger glucose sticks.  Encouraged patient to watch trend arrows.  Discussed checking blood sugars if symptoms do not match reading.       Recommendations:  Encouraged patient to return in 10 days to assist with first sensor change.     No charge until return for download of reports and sensor change.

## 2023-07-03 ENCOUNTER — OFFICE VISIT NO LOS (OUTPATIENT)
Dept: DIABETES SERVICES | Facility: CLINIC | Age: 78
End: 2023-07-03
Payer: MEDICARE

## 2023-07-03 DIAGNOSIS — Z79.4 TYPE 2 DIABETES MELLITUS WITH DIABETIC NEPHROPATHY, WITH LONG-TERM CURRENT USE OF INSULIN (CMS/HCC): Primary | Chronic | ICD-10-CM

## 2023-07-03 DIAGNOSIS — E11.21 TYPE 2 DIABETES MELLITUS WITH DIABETIC NEPHROPATHY, WITH LONG-TERM CURRENT USE OF INSULIN (CMS/HCC): Primary | Chronic | ICD-10-CM

## 2023-07-03 PROCEDURE — 95249 CONT GLUC MNTR PT PROV EQP: CPT

## 2023-07-03 NOTE — PROGRESS NOTES
Situtation:  First sensor change Dexcom G7 and download of reports  Start time: 0900  End time:0915    Background:  Patient is a 77 year old femal with type 2 diabetes.  She recently upgraded from a Dexcom G6 to a Dexcom G7.  She is currently taking Toujeo and Novolog.      Assessment:  Patient comes to the clinic today a willing participant in changing sensor first time and review of reports.  Reports show average blood sugar of 143mg/dl with 1% percent lows.  Consuelo reports that she does not believe they are true lows.  She denied any symptoms of hypoglycemia.  The low happened shortly after placement.  Notified her that even though the warm up period is shorter at half an hour, it still might take a few hours to get true readings.  Explained that depending of inflammation or bleeding it could be more inaccurate for a while.  Reminded her to always go back to finger sticks if readings do not match symptoms.  Then had Consuelo stop current dexcom sensor.  She then removed current sensor placed.  Placed new sensor.  I did help her place the overlay tape.  She then input sensor data and paired transmitter.  Sensor entered half hour warm up period.      Recommendations:  Follow up as needed

## 2023-09-11 ENCOUNTER — OFFICE VISIT (OUTPATIENT)
Dept: FAMILY MEDICINE | Facility: CLINIC | Age: 78
End: 2023-09-11
Payer: MEDICARE

## 2023-09-11 ENCOUNTER — LAB (OUTPATIENT)
Dept: LAB | Facility: CLINIC | Age: 78
End: 2023-09-11
Payer: MEDICARE

## 2023-09-11 ENCOUNTER — TELEPHONE (OUTPATIENT)
Dept: FAMILY MEDICINE | Facility: CLINIC | Age: 78
End: 2023-09-11
Payer: MEDICARE

## 2023-09-11 VITALS
HEIGHT: 63 IN | HEART RATE: 73 BPM | WEIGHT: 230 LBS | SYSTOLIC BLOOD PRESSURE: 138 MMHG | BODY MASS INDEX: 40.75 KG/M2 | OXYGEN SATURATION: 96 % | RESPIRATION RATE: 16 BRPM | DIASTOLIC BLOOD PRESSURE: 72 MMHG | TEMPERATURE: 97.9 F

## 2023-09-11 DIAGNOSIS — R05.9 COUGH, UNSPECIFIED TYPE: ICD-10-CM

## 2023-09-11 DIAGNOSIS — I10 ESSENTIAL HYPERTENSION: ICD-10-CM

## 2023-09-11 DIAGNOSIS — Z79.4 TYPE 2 DIABETES MELLITUS WITH DIABETIC NEPHROPATHY, WITH LONG-TERM CURRENT USE OF INSULIN (CMS/HCC): Chronic | ICD-10-CM

## 2023-09-11 DIAGNOSIS — E11.21 TYPE 2 DIABETES MELLITUS WITH DIABETIC NEPHROPATHY, WITH LONG-TERM CURRENT USE OF INSULIN (CMS/HCC): ICD-10-CM

## 2023-09-11 DIAGNOSIS — Z79.4 TYPE 2 DIABETES MELLITUS WITH DIABETIC NEPHROPATHY, WITH LONG-TERM CURRENT USE OF INSULIN (CMS/HCC): ICD-10-CM

## 2023-09-11 DIAGNOSIS — E66.01 MORBID OBESITY (CMS/HCC): Primary | Chronic | ICD-10-CM

## 2023-09-11 DIAGNOSIS — E11.21 TYPE 2 DIABETES MELLITUS WITH DIABETIC NEPHROPATHY, WITH LONG-TERM CURRENT USE OF INSULIN (CMS/HCC): Chronic | ICD-10-CM

## 2023-09-11 DIAGNOSIS — N18.31 STAGE 3A CHRONIC KIDNEY DISEASE (CMS/HCC): Chronic | ICD-10-CM

## 2023-09-11 LAB
ALBUMIN SERPL-MCNC: 4.3 G/DL (ref 3.5–5.3)
ALP SERPL-CCNC: 115 U/L (ref 55–142)
ALT SERPL-CCNC: 28 U/L (ref 7–52)
ANION GAP SERPL CALC-SCNC: 9 MMOL/L (ref 3–11)
AST SERPL-CCNC: 25 U/L
BASOPHILS # BLD AUTO: 0.1 10*3/UL
BASOPHILS NFR BLD AUTO: 0.6 % (ref 0–2)
BILIRUB SERPL-MCNC: 0.44 MG/DL (ref 0.2–1.4)
BUN SERPL-MCNC: 21 MG/DL (ref 7–25)
CALCIUM ALBUM COR SERPL-MCNC: 9.1 MG/DL (ref 8.6–10.3)
CALCIUM SERPL-MCNC: 9.3 MG/DL (ref 8.6–10.3)
CHLORIDE SERPL-SCNC: 102 MMOL/L (ref 98–107)
CO2 SERPL-SCNC: 30 MMOL/L (ref 21–32)
CREAT SERPL-MCNC: 1.1 MG/DL (ref 0.6–1.1)
CREAT UR-MCNC: 72.7 MG/DL
EGFRCR SERPLBLD CKD-EPI 2021: 52 ML/MIN/1.73M*2
EOSINOPHIL # BLD AUTO: 0.3 10*3/UL
EOSINOPHIL NFR BLD AUTO: 3.3 % (ref 0–3)
ERYTHROCYTE [DISTWIDTH] IN BLOOD BY AUTOMATED COUNT: 13.8 % (ref 11.5–14)
EST. AVERAGE GLUCOSE BLD GHB EST-MCNC: 148.5 MG/DL
GLUCOSE SERPL-MCNC: 109 MG/DL (ref 70–105)
HBA1C MFR BLD: 6.8 % (ref 4–6)
HCT VFR BLD AUTO: 41.3 % (ref 34–45)
HGB BLD-MCNC: 13.6 G/DL (ref 11.5–15.5)
LYMPHOCYTES # BLD AUTO: 2.3 10*3/UL
LYMPHOCYTES NFR BLD AUTO: 25.4 % (ref 11–47)
MCH RBC QN AUTO: 29.7 PG (ref 28–33)
MCHC RBC AUTO-ENTMCNC: 32.8 G/DL (ref 32–36)
MCV RBC AUTO: 90.5 FL (ref 81–97)
MICROALBUMIN UR-MCNC: 163.9 MG/L (ref 0–30)
MICROALBUMIN/CREAT UR: 225.4 MG/GCREAT
MONOCYTES # BLD AUTO: 0.6 10*3/UL
MONOCYTES NFR BLD AUTO: 6.6 % (ref 3–11)
NEUTROPHILS # BLD AUTO: 5.8 10*3/UL
NEUTROPHILS NFR BLD AUTO: 64.1 % (ref 41–81)
PLATELET # BLD AUTO: 225 10*3/UL (ref 140–350)
PMV BLD AUTO: 7.5 FL (ref 6.9–10.8)
POTASSIUM SERPL-SCNC: 4.1 MMOL/L (ref 3.5–5.1)
PROT SERPL-MCNC: 6.5 G/DL (ref 6–8.3)
RBC # BLD AUTO: 4.56 10*6/ΜL (ref 3.7–5.3)
SODIUM SERPL-SCNC: 141 MMOL/L (ref 135–145)
WBC # BLD AUTO: 9.1 10*3/UL (ref 4.5–10.5)

## 2023-09-11 PROCEDURE — 36415 COLL VENOUS BLD VENIPUNCTURE: CPT

## 2023-09-11 PROCEDURE — G0463 HOSPITAL OUTPT CLINIC VISIT: HCPCS | Performed by: FAMILY MEDICINE

## 2023-09-11 PROCEDURE — 85025 COMPLETE CBC W/AUTO DIFF WBC: CPT

## 2023-09-11 PROCEDURE — 80053 COMPREHEN METABOLIC PANEL: CPT

## 2023-09-11 PROCEDURE — 99214 OFFICE O/P EST MOD 30 MIN: CPT | Performed by: FAMILY MEDICINE

## 2023-09-11 PROCEDURE — 83036 HEMOGLOBIN GLYCOSYLATED A1C: CPT

## 2023-09-11 PROCEDURE — 82570 ASSAY OF URINE CREATININE: CPT

## 2023-09-11 RX ORDER — INSULIN ASPART 100 [IU]/ML
INJECTION, SOLUTION INTRAVENOUS; SUBCUTANEOUS
Qty: 25.8 ML | Refills: 11 | Status: SHIPPED | OUTPATIENT
Start: 2023-09-11 | End: 2024-03-21

## 2023-09-11 RX ORDER — BENZONATATE 200 MG/1
200 CAPSULE ORAL 3 TIMES DAILY PRN
Qty: 30 CAPSULE | Refills: 0 | Status: SHIPPED | OUTPATIENT
Start: 2023-09-11

## 2023-09-11 RX ORDER — METOPROLOL SUCCINATE 100 MG/1
150 TABLET, EXTENDED RELEASE ORAL NIGHTLY
Qty: 135 TABLET | Refills: 3 | Status: SHIPPED | OUTPATIENT
Start: 2023-09-11 | End: 2024-03-25

## 2023-09-11 RX ORDER — CHLORTHALIDONE 25 MG/1
25 TABLET ORAL DAILY
Qty: 90 TABLET | Refills: 3 | Status: SHIPPED | OUTPATIENT
Start: 2023-09-11 | End: 2024-09-03 | Stop reason: SDUPTHER

## 2023-09-11 RX ORDER — INSULIN ASPART 100 [IU]/ML
INJECTION, SOLUTION INTRAVENOUS; SUBCUTANEOUS
Qty: 24.9 ML | Refills: 11 | Status: SHIPPED | OUTPATIENT
Start: 2023-09-11 | End: 2023-09-11

## 2023-09-11 NOTE — PROGRESS NOTES
"Subjective    I would like your consent to use a new technology to help document today’s visit. The technology is called Dragon Ambient eXperience or ASHWINI for short. The ASHWINI technology will securely listen to your visit and convert what it hears into an office visit note. Would you be comfortable using ASHWINI during your visit today?      Patient's response: Yes    Consuelo Singh is a 77 y.o. female who presents for Diabetes (Pt presents for a 6 month diabetic FU. Labs obtained this morning. )      HPI  The patient states that she is doing well. Her A1c result has not come back today. She adds that she got the new G7. She is taking 20 units of insulin in the morning with breakfast, 27 units at lunch, and 39 units at dinner. She occasionally gets false lows on her readings and double checks it; it will usually be 10 to 15 points off. She notes that she can feel when she has a hypoglycemic attack.     She is taking losartan, chlorthalidone, metoprolol, and insulin.    She reports that she has lesions on her tongue that developed 2 weeks ago. She is unsure what she ate but it was \"like acid.\"     The following have been reviewed and updated as appropriate in this visit:   Allergies  Meds  Problems  Med Hx  Surg Hx  Fam Hx         Review of Systems   HENT:          Lesions on tongue.        Current Outpatient Medications   Medication Sig Dispense Refill    pravastatin (PRAVACHOL) 20 mg tablet Take 1 tablet (20 mg total) by mouth 3 (three) times a week 36 tablet 3    omeprazole (PriLOSEC) 40 mg capsule Take 1 capsule (40 mg total) by mouth daily 90 capsule 3    nortriptyline (PAMELOR) 50 mg capsule Take 1 capsule (50 mg total) by mouth daily 90 capsule 3    losartan (COZAAR) 100 mg tablet Take 1 tablet (100 mg total) by mouth daily 90 tablet 3    amLODIPine (NORVASC) 5 mg tablet Take 1 tablet (5 mg total) by mouth daily 90 tablet 3    pen needle, diabetic 31 gauge x 15/64\" needle SMARTSIG:SUB-Q      insulin glargine " "U-300 conc (Toujeo Max U-300 SoloStar) 300 unit/mL (3 mL) insulin pen insulin pen Inject 70 Units under the skin 2 (two) times a day NO SUBSTITUTIONS AS PATIENT IS ALLERGIC TO OTHER INSULINS 45 mL 11    pen needle, diabetic 31 gauge x 1/4\" needle USE 1 NEEDLE TO INJECT UNDER THE SKIN 5 TIMES DAILY Type 2 diabetes with long term insuline use. E11.9 200 each 11    clobetasoL (TEMOVATE) 0.05 % ointment Apply 1 application topically 2 (two) times a day as needed (Rash) 30 g 2    lutein 20 mg tablet Take 1 tab/cap by mouth daily      multivitamin (THERAGRAN) tablet tablet Take 1 tablet by mouth daily      blood-glucose sensor device Use 1 sensor every 10 days. 3 Device 11    blood-glucose meter,continuous (Dexcom G6 ) misc Use as directed. 1 each 0    blood-glucose transmitter (Dexcom G6 Transmitter) device Use as directed.  Change every 3 months. 1 Device 4    aspirin 325 mg tablet Take 1 tablet (325 mg total) by mouth daily      chlorthalidone 25 mg tablet Take 1 tablet (25 mg total) by mouth daily 90 tablet 3    metoprolol succinate XL (TOPROL-XL) 100 mg 24 hr tablet Take 1.5 tablets (150 mg total) by mouth nightly 135 tablet 3    NovoLOG FlexPen U-100 Insulin 100 unit/mL (3 mL) insulin pen Inject 20 Units under the skin daily with breakfast AND 26 Units daily before lunch AND 37 Units daily with dinner. Inject 20 Units under the skin daily with breakfast AND 26 Units daily before lunch AND 37 Units daily with dinner. 24.9 mL 11    benzonatate (TESSALON) 200 mg capsule Take 1 capsule (200 mg total) by mouth 3 (three) times a day as needed for cough 30 capsule 0     No current facility-administered medications for this visit.       Objective     VITAL SIGNS  /72 (BP Location: Left arm, Patient Position: Sitting, Cuff Size: Regular Adult)   Pulse 73   Temp 36.6 °C (97.9 °F) (Temporal)   Resp 16   Ht 1.6 m (5' 3\")   Wt 104.3 kg (230 lb)   SpO2 96%   BMI 40.74 kg/m²     Physical Exam  Vitals and " nursing note reviewed.   Constitutional:       General: She is not in acute distress.     Appearance: She is well-developed.   HENT:      Head: Normocephalic and atraumatic.   Cardiovascular:      Rate and Rhythm: Normal rate and regular rhythm.      Heart sounds: Murmur heard.   Pulmonary:      Effort: Pulmonary effort is normal.      Breath sounds: Normal breath sounds.   Abdominal:      General: There is no distension.   Skin:     General: Skin is warm.   Neurological:      Mental Status: She is alert and oriented to person, place, and time.     Oropharynx: Lesions present in the tongue which appear to be papillae which are engorged/enlarged      Assessment/Plan   Problem List Items Addressed This Visit          Cardiac and Vasculature    Essential hypertension (Chronic)    Relevant Medications    chlorthalidone 25 mg tablet    metoprolol succinate XL (TOPROL-XL) 100 mg 24 hr tablet       Endocrine and Metabolic    Morbid obesity (CMS/[facility]) ([facility]) - Primary (Chronic)    Type 2 diabetes mellitus with diabetic nephropathy, with long-term current use of insulin (CMS/[facility]) ([facility]) (Chronic)    Relevant Medications    chlorthalidone 25 mg tablet    NovoLOG FlexPen U-100 Insulin 100 unit/mL (3 mL) insulin pen       Genitourinary and Reproductive    CKD (chronic kidney disease) stage 3, GFR 30-59 ml/min (CMS/[facility]) ([facility]) (Chronic)    Relevant Medications    chlorthalidone 25 mg tablet     Other Visit Diagnoses       Cough, unspecified type        Relevant Medications    benzonatate (TESSALON) 200 mg capsule            1. Diabetes.  The patient's A1c has not returned yet. She will continue with her current medication regimen.    2. Hypertension.  The patient's blood pressure is well controlled at this time. She will continue with her current medication regimen.    3. Papillae on tongue.   We discussed that this should resolve in a few weeks. If this does not improve in a few weeks she will come back in. Recommend to avoid hot  or scratchy foods that could irritate. She can try a Cepacol lozenge or chloraseptic spray.     The patient will follow up in 6 months.      ATTESTATION:    Draft generated by Nirali MARADIAGA and edited by Laura Bateman, Quality  on 09/11/2023.    Margarito Glass MD

## 2023-09-22 ENCOUNTER — LAB (OUTPATIENT)
Dept: LAB | Facility: CLINIC | Age: 78
End: 2023-09-22
Payer: MEDICARE

## 2023-09-22 ENCOUNTER — ANCILLARY PROCEDURE (OUTPATIENT)
Dept: RADIOLOGY | Facility: CLINIC | Age: 78
End: 2023-09-22
Payer: MEDICARE

## 2023-09-22 ENCOUNTER — OFFICE VISIT (OUTPATIENT)
Dept: URGENT CARE | Facility: CLINIC | Age: 78
End: 2023-09-22
Payer: MEDICARE

## 2023-09-22 VITALS
RESPIRATION RATE: 16 BRPM | WEIGHT: 224.5 LBS | SYSTOLIC BLOOD PRESSURE: 153 MMHG | OXYGEN SATURATION: 95 % | TEMPERATURE: 97.5 F | DIASTOLIC BLOOD PRESSURE: 67 MMHG | HEART RATE: 77 BPM | BODY MASS INDEX: 39.77 KG/M2

## 2023-09-22 DIAGNOSIS — J40 BRONCHITIS: ICD-10-CM

## 2023-09-22 DIAGNOSIS — R05.1 ACUTE COUGH: Primary | ICD-10-CM

## 2023-09-22 LAB
ALBUMIN SERPL-MCNC: 4.6 G/DL (ref 3.5–5.3)
ALP SERPL-CCNC: 119 U/L (ref 55–142)
ALT SERPL-CCNC: 49 U/L (ref 7–52)
ANION GAP SERPL CALC-SCNC: 12 MMOL/L (ref 3–11)
AST SERPL-CCNC: 50 U/L
BASOPHILS # BLD AUTO: 0.1 10*3/UL
BASOPHILS NFR BLD AUTO: 0.5 % (ref 0–2)
BILIRUB SERPL-MCNC: 0.73 MG/DL (ref 0.2–1.4)
BUN SERPL-MCNC: 30 MG/DL (ref 7–25)
CALCIUM ALBUM COR SERPL-MCNC: 9.4 MG/DL (ref 8.6–10.3)
CALCIUM SERPL-MCNC: 9.9 MG/DL (ref 8.6–10.3)
CHLORIDE SERPL-SCNC: 98 MMOL/L (ref 98–107)
CO2 SERPL-SCNC: 29 MMOL/L (ref 21–32)
CREAT SERPL-MCNC: 1.28 MG/DL (ref 0.6–1.1)
EGFRCR SERPLBLD CKD-EPI 2021: 43 ML/MIN/1.73M*2
EOSINOPHIL # BLD AUTO: 0.3 10*3/UL
EOSINOPHIL NFR BLD AUTO: 2.6 % (ref 0–3)
ERYTHROCYTE [DISTWIDTH] IN BLOOD BY AUTOMATED COUNT: 14.5 % (ref 11.5–14)
GLUCOSE SERPL-MCNC: 167 MG/DL (ref 70–105)
HCT VFR BLD AUTO: 42.6 % (ref 34–45)
HGB BLD-MCNC: 13.9 G/DL (ref 11.5–15.5)
LYMPHOCYTES # BLD AUTO: 2.6 10*3/UL
LYMPHOCYTES NFR BLD AUTO: 24.4 % (ref 11–47)
MCH RBC QN AUTO: 29.6 PG (ref 28–33)
MCHC RBC AUTO-ENTMCNC: 32.7 G/DL (ref 32–36)
MCV RBC AUTO: 90.6 FL (ref 81–97)
MONOCYTES # BLD AUTO: 0.6 10*3/UL
MONOCYTES NFR BLD AUTO: 5.8 % (ref 3–11)
NEUTROPHILS # BLD AUTO: 7.1 10*3/UL
NEUTROPHILS NFR BLD AUTO: 66.7 % (ref 41–81)
PLATELET # BLD AUTO: 260 10*3/UL (ref 140–350)
PMV BLD AUTO: 7.3 FL (ref 6.9–10.8)
POTASSIUM SERPL-SCNC: 4.7 MMOL/L (ref 3.5–5.1)
PROT SERPL-MCNC: 7.3 G/DL (ref 6–8.3)
RBC # BLD AUTO: 4.7 10*6/ΜL (ref 3.7–5.3)
SODIUM SERPL-SCNC: 139 MMOL/L (ref 135–145)
WBC # BLD AUTO: 10.7 10*3/UL (ref 4.5–10.5)

## 2023-09-22 PROCEDURE — 71046 X-RAY EXAM CHEST 2 VIEWS: CPT

## 2023-09-22 PROCEDURE — 36415 COLL VENOUS BLD VENIPUNCTURE: CPT

## 2023-09-22 PROCEDURE — 71046 X-RAY EXAM CHEST 2 VIEWS: CPT | Mod: 26,CMS | Performed by: RADIOLOGY

## 2023-09-22 PROCEDURE — G0463 HOSPITAL OUTPT CLINIC VISIT: HCPCS

## 2023-09-22 PROCEDURE — 80053 COMPREHEN METABOLIC PANEL: CPT

## 2023-09-22 PROCEDURE — 99213 OFFICE O/P EST LOW 20 MIN: CPT

## 2023-09-22 PROCEDURE — 85025 COMPLETE CBC W/AUTO DIFF WBC: CPT

## 2023-09-22 RX ORDER — ALBUTEROL SULFATE 90 UG/1
2 INHALANT RESPIRATORY (INHALATION) EVERY 6 HOURS PRN
Qty: 18 G | Refills: 0 | Status: SHIPPED | OUTPATIENT
Start: 2023-09-22

## 2023-09-22 RX ORDER — DOXYCYCLINE 100 MG/1
100 CAPSULE ORAL 2 TIMES DAILY
Qty: 14 CAPSULE | Refills: 0 | Status: SHIPPED | OUTPATIENT
Start: 2023-09-22 | End: 2023-09-29

## 2023-09-22 ASSESSMENT — ENCOUNTER SYMPTOMS
CONSTITUTIONAL NEGATIVE: 1
NEUROLOGICAL NEGATIVE: 1
PSYCHIATRIC NEGATIVE: 1
SHORTNESS OF BREATH: 0
GASTROINTESTINAL NEGATIVE: 1
CARDIOVASCULAR NEGATIVE: 1
WHEEZING: 0
COUGH: 1

## 2023-09-22 NOTE — PROGRESS NOTES
Subjective      Consuelo Singh is a 77 y.o. female who presents for cough.    Patient presents to urgent care for cough x2 weeks.  States cough is intermittent and productive.  She has been taking Tessalon Perles with minimal relief.  She is eating and drinking well.  Activity is stable.  Denies fever, chills, nasal congestion, sore throat, N/V/D, shortness of breath or chest pain.  She is a half a pack a day smoker, quit in 2007.    The following have been reviewed and updated as appropriate in this visit:          Allergies   Allergen Reactions    Aloe Vera     Bacitracin      ZINC    Erythromycin Base     Formaldehyde     Gramicidins      OPHTHALMIC    Hydrocortisone      ACETATE    Latex     Levofloxacin     Metformin     Neomycin     Neomycin-Bacitracnzn-Polymyxnb     Polymyxin B     Prednisolone     Prednisone     Statins-Hmg-Coa Reductase Inhibitors     Sulfamethoxazole     Sulfamethoxazole-Trimethoprim     Thimerosal     Tresiba Flextouch U-100 [Insulin Degludec] Diarrhea and Other (see comments)     Abdominal pain    Trimethoprim     Humalog Kwikpen Insulin [Insulin Lispro] Rash     Current Outpatient Medications   Medication Sig Dispense Refill    chlorthalidone 25 mg tablet Take 1 tablet (25 mg total) by mouth daily 90 tablet 3    metoprolol succinate XL (TOPROL-XL) 100 mg 24 hr tablet Take 1.5 tablets (150 mg total) by mouth nightly 135 tablet 3    benzonatate (TESSALON) 200 mg capsule Take 1 capsule (200 mg total) by mouth 3 (three) times a day as needed for cough 30 capsule 0    NovoLOG FlexPen U-100 Insulin 100 unit/mL (3 mL) insulin pen Inject 20 Units under the skin daily with breakfast AND 27 Units daily before lunch AND 39 Units daily with dinner. 25.8 mL 11    pravastatin (PRAVACHOL) 20 mg tablet Take 1 tablet (20 mg total) by mouth 3 (three) times a week 36 tablet 3    omeprazole (PriLOSEC) 40 mg capsule Take 1 capsule (40 mg total) by mouth daily 90 capsule 3    nortriptyline (PAMELOR) 50 mg  "capsule Take 1 capsule (50 mg total) by mouth daily 90 capsule 3    losartan (COZAAR) 100 mg tablet Take 1 tablet (100 mg total) by mouth daily 90 tablet 3    amLODIPine (NORVASC) 5 mg tablet Take 1 tablet (5 mg total) by mouth daily 90 tablet 3    pen needle, diabetic 31 gauge x 15/64\" needle SMARTSIG:SUB-Q      pen needle, diabetic 31 gauge x 1/4\" needle USE 1 NEEDLE TO INJECT UNDER THE SKIN 5 TIMES DAILY Type 2 diabetes with long term insuline use. E11.9 200 each 11    clobetasoL (TEMOVATE) 0.05 % ointment Apply 1 application topically 2 (two) times a day as needed (Rash) 30 g 2    lutein 20 mg tablet Take 1 tab/cap by mouth daily      multivitamin (THERAGRAN) tablet tablet Take 1 tablet by mouth daily      blood-glucose sensor device Use 1 sensor every 10 days. 3 Device 11    blood-glucose meter,continuous (Dexcom G6 ) misc Use as directed. 1 each 0    blood-glucose transmitter (Dexcom G6 Transmitter) device Use as directed.  Change every 3 months. 1 Device 4    aspirin 325 mg tablet Take 1 tablet (325 mg total) by mouth daily      doxycycline (MONODOX) 100 mg capsule Take 1 capsule (100 mg total) by mouth 2 (two) times a day for 7 days 14 capsule 0    albuterol HFA 90 mcg/actuation inhaler Inhale 2 puffs every 6 (six) hours as needed for wheezing 18 g 0    insulin glargine U-300 conc (Toujeo Max U-300 SoloStar) 300 unit/mL (3 mL) insulin pen insulin pen Inject 70 Units under the skin 2 (two) times a day NO SUBSTITUTIONS AS PATIENT IS ALLERGIC TO OTHER INSULINS 45 mL 11     No current facility-administered medications for this visit.     Past Medical History:   Diagnosis Date    Allergic Do not remember    Arthritis     Blood clot in vein     shani legs    Cataract     Deep vein thrombosis (CMS/HCC)     hx of    Diabetes (CMS/HCC)     type 2    Eczema     Fibromyalgia     GERD (gastroesophageal reflux disease)     Heart murmur     Hyperlipidemia     Hypertension     Obesity In my twenties    Peripheral " neuropathy     toes    Pneumonia     Psoriasis     Scoliosis     Skin abnormalities     Visual impairment When i was 17    Wears dentures     full set     Past Surgical History:   Procedure Laterality Date    CHOLECYSTECTOMY      COLONOSCOPY  2016    5 yr  follow up    COLONOSCOPY N/A 2021    Please call the patient regarding her abnormal result. Repeat colonoscopy in 5 year due to 2 tubular adenomas if patient otherwise healthy at that time. Also reasonable to discontinue further colonoscopy--Dr Laboy    EYE SURGERY  I believe     HYSTERECTOMY      With BSO    OTHER SURGICAL HISTORY      Rt. radical mastoidectomy, rebuilt canal from growth     Family History   Problem Relation Age of Onset    Breast cancer Mother     Cancer Mother     Diabetes Father     Lung cancer Maternal Grandmother     Bone cancer Maternal Grandfather     Pancreatic cancer Mother's Brother     Breast cancer Mother's Sister     Lung cancer Mother's Sister      Social History     Socioeconomic History    Marital status:    Tobacco Use    Smoking status: Former     Packs/day: 0.50     Years: 40.00     Additional pack years: 0.00     Total pack years: 20.00     Types: Cigarettes     Quit date: 2007     Years since quittin.7    Smokeless tobacco: Never   Substance and Sexual Activity    Alcohol use: Yes     Comment: Maybe once a month    Drug use: No    Sexual activity: Not Currently     Social Determinants of Health     Tobacco Use: Medium Risk (2023)    Patient History     Smoking Tobacco Use: Former     Smokeless Tobacco Use: Never       Review of Systems   Constitutional: Negative.    HENT: Negative.     Respiratory:  Positive for cough. Negative for shortness of breath and wheezing.    Cardiovascular: Negative.    Gastrointestinal: Negative.    Skin: Negative.    Neurological: Negative.    Psychiatric/Behavioral: Negative.         Objective   /67 (BP Location: Left arm, Patient Position:  Sitting, Cuff Size: Regular Adult)   Pulse 77   Temp 36.4 °C (97.5 °F) (Temporal)   Resp 16   Wt 101.8 kg (224 lb 8 oz)   SpO2 95%   BMI 39.77 kg/m²     Physical Exam  Constitutional:       General: She is not in acute distress.     Appearance: Normal appearance. She is not ill-appearing or toxic-appearing.   HENT:      Right Ear: Tympanic membrane and ear canal normal.      Left Ear: Tympanic membrane and ear canal normal.      Nose: Nose normal.      Mouth/Throat:      Mouth: Mucous membranes are moist.      Pharynx: Oropharynx is clear.   Cardiovascular:      Rate and Rhythm: Normal rate and regular rhythm.      Heart sounds: Normal heart sounds.   Pulmonary:      Effort: Pulmonary effort is normal.      Breath sounds: Wheezing present. No decreased breath sounds.   Abdominal:      General: Abdomen is flat. Bowel sounds are normal.      Palpations: Abdomen is soft.   Neurological:      Mental Status: She is alert and oriented to person, place, and time.         Assessment/Plan   Diagnoses and all orders for this visit:    Acute cough  -     X-ray chest 2 views  -     CBC w/auto differential Blood, Venous  -     Comprehensive metabolic panel Blood, Venous  -     doxycycline (MONODOX) 100 mg capsule; Take 1 capsule (100 mg total) by mouth 2 (two) times a day for 7 days  -     albuterol HFA 90 mcg/actuation inhaler; Inhale 2 puffs every 6 (six) hours as needed for wheezing    Bronchitis  -     doxycycline (MONODOX) 100 mg capsule; Take 1 capsule (100 mg total) by mouth 2 (two) times a day for 7 days  -     albuterol HFA 90 mcg/actuation inhaler; Inhale 2 puffs every 6 (six) hours as needed for wheezing    Chest x-ray no acute cardiopulmonary process.  WBC 10.7, neutrophils 66.7%.  Creatinine slightly elevated at 1.28, eGFR 43. She does have a history of CKD. discussed results with patient.  Patient is well-appearing.  Vital signs are stable.  Due to multiple allergies, med choices are limited.  Treat  bronchitis with doxycycline and albuterol.  Discussed dose, frequency and side effects.  Continue symptom management at home.  Discussed adequate hydration. Follow up with PCP in one week. Discussed ER return guidelines.  Urgent care as needed.  Patient in agreement with plan and verbalized understanding.    Brenna Sewell, CNP

## 2023-09-27 DIAGNOSIS — E11.9 DIABETES MELLITUS WITHOUT COMPLICATION (CMS/HCC): ICD-10-CM

## 2023-09-27 RX ORDER — PEN NEEDLE, DIABETIC 32GX 5/32"
1 NEEDLE, DISPOSABLE MISCELLANEOUS
Qty: 200 EACH | Refills: 3 | Status: SHIPPED | OUTPATIENT
Start: 2023-09-27 | End: 2024-02-24

## 2023-09-27 NOTE — TELEPHONE ENCOUNTER
No care due was identified.  [facility] Embedded Care Due Messages. Reference number: [id number]. 9/27/2023 3:26:45 PM MDT

## 2023-10-02 ENCOUNTER — OFFICE VISIT (OUTPATIENT)
Dept: OTOLARYNGOLOGY | Facility: CLINIC | Age: 78
End: 2023-10-02
Payer: MEDICARE

## 2023-10-02 VITALS
HEART RATE: 80 BPM | WEIGHT: 229.5 LBS | OXYGEN SATURATION: 95 % | DIASTOLIC BLOOD PRESSURE: 78 MMHG | TEMPERATURE: 97.8 F | SYSTOLIC BLOOD PRESSURE: 142 MMHG | BODY MASS INDEX: 40.65 KG/M2

## 2023-10-02 DIAGNOSIS — H61.21 IMPACTED CERUMEN OF RIGHT EAR: ICD-10-CM

## 2023-10-02 DIAGNOSIS — Z90.89 HISTORY OF RIGHT MASTOIDECTOMY: Primary | ICD-10-CM

## 2023-10-02 PROCEDURE — G0463 HOSPITAL OUTPT CLINIC VISIT: HCPCS | Performed by: OTOLARYNGOLOGY

## 2023-10-02 PROCEDURE — 69222 CLEAN OUT MASTOID CAVITY: CPT | Performed by: OTOLARYNGOLOGY

## 2023-10-02 PROCEDURE — 99212 OFFICE O/P EST SF 10 MIN: CPT | Mod: 25 | Performed by: OTOLARYNGOLOGY

## 2023-10-02 PROCEDURE — G0463 HOSPITAL OUTPT CLINIC VISIT: HCPCS | Mod: 25 | Performed by: OTOLARYNGOLOGY

## 2023-10-02 ASSESSMENT — ENCOUNTER SYMPTOMS
CHILLS: 0
TROUBLE SWALLOWING: 0
SINUS PRESSURE: 0
CONSTITUTIONAL NEGATIVE: 1
RHINORRHEA: 0
FEVER: 0
SORE THROAT: 0

## 2023-10-02 ASSESSMENT — PAIN SCALES - GENERAL: PAINLEVEL: 0-NO PAIN

## 2023-10-02 NOTE — PROGRESS NOTES
"Subjective    CC: Mastoid debridement    HPI: Consuelo Singh is a 78 y.o. female who comes in periodically for mastoid debridement.  She had a right canal wall down mastoidectomy many years ago.  She has not had any problems with the ear since her last visit with me in June.    Past Medical History:   Diagnosis Date    Allergic Do not remember    Arthritis     Blood clot in vein     shani legs    Cataract     Deep vein thrombosis (CMS/HCC)     hx of    Diabetes (CMS/HCC)     type 2    Eczema     Fibromyalgia     GERD (gastroesophageal reflux disease)     Heart murmur     Hyperlipidemia     Hypertension     Obesity In my twenties    Peripheral neuropathy     toes    Pneumonia     Psoriasis     Scoliosis     Skin abnormalities 1985    Visual impairment When i was 17    Wears dentures     full set       Past Surgical History:   Procedure Laterality Date    CHOLECYSTECTOMY      COLONOSCOPY  03/11/2016    5 yr  follow up    COLONOSCOPY N/A 05/28/2021    Please call the patient regarding her abnormal result. Repeat colonoscopy in 5 year due to 2 tubular adenomas if patient otherwise healthy at that time. Also reasonable to discontinue further colonoscopy--Dr Laboy    EYE SURGERY  I believe 2020    HYSTERECTOMY      With BSO    OTHER SURGICAL HISTORY      Rt. radical mastoidectomy, rebuilt canal from growth         Current Outpatient Medications:     pen needle, diabetic 31 gauge x 15/64\" needle, Inject 1 Needle under the skin 5 (five) times a day, Disp: 200 each, Rfl: 3    albuterol HFA 90 mcg/actuation inhaler, Inhale 2 puffs every 6 (six) hours as needed for wheezing, Disp: 18 g, Rfl: 0    chlorthalidone 25 mg tablet, Take 1 tablet (25 mg total) by mouth daily, Disp: 90 tablet, Rfl: 3    metoprolol succinate XL (TOPROL-XL) 100 mg 24 hr tablet, Take 1.5 tablets (150 mg total) by mouth nightly, Disp: 135 tablet, Rfl: 3    benzonatate (TESSALON) 200 mg capsule, Take 1 capsule (200 mg total) by mouth 3 (three) times a day " "as needed for cough, Disp: 30 capsule, Rfl: 0    NovoLOG FlexPen U-100 Insulin 100 unit/mL (3 mL) insulin pen, Inject 20 Units under the skin daily with breakfast AND 27 Units daily before lunch AND 39 Units daily with dinner., Disp: 25.8 mL, Rfl: 11    pravastatin (PRAVACHOL) 20 mg tablet, Take 1 tablet (20 mg total) by mouth 3 (three) times a week, Disp: 36 tablet, Rfl: 3    omeprazole (PriLOSEC) 40 mg capsule, Take 1 capsule (40 mg total) by mouth daily, Disp: 90 capsule, Rfl: 3    nortriptyline (PAMELOR) 50 mg capsule, Take 1 capsule (50 mg total) by mouth daily, Disp: 90 capsule, Rfl: 3    losartan (COZAAR) 100 mg tablet, Take 1 tablet (100 mg total) by mouth daily, Disp: 90 tablet, Rfl: 3    amLODIPine (NORVASC) 5 mg tablet, Take 1 tablet (5 mg total) by mouth daily, Disp: 90 tablet, Rfl: 3    pen needle, diabetic 31 gauge x 15/64\" needle, SMARTSIG:SUB-Q, Disp: , Rfl:     insulin glargine U-300 conc (Toujeo Max U-300 SoloStar) 300 unit/mL (3 mL) insulin pen insulin pen, Inject 70 Units under the skin 2 (two) times a day NO SUBSTITUTIONS AS PATIENT IS ALLERGIC TO OTHER INSULINS, Disp: 45 mL, Rfl: 11    clobetasoL (TEMOVATE) 0.05 % ointment, Apply 1 application topically 2 (two) times a day as needed (Rash), Disp: 30 g, Rfl: 2    lutein 20 mg tablet, Take 1 tab/cap by mouth daily, Disp: , Rfl:     multivitamin (THERAGRAN) tablet tablet, Take 1 tablet by mouth daily, Disp: , Rfl:     blood-glucose sensor device, Use 1 sensor every 10 days., Disp: 3 Device, Rfl: 11    blood-glucose meter,continuous (Dexcom G6 ) misc, Use as directed., Disp: 1 each, Rfl: 0    blood-glucose transmitter (Dexcom G6 Transmitter) device, Use as directed.  Change every 3 months., Disp: 1 Device, Rfl: 4    aspirin 325 mg tablet, Take 1 tablet (325 mg total) by mouth daily, Disp: , Rfl:     Allergies   Allergen Reactions    Aloe Vera     Bacitracin      ZINC    Erythromycin Base     Formaldehyde     Gramicidins      OPHTHALMIC    " Hydrocortisone      ACETATE    Latex     Levofloxacin     Metformin     Neomycin     Neomycin-Bacitracnzn-Polymyxnb     Polymyxin B     Prednisolone     Prednisone     Statins-Hmg-Coa Reductase Inhibitors     Sulfamethoxazole     Sulfamethoxazole-Trimethoprim     Thimerosal     Tresiba Flextouch U-100 [Insulin Degludec] Diarrhea and Other (see comments)     Abdominal pain    Trimethoprim     Humalog Kwikpen Insulin [Insulin Lispro] Rash       family history includes Bone cancer in her maternal grandfather; Breast cancer in her mother and mother's sister; Cancer in her mother; Diabetes in her father; Lung cancer in her maternal grandmother and mother's sister; Pancreatic cancer in her mother's brother.    Social History     Tobacco Use    Smoking status: Former     Packs/day: 0.50     Years: 40.00     Additional pack years: 0.00     Total pack years: 20.00     Types: Cigarettes     Quit date: 2007     Years since quittin.7    Smokeless tobacco: Never   Substance Use Topics    Alcohol use: Yes     Comment: Maybe once a month    Drug use: No       Review of Systems   Constitutional: Negative.  Negative for chills and fever.   HENT: Negative.  Negative for congestion, ear discharge, ear pain, hearing loss, nosebleeds, postnasal drip, rhinorrhea, sinus pressure, sneezing, sore throat, tinnitus and trouble swallowing.        Objective    /78 (BP Location: Left arm, Patient Position: Sitting, Cuff Size: Large Adult)   Pulse 80   Temp 36.6 °C (97.8 °F) (Temporal)   Wt 104.1 kg (229 lb 8 oz)   SpO2 95%   BMI 40.65 kg/m²     PHYSICAL EXAMINATION:      GENERAL:  Well-developed, well-nourished.  The patient is alert, oriented and in no acute distress.        PSYCH: Normal mood and affect.        SKIN: No evidence of significant rash or concerning skin lesion on the head or neck.      HEAD/FACE:  Normally formed without evidence of mass or trauma.  The sinuses are nontender.        EARS: Bilateral external  ears are normal.  Left external auditory canal and tympanic membrane appear normal.  A small amount of wax was removed on the left.  On the right there is a large amount of cerumen in the and hard dried debris in the mastoid cavity.  See below.        Procedure: Mastoid debridement, complex, right.  Right mastoid cavity was visualized with speculum and microscope.  Meatoplasty is widely patent.  There is a large crust of hard dried cerumen material filling most of the meatoplasty.  This was removed with alligator forceps.  Underlying this was a broad coating of adherent cerumen that was carefully removed.  Great caution was taken around the oval window to avoid vertigo for the patient.  She tolerated this well without any problems.  Once all the debris had been removed the mastoid cavity appears healthy.      Diagnosis    1. History of right mastoidectomy    2. Impacted cerumen of right ear        Recommendations

## 2023-11-25 DIAGNOSIS — E11.9 DIABETES MELLITUS WITHOUT COMPLICATION (CMS/HCC): ICD-10-CM

## 2023-11-25 NOTE — TELEPHONE ENCOUNTER
Medication refill request:  Medication(s):  toujeo not filled due to Lab(s) abnormal or delinquent > 90 days

## 2023-11-25 NOTE — TELEPHONE ENCOUNTER
No care due was identified.  [facility] Embedded Care Due Messages. Reference number: [id number]. 11/25/2023 9:57:01 AM MST

## 2023-11-27 DIAGNOSIS — E11.9 DIABETES MELLITUS WITHOUT COMPLICATION (CMS/HCC): ICD-10-CM

## 2023-11-27 RX ORDER — INSULIN GLARGINE 300 U/ML
INJECTION, SOLUTION SUBCUTANEOUS
Qty: 45 ML | Refills: 3 | Status: SHIPPED | OUTPATIENT
Start: 2023-11-27 | End: 2024-11-07 | Stop reason: SDUPTHER

## 2023-11-27 NOTE — TELEPHONE ENCOUNTER
No care due was identified.  [facility] Embedded Care Due Messages. Reference number: [id number]. 11/27/2023 2:56:16 PM MST

## 2023-11-28 RX ORDER — INSULIN GLARGINE 300 U/ML
70 INJECTION, SOLUTION SUBCUTANEOUS 2 TIMES DAILY
OUTPATIENT
Start: 2023-11-28 | End: 2024-11-27

## 2023-11-28 NOTE — TELEPHONE ENCOUNTER
Medication(s) toujeo refill refused due to DUPLICATE was filled 11/27/2023 to the same pharmacy.

## 2024-01-08 ENCOUNTER — OFFICE VISIT (OUTPATIENT)
Dept: OTOLARYNGOLOGY | Facility: CLINIC | Age: 79
End: 2024-01-08
Payer: MEDICARE

## 2024-01-08 VITALS
OXYGEN SATURATION: 94 % | WEIGHT: 227.5 LBS | BODY MASS INDEX: 40.3 KG/M2 | TEMPERATURE: 97.5 F | HEART RATE: 70 BPM | SYSTOLIC BLOOD PRESSURE: 128 MMHG | DIASTOLIC BLOOD PRESSURE: 76 MMHG | RESPIRATION RATE: 18 BRPM

## 2024-01-08 DIAGNOSIS — H61.21 IMPACTED CERUMEN OF RIGHT EAR: ICD-10-CM

## 2024-01-08 DIAGNOSIS — Z90.89 HISTORY OF RIGHT MASTOIDECTOMY: Primary | ICD-10-CM

## 2024-01-08 PROCEDURE — 69222 CLEAN OUT MASTOID CAVITY: CPT | Performed by: OTOLARYNGOLOGY

## 2024-01-08 PROCEDURE — G0463 HOSPITAL OUTPT CLINIC VISIT: HCPCS | Performed by: OTOLARYNGOLOGY

## 2024-01-08 ASSESSMENT — ENCOUNTER SYMPTOMS
SINUS PRESSURE: 0
CHILLS: 0
TROUBLE SWALLOWING: 0
FEVER: 0
RHINORRHEA: 0
SORE THROAT: 0
CONSTITUTIONAL NEGATIVE: 1

## 2024-01-08 ASSESSMENT — PAIN SCALES - GENERAL: PAINLEVEL: 0-NO PAIN

## 2024-01-08 NOTE — PROGRESS NOTES
"Subjective    CC: Mastoid debridement    HPI: Consuelo Singh is a 78 y.o. female here for her interval every 3 months right canal wall down mastoid debridement.  She is feeling well.  No otorrhea, otalgia or change in the ear otherwise.    Past Medical History:   Diagnosis Date    Allergic Do not remember    Arthritis     Blood clot in vein     shani legs    Cataract     Deep vein thrombosis (CMS/HCC)     hx of    Diabetes (CMS/HCC)     type 2    Eczema     Fibromyalgia     GERD (gastroesophageal reflux disease)     Heart murmur     Hyperlipidemia     Hypertension     Obesity In my twenties    Peripheral neuropathy     toes    Pneumonia     Psoriasis     Scoliosis     Skin abnormalities 1985    Visual impairment When i was 17    Wears dentures     full set       Past Surgical History:   Procedure Laterality Date    CHOLECYSTECTOMY      COLONOSCOPY  03/11/2016    5 yr  follow up    COLONOSCOPY N/A 05/28/2021    Please call the patient regarding her abnormal result. Repeat colonoscopy in 5 year due to 2 tubular adenomas if patient otherwise healthy at that time. Also reasonable to discontinue further colonoscopy--Dr Laboy    EYE SURGERY  I believe 2020    HYSTERECTOMY      With BSO    OTHER SURGICAL HISTORY      Rt. radical mastoidectomy, rebuilt canal from growth         Current Outpatient Medications:     Toujeo Max U-300 SoloStar 300 unit/mL (3 mL) insulin pen insulin pen, INJECT 70 UNITS UNDER THE SKIN 2 TIMES A DAY, Disp: 45 mL, Rfl: 3    pen needle, diabetic 31 gauge x 15/64\" needle, Inject 1 Needle under the skin 5 (five) times a day, Disp: 200 each, Rfl: 3    albuterol HFA 90 mcg/actuation inhaler, Inhale 2 puffs every 6 (six) hours as needed for wheezing, Disp: 18 g, Rfl: 0    chlorthalidone 25 mg tablet, Take 1 tablet (25 mg total) by mouth daily, Disp: 90 tablet, Rfl: 3    metoprolol succinate XL (TOPROL-XL) 100 mg 24 hr tablet, Take 1.5 tablets (150 mg total) by mouth nightly, Disp: 135 tablet, Rfl: 3    " "benzonatate (TESSALON) 200 mg capsule, Take 1 capsule (200 mg total) by mouth 3 (three) times a day as needed for cough, Disp: 30 capsule, Rfl: 0    NovoLOG FlexPen U-100 Insulin 100 unit/mL (3 mL) insulin pen, Inject 20 Units under the skin daily with breakfast AND 27 Units daily before lunch AND 39 Units daily with dinner., Disp: 25.8 mL, Rfl: 11    pravastatin (PRAVACHOL) 20 mg tablet, Take 1 tablet (20 mg total) by mouth 3 (three) times a week, Disp: 36 tablet, Rfl: 3    omeprazole (PriLOSEC) 40 mg capsule, Take 1 capsule (40 mg total) by mouth daily, Disp: 90 capsule, Rfl: 3    nortriptyline (PAMELOR) 50 mg capsule, Take 1 capsule (50 mg total) by mouth daily, Disp: 90 capsule, Rfl: 3    losartan (COZAAR) 100 mg tablet, Take 1 tablet (100 mg total) by mouth daily, Disp: 90 tablet, Rfl: 3    amLODIPine (NORVASC) 5 mg tablet, Take 1 tablet (5 mg total) by mouth daily, Disp: 90 tablet, Rfl: 3    pen needle, diabetic 31 gauge x 15/64\" needle, SMARTSIG:SUB-Q, Disp: , Rfl:     clobetasoL (TEMOVATE) 0.05 % ointment, Apply 1 application topically 2 (two) times a day as needed (Rash), Disp: 30 g, Rfl: 2    lutein 20 mg tablet, Take 1 tab/cap by mouth daily, Disp: , Rfl:     multivitamin (THERAGRAN) tablet tablet, Take 1 tablet by mouth daily, Disp: , Rfl:     blood-glucose sensor device, Use 1 sensor every 10 days., Disp: 3 Device, Rfl: 11    blood-glucose meter,continuous (Dexcom G6 ) misc, Use as directed., Disp: 1 each, Rfl: 0    blood-glucose transmitter (Dexcom G6 Transmitter) device, Use as directed.  Change every 3 months., Disp: 1 Device, Rfl: 4    aspirin 325 mg tablet, Take 1 tablet (325 mg total) by mouth daily, Disp: , Rfl:     Allergies   Allergen Reactions    Aloe Vera     Bacitracin      ZINC    Erythromycin Base     Formaldehyde     Gramicidins      OPHTHALMIC    Hydrocortisone      ACETATE    Latex     Levofloxacin     Metformin     Neomycin     Neomycin-Bacitracnzn-Polymyxnb     Polymyxin B     " Prednisolone     Prednisone     Statins-Hmg-Coa Reductase Inhibitors     Sulfamethoxazole     Sulfamethoxazole-Trimethoprim     Thimerosal     Tresiba Flextouch U-100 [Insulin Degludec] Diarrhea and Other (see comments)     Abdominal pain    Trimethoprim     Humalog Kwikpen Insulin [Insulin Lispro] Rash       family history includes Bone cancer in her maternal grandfather; Breast cancer in her mother and mother's sister; Cancer in her mother; Diabetes in her father; Lung cancer in her maternal grandmother and mother's sister; Pancreatic cancer in her mother's brother.    Social History     Tobacco Use    Smoking status: Former     Packs/day: 0.50     Years: 40.00     Additional pack years: 0.00     Total pack years: 20.00     Types: Cigarettes     Quit date: 2007     Years since quittin.0    Smokeless tobacco: Never   Substance Use Topics    Alcohol use: Yes     Comment: Maybe once a month    Drug use: No       Review of Systems   Constitutional: Negative.  Negative for chills and fever.   HENT: Negative.  Negative for congestion, ear discharge, ear pain, hearing loss, nosebleeds, postnasal drip, rhinorrhea, sinus pressure, sneezing, sore throat, tinnitus and trouble swallowing.        Objective    /76 (BP Location: Left arm, Patient Position: Sitting, Cuff Size: Large Adult)   Pulse 70   Temp 36.4 °C (97.5 °F) (Temporal)   Resp 18   Wt 103.2 kg (227 lb 8 oz)   SpO2 94%   BMI 40.30 kg/m²     General: Well-developed well-nourished female no acute distress.    Ears: Right ear status post wall down mastoidectomy with some debris in the cavity.  Left ear appears normal.    Procedure: Mastoid debridement, complex, right.  Right mastoid cavity was visualized with speculum and microscope.  Meatoplasty is widely patent.  There is a large crust of hard dried cerumen material filling most of the meatoplasty.  This was removed with alligator forceps.  Underlying this was a broad coating of adherent cerumen  that was carefully removed.  Great caution was taken around the oval window to avoid vertigo for the patient.  She tolerated this well without any problems.  Once all the debris had been removed the mastoid cavity appears healthy.       Diagnosis    1. History of right mastoidectomy    2. Impacted cerumen of right ear        Recommendations  Mastoid debridement done today.  Patient appears healthy with regard to the right mastoid cavity as well as the left ear.  Follow-up in 3 months for debridement, sooner as needed.

## 2024-01-19 ENCOUNTER — LAB (OUTPATIENT)
Dept: LAB | Facility: CLINIC | Age: 79
End: 2024-01-19
Payer: MEDICARE

## 2024-01-19 ENCOUNTER — OFFICE VISIT (OUTPATIENT)
Dept: URGENT CARE | Facility: CLINIC | Age: 79
End: 2024-01-19
Payer: MEDICARE

## 2024-01-19 ENCOUNTER — ANCILLARY PROCEDURE (OUTPATIENT)
Dept: RADIOLOGY | Facility: CLINIC | Age: 79
End: 2024-01-19
Payer: MEDICARE

## 2024-01-19 VITALS
OXYGEN SATURATION: 97 % | HEART RATE: 78 BPM | WEIGHT: 222 LBS | BODY MASS INDEX: 39.33 KG/M2 | SYSTOLIC BLOOD PRESSURE: 130 MMHG | TEMPERATURE: 97.8 F | RESPIRATION RATE: 17 BRPM | DIASTOLIC BLOOD PRESSURE: 60 MMHG

## 2024-01-19 DIAGNOSIS — R05.1 ACUTE COUGH: Primary | ICD-10-CM

## 2024-01-19 LAB
ANION GAP SERPL CALC-SCNC: 9 MMOL/L (ref 3–11)
BASOPHILS # BLD AUTO: 0 10*3/UL
BASOPHILS NFR BLD AUTO: 0.3 % (ref 0–2)
BUN SERPL-MCNC: 25 MG/DL (ref 7–25)
CALCIUM SERPL-MCNC: 9.6 MG/DL (ref 8.6–10.3)
CHLORIDE SERPL-SCNC: 99 MMOL/L (ref 98–107)
CO2 SERPL-SCNC: 30 MMOL/L (ref 21–32)
CREAT SERPL-MCNC: 1.25 MG/DL (ref 0.6–1.1)
EGFRCR SERPLBLD CKD-EPI 2021: 44 ML/MIN/1.73M*2
EOSINOPHIL # BLD AUTO: 0.2 10*3/UL
EOSINOPHIL NFR BLD AUTO: 1.7 % (ref 0–3)
ERYTHROCYTE [DISTWIDTH] IN BLOOD BY AUTOMATED COUNT: 14.5 % (ref 11.5–14)
GLUCOSE SERPL-MCNC: 159 MG/DL (ref 70–105)
HCT VFR BLD AUTO: 42.8 % (ref 34–45)
HGB BLD-MCNC: 14 G/DL (ref 11.5–15.5)
LYMPHOCYTES # BLD AUTO: 2.8 10*3/UL
LYMPHOCYTES NFR BLD AUTO: 24 % (ref 11–47)
MCH RBC QN AUTO: 29.7 PG (ref 28–33)
MCHC RBC AUTO-ENTMCNC: 32.6 G/DL (ref 32–36)
MCV RBC AUTO: 91 FL (ref 81–97)
MONOCYTES # BLD AUTO: 0.8 10*3/UL
MONOCYTES NFR BLD AUTO: 6.6 % (ref 3–11)
NEUTROPHILS # BLD AUTO: 7.8 10*3/UL
NEUTROPHILS NFR BLD AUTO: 67.4 % (ref 41–81)
PLATELET # BLD AUTO: 236 10*3/UL (ref 140–350)
PMV BLD AUTO: 7.5 FL (ref 6.9–10.8)
POTASSIUM SERPL-SCNC: 3.7 MMOL/L (ref 3.5–5.1)
RBC # BLD AUTO: 4.7 10*6/ΜL (ref 3.7–5.3)
SODIUM SERPL-SCNC: 138 MMOL/L (ref 135–145)
WBC # BLD AUTO: 11.6 10*3/UL (ref 4.5–10.5)

## 2024-01-19 PROCEDURE — 36415 COLL VENOUS BLD VENIPUNCTURE: CPT | Performed by: NURSE PRACTITIONER

## 2024-01-19 PROCEDURE — 80048 BASIC METABOLIC PNL TOTAL CA: CPT | Performed by: NURSE PRACTITIONER

## 2024-01-19 PROCEDURE — 99214 OFFICE O/P EST MOD 30 MIN: CPT | Performed by: NURSE PRACTITIONER

## 2024-01-19 PROCEDURE — G0463 HOSPITAL OUTPT CLINIC VISIT: HCPCS | Performed by: NURSE PRACTITIONER

## 2024-01-19 PROCEDURE — 85025 COMPLETE CBC W/AUTO DIFF WBC: CPT | Performed by: NURSE PRACTITIONER

## 2024-01-19 PROCEDURE — 71046 X-RAY EXAM CHEST 2 VIEWS: CPT | Mod: 26,CMS | Performed by: INTERNAL MEDICINE

## 2024-01-19 PROCEDURE — 71046 X-RAY EXAM CHEST 2 VIEWS: CPT

## 2024-01-19 RX ORDER — BENZONATATE 100 MG/1
100 CAPSULE ORAL 3 TIMES DAILY PRN
Qty: 15 CAPSULE | Refills: 0 | Status: SHIPPED | OUTPATIENT
Start: 2024-01-19 | End: 2024-01-26

## 2024-01-19 ASSESSMENT — PAIN SCALES - GENERAL: PAINLEVEL: 0-NO PAIN

## 2024-01-19 ASSESSMENT — ENCOUNTER SYMPTOMS
FEVER: 0
DIARRHEA: 0
SHORTNESS OF BREATH: 1
MYALGIAS: 0
ABDOMINAL PAIN: 0
NAUSEA: 0
COUGH: 1
VOMITING: 0
CHILLS: 0

## 2024-01-19 NOTE — PATIENT INSTRUCTIONS
Your chest x-ray shows no signs of pneumonia.  White blood cells are slightly elevated with no significant signs of a bacterial infection.  Most likely this is related to a respiratory viral infection such as a common cold, covid, etc.    Use Sudafed during the day and Benadryl at nighttime for sinus congestion if not contraindicated.  Monitor blood pressure if you take sudafed.  May use saline rinses for congestion, hot steaming showers, and humidifier at nighttime.  Return to urgent care should you develop shortness of breath, chest pain with cough, inability to tolerate fluids, or worseing symptoms or concerns.

## 2024-01-19 NOTE — PROGRESS NOTES
Subjective      HPI    Consuelo Singh is a 78 y.o. female who presents for cough and congestion x 1 week.  Patient reports she had fevers and chills initially in the beginning of the week that is since improved.  Has experienced some shortness of breath.  Has tried several over-the-counter remedies without improvement of her cough.  Denies any nausea, vomiting, or diarrhea.      Review of Systems   Constitutional:  Negative for chills and fever.   HENT:  Positive for congestion.    Respiratory:  Positive for cough and shortness of breath.    Gastrointestinal:  Negative for abdominal pain, diarrhea, nausea and vomiting.   Musculoskeletal:  Negative for myalgias.         Objective   /60 (BP Location: Left arm)   Pulse 78   Temp 36.6 °C (97.8 °F) (Temporal)   Resp 17   Wt 100.7 kg (222 lb)   SpO2 97%   BMI 39.33 kg/m²     Physical Exam  Vitals and nursing note reviewed.   Constitutional:       General: She is not in acute distress.     Appearance: Normal appearance. She is not ill-appearing or toxic-appearing.   HENT:      Nose: Nose normal.   Cardiovascular:      Rate and Rhythm: Normal rate and regular rhythm.   Pulmonary:      Effort: Pulmonary effort is normal.      Breath sounds: Rhonchi present.   Musculoskeletal:         General: Normal range of motion.   Skin:     General: Skin is warm and dry.   Neurological:      General: No focal deficit present.      Mental Status: She is alert and oriented to person, place, and time.   Psychiatric:         Mood and Affect: Mood normal.         Behavior: Behavior normal.         Thought Content: Thought content normal.         Judgment: Judgment normal.         Recent Results (from the past 4 hour(s))   CBC w/auto differential Blood, Venous    Collection Time: 01/19/24 10:40 AM   Result Value Ref Range    WBC 11.6 (H) 4.5 - 10.5 10*3/uL    RBC 4.70 3.70 - 5.30 10*6/µL    Hemoglobin 14.0 11.5 - 15.5 g/dL    Hematocrit 42.8 34.0 - 45.0 %    MCV 91.0 81.0 - 97.0 fL     MCH 29.7 28.0 - 33.0 pg    MCHC 32.6 32.0 - 36.0 g/dL    RDW 14.5 (H) 11.5 - 14.0 %    Platelets 236 140 - 350 10*3/uL    MPV 7.5 6.9 - 10.8 fL    Neutrophils% 67.4 41.0 - 81.0 %    Lymphocytes% 24.0 11.0 - 47.0 %    Monocytes% 6.6 3.0 - 11.0 %    Eosinophils% 1.7 0.0 - 3.0 %    Basophils% 0.3 0.0 - 2.0 %    ANC (auto diff) 7.80 10*3/UL    Lymphocytes Absolute 2.80 10*3/uL    Monocytes Absolute 0.80 10*3/uL    Eosinophils Absolute 0.20 10*3/uL    Basophils Absolute 0.00 10*3/uL   Basic metabolic panel Blood, Venous    Collection Time: 01/19/24 10:40 AM   Result Value Ref Range    Sodium 138 135 - 145 mmol/L    Potassium 3.7 3.5 - 5.1 MMOL/L    Chloride 99 98 - 107 mmol/L    CO2 30 21 - 32 mmol/L    BUN 25 7 - 25 mg/dL    Creatinine 1.25 (H) 0.60 - 1.10 mg/dL    Glucose 159 (H) 70 - 105 mg/dL    Calcium 9.6 8.6 - 10.3 mg/dL    Anion Gap 9 3 - 11 mmol/L    eGFR 44 (L) >60 mL/min/1.73m*2         Assessment/Plan   Diagnoses and all orders for this visit:    Acute cough  -     X-ray chest 2 views  -     CBC w/auto differential Blood, Venous  -     Basic metabolic panel Blood, Venous  -     benzonatate (Tessalon Perles) 100 mg capsule; Take 1 capsule (100 mg total) by mouth 3 (three) times a day as needed for cough for up to 7 days        X-ray chest 2 views    Result Date: 1/19/2024  Exam: DX CHEST 2 VW from 01/19/2024 Clinical History: Acute cough; cough; dyspnea Comparison(s): September 22, 2023 Findings: No focal consolidations or pleural effusions. No pneumothorax. Normal heart size. No acute osseous abnormalities.     IMPRESSION: No acute cardiopulmonary findings.     Discussion:     Chest x-ray shows no acute cardiopulmonary findings including pneumonia or bronchitis.  CBC showed slightly elevated white blood cells that appears to be chronic with no significant bacterial shift.  Given patient's symptoms have improved over the course of the week and she is not experiencing any fever or chills will treat as a  respiratory viral illness.  Can take Sudafed and Benadryl as needed for sinus congestion.  Encourage patient to monitor her blood pressure should she take Sudafed to assure not elevated.  Sinus rinses, humidifier, and hot steamy showers encouraged for sinus drainage.  Patient requesting Tessalon Perles for cough that has been ordered.  Discussed signs and symptoms when to return to urgent care the emergency department including shortness of breath, pain with cough, fever, worsening symptoms, or any concerns.  Patient agrees with plan.  Patient verbalized understanding and denies any  further questions or concerns at this time.         Jennifer G Franke, CNP

## 2024-01-29 ENCOUNTER — DOCUMENTATION (OUTPATIENT)
Dept: FAMILY MEDICINE | Facility: CLINIC | Age: 79
End: 2024-01-29
Payer: MEDICARE

## 2024-02-23 DIAGNOSIS — E11.9 DIABETES MELLITUS WITHOUT COMPLICATION (CMS/HCC): ICD-10-CM

## 2024-02-23 NOTE — TELEPHONE ENCOUNTER
Care Due:                  Date            Visit Type   Department     Provider  --------------------------------------------------------------------------------                              ESTABLISHED   [facility]  Last Visit: 09-      PATIENT      MEDICINE       LORA TRAN                              ESTABLISHED   [address]  Next Visit: 03-      PATIENT      MEDICINE       LORA TRAN                                                            Last  Test          Frequency    Reason                     Performed    Due Date  --------------------------------------------------------------------------------  HBA1C.......  6 months...  NovoBogdan PASTRANA..........  09- 03-    Lipid Panel.  12 months..  pravastatin..............  Not Found    Overdue    [employer] Embedded Care Due Messages. Reference number: [id number]. 2/23/2024 11:21:50 AM ROWDY

## 2024-02-24 RX ORDER — PEN NEEDLE, DIABETIC 32GX 5/32"
1 NEEDLE, DISPOSABLE MISCELLANEOUS
Qty: 200 EACH | Refills: 6 | Status: SHIPPED | OUTPATIENT
Start: 2024-02-24 | End: 2024-12-11 | Stop reason: SDUPTHER

## 2024-03-20 DIAGNOSIS — Z79.4 TYPE 2 DIABETES MELLITUS WITH DIABETIC NEPHROPATHY, WITH LONG-TERM CURRENT USE OF INSULIN (CMS/HCC): ICD-10-CM

## 2024-03-20 DIAGNOSIS — E11.21 TYPE 2 DIABETES MELLITUS WITH DIABETIC NEPHROPATHY, WITH LONG-TERM CURRENT USE OF INSULIN (CMS/HCC): ICD-10-CM

## 2024-03-20 NOTE — TELEPHONE ENCOUNTER
No care due was identified.  [facility] Embedded Care Due Messages. Reference number: [id number]. 3/20/2024 9:37:26 AM ANITA

## 2024-03-21 RX ORDER — INSULIN ASPART 100 [IU]/ML
INJECTION, SOLUTION INTRAVENOUS; SUBCUTANEOUS
Qty: 60 ML | Refills: 5 | Status: SHIPPED | OUTPATIENT
Start: 2024-03-21 | End: 2024-12-20

## 2024-03-21 NOTE — TELEPHONE ENCOUNTER
Medication refill request:  Medication(s): Novolog not filled due to Patient has appointment 3/28/24. Provider to review medications at appointment and refill if appropriate.  Patient should have enough of medication to last until upcoming appt. Please review if labs are due as well.

## 2024-03-25 ENCOUNTER — LAB (OUTPATIENT)
Dept: LAB | Facility: CLINIC | Age: 79
End: 2024-03-25
Payer: MEDICARE

## 2024-03-25 ENCOUNTER — OFFICE VISIT (OUTPATIENT)
Dept: FAMILY MEDICINE | Facility: CLINIC | Age: 79
End: 2024-03-25
Payer: MEDICARE

## 2024-03-25 VITALS
WEIGHT: 225 LBS | BODY MASS INDEX: 39.86 KG/M2 | HEART RATE: 68 BPM | SYSTOLIC BLOOD PRESSURE: 126 MMHG | TEMPERATURE: 97.2 F | RESPIRATION RATE: 16 BRPM | OXYGEN SATURATION: 97 % | DIASTOLIC BLOOD PRESSURE: 68 MMHG

## 2024-03-25 DIAGNOSIS — Z79.4 TYPE 2 DIABETES MELLITUS WITH DIABETIC NEPHROPATHY, WITH LONG-TERM CURRENT USE OF INSULIN (CMS/HCC): Primary | Chronic | ICD-10-CM

## 2024-03-25 DIAGNOSIS — E78.00 PURE HYPERCHOLESTEROLEMIA: ICD-10-CM

## 2024-03-25 DIAGNOSIS — E11.9 DIABETES MELLITUS WITHOUT COMPLICATION (CMS/HCC): ICD-10-CM

## 2024-03-25 DIAGNOSIS — I10 ESSENTIAL HYPERTENSION: ICD-10-CM

## 2024-03-25 DIAGNOSIS — E11.21 TYPE 2 DIABETES MELLITUS WITH DIABETIC NEPHROPATHY, WITH LONG-TERM CURRENT USE OF INSULIN (CMS/HCC): Primary | Chronic | ICD-10-CM

## 2024-03-25 LAB
ALBUMIN SERPL-MCNC: 4.4 G/DL (ref 3.5–5.3)
ALP SERPL-CCNC: 107 U/L (ref 55–142)
ALT SERPL-CCNC: 30 U/L (ref 7–52)
ANION GAP SERPL CALC-SCNC: 7 MMOL/L (ref 3–11)
AST SERPL-CCNC: 27 U/L
BILIRUB SERPL-MCNC: 0.45 MG/DL (ref 0.2–1.4)
BUN SERPL-MCNC: 27 MG/DL (ref 7–25)
CALCIUM ALBUM COR SERPL-MCNC: 9.5 MG/DL (ref 8.6–10.3)
CALCIUM SERPL-MCNC: 9.8 MG/DL (ref 8.6–10.3)
CHLORIDE SERPL-SCNC: 101 MMOL/L (ref 98–107)
CHOLEST SERPL-MCNC: 210 MG/DL (ref 0–199)
CO2 SERPL-SCNC: 30 MMOL/L (ref 21–32)
CREAT SERPL-MCNC: 1.12 MG/DL (ref 0.6–1.1)
EGFRCR SERPLBLD CKD-EPI 2021: 51 ML/MIN/1.73M*2
ERYTHROCYTE [DISTWIDTH] IN BLOOD BY AUTOMATED COUNT: 14.3 % (ref 11.5–14)
EST. AVERAGE GLUCOSE BLD GHB EST-MCNC: 157.1 MG/DL
FASTING STATUS PATIENT QL REPORTED: YES
GLUCOSE SERPL-MCNC: 104 MG/DL (ref 70–105)
HBA1C MFR BLD: 7.1 % (ref 4–6)
HCT VFR BLD AUTO: 43.8 % (ref 34–45)
HDLC SERPL-MCNC: 61 MG/DL
HGB BLD-MCNC: 14.6 G/DL (ref 11.5–15.5)
LDLC SERPL CALC-MCNC: 103 MG/DL (ref 20–99)
MCH RBC QN AUTO: 30.4 PG (ref 28–33)
MCHC RBC AUTO-ENTMCNC: 33.2 G/DL (ref 32–36)
MCV RBC AUTO: 91.5 FL (ref 81–97)
PLATELET # BLD AUTO: 279 10*3/UL (ref 140–350)
PMV BLD AUTO: 8.6 FL (ref 6.9–10.8)
POTASSIUM SERPL-SCNC: 4.4 MMOL/L (ref 3.5–5.1)
PROT SERPL-MCNC: 7.1 G/DL (ref 6–8.3)
RBC # BLD AUTO: 4.79 10*6/ΜL (ref 3.7–5.3)
SODIUM SERPL-SCNC: 138 MMOL/L (ref 135–145)
TRIGL SERPL-MCNC: 231 MG/DL
WBC # BLD AUTO: 10.1 10*3/UL (ref 4.5–10.5)

## 2024-03-25 PROCEDURE — G0463 HOSPITAL OUTPT CLINIC VISIT: HCPCS | Performed by: FAMILY MEDICINE

## 2024-03-25 PROCEDURE — 99214 OFFICE O/P EST MOD 30 MIN: CPT | Performed by: FAMILY MEDICINE

## 2024-03-25 PROCEDURE — 80053 COMPREHEN METABOLIC PANEL: CPT

## 2024-03-25 PROCEDURE — 83036 HEMOGLOBIN GLYCOSYLATED A1C: CPT

## 2024-03-25 PROCEDURE — 85027 COMPLETE CBC AUTOMATED: CPT

## 2024-03-25 PROCEDURE — 36415 COLL VENOUS BLD VENIPUNCTURE: CPT

## 2024-03-25 PROCEDURE — G2211 COMPLEX E/M VISIT ADD ON: HCPCS | Performed by: FAMILY MEDICINE

## 2024-03-25 PROCEDURE — 80061 LIPID PANEL: CPT

## 2024-03-25 RX ORDER — LOSARTAN POTASSIUM 100 MG/1
100 TABLET ORAL DAILY
Qty: 90 TABLET | Refills: 3 | Status: SHIPPED | OUTPATIENT
Start: 2024-03-25 | End: 2024-12-20 | Stop reason: SDUPTHER

## 2024-03-25 RX ORDER — METOPROLOL SUCCINATE 50 MG/1
150 TABLET, EXTENDED RELEASE ORAL NIGHTLY
Qty: 270 TABLET | Refills: 3 | Status: SHIPPED | OUTPATIENT
Start: 2024-03-25 | End: 2024-09-03 | Stop reason: SDUPTHER

## 2024-03-25 RX ORDER — PRAVASTATIN SODIUM 20 MG/1
20 TABLET ORAL DAILY
Qty: 90 TABLET | Refills: 3 | Status: SHIPPED | OUTPATIENT
Start: 2024-03-25 | End: 2024-12-20 | Stop reason: SDUPTHER

## 2024-03-25 RX ORDER — AMLODIPINE BESYLATE 5 MG/1
5 TABLET ORAL DAILY
Qty: 90 TABLET | Refills: 3 | Status: SHIPPED | OUTPATIENT
Start: 2024-03-25 | End: 2024-12-20 | Stop reason: SDUPTHER

## 2024-03-25 NOTE — PROGRESS NOTES
"History of Present Illness    The patient, with a history of diabetes and kidney dysfunction, presents with a persistent dry cough and high blood sugar levels. They report having issues with their Dexcom device, including it falling off and having connectivity issues. The patient also mentions that they have been experiencing high blood sugar levels, even when they have not consumed any carbohydrates. They have been taking Pravastatin three times a week and Toprol, which they have been cutting in half to achieve the correct dosage. The patient also mentions that they have a hereditary predisposition to high cholesterol levels.         Review of Systems    Current Outpatient Medications   Medication Sig Dispense Refill    NovoLOG Flexpen U-100 Insulin 100 unit/mL (3 mL) insulin pen INJECT 20 UNITS UNDER THE SKIN DAILY WITH BREAKFAST AND 26 UNITS DAILY BEFORE LUNCH AND 37 UNITS DAILY WITH DINNER 60 mL 5    pen needle, diabetic 31 gauge x 15/64\" needle Inject 1 Needle under the skin 5 (five) times a day 200 each 6    Toujeo Max U-300 SoloStar 300 unit/mL (3 mL) insulin pen insulin pen INJECT 70 UNITS UNDER THE SKIN 2 TIMES A DAY 45 mL 3    albuterol HFA 90 mcg/actuation inhaler Inhale 2 puffs every 6 (six) hours as needed for wheezing 18 g 0    chlorthalidone 25 mg tablet Take 1 tablet (25 mg total) by mouth daily 90 tablet 3    benzonatate (TESSALON) 200 mg capsule Take 1 capsule (200 mg total) by mouth 3 (three) times a day as needed for cough 30 capsule 0    pen needle, diabetic 31 gauge x 15/64\" needle SMARTSIG:SUB-Q      clobetasoL (TEMOVATE) 0.05 % ointment Apply 1 application topically 2 (two) times a day as needed (Rash) 30 g 2    lutein 20 mg tablet Take 1 tab/cap by mouth daily      multivitamin (THERAGRAN) tablet tablet Take 1 tablet by mouth daily      blood-glucose sensor device Use 1 sensor every 10 days. 3 Device 11    blood-glucose meter,continuous (Dexcom G6 ) misc Use as directed. 1 each 0    " blood-glucose transmitter (Dexcom G6 Transmitter) device Use as directed.  Change every 3 months. 1 Device 4    aspirin 325 mg tablet Take 1 tablet (325 mg total) by mouth daily      empagliflozin (JARDIANCE) 10 mg tablet tablet Take 10 mg by mouth daily 30 each 2    pravastatin (PRAVACHOL) 20 mg tablet Take 1 tablet (20 mg total) by mouth daily 90 tablet 3    amLODIPine (NORVASC) 5 mg tablet Take 1 tablet (5 mg total) by mouth daily 90 tablet 3    losartan (COZAAR) 100 mg tablet Take 1 tablet (100 mg total) by mouth daily 90 tablet 3    metoprolol succinate XL (TOPROL-XL) 50 mg 24 hr tablet Take 3 tablets (150 mg total) by mouth nightly 270 tablet 3    omeprazole (PriLOSEC) 40 mg capsule Take 1 capsule (40 mg total) by mouth daily 90 capsule 3     No current facility-administered medications for this visit.       Vitals:    03/25/24 1003   BP: 126/68   Pulse: 68   Resp: 16   Temp: 36.2 °C (97.2 °F)   SpO2: 97%     Physical Exam  Vitals and nursing note reviewed.   HENT:      Head: Normocephalic and atraumatic.      Mouth/Throat:      Mouth: Mucous membranes are dry.      Pharynx: Posterior oropharyngeal erythema present. No oropharyngeal exudate.   Cardiovascular:      Rate and Rhythm: Normal rate and regular rhythm.   Pulmonary:      Effort: Pulmonary effort is normal.      Breath sounds: Normal breath sounds.   Skin:     General: Skin is warm.   Neurological:      Mental Status: She is alert.           Assessment/Plan     Type 2 Diabetes Mellitus: A1c increased slightly. Patient reports occasional days with persistent hyperglycemia despite appropriate diet. Currently using Dexcom 7 with some connectivity issues.  -Continue current insulin regimen.  -Consider switching to Freestyle Naz for glucose monitoring if patient is not satisfied with Dexcom 7.  -Start Jardiance (empagliflozin) for additional glycemic control and renal protection, pending cost-effectiveness.  -Check A1c in 3 months.    Hyperlipidemia: LDL  increased to 103. Patient currently taking pravastatin three times a week without rash side effects.  -Increase pravastatin to daily dosing.  -Refill pravastatin prescription.    Hypertension: Patient currently taking 150mg of Toprol, prefers not to split pills.  -Change Toprol prescription to 50mg pills, take three daily.  -Refill amlodipine and losartan prescriptions.    Chronic cough: Patient reports persistent dry cough, likely post-viral. Throat appears red and irritated on examination.  -Continue supportive care.    Follow-up in 3 months.         Margarito Glass MD

## 2024-03-26 DIAGNOSIS — E78.00 PURE HYPERCHOLESTEROLEMIA: ICD-10-CM

## 2024-03-26 RX ORDER — PRAVASTATIN SODIUM 20 MG/1
20 TABLET ORAL 3 TIMES WEEKLY
Qty: 36 TABLET | Refills: 0 | OUTPATIENT
Start: 2024-03-27

## 2024-03-26 NOTE — TELEPHONE ENCOUNTER
Medication(s) pravastatin refill refused due to DUPLICATE   Recently filled on 3/25/24; same pharmacy, receipt confirmed.  Dispense amount:  90, Refill: 3,  12 month supply.  Refills or Rx should be available at preferred pharmacy.

## 2024-03-26 NOTE — TELEPHONE ENCOUNTER
No care due was identified.  [facility] Embedded Care Due Messages. Reference number: [id number]. 3/26/2024 5:51:59 AM ANITA

## 2024-04-15 DIAGNOSIS — Z79.4 TYPE 2 DIABETES MELLITUS WITH DIABETIC NEPHROPATHY, WITH LONG-TERM CURRENT USE OF INSULIN (CMS/HCC): ICD-10-CM

## 2024-04-15 DIAGNOSIS — E11.21 TYPE 2 DIABETES MELLITUS WITH DIABETIC NEPHROPATHY, WITH LONG-TERM CURRENT USE OF INSULIN (CMS/HCC): ICD-10-CM

## 2024-04-15 NOTE — TELEPHONE ENCOUNTER
No care due was identified.  [facility] Embedded Care Due Messages. Reference number: [id number]. 4/15/2024 1:48:16 PM MDT

## 2024-04-16 RX ORDER — INSULIN ASPART 100 [IU]/ML
INJECTION, SOLUTION INTRAVENOUS; SUBCUTANEOUS
OUTPATIENT
Start: 2024-04-16

## 2024-04-16 NOTE — TELEPHONE ENCOUNTER
Medication(s) novolog flexpen refill refused due to DUPLICATE was filled 3/21/2024 to the same pharmacy for 6-months.

## 2024-04-22 DIAGNOSIS — F32.A DEPRESSIVE DISORDER: ICD-10-CM

## 2024-04-22 RX ORDER — NORTRIPTYLINE HYDROCHLORIDE 50 MG/1
50 CAPSULE ORAL DAILY
Qty: 90 CAPSULE | Refills: 1 | Status: SHIPPED | OUTPATIENT
Start: 2024-04-22 | End: 2024-10-21 | Stop reason: SDUPTHER

## 2024-04-29 ENCOUNTER — OFFICE VISIT (OUTPATIENT)
Dept: OTOLARYNGOLOGY | Facility: CLINIC | Age: 79
End: 2024-04-29
Payer: MEDICARE

## 2024-04-29 VITALS
RESPIRATION RATE: 16 BRPM | HEART RATE: 77 BPM | WEIGHT: 226 LBS | BODY MASS INDEX: 40.03 KG/M2 | TEMPERATURE: 97.3 F | OXYGEN SATURATION: 94 %

## 2024-04-29 DIAGNOSIS — H61.21 IMPACTED CERUMEN OF RIGHT EAR: Primary | ICD-10-CM

## 2024-04-29 DIAGNOSIS — Z90.89 HISTORY OF RIGHT MASTOIDECTOMY: ICD-10-CM

## 2024-04-29 PROCEDURE — 69222 CLEAN OUT MASTOID CAVITY: CPT | Performed by: OTOLARYNGOLOGY

## 2024-04-29 PROCEDURE — G0463 HOSPITAL OUTPT CLINIC VISIT: HCPCS | Performed by: OTOLARYNGOLOGY

## 2024-04-29 ASSESSMENT — ENCOUNTER SYMPTOMS
CONSTITUTIONAL NEGATIVE: 1
SORE THROAT: 0
TROUBLE SWALLOWING: 0
FEVER: 0
RHINORRHEA: 0
SINUS PRESSURE: 0
CHILLS: 0

## 2024-04-29 ASSESSMENT — PAIN SCALES - GENERAL: PAINLEVEL: 0-NO PAIN

## 2024-04-29 NOTE — PROGRESS NOTES
Subjective    CC: Clean mastoid cavity    HPI: Consuelo Singh is a 78 y.o. female who sees me every 3 months for mastoid debridement.  She has history of right mastoidectomy canal wall down.  She is doing well.  No symptoms with the ear.    Past Medical History:   Diagnosis Date    Allergic Do not remember    Arthritis     Blood clot in vein     shani legs    Cataract     Deep vein thrombosis (CMS/HCC)     hx of    Diabetes (CMS/HCC)     type 2    Eczema     Fibromyalgia     GERD (gastroesophageal reflux disease)     Heart murmur     Hyperlipidemia     Hypertension     Obesity In my twenties    Peripheral neuropathy     toes    Pneumonia     Psoriasis     Scoliosis     Skin abnormalities 1985    Visual impairment When i was 17    Wears dentures     full set       Past Surgical History:   Procedure Laterality Date    CHOLECYSTECTOMY      COLONOSCOPY  03/11/2016    5 yr  follow up    COLONOSCOPY N/A 05/28/2021    Please call the patient regarding her abnormal result. Repeat colonoscopy in 5 year due to 2 tubular adenomas if patient otherwise healthy at that time. Also reasonable to discontinue further colonoscopy--Dr Laboy    EYE SURGERY  I believe 2020    HYSTERECTOMY      With BSO    OTHER SURGICAL HISTORY      Rt. radical mastoidectomy, rebuilt canal from growth         Current Outpatient Medications:     nortriptyline (PAMELOR) 50 mg capsule, Take 1 capsule (50 mg total) by mouth daily, Disp: 90 capsule, Rfl: 1    empagliflozin (JARDIANCE) 10 mg tablet tablet, Take 10 mg by mouth daily, Disp: 30 each, Rfl: 2    pravastatin (PRAVACHOL) 20 mg tablet, Take 1 tablet (20 mg total) by mouth daily, Disp: 90 tablet, Rfl: 3    amLODIPine (NORVASC) 5 mg tablet, Take 1 tablet (5 mg total) by mouth daily, Disp: 90 tablet, Rfl: 3    losartan (COZAAR) 100 mg tablet, Take 1 tablet (100 mg total) by mouth daily, Disp: 90 tablet, Rfl: 3    metoprolol succinate XL (TOPROL-XL) 50 mg 24 hr tablet, Take 3 tablets (150 mg total) by  "mouth nightly, Disp: 270 tablet, Rfl: 3    NovoLOG Flexpen U-100 Insulin 100 unit/mL (3 mL) insulin pen, INJECT 20 UNITS UNDER THE SKIN DAILY WITH BREAKFAST AND 26 UNITS DAILY BEFORE LUNCH AND 37 UNITS DAILY WITH DINNER, Disp: 60 mL, Rfl: 5    pen needle, diabetic 31 gauge x 15/64\" needle, Inject 1 Needle under the skin 5 (five) times a day, Disp: 200 each, Rfl: 6    Toujeo Max U-300 SoloStar 300 unit/mL (3 mL) insulin pen insulin pen, INJECT 70 UNITS UNDER THE SKIN 2 TIMES A DAY, Disp: 45 mL, Rfl: 3    albuterol HFA 90 mcg/actuation inhaler, Inhale 2 puffs every 6 (six) hours as needed for wheezing, Disp: 18 g, Rfl: 0    chlorthalidone 25 mg tablet, Take 1 tablet (25 mg total) by mouth daily, Disp: 90 tablet, Rfl: 3    benzonatate (TESSALON) 200 mg capsule, Take 1 capsule (200 mg total) by mouth 3 (three) times a day as needed for cough, Disp: 30 capsule, Rfl: 0    pen needle, diabetic 31 gauge x 15/64\" needle, SMARTSIG:SUB-Q, Disp: , Rfl:     clobetasoL (TEMOVATE) 0.05 % ointment, Apply 1 application topically 2 (two) times a day as needed (Rash), Disp: 30 g, Rfl: 2    lutein 20 mg tablet, Take 1 tab/cap by mouth daily, Disp: , Rfl:     multivitamin (THERAGRAN) tablet tablet, Take 1 tablet by mouth daily, Disp: , Rfl:     blood-glucose sensor device, Use 1 sensor every 10 days., Disp: 3 Device, Rfl: 11    blood-glucose meter,continuous (Dexcom G6 ) misc, Use as directed., Disp: 1 each, Rfl: 0    blood-glucose transmitter (Dexcom G6 Transmitter) device, Use as directed.  Change every 3 months., Disp: 1 Device, Rfl: 4    aspirin 325 mg tablet, Take 1 tablet (325 mg total) by mouth daily, Disp: , Rfl:     omeprazole (PriLOSEC) 40 mg capsule, Take 1 capsule (40 mg total) by mouth daily, Disp: 90 capsule, Rfl: 3    Allergies   Allergen Reactions    Aloe Vera     Bacitracin      ZINC    Erythromycin Base     Formaldehyde     Gramicidins      OPHTHALMIC    Hydrocortisone      ACETATE    Latex     Levofloxacin     " Metformin     Neomycin     Neomycin-Bacitracnzn-Polymyxnb     Polymyxin B     Prednisolone     Prednisone     Statins-Hmg-Coa Reductase Inhibitors     Sulfamethoxazole     Sulfamethoxazole-Trimethoprim     Thimerosal     Tresiba Flextouch U-100 [Insulin Degludec] Diarrhea and Other (see comments)     Abdominal pain    Trimethoprim     Humalog Kwikpen Insulin [Insulin Lispro] Rash       family history includes Bone cancer in her maternal grandfather; Breast cancer in her mother and mother's sister; Cancer in her mother; Diabetes in her father; Lung cancer in her maternal grandmother and mother's sister; Pancreatic cancer in her mother's brother.    Social History     Tobacco Use    Smoking status: Former     Packs/day: 0.50     Years: 40.00     Additional pack years: 0.00     Total pack years: 20.00     Types: Cigarettes     Quit date: 2007     Years since quittin.3    Smokeless tobacco: Never   Substance Use Topics    Alcohol use: Yes     Comment: Maybe once a month    Drug use: No       Review of Systems   Constitutional: Negative.  Negative for chills and fever.   HENT: Negative.  Negative for congestion, ear discharge, ear pain, hearing loss, nosebleeds, postnasal drip, rhinorrhea, sinus pressure, sneezing, sore throat, tinnitus and trouble swallowing.        Objective    Pulse 77   Temp 36.3 °C (97.3 °F) (Temporal)   Resp 16   Wt 102.5 kg (226 lb)   SpO2 94%   BMI 40.03 kg/m²     General: Well-developed well-nourished female no acute distress.    Ears: Scant cerumen cleaned from left external auditory canal.  On the right the mastoid cavity has moderate cerumen buildup.    Mastoid debridement  Right external auditory canal and mastoid cavity were examined with the operating microscope.  Cerumen was removed using forceps and cerumen loop.  Underlying tissues appear healthy and stable without evidence of granulation, purulence or other signs of infection.    Diagnosis    1. Impacted cerumen of right  ear    2. History of right mastoidectomy        Recommendations    Patient with stable appearing ears, healthy on the left and stable status post right mastoidectomy years ago.  Follow-up in 3 months for repeat debridement or sooner as needed for problems.

## 2024-05-06 DIAGNOSIS — K21.9 CHRONIC GERD: ICD-10-CM

## 2024-05-06 RX ORDER — OMEPRAZOLE 40 MG/1
40 CAPSULE, DELAYED RELEASE ORAL DAILY
Qty: 90 CAPSULE | Refills: 3 | Status: SHIPPED | OUTPATIENT
Start: 2024-05-06

## 2024-05-06 NOTE — TELEPHONE ENCOUNTER
No care due was identified.  [facility] Embedded Care Due Messages. Reference number: [id number]. 5/06/2024 5:52:08 AM ANITA

## 2024-05-16 ENCOUNTER — OFFICE VISIT (OUTPATIENT)
Dept: URGENT CARE | Facility: CLINIC | Age: 79
End: 2024-05-16
Payer: MEDICARE

## 2024-05-16 VITALS
DIASTOLIC BLOOD PRESSURE: 82 MMHG | RESPIRATION RATE: 20 BRPM | TEMPERATURE: 97.2 F | SYSTOLIC BLOOD PRESSURE: 128 MMHG | WEIGHT: 224 LBS | BODY MASS INDEX: 39.68 KG/M2 | OXYGEN SATURATION: 96 %

## 2024-05-16 DIAGNOSIS — R30.0 DYSURIA: ICD-10-CM

## 2024-05-16 DIAGNOSIS — B37.31 VAGINAL YEAST INFECTION: Primary | ICD-10-CM

## 2024-05-16 LAB
BACTERIA #/AREA URNS HPF: ABNORMAL /HPF
BILIRUB UR QL: NEGATIVE
CLARITY UR: CLEAR
COLOR UR: YELLOW
GLUCOSE UR QL: 100 MG/DL
HGB UR QL: NEGATIVE
KETONES UR-MCNC: NEGATIVE MG/DL
LEUKOCYTE ESTERASE UR QL STRIP: ABNORMAL
NITRITE UR QL: NEGATIVE
PH UR: 6 PH
PROT UR STRIP-MCNC: ABNORMAL MG/DL
RBC #/AREA URNS HPF: ABNORMAL /HPF
SP GR UR: 1.01 (ref 1–1.03)
SQUAMOUS #/AREA URNS HPF: ABNORMAL /HPF
UROBILINOGEN UR-MCNC: 1 MG/DL
WBC #/AREA URNS HPF: ABNORMAL /HPF

## 2024-05-16 PROCEDURE — G0463 HOSPITAL OUTPT CLINIC VISIT: HCPCS | Performed by: NURSE PRACTITIONER

## 2024-05-16 PROCEDURE — 81001 URINALYSIS AUTO W/SCOPE: CPT | Performed by: NURSE PRACTITIONER

## 2024-05-16 PROCEDURE — 87088 URINE BACTERIA CULTURE: CPT | Performed by: NURSE PRACTITIONER

## 2024-05-16 PROCEDURE — 99213 OFFICE O/P EST LOW 20 MIN: CPT | Performed by: NURSE PRACTITIONER

## 2024-05-16 RX ORDER — FLUCONAZOLE 150 MG/1
150 TABLET ORAL
Qty: 2 TABLET | Refills: 0 | Status: SHIPPED | OUTPATIENT
Start: 2024-05-16 | End: 2024-05-20

## 2024-05-16 ASSESSMENT — ENCOUNTER SYMPTOMS
CONSTIPATION: 0
DIZZINESS: 0
SHORTNESS OF BREATH: 0
SINUS PAIN: 0
CHILLS: 0
ABDOMINAL PAIN: 0
LIGHT-HEADEDNESS: 0
DIARRHEA: 0
ARTHRALGIAS: 0
WHEEZING: 0
VOMITING: 0
HEADACHES: 0
TROUBLE SWALLOWING: 0
CHEST TIGHTNESS: 0
DIFFICULTY URINATING: 0
FREQUENCY: 0
PALPITATIONS: 0
AGITATION: 0
NERVOUS/ANXIOUS: 0
MYALGIAS: 0
DYSURIA: 1
SINUS PRESSURE: 0
FEVER: 0
RHINORRHEA: 0
EYE PAIN: 0
SORE THROAT: 0
WEAKNESS: 0
NAUSEA: 0
COUGH: 0

## 2024-05-16 NOTE — PROGRESS NOTES
Subjective      Consuelo Singh is a 78 y.o. female who presents for possible yeast infection.    Patient presents today with complaints of vaginal itching, burning and dysuria.  She states that she is not going to the bathroom much more often than usual.  She has been taking an over-the-counter medicine for what she says is for a yeast infection however it is a pill called Azo which is usually used for bladder infections.        The following have been reviewed and updated as appropriate in this visit:   Tobacco  Allergies  Meds  Problems  Med Hx  Surg Hx  Fam Hx         Allergies   Allergen Reactions    Aloe Vera     Bacitracin      ZINC    Erythromycin Base     Formaldehyde     Gramicidins      OPHTHALMIC    Hydrocortisone      ACETATE    Latex     Levofloxacin     Metformin     Neomycin     Neomycin-Bacitracnzn-Polymyxnb     Polymyxin B     Prednisolone     Prednisone     Statins-Hmg-Coa Reductase Inhibitors     Sulfamethoxazole     Sulfamethoxazole-Trimethoprim     Thimerosal     Tresiba Flextouch U-100 [Insulin Degludec] Diarrhea and Other (see comments)     Abdominal pain    Trimethoprim     Humalog Kwikpen Insulin [Insulin Lispro] Rash     Current Outpatient Medications   Medication Sig Dispense Refill    omeprazole (PriLOSEC) 40 mg capsule Take 1 capsule (40 mg total) by mouth daily 90 capsule 3    nortriptyline (PAMELOR) 50 mg capsule Take 1 capsule (50 mg total) by mouth daily 90 capsule 1    empagliflozin (JARDIANCE) 10 mg tablet tablet Take 10 mg by mouth daily 30 each 2    pravastatin (PRAVACHOL) 20 mg tablet Take 1 tablet (20 mg total) by mouth daily (Patient taking differently: Take 1 tablet (20 mg total) by mouth 3 (three) times a week) 90 tablet 3    amLODIPine (NORVASC) 5 mg tablet Take 1 tablet (5 mg total) by mouth daily 90 tablet 3    losartan (COZAAR) 100 mg tablet Take 1 tablet (100 mg total) by mouth daily 90 tablet 3    metoprolol succinate XL (TOPROL-XL) 50 mg 24 hr tablet Take 3  "tablets (150 mg total) by mouth nightly 270 tablet 3    NovoLOG Flexpen U-100 Insulin 100 unit/mL (3 mL) insulin pen INJECT 20 UNITS UNDER THE SKIN DAILY WITH BREAKFAST AND 26 UNITS DAILY BEFORE LUNCH AND 37 UNITS DAILY WITH DINNER 60 mL 5    pen needle, diabetic 31 gauge x 15/64\" needle Inject 1 Needle under the skin 5 (five) times a day 200 each 6    Toujeo Max U-300 SoloStar 300 unit/mL (3 mL) insulin pen insulin pen INJECT 70 UNITS UNDER THE SKIN 2 TIMES A DAY 45 mL 3    albuterol HFA 90 mcg/actuation inhaler Inhale 2 puffs every 6 (six) hours as needed for wheezing 18 g 0    chlorthalidone 25 mg tablet Take 1 tablet (25 mg total) by mouth daily 90 tablet 3    benzonatate (TESSALON) 200 mg capsule Take 1 capsule (200 mg total) by mouth 3 (three) times a day as needed for cough 30 capsule 0    pen needle, diabetic 31 gauge x 15/64\" needle SMARTSIG:SUB-Q      clobetasoL (TEMOVATE) 0.05 % ointment Apply 1 application topically 2 (two) times a day as needed (Rash) 30 g 2    lutein 20 mg tablet Take 1 tab/cap by mouth daily      multivitamin (THERAGRAN) tablet tablet Take 1 tablet by mouth daily      blood-glucose sensor device Use 1 sensor every 10 days. 3 Device 11    blood-glucose meter,continuous (Dexcom G6 ) misc Use as directed. 1 each 0    blood-glucose transmitter (Dexcom G6 Transmitter) device Use as directed.  Change every 3 months. 1 Device 4    aspirin 325 mg tablet Take 1 tablet (325 mg total) by mouth daily      fluconazole (Diflucan) 150 mg tablet Take 1 tablet (150 mg total) by mouth every 3 (three) days for 2 doses 2 tablet 0     No current facility-administered medications for this visit.     Past Medical History:   Diagnosis Date    Allergic Do not remember    Arthritis     Blood clot in vein     shani legs    Cataract     Deep vein thrombosis (CMS/HCC)     hx of    Diabetes (CMS/HCC)     type 2    Eczema     Fibromyalgia     GERD (gastroesophageal reflux disease)     Heart murmur     " Hyperlipidemia     Hypertension     Obesity In my twenties    Peripheral neuropathy     toes    Pneumonia     Psoriasis     Scoliosis     Skin abnormalities     Visual impairment When i was 17    Wears dentures     full set     Past Surgical History:   Procedure Laterality Date    CHOLECYSTECTOMY      COLONOSCOPY  2016    5 yr  follow up    COLONOSCOPY N/A 2021    Please call the patient regarding her abnormal result. Repeat colonoscopy in 5 year due to 2 tubular adenomas if patient otherwise healthy at that time. Also reasonable to discontinue further colonoscopy--Dr Laboy    EYE SURGERY  I believe     HYSTERECTOMY      With BSO    OTHER SURGICAL HISTORY      Rt. radical mastoidectomy, rebuilt canal from growth     Family History   Problem Relation Age of Onset    Breast cancer Mother     Cancer Mother     Diabetes Father     Lung cancer Maternal Grandmother     Bone cancer Maternal Grandfather     Pancreatic cancer Mother's Brother     Breast cancer Mother's Sister     Lung cancer Mother's Sister      Social History     Socioeconomic History    Marital status:    Tobacco Use    Smoking status: Former     Current packs/day: 0.00     Average packs/day: 0.5 packs/day for 40.0 years (20.0 ttl pk-yrs)     Types: Cigarettes     Start date: 1967     Quit date: 2007     Years since quittin.3    Smokeless tobacco: Never   Substance and Sexual Activity    Alcohol use: Yes     Comment: Maybe once a month    Drug use: No    Sexual activity: Not Currently     Social Determinants of Health     Tobacco Use: Medium Risk (2024)    Patient History     Smoking Tobacco Use: Former     Smokeless Tobacco Use: Never       Review of Systems   Constitutional:  Negative for chills and fever.   HENT:  Negative for congestion, ear pain, rhinorrhea, sinus pressure, sinus pain, sore throat and trouble swallowing.    Eyes:  Negative for pain and visual disturbance.   Respiratory:  Negative for  cough, chest tightness, shortness of breath and wheezing.    Cardiovascular:  Negative for chest pain and palpitations.   Gastrointestinal:  Negative for abdominal pain, constipation, diarrhea, nausea and vomiting.   Endocrine: Negative for cold intolerance and heat intolerance.   Genitourinary:  Positive for dysuria and vaginal pain (Itching and burning). Negative for difficulty urinating, frequency and urgency.   Musculoskeletal:  Negative for arthralgias and myalgias.   Skin:  Negative for rash.   Neurological:  Negative for dizziness, weakness, light-headedness and headaches.   Psychiatric/Behavioral:  Negative for agitation. The patient is not nervous/anxious.        Objective   /82 (BP Location: Left arm, Patient Position: Sitting)   Temp 36.2 °C (97.2 °F) (Temporal)   Resp 20   Wt 101.6 kg (224 lb)   SpO2 96%   BMI 39.68 kg/m²   BP Readings from Last 3 Encounters:   05/16/24 128/82   03/25/24 126/68   01/19/24 130/60      Wt Readings from Last 3 Encounters:   05/16/24 101.6 kg (224 lb)   04/29/24 102.5 kg (226 lb)   03/25/24 102.1 kg (225 lb)      Pulse Readings from Last 3 Encounters:   04/29/24 77   03/25/24 68   01/19/24 78      Resp Readings from Last 3 Encounters:   05/16/24 20   04/29/24 16   03/25/24 16       Physical Exam  Vitals and nursing note reviewed.   Constitutional:       Appearance: Normal appearance. She is normal weight.   HENT:      Head: Normocephalic and atraumatic.      Right Ear: External ear normal.      Left Ear: External ear normal.      Nose: Nose normal.      Mouth/Throat:      Mouth: Mucous membranes are moist.   Eyes:      Extraocular Movements: Extraocular movements intact.      Pupils: Pupils are equal, round, and reactive to light.   Cardiovascular:      Rate and Rhythm: Normal rate and regular rhythm.      Pulses: Normal pulses.      Heart sounds: Normal heart sounds.   Pulmonary:      Effort: Pulmonary effort is normal.      Breath sounds: Normal breath sounds.    Abdominal:      General: Abdomen is flat. Bowel sounds are normal.      Palpations: Abdomen is soft.   Musculoskeletal:         General: Normal range of motion.      Cervical back: Normal range of motion and neck supple.   Lymphadenopathy:      Cervical: No cervical adenopathy.   Skin:     General: Skin is warm and dry.      Capillary Refill: Capillary refill takes less than 2 seconds.   Neurological:      General: No focal deficit present.      Mental Status: She is alert and oriented to person, place, and time.   Psychiatric:         Mood and Affect: Mood normal.         Behavior: Behavior normal.         Recent Results (from the past 24 hour(s))   Urinalysis, Dip (part 1 of panel) Urine, Clean Catch    Collection Time: 05/16/24  7:19 AM    Specimen: Urine, Clean Catch   Result Value Ref Range    Color, Urine Yellow Yellow    Clarity, Urine Clear Clear    pH, Urine 6.0 5.0 - 8.0 PH    Specific Gravity, Urine 1.010 1.003 - 1.030    Protein, Urine Trace (A) Negative MG/DL    Glucose, Urine 100 (A) Negative MG/DL    Ketones, Urine Negative Negative MG/DL    Blood, Urine Negative Negative    Nitrite, Urine Negative Negative    Bilirubin, Urine Negative Negative    Leukocytes, Urine Small (A) Negative    Urobilinogen, Urine 1.0 <2.0 mg/dL   Urinalysis, Micro (part 2 of panel) Urine, Clean Catch    Collection Time: 05/16/24  7:19 AM    Specimen: Urine, Clean Catch   Result Value Ref Range    RBC, Urine 0-2 None seen, 0-2, Negative /HPF    WBC, Urine 5-9 (A) 0 - 4 /HPF    Squamous Epithelial, Urine 5-9 None Seen-9 /HPF    Bacteria, Urine Few None seen, Few /HPF       Assessment/Plan   Diagnoses and all orders for this visit:    Vaginal yeast infection  -     fluconazole (Diflucan) 150 mg tablet; Take 1 tablet (150 mg total) by mouth every 3 (three) days for 2 doses    Dysuria  -     Urinalysis w/microscopic, reflex culture Urine, Clean Catch  -     Urine culture    Urine shows few leukocytes and few bacteria.  Patient  would like to wait for urine culture before treating with antibiotics due to multiple allergies.  She is given Diflucan for likely yeast infection while she awaits her urine culture results.  She is encouraged to continue with her fluid intake.    Patient Instructions   It is very important to drink plenty of fluids.  Drink small amounts frequently.       Frieda Villar, CNP

## 2024-05-17 ENCOUNTER — TELEPHONE (OUTPATIENT)
Dept: URGENT CARE | Facility: CLINIC | Age: 79
End: 2024-05-17
Payer: MEDICARE

## 2024-05-17 DIAGNOSIS — N30.00 ACUTE CYSTITIS WITHOUT HEMATURIA: Primary | ICD-10-CM

## 2024-05-17 LAB — BACTERIA UR CULT: ABNORMAL

## 2024-05-17 RX ORDER — CEPHALEXIN 500 MG/1
500 CAPSULE ORAL 2 TIMES DAILY
Qty: 14 CAPSULE | Refills: 0 | Status: SHIPPED | OUTPATIENT
Start: 2024-05-17 | End: 2024-05-24

## 2024-05-28 ENCOUNTER — PATIENT MESSAGE (OUTPATIENT)
Dept: FAMILY MEDICINE | Facility: CLINIC | Age: 79
End: 2024-05-28
Payer: MEDICARE

## 2024-05-28 ENCOUNTER — TELEPHONE (OUTPATIENT)
Dept: FAMILY MEDICINE | Facility: CLINIC | Age: 79
End: 2024-05-28

## 2024-05-28 DIAGNOSIS — E11.21 TYPE 2 DIABETES MELLITUS WITH DIABETIC NEPHROPATHY, WITH LONG-TERM CURRENT USE OF INSULIN (CMS/HCC): Primary | Chronic | ICD-10-CM

## 2024-05-28 DIAGNOSIS — Z79.4 TYPE 2 DIABETES MELLITUS WITH DIABETIC NEPHROPATHY, WITH LONG-TERM CURRENT USE OF INSULIN (CMS/HCC): Primary | Chronic | ICD-10-CM

## 2024-05-28 NOTE — TELEPHONE ENCOUNTER
Chris from [facility] calling re:    Pt's blood glucose sensor, dolores 3, but the wrote the script for dexcom.      Please call to discuss, he thinks the dolores is wanted, needs a new script because goes to Medicare.  Call with any questions.

## 2024-05-30 ENCOUNTER — TELEPHONE (OUTPATIENT)
Dept: FAMILY MEDICINE | Facility: CLINIC | Age: 79
End: 2024-05-30

## 2024-05-30 DIAGNOSIS — Z79.4 TYPE 2 DIABETES MELLITUS WITH DIABETIC NEPHROPATHY, WITH LONG-TERM CURRENT USE OF INSULIN (CMS/HCC): Chronic | ICD-10-CM

## 2024-05-30 DIAGNOSIS — E11.21 TYPE 2 DIABETES MELLITUS WITH DIABETIC NEPHROPATHY, WITH LONG-TERM CURRENT USE OF INSULIN (CMS/HCC): Chronic | ICD-10-CM

## 2024-05-30 NOTE — TELEPHONE ENCOUNTER
Pharm[facility]. Spoke with patient and she is in touch with the pharmacy on this.

## 2024-06-11 DIAGNOSIS — Z79.4 TYPE 2 DIABETES MELLITUS WITH DIABETIC NEPHROPATHY, WITH LONG-TERM CURRENT USE OF INSULIN (CMS/HCC): Chronic | ICD-10-CM

## 2024-06-11 DIAGNOSIS — E11.21 TYPE 2 DIABETES MELLITUS WITH DIABETIC NEPHROPATHY, WITH LONG-TERM CURRENT USE OF INSULIN (CMS/HCC): Chronic | ICD-10-CM

## 2024-06-11 NOTE — TELEPHONE ENCOUNTER
No care due was identified.  [facility] Embedded Care Due Messages. Reference number: [id number]. 6/11/2024 5:53:13 AM ANITA

## 2024-06-12 RX ORDER — EMPAGLIFLOZIN 10 MG/1
10 TABLET, FILM COATED ORAL DAILY
Qty: 30 TABLET | Refills: 0 | Status: SHIPPED | OUTPATIENT
Start: 2024-06-12 | End: 2024-07-10 | Stop reason: SDUPTHER

## 2024-06-12 NOTE — TELEPHONE ENCOUNTER
Medication refill request:  Medication(s):  jardiance not filled due to Previous refill visit indicating specific care plan, please review for refill

## 2024-06-19 ENCOUNTER — OFFICE VISIT (OUTPATIENT)
Dept: DERMATOLOGY | Facility: CLINIC | Age: 79
End: 2024-06-19
Payer: MEDICARE

## 2024-06-19 VITALS
WEIGHT: 223.99 LBS | SYSTOLIC BLOOD PRESSURE: 134 MMHG | DIASTOLIC BLOOD PRESSURE: 70 MMHG | BODY MASS INDEX: 39.69 KG/M2 | HEIGHT: 63 IN

## 2024-06-19 DIAGNOSIS — D22.9 MULTIPLE BENIGN NEVI: ICD-10-CM

## 2024-06-19 DIAGNOSIS — L57.0 AK (ACTINIC KERATOSIS): Primary | ICD-10-CM

## 2024-06-19 DIAGNOSIS — L82.1 SEBORRHEIC KERATOSES: ICD-10-CM

## 2024-06-19 DIAGNOSIS — L30.4 INTERTRIGO: ICD-10-CM

## 2024-06-19 DIAGNOSIS — L82.0 INFLAMED SEBORRHEIC KERATOSIS: ICD-10-CM

## 2024-06-19 PROCEDURE — 17000 DESTRUCT PREMALG LESION: CPT | Mod: PO

## 2024-06-19 PROCEDURE — G0463 HOSPITAL OUTPT CLINIC VISIT: HCPCS | Mod: PO

## 2024-06-19 PROCEDURE — 99213 OFFICE O/P EST LOW 20 MIN: CPT | Mod: 25

## 2024-06-19 PROCEDURE — 17110 DESTRUCTION B9 LES UP TO 14: CPT | Mod: PO

## 2024-06-19 NOTE — PROGRESS NOTES
DERMATOLOGY, [facility]  OFFICE VISIT    Subjective   HPI  Consuelo Singh is a 78 y.o. female without personal history of skin cancer,  . presenting with a chief complaint of lesion on left side of neck. Patient requests a full body skin exam. Patient reports lesion on left side of neck that she noticed recently and states asystematic. Patient last seen in office on 6/19/23 by Ivelisse Loomis CNP.    Patient history of eczema (clobetasol) EIC, porokeratosis, angioma, and lentigo, cyst of skin, sk, intertrigo     Otherwise denies any lesions with associated rapid growth, pigment change, or spontaneous bleeding. Denies any other concerns or acute changes to baseline health.    Past Medical History:   Diagnosis Date    Allergic Do not remember    Arthritis     Blood clot in vein     shani legs    Cataract     Deep vein thrombosis (CMS/HCC)     hx of    Diabetes (CMS/HCC)     type 2    Eczema     Fibromyalgia     GERD (gastroesophageal reflux disease)     Heart murmur     Hyperlipidemia     Hypertension     Obesity In my twenties    Peripheral neuropathy     toes    Pneumonia     Psoriasis     Scoliosis     Skin abnormalities 1985    Visual impairment When i was 17    Wears dentures     full set     Past Surgical History:   Procedure Laterality Date    CHOLECYSTECTOMY      COLONOSCOPY  03/11/2016    5 yr  follow up    COLONOSCOPY N/A 05/28/2021    Please call the patient regarding her abnormal result. Repeat colonoscopy in 5 year due to 2 tubular adenomas if patient otherwise healthy at that time. Also reasonable to discontinue further colonoscopy--Dr Laboy    EYE SURGERY  I believe 2020    HYSTERECTOMY      With BSO    OTHER SURGICAL HISTORY      Rt. radical mastoidectomy, rebuilt canal from growth     family history includes Bone cancer in her maternal grandfather; Breast cancer in her mother and mother's sister; Cancer in her mother; Diabetes in her father; Lung cancer in her maternal grandmother and mother's  "sister; Pancreatic cancer in her mother's brother.    Review of Systems  A 12 point review of systems was performed and negative aside from discussed in HPI.    Medications  Current Outpatient Medications on File Prior to Visit   Medication Sig Dispense Refill    Jardiance 10 mg tablet tablet Take 1 tablet by mouth once daily 30 tablet 0    blood-glucose sensor device 1 Device every 14 (fourteen) days 3 each 11    omeprazole (PriLOSEC) 40 mg capsule Take 1 capsule (40 mg total) by mouth daily 90 capsule 3    nortriptyline (PAMELOR) 50 mg capsule Take 1 capsule (50 mg total) by mouth daily 90 capsule 1    pravastatin (PRAVACHOL) 20 mg tablet Take 1 tablet (20 mg total) by mouth daily (Patient taking differently: Take 1 tablet (20 mg total) by mouth 3 (three) times a week) 90 tablet 3    amLODIPine (NORVASC) 5 mg tablet Take 1 tablet (5 mg total) by mouth daily 90 tablet 3    losartan (COZAAR) 100 mg tablet Take 1 tablet (100 mg total) by mouth daily 90 tablet 3    metoprolol succinate XL (TOPROL-XL) 50 mg 24 hr tablet Take 3 tablets (150 mg total) by mouth nightly 270 tablet 3    NovoLOG Flexpen U-100 Insulin 100 unit/mL (3 mL) insulin pen INJECT 20 UNITS UNDER THE SKIN DAILY WITH BREAKFAST AND 26 UNITS DAILY BEFORE LUNCH AND 37 UNITS DAILY WITH DINNER 60 mL 5    pen needle, diabetic 31 gauge x 15/64\" needle Inject 1 Needle under the skin 5 (five) times a day 200 each 6    Toujeo Max U-300 SoloStar 300 unit/mL (3 mL) insulin pen insulin pen INJECT 70 UNITS UNDER THE SKIN 2 TIMES A DAY 45 mL 3    albuterol HFA 90 mcg/actuation inhaler Inhale 2 puffs every 6 (six) hours as needed for wheezing 18 g 0    chlorthalidone 25 mg tablet Take 1 tablet (25 mg total) by mouth daily 90 tablet 3    benzonatate (TESSALON) 200 mg capsule Take 1 capsule (200 mg total) by mouth 3 (three) times a day as needed for cough 30 capsule 0    pen needle, diabetic 31 gauge x 15/64\" needle SMARTSIG:SUB-Q      clobetasoL (TEMOVATE) 0.05 % " "ointment Apply 1 application topically 2 (two) times a day as needed (Rash) 30 g 2    lutein 20 mg tablet Take 1 tab/cap by mouth daily      multivitamin (THERAGRAN) tablet tablet Take 1 tablet by mouth daily      blood-glucose meter,continuous (Dexcom G6 ) misc Use as directed. 1 each 0    blood-glucose transmitter (Dexcom G6 Transmitter) device Use as directed.  Change every 3 months. 1 Device 4    aspirin 325 mg tablet Take 1 tablet (325 mg total) by mouth daily       No current facility-administered medications on file prior to visit.       Allergies  Aloe vera, Bacitracin, Erythromycin base, Formaldehyde, Gramicidins, Hydrocortisone, Latex, Levofloxacin, Metformin, Neomycin, Neomycin-bacitracnzn-polymyxnb, Polymyxin b, Prednisolone, Prednisone, Statins-hmg-coa reductase inhibitors, Sulfamethoxazole, Sulfamethoxazole-trimethoprim, Thimerosal, Tresiba flextouch u-100 [insulin degludec], Trimethoprim, and Humalog kwikpen insulin [insulin lispro]    Objective   Physical Examination  Vital Signs /70 (BP Location: Right arm, Patient Position: Sitting, Cuff Size: Regular Adult)   Ht 1.6 m (5' 2.99\")   Wt 101.6 kg (223 lb 15.8 oz)   BMI 39.69 kg/m²     General: Awake, alert and oriented x 3; no apparent distress  Skin: A full body skin examination was performed including scalp, face, lips, ears, neck, both arms, chest, back, abdomen, buttocks, both legs, both feet and notable for several scattered thin, tan fleshy keratotic papules; uniformly brown macules; cherry red vascular papules; lesions without concerning features on dermoscopy.  Inflamed waxy tan keratotic stuck on papule(s) over the left neck, right upper thigh. and Scattered gritty scaled keratotic papule(s) on erythematous bases over the left cheek. well demarcated erythematous patches with satellite lesions to inguinal folds and lower abdomen    Procedure(s)    Destruction of Benign or Premalignant Lesion    Date/Time: 6/19/2024 8:01 " AM    Performed by: Ivelisse Loomis CNP      Consent  Verbal consent was obtained from the patient. Written consent was not obtained from the patient. Risks include permanent scarring, light or dark discoloration, infection, pain, bleeding, bruising, redness, blister formation and recurrence of the lesion. Consent given by patient. The patient states understanding of the procedure being performed. Patient ID confirmed verbally with patient and provided demographic data.     Location:  1 total lesions removed     Head/neck location(s): left cheek  Number of head/neck lesions removed: 1                      Procedure Details   Lesion(s) are pre-malignant. The area was prepped and draped in a sterile fashion. Local anesthesia was not used.   Lesion(s) destroyed with: liquid nitrogen. Liquid nitrogen application completed 1 times.     Post-Procedure  Antibiotic ointment and sterile dressing was applied. Patient tolerated procedure well with no complications.         Date/Time: 6/19/2024 8:04 AM    Performed by: Ivelisse Loomis CNP      Consent  Verbal consent was obtained from the patient. Written consent was not obtained from the patient. Risks include permanent scarring, light or dark discoloration, infection, pain, bleeding, bruising, redness, blister formation and recurrence of the lesion. Consent given by patient. The patient states understanding of the procedure being performed. Patient ID confirmed verbally with patient and provided demographic data.     Location:  2 total lesions removed     Head/neck location(s): neck  Number of head/neck lesions removed: 1                    Lower Extremity location(s): right upper leg   Number of Lower Extremity lesions removed: 1      Procedure Details   Lesion(s) are benign. The area was prepped and draped in a sterile fashion. Local anesthesia was not used.   Lesion(s) destroyed with: liquid nitrogen. Liquid nitrogen application completed 1 times. Lesion(s) were  frozen until an ice ball extended beyond the lesion.     Post-Procedure  Antibiotic ointment and sterile dressing was applied. Patient tolerated procedure well with no complications.               Assessment and Plan  1. AK (actinic keratosis)  Discussed diagnosis and treatment options relevant to the clinical presentation. Cryosurgery performed on lesions as documented in the procedure note. Photoprotection measures reinforced. Discussed risk of malignant transformation of AK. Recommend annual skin exams for follow up.    - Destruction of Benign or Premalignant Lesion    2. Intertrigo  Intertrigo is inflammation caused by skin-to-skin friction, most often in warm, moist areas of the body, such as the groin, between folds of skin on the abdomen, under the breasts, under the arms or between toes.  It is important to decrease the moisture in these areas to prevent and treat the condition.  Patient states she does not do well with creams. Can use vaseline without skin reaction. Also provided with InterDry sheets to use as a barrier. Recheck annually or sooner as needed.    3. Multiple benign nevi  Reassurance given that nevi appear benign today. Recommend continued self exams. Discussed lesion ABCD and Es of melanoma; asymmetry,lacy or irregular borders, variegated coloration, diameter greater than 6 mm, and evolution over time. Continued photoprotection measures reinforced. Recommend annual follow-up for full skin examinations.    4. Inflamed seborrheic keratosis  Discussed treatment options.  Patient opts for cryo.  Cryo as noted above.  Patient tolerated well.  Aftercare instructions provided.    - infl seb ker    5. Seborrheic keratoses  Education and counseling provided to patient on benign nature of lesions. I discussed the diagnosis and treatment options with the patient.  As the lesion/these lesions is/are asymptomatic, no further treatment is required or requested by the patient.    Discussed importance of  daily sunscreen use with a broad spectrum SPF of 30-50, broad spectrum and those including zinc oxide and titanium dioxide as well as being water resistant.  Recommended wide brimmed hats.  Finally, we discussed danger signs of changing skin lesions including sores not healing and moles changing in size, shape or color.  Should any of these lesions occur before next appointment, I instructed patient to call sooner for evaluation.    Follow-up as directed above or as needed for any new or changing lesions or skin changes of concern.    Ivelisse Loomis, CNP

## 2024-06-26 ENCOUNTER — OFFICE VISIT NO LOS (OUTPATIENT)
Dept: DIABETES SERVICES | Facility: CLINIC | Age: 79
End: 2024-06-26
Payer: MEDICARE

## 2024-06-26 ENCOUNTER — ANCILLARY PROCEDURE (OUTPATIENT)
Dept: MAMMOGRAPHY | Facility: CLINIC | Age: 79
End: 2024-06-26
Payer: MEDICARE

## 2024-06-26 DIAGNOSIS — E11.21 TYPE 2 DIABETES MELLITUS WITH DIABETIC NEPHROPATHY, WITH LONG-TERM CURRENT USE OF INSULIN (CMS/HCC): Primary | Chronic | ICD-10-CM

## 2024-06-26 DIAGNOSIS — Z79.4 TYPE 2 DIABETES MELLITUS WITH DIABETIC NEPHROPATHY, WITH LONG-TERM CURRENT USE OF INSULIN (CMS/HCC): Chronic | ICD-10-CM

## 2024-06-26 DIAGNOSIS — Z79.4 TYPE 2 DIABETES MELLITUS WITH DIABETIC NEPHROPATHY, WITH LONG-TERM CURRENT USE OF INSULIN (CMS/HCC): Primary | Chronic | ICD-10-CM

## 2024-06-26 DIAGNOSIS — E11.21 TYPE 2 DIABETES MELLITUS WITH DIABETIC NEPHROPATHY, WITH LONG-TERM CURRENT USE OF INSULIN (CMS/HCC): Chronic | ICD-10-CM

## 2024-06-26 DIAGNOSIS — Z12.31 ENCOUNTER FOR SCREENING MAMMOGRAM FOR MALIGNANT NEOPLASM OF BREAST: ICD-10-CM

## 2024-06-26 PROCEDURE — 77067 SCR MAMMO BI INCL CAD: CPT

## 2024-06-26 PROCEDURE — 77067 SCR MAMMO BI INCL CAD: CPT | Mod: 26 | Performed by: RADIOLOGY

## 2024-06-26 PROCEDURE — 77063 BREAST TOMOSYNTHESIS BI: CPT | Mod: 26 | Performed by: RADIOLOGY

## 2024-06-26 NOTE — PROGRESS NOTES
Time session started:  1359  Time session ended: 1415      Situation:  Patient presents today for Libre3 personal sensor training.    Background:  Patient has used a CGM previously.      Assessment:  Patient presents today ready and willing to learn.  Patient was able to place the Naz sensor to the back of her left arm SQ.  No bleeding with insertion.  Patient was then able to start the sensor with her phone.  1 hour warm up discussed.  Low alert set at 70mg/dl and high alert set at 300mg/dl per patient request.   No questions or concerns noted at this time.    Recommendations:   Consider switching patient from Jardiance to Farxiga due to cost (could apply for Farxiga through patient assistance).    Not billed until downloaded.

## 2024-07-04 DIAGNOSIS — Z79.4 TYPE 2 DIABETES MELLITUS WITH DIABETIC NEPHROPATHY, WITH LONG-TERM CURRENT USE OF INSULIN (CMS/HCC): Primary | Chronic | ICD-10-CM

## 2024-07-04 DIAGNOSIS — E11.21 TYPE 2 DIABETES MELLITUS WITH DIABETIC NEPHROPATHY, WITH LONG-TERM CURRENT USE OF INSULIN (CMS/HCC): Primary | Chronic | ICD-10-CM

## 2024-07-10 DIAGNOSIS — E11.21 TYPE 2 DIABETES MELLITUS WITH DIABETIC NEPHROPATHY, WITH LONG-TERM CURRENT USE OF INSULIN (CMS/HCC): Chronic | ICD-10-CM

## 2024-07-10 DIAGNOSIS — Z79.4 TYPE 2 DIABETES MELLITUS WITH DIABETIC NEPHROPATHY, WITH LONG-TERM CURRENT USE OF INSULIN (CMS/HCC): Chronic | ICD-10-CM

## 2024-07-10 NOTE — TELEPHONE ENCOUNTER
No care due was identified.  [facility] Embedded Care Due Messages. Reference number: [id number]. 7/10/2024 9:29:30 AM ANITA

## 2024-07-10 NOTE — TELEPHONE ENCOUNTER
Medication refill request:  Medication(s):  jardiance not filled due to Last Prescriber no longer with department or organization, and no PCP is listed. Please have on call provider fill as appropriate

## 2024-07-12 ENCOUNTER — OFFICE VISIT (OUTPATIENT)
Dept: FAMILY MEDICINE | Facility: CLINIC | Age: 79
End: 2024-07-12
Payer: MEDICARE

## 2024-07-12 VITALS
OXYGEN SATURATION: 95 % | TEMPERATURE: 97.6 F | HEIGHT: 63 IN | HEART RATE: 77 BPM | WEIGHT: 223 LBS | RESPIRATION RATE: 16 BRPM | BODY MASS INDEX: 39.51 KG/M2 | DIASTOLIC BLOOD PRESSURE: 46 MMHG | SYSTOLIC BLOOD PRESSURE: 160 MMHG

## 2024-07-12 DIAGNOSIS — Z79.4 TYPE 2 DIABETES MELLITUS WITH DIABETIC NEPHROPATHY, WITH LONG-TERM CURRENT USE OF INSULIN (CMS/HCC): Chronic | ICD-10-CM

## 2024-07-12 DIAGNOSIS — E78.00 PURE HYPERCHOLESTEROLEMIA: Primary | Chronic | ICD-10-CM

## 2024-07-12 DIAGNOSIS — E11.21 TYPE 2 DIABETES MELLITUS WITH DIABETIC NEPHROPATHY, WITH LONG-TERM CURRENT USE OF INSULIN (CMS/HCC): Chronic | ICD-10-CM

## 2024-07-12 PROCEDURE — 99214 OFFICE O/P EST MOD 30 MIN: CPT | Performed by: FAMILY MEDICINE

## 2024-07-12 PROCEDURE — G0463 HOSPITAL OUTPT CLINIC VISIT: HCPCS | Performed by: FAMILY MEDICINE

## 2024-07-12 NOTE — TELEPHONE ENCOUNTER
Medication(s) jardiance refill refused due to DUPLICATE was filled 7/12/2024 to the same pharmacy.

## 2024-07-12 NOTE — TELEPHONE ENCOUNTER
No care due was identified.  [facility] Embedded Care Due Messages. Reference number: [id number]. 7/12/2024 8:49:37 AM ANITA

## 2024-07-14 ASSESSMENT — ENCOUNTER SYMPTOMS
ABDOMINAL PAIN: 0
DIAPHORESIS: 0
SHORTNESS OF BREATH: 0
CONSTIPATION: 0
NERVOUS/ANXIOUS: 0
NAUSEA: 0
RHINORRHEA: 0
SINUS PRESSURE: 0
SORE THROAT: 0
COUGH: 0
WEAKNESS: 0
AGITATION: 0
VOMITING: 0

## 2024-07-14 NOTE — PROGRESS NOTES
Office Note    SUBJECTIVE     Consuelo Singh is a 78 y.o. female who presents for new patient.    History of Present Illness    The patient, with a long-standing history of diabetes, presents for a routine follow-up. She reports feeling well overall and has been managing her diabetes with a Dexcom to Rika 3 switch, which she finds very accurate. She also mentions being on Jardiance for the past three months, with no reported side effects. The patient's last lab work was done three months ago, and she reports that the results were satisfactory.    In addition to diabetes, the patient has a history of high cholesterol, which is hereditary. She has been managing this with a low dose of pravastatin three times a week, which she tolerates well. However, an attempt to increase the dosage to seven days a week resulted in a breakout, so the dosage was reduced back to three times a week.    The patient also has a history of fibromyalgia, for which she takes nortriptyline. She reports no current issues with fibromyalgia symptoms. She also has a history of eczema, which she manages with a steroid cream as needed.    The patient has a history of allergies and is on multiple medications. She reports no current issues with allergies. She also mentions having had major surgery in her ear, which requires regular cleaning every three months.        The following have been reviewed and updated as appropriate in this visit:   Tobacco  Allergies  Meds  Problems  Med Hx  Surg Hx  Fam Hx         Allergies   Allergen Reactions    Aloe Vera     Bacitracin      ZINC    Erythromycin Base     Formaldehyde     Gramicidins      OPHTHALMIC    Hydrocortisone      ACETATE    Latex     Levofloxacin     Metformin     Neomycin     Neomycin-Bacitracnzn-Polymyxnb     Polymyxin B     Prednisolone     Prednisone     Statins-Hmg-Coa Reductase Inhibitors     Sulfamethoxazole     Sulfamethoxazole-Trimethoprim     Thimerosal     Tresiba Flextouch  "U-100 [Insulin Degludec] Diarrhea and Other (see comments)     Abdominal pain    Trimethoprim     Humalog Kwikpen Insulin [Insulin Lispro] Rash     Current Outpatient Medications   Medication Sig Dispense Refill    blood-glucose sensor device 1 Device every 14 (fourteen) days 1 device every 10 days. 3 each 11    omeprazole (PriLOSEC) 40 mg capsule Take 1 capsule (40 mg total) by mouth daily 90 capsule 3    nortriptyline (PAMELOR) 50 mg capsule Take 1 capsule (50 mg total) by mouth daily 90 capsule 1    pravastatin (PRAVACHOL) 20 mg tablet Take 1 tablet (20 mg total) by mouth daily (Patient taking differently: Take 1 tablet (20 mg total) by mouth 3 (three) times a week) 90 tablet 3    amLODIPine (NORVASC) 5 mg tablet Take 1 tablet (5 mg total) by mouth daily 90 tablet 3    losartan (COZAAR) 100 mg tablet Take 1 tablet (100 mg total) by mouth daily 90 tablet 3    metoprolol succinate XL (TOPROL-XL) 50 mg 24 hr tablet Take 3 tablets (150 mg total) by mouth nightly 270 tablet 3    NovoLOG Flexpen U-100 Insulin 100 unit/mL (3 mL) insulin pen INJECT 20 UNITS UNDER THE SKIN DAILY WITH BREAKFAST AND 26 UNITS DAILY BEFORE LUNCH AND 37 UNITS DAILY WITH DINNER 60 mL 5    pen needle, diabetic 31 gauge x 15/64\" needle Inject 1 Needle under the skin 5 (five) times a day 200 each 6    Toujeo Max U-300 SoloStar 300 unit/mL (3 mL) insulin pen insulin pen INJECT 70 UNITS UNDER THE SKIN 2 TIMES A DAY 45 mL 3    albuterol HFA 90 mcg/actuation inhaler Inhale 2 puffs every 6 (six) hours as needed for wheezing 18 g 0    chlorthalidone 25 mg tablet Take 1 tablet (25 mg total) by mouth daily 90 tablet 3    benzonatate (TESSALON) 200 mg capsule Take 1 capsule (200 mg total) by mouth 3 (three) times a day as needed for cough 30 capsule 0    pen needle, diabetic 31 gauge x 15/64\" needle SMARTSIG:SUB-Q      clobetasoL (TEMOVATE) 0.05 % ointment Apply 1 application topically 2 (two) times a day as needed (Rash) 30 g 2    lutein 20 mg tablet Take " 1 tab/cap by mouth daily      multivitamin (THERAGRAN) tablet tablet Take 1 tablet by mouth daily      aspirin 325 mg tablet Take 1 tablet (325 mg total) by mouth daily      empagliflozin (Jardiance) 10 mg tablet Take 1 tablet (10 mg total) by mouth daily 90 tablet 3     No current facility-administered medications for this visit.     Past Medical History:   Diagnosis Date    Allergic Do not remember    Arthritis     Blood clot in vein     shani legs    Cataract     Deep vein thrombosis (CMS/HCC)     hx of    Diabetes (CMS/HCC)     type 2    Eczema     Fibromyalgia     GERD (gastroesophageal reflux disease)     Heart murmur     Hyperlipidemia     Hypertension     Obesity In my twenties    Peripheral neuropathy     toes    Pneumonia     Psoriasis     Scoliosis     Skin abnormalities 1985    Visual impairment When i was 17    Wears dentures     full set     Past Surgical History:   Procedure Laterality Date    CHOLECYSTECTOMY      COLONOSCOPY  03/11/2016    5 yr  follow up    COLONOSCOPY N/A 05/28/2021    Please call the patient regarding her abnormal result. Repeat colonoscopy in 5 year due to 2 tubular adenomas if patient otherwise healthy at that time. Also reasonable to discontinue further colonoscopy--Dr Laboy    EYE SURGERY  I believe 2020    HYSTERECTOMY      With BSO    OTHER SURGICAL HISTORY      Rt. radical mastoidectomy, rebuilt canal from growth     Family History   Problem Relation Age of Onset    Breast cancer Mother     Cancer Mother     Diabetes Father     Lung cancer Maternal Grandmother     Bone cancer Maternal Grandfather     Pancreatic cancer Mother's Brother     Breast cancer Mother's Sister     Lung cancer Mother's Sister      Social History     Socioeconomic History    Marital status:    Tobacco Use    Smoking status: Former     Current packs/day: 0.00     Average packs/day: 0.5 packs/day for 40.0 years (20.0 ttl pk-yrs)     Types: Cigarettes     Start date: 1/1/1967     Quit date:  "2007     Years since quittin.5    Smokeless tobacco: Never   Substance and Sexual Activity    Alcohol use: Yes     Comment: Maybe once a month    Drug use: No    Sexual activity: Not Currently     Social Determinants of Health     Tobacco Use: Medium Risk (2024)    Patient History     Smoking Tobacco Use: Former     Smokeless Tobacco Use: Never       Review of Systems  Review of Systems   Constitutional:  Negative for diaphoresis.   HENT:  Negative for congestion, ear pain, rhinorrhea, sinus pressure and sore throat.    Eyes:  Negative for visual disturbance.   Respiratory:  Negative for cough and shortness of breath.    Gastrointestinal:  Negative for abdominal pain, constipation, nausea and vomiting.   Neurological:  Negative for weakness.   Psychiatric/Behavioral:  Negative for agitation. The patient is not nervous/anxious.        OBJECTIVE  /46   Pulse 77   Temp 36.4 °C (97.6 °F) (Temporal)   Resp 16   Ht 1.6 m (5' 3\")   Wt 101.2 kg (223 lb)   SpO2 95%   BMI 39.50 kg/m²     Exam  Physical Exam  Vitals and nursing note reviewed.   Constitutional:       General: She is not in acute distress.     Appearance: Normal appearance. She is well-developed.   HENT:      Head: Normocephalic and atraumatic.      Right Ear: Hearing, tympanic membrane and ear canal normal.      Left Ear: Hearing, tympanic membrane and ear canal normal.   Eyes:      Conjunctiva/sclera: Conjunctivae normal.   Neck:      Thyroid: No thyromegaly.      Vascular: No JVD.      Trachea: Trachea normal.   Cardiovascular:      Rate and Rhythm: Normal rate and regular rhythm.      Heart sounds: Normal heart sounds. No murmur heard.  Pulmonary:      Effort: Pulmonary effort is normal. No respiratory distress.      Breath sounds: Normal breath sounds.   Abdominal:      General: Bowel sounds are normal.      Palpations: Abdomen is soft.      Tenderness: There is no abdominal tenderness.      Hernia: No hernia is present. "   Neurological:      Mental Status: She is alert and oriented to person, place, and time.   Psychiatric:         Mood and Affect: Mood normal.          Diagnosis Plan   1. Pure hypercholesterolemia        2. Type 2 diabetes mellitus with diabetic nephropathy, with long-term current use of insulin (CMS/[facility]) ([facility])  empagliflozin (Jardiance) 10 mg tablet    CBC w/auto differential Blood, Venous    Basic metabolic panel Blood, Venous    Hemoglobin A1c (glycosylated) Blood, Venous    Urine Albumin/Creatinine Ratio Urine, Unspecified Source        Assessment/Plan     Diabetes Mellitus: Well controlled on current regimen, including Toujeo 70 units twice daily, NovoLog three times daily, and Jardiance. She recently switched to the Rika 3 glucose sensor with good results, and her last A1c was checked three months ago. We will renew the prescription for Jardiance and continue the current diabetes management regimen. Consider checking A1c at the next visit in six months.    Hypercholesterolemia: She has a history of statin intolerance but is currently tolerating low-dose Pravastatin three times weekly. A recent attempt to increase to daily dosing resulted in a skin rash. We will continue Pravastatin three times weekly.    Follow-up plan: Schedule a follow-up visit in six months and consider checking labs prior to the next visit.          Orders  Orders Placed This Encounter   Procedures    CBC w/auto differential Blood, Venous     Standing Status:   Future     Standing Expiration Date:   7/26/2025    Basic metabolic panel Blood, Venous     Standing Status:   Future     Standing Expiration Date:   7/26/2025    Hemoglobin A1c (glycosylated) Blood, Venous     Standing Status:   Future     Standing Expiration Date:   7/26/2025    Urine Albumin/Creatinine Ratio Urine, Unspecified Source     Standing Status:   Future     Standing Expiration Date:   7/26/2025         Chriss Portillo DO  07/14/24

## 2024-08-05 ENCOUNTER — OFFICE VISIT (OUTPATIENT)
Dept: OTOLARYNGOLOGY | Facility: CLINIC | Age: 79
End: 2024-08-05
Payer: MEDICARE

## 2024-08-05 VITALS
DIASTOLIC BLOOD PRESSURE: 78 MMHG | TEMPERATURE: 97.8 F | BODY MASS INDEX: 39.34 KG/M2 | WEIGHT: 222 LBS | SYSTOLIC BLOOD PRESSURE: 146 MMHG | HEIGHT: 63 IN

## 2024-08-05 DIAGNOSIS — H61.21 IMPACTED CERUMEN OF RIGHT EAR: ICD-10-CM

## 2024-08-05 DIAGNOSIS — Z90.89 HISTORY OF RIGHT MASTOIDECTOMY: Primary | ICD-10-CM

## 2024-08-05 PROCEDURE — 69222 CLEAN OUT MASTOID CAVITY: CPT | Performed by: OTOLARYNGOLOGY

## 2024-08-05 PROCEDURE — 99212 OFFICE O/P EST SF 10 MIN: CPT | Mod: 25 | Performed by: OTOLARYNGOLOGY

## 2024-08-05 PROCEDURE — G0463 HOSPITAL OUTPT CLINIC VISIT: HCPCS | Performed by: OTOLARYNGOLOGY

## 2024-08-05 ASSESSMENT — ENCOUNTER SYMPTOMS
SINUS PRESSURE: 0
SORE THROAT: 0
CHILLS: 0
RHINORRHEA: 0
CONSTITUTIONAL NEGATIVE: 1
TROUBLE SWALLOWING: 0
FEVER: 0

## 2024-08-05 ASSESSMENT — PAIN SCALES - GENERAL: PAINLEVEL: 0-NO PAIN

## 2024-08-05 NOTE — PROGRESS NOTES
Subjective    CC: Mastoid cleaning    HPI: Consuelo Singh is a 78 y.o. female with history of right canal wall down mastoidectomy.  She is here for periodic cerumen removal.  No complaint of discharge or pain or change in hearing.    Past Medical History:   Diagnosis Date    Allergic Do not remember    Arthritis     Blood clot in vein     shani legs    Cataract     Deep vein thrombosis (CMS/HCC)     hx of    Diabetes (CMS/HCC)     type 2    Eczema     Fibromyalgia     GERD (gastroesophageal reflux disease)     Heart murmur     Hyperlipidemia     Hypertension     Obesity In my twenties    Peripheral neuropathy     toes    Pneumonia     Psoriasis     Scoliosis     Skin abnormalities 1985    Visual impairment When i was 17    Wears dentures     full set       Past Surgical History:   Procedure Laterality Date    CHOLECYSTECTOMY      COLONOSCOPY  03/11/2016    5 yr  follow up    COLONOSCOPY N/A 05/28/2021    Please call the patient regarding her abnormal result. Repeat colonoscopy in 5 year due to 2 tubular adenomas if patient otherwise healthy at that time. Also reasonable to discontinue further colonoscopy--Dr Laboy    EYE SURGERY  I believe 2020    HYSTERECTOMY      With BSO    OTHER SURGICAL HISTORY      Rt. radical mastoidectomy, rebuilt canal from growth         Current Outpatient Medications:     empagliflozin (Jardiance) 10 mg tablet, Take 1 tablet (10 mg total) by mouth daily, Disp: 90 tablet, Rfl: 3    blood-glucose sensor device, 1 Device every 14 (fourteen) days 1 device every 10 days., Disp: 3 each, Rfl: 11    omeprazole (PriLOSEC) 40 mg capsule, Take 1 capsule (40 mg total) by mouth daily, Disp: 90 capsule, Rfl: 3    nortriptyline (PAMELOR) 50 mg capsule, Take 1 capsule (50 mg total) by mouth daily, Disp: 90 capsule, Rfl: 1    pravastatin (PRAVACHOL) 20 mg tablet, Take 1 tablet (20 mg total) by mouth daily (Patient taking differently: Take 1 tablet (20 mg total) by mouth 3 (three) times a week), Disp: 90  "tablet, Rfl: 3    amLODIPine (NORVASC) 5 mg tablet, Take 1 tablet (5 mg total) by mouth daily, Disp: 90 tablet, Rfl: 3    losartan (COZAAR) 100 mg tablet, Take 1 tablet (100 mg total) by mouth daily, Disp: 90 tablet, Rfl: 3    metoprolol succinate XL (TOPROL-XL) 50 mg 24 hr tablet, Take 3 tablets (150 mg total) by mouth nightly, Disp: 270 tablet, Rfl: 3    NovoLOG Flexpen U-100 Insulin 100 unit/mL (3 mL) insulin pen, INJECT 20 UNITS UNDER THE SKIN DAILY WITH BREAKFAST AND 26 UNITS DAILY BEFORE LUNCH AND 37 UNITS DAILY WITH DINNER, Disp: 60 mL, Rfl: 5    pen needle, diabetic 31 gauge x 15/64\" needle, Inject 1 Needle under the skin 5 (five) times a day, Disp: 200 each, Rfl: 6    Toujeo Max U-300 SoloStar 300 unit/mL (3 mL) insulin pen insulin pen, INJECT 70 UNITS UNDER THE SKIN 2 TIMES A DAY, Disp: 45 mL, Rfl: 3    albuterol HFA 90 mcg/actuation inhaler, Inhale 2 puffs every 6 (six) hours as needed for wheezing, Disp: 18 g, Rfl: 0    chlorthalidone 25 mg tablet, Take 1 tablet (25 mg total) by mouth daily, Disp: 90 tablet, Rfl: 3    benzonatate (TESSALON) 200 mg capsule, Take 1 capsule (200 mg total) by mouth 3 (three) times a day as needed for cough, Disp: 30 capsule, Rfl: 0    pen needle, diabetic 31 gauge x 15/64\" needle, SMARTSIG:SUB-Q, Disp: , Rfl:     clobetasoL (TEMOVATE) 0.05 % ointment, Apply 1 application topically 2 (two) times a day as needed (Rash), Disp: 30 g, Rfl: 2    lutein 20 mg tablet, Take 1 tab/cap by mouth daily, Disp: , Rfl:     multivitamin (THERAGRAN) tablet tablet, Take 1 tablet by mouth daily, Disp: , Rfl:     aspirin 325 mg tablet, Take 1 tablet (325 mg total) by mouth daily, Disp: , Rfl:     Allergies   Allergen Reactions    Aloe Vera     Bacitracin      ZINC    Erythromycin Base     Formaldehyde     Gramicidins      OPHTHALMIC    Hydrocortisone      ACETATE    Latex     Levofloxacin     Metformin     Neomycin     Neomycin-Bacitracnzn-Polymyxnb     Polymyxin B     Prednisolone     " "Prednisone     Statins-Hmg-Coa Reductase Inhibitors     Sulfamethoxazole     Sulfamethoxazole-Trimethoprim     Thimerosal     Tresiba Flextouch U-100 [Insulin Degludec] Diarrhea and Other (see comments)     Abdominal pain    Trimethoprim     Humalog Kwikpen Insulin [Insulin Lispro] Rash       family history includes Bone cancer in her maternal grandfather; Breast cancer in her mother and mother's sister; Cancer in her mother; Diabetes in her father; Lung cancer in her maternal grandmother and mother's sister; Pancreatic cancer in her mother's brother.    Social History     Tobacco Use    Smoking status: Former     Current packs/day: 0.00     Average packs/day: 0.5 packs/day for 40.0 years (20.0 ttl pk-yrs)     Types: Cigarettes     Start date: 1967     Quit date: 2007     Years since quittin.6    Smokeless tobacco: Never   Substance Use Topics    Alcohol use: Yes     Comment: Maybe once a month    Drug use: No       Review of Systems   Constitutional: Negative.  Negative for chills and fever.   HENT: Negative.  Negative for congestion, ear discharge, ear pain, hearing loss, nosebleeds, postnasal drip, rhinorrhea, sinus pressure, sneezing, sore throat, tinnitus and trouble swallowing.        Objective    /78 (BP Location: Left arm, Patient Position: Sitting, Cuff Size: Regular Adult)   Temp 36.6 °C (97.8 °F) (Temporal)   Ht 1.6 m (5' 3\")   Wt 100.7 kg (222 lb)   BMI 39.33 kg/m²     General: Well developed well-nourished female no acute distress.    Ears: Right external ear is normal, right external auditory canal wide due to prior meatoplasty.  Cerumen present in mastoid cavity.    Mastoid debridement, complex  Using microscope, forceps, sharp pick and cerumen loop mastoid cavity was debrided free of an adherent coating of cerumen.  This was fairly prominent today, more so than usual for this patient.  No evidence of infection.  Underlying tissues appear stable and healthy.    Diagnosis    1. " History of right mastoidectomy    2. Impacted cerumen of right ear        Recommendations      Mastoid cavity on the right debrided today.  Patient appears healthy after debridement.  Follow-up in 3 months, sooner as needed for problems.

## 2024-09-03 DIAGNOSIS — I10 ESSENTIAL HYPERTENSION: ICD-10-CM

## 2024-09-03 NOTE — TELEPHONE ENCOUNTER
Care Due:                  Date            Visit Type   Department     Provider  --------------------------------------------------------------------------------                                           [facility]  Last Visit: 07-      NEW PATIENT  MEDICINE       BRE HERNANDEZ                                           [facility]  Mary Alice Pete,  Next Visit: 09-      NEW PATIENT  MEDICINE       MD Shirley                                                            Last  Test          Frequency    Reason                     Performed    Due Date  --------------------------------------------------------------------------------  HBA1C.......  6 months...  NovoLOG, Toujeo,           03- 09-                             empagliflozin............    [employer] Embedded Care Due Messages. Reference number: [id number]. 9/03/2024 11:53:12 AM ANITA

## 2024-09-04 DIAGNOSIS — I10 ESSENTIAL HYPERTENSION: ICD-10-CM

## 2024-09-04 RX ORDER — METOPROLOL SUCCINATE 50 MG/1
150 TABLET, EXTENDED RELEASE ORAL NIGHTLY
Qty: 270 TABLET | Refills: 1 | Status: SHIPPED | OUTPATIENT
Start: 2024-09-04 | End: 2024-12-20 | Stop reason: SDUPTHER

## 2024-09-04 RX ORDER — CHLORTHALIDONE 25 MG/1
25 TABLET ORAL DAILY
Qty: 90 TABLET | Refills: 1 | Status: SHIPPED | OUTPATIENT
Start: 2024-09-04 | End: 2024-12-20 | Stop reason: SDUPTHER

## 2024-09-04 NOTE — TELEPHONE ENCOUNTER
Medication refill request:  Medication(s):    not filled due to Request has not been filled by current PCP listed, please review

## 2024-09-04 NOTE — TELEPHONE ENCOUNTER
No care due was identified.  [facility] Embedded Care Due Messages. Reference number: [id number]. 9/04/2024 10:31:27 AM ANITA

## 2024-09-05 RX ORDER — CHLORTHALIDONE 25 MG/1
25 TABLET ORAL DAILY
OUTPATIENT
Start: 2024-09-05 | End: 2025-09-05

## 2024-09-05 RX ORDER — METOPROLOL SUCCINATE 50 MG/1
150 TABLET, EXTENDED RELEASE ORAL NIGHTLY
OUTPATIENT
Start: 2024-09-05 | End: 2025-09-05

## 2024-09-26 ENCOUNTER — LAB (OUTPATIENT)
Dept: LAB | Facility: CLINIC | Age: 79
End: 2024-09-26
Payer: MEDICARE

## 2024-09-26 ENCOUNTER — TELEPHONE (OUTPATIENT)
Dept: FAMILY MEDICINE | Facility: CLINIC | Age: 79
End: 2024-09-26

## 2024-09-26 ENCOUNTER — OFFICE VISIT (OUTPATIENT)
Dept: FAMILY MEDICINE | Facility: CLINIC | Age: 79
End: 2024-09-26
Payer: MEDICARE

## 2024-09-26 VITALS
OXYGEN SATURATION: 93 % | HEART RATE: 83 BPM | TEMPERATURE: 97.9 F | WEIGHT: 221 LBS | BODY MASS INDEX: 39.15 KG/M2 | SYSTOLIC BLOOD PRESSURE: 120 MMHG | DIASTOLIC BLOOD PRESSURE: 70 MMHG

## 2024-09-26 DIAGNOSIS — Z76.89 ESTABLISHING CARE WITH NEW DOCTOR, ENCOUNTER FOR: Primary | ICD-10-CM

## 2024-09-26 DIAGNOSIS — E11.22 TYPE 2 DIABETES MELLITUS WITH STAGE 3A CHRONIC KIDNEY DISEASE, WITH LONG-TERM CURRENT USE OF INSULIN (CMS/HCC): ICD-10-CM

## 2024-09-26 DIAGNOSIS — Z79.4 TYPE 2 DIABETES MELLITUS WITH STAGE 3A CHRONIC KIDNEY DISEASE, WITH LONG-TERM CURRENT USE OF INSULIN (CMS/HCC): ICD-10-CM

## 2024-09-26 DIAGNOSIS — N18.31 STAGE 3A CHRONIC KIDNEY DISEASE (CMS/HCC): Chronic | ICD-10-CM

## 2024-09-26 DIAGNOSIS — N18.31 TYPE 2 DIABETES MELLITUS WITH STAGE 3A CHRONIC KIDNEY DISEASE, WITH LONG-TERM CURRENT USE OF INSULIN (CMS/HCC): ICD-10-CM

## 2024-09-26 PROBLEM — Z12.11 SCREENING FOR MALIGNANT NEOPLASM OF COLON: Status: RESOLVED | Noted: 2021-05-04 | Resolved: 2024-09-26

## 2024-09-26 LAB
ANION GAP SERPL CALC-SCNC: 12 MMOL/L (ref 3–11)
BASOPHILS # BLD AUTO: 0.1 10*3/UL
BASOPHILS NFR BLD AUTO: 0.5 % (ref 0–2)
BUN SERPL-MCNC: 30 MG/DL (ref 7–25)
CALCIUM SERPL-MCNC: 10.1 MG/DL (ref 8.6–10.3)
CHLORIDE SERPL-SCNC: 101 MMOL/L (ref 98–107)
CO2 SERPL-SCNC: 26 MMOL/L (ref 21–32)
CREAT SERPL-MCNC: 1.4 MG/DL (ref 0.6–1.1)
CREAT UR-MCNC: 66.3 MG/DL
EGFRCR SERPLBLD CKD-EPI 2021: 39 ML/MIN/1.73M*2
EOSINOPHIL # BLD AUTO: 0.3 10*3/UL
EOSINOPHIL NFR BLD AUTO: 2.7 % (ref 0–3)
ERYTHROCYTE [DISTWIDTH] IN BLOOD BY AUTOMATED COUNT: 15.2 % (ref 11.5–14)
EST. AVERAGE GLUCOSE BLD GHB EST-MCNC: 151.3 MG/DL
GLUCOSE SERPL-MCNC: 166 MG/DL (ref 70–105)
HBA1C MFR BLD: 6.9 % (ref 4–6)
HCT VFR BLD AUTO: 44.5 % (ref 34–45)
HGB BLD-MCNC: 14.3 G/DL (ref 11.5–15.5)
LYMPHOCYTES # BLD AUTO: 2.7 10*3/UL
LYMPHOCYTES NFR BLD AUTO: 23.1 % (ref 11–47)
MCH RBC QN AUTO: 29.5 PG (ref 28–33)
MCHC RBC AUTO-ENTMCNC: 32.1 G/DL (ref 32–36)
MCV RBC AUTO: 91.7 FL (ref 81–97)
MICROALBUMIN UR-MCNC: 61.8 MG/L (ref 0–30)
MICROALBUMIN/CREAT UR: 93.2 MG/GCREAT
MONOCYTES # BLD AUTO: 0.7 10*3/UL
MONOCYTES NFR BLD AUTO: 5.6 % (ref 3–11)
NEUTROPHILS # BLD AUTO: 8 10*3/UL
NEUTROPHILS NFR BLD AUTO: 68.1 % (ref 41–81)
PLATELET # BLD AUTO: 263 10*3/UL (ref 140–350)
PMV BLD AUTO: 8 FL (ref 6.9–10.8)
POTASSIUM SERPL-SCNC: 3.9 MMOL/L (ref 3.5–5.1)
RBC # BLD AUTO: 4.85 10*6/UL (ref 3.7–5.3)
SODIUM SERPL-SCNC: 139 MMOL/L (ref 135–145)
WBC # BLD AUTO: 11.7 10*3/UL (ref 4.5–10.5)

## 2024-09-26 PROCEDURE — 85025 COMPLETE CBC W/AUTO DIFF WBC: CPT

## 2024-09-26 PROCEDURE — 99214 OFFICE O/P EST MOD 30 MIN: CPT | Performed by: FAMILY MEDICINE

## 2024-09-26 PROCEDURE — 36415 COLL VENOUS BLD VENIPUNCTURE: CPT

## 2024-09-26 PROCEDURE — 83036 HEMOGLOBIN GLYCOSYLATED A1C: CPT

## 2024-09-26 PROCEDURE — 82570 ASSAY OF URINE CREATININE: CPT

## 2024-09-26 PROCEDURE — 80048 BASIC METABOLIC PNL TOTAL CA: CPT

## 2024-09-26 PROCEDURE — G2211 COMPLEX E/M VISIT ADD ON: HCPCS | Performed by: FAMILY MEDICINE

## 2024-09-26 PROCEDURE — G0463 HOSPITAL OUTPT CLINIC VISIT: HCPCS | Performed by: FAMILY MEDICINE

## 2024-09-26 NOTE — PROGRESS NOTES
Subjective      Consuelo Singh is a 79 y.o. female who presents for   Chief Complaint   Patient presents with    Establish Care     Patient is here to establish care today.           History of Present Illness    The patient, with a history of type 2 diabetes, allergies, arthritis, a hx of DVT the left leg (30 yrs ago), eczema, fibromyalgia, heartburn, high cholesterol, high blood pressure, peripheral neuropathy, and pneumonia, presents to establish care. The patient's primary concern is the management of her diabetes. She recently switched from Dexcom to Rika 3 for continuous glucose monitoring and has been adjusting her insulin doses accordingly. She reports that her blood sugars have been in the 200s for the past few days due to attending a wedding. She also mentions having occasional constipation, which has improved since starting Jardiance. The patient is a former smoker and occasionally drinks alcohol. She has a family history of breast cancer in her mother and diabetes in her father.           Allergies   Allergen Reactions    Aloe Vera     Bacitracin      ZINC    Erythromycin Base     Formaldehyde     Gramicidins      OPHTHALMIC    Hydrocortisone      ACETATE    Latex     Levofloxacin     Metformin     Neomycin     Neomycin-Bacitracnzn-Polymyxnb     Polymyxin B     Prednisolone     Prednisone     Statins-Hmg-Coa Reductase Inhibitors     Sulfamethoxazole     Sulfamethoxazole-Trimethoprim     Thimerosal     Tresiba Flextouch U-100 [Insulin Degludec] Diarrhea and Other (see comments)     Abdominal pain    Trimethoprim     Humalog Kwikpen Insulin [Insulin Lispro] Rash     Current Outpatient Medications   Medication Sig Dispense Refill    chlorthalidone 25 mg tablet Take 1 tablet (25 mg total) by mouth daily 90 tablet 1    metoprolol succinate XL (TOPROL-XL) 50 mg 24 hr tablet Take 3 tablets (150 mg total) by mouth nightly 270 tablet 1    empagliflozin (Jardiance) 10 mg tablet Take 1 tablet (10 mg total) by  "mouth daily 90 tablet 3    blood-glucose sensor device 1 Device every 14 (fourteen) days 1 device every 10 days. 3 each 11    omeprazole (PriLOSEC) 40 mg capsule Take 1 capsule (40 mg total) by mouth daily 90 capsule 3    nortriptyline (PAMELOR) 50 mg capsule Take 1 capsule (50 mg total) by mouth daily 90 capsule 1    pravastatin (PRAVACHOL) 20 mg tablet Take 1 tablet (20 mg total) by mouth daily (Patient taking differently: Take 1 tablet (20 mg total) by mouth 3 (three) times a week) 90 tablet 3    amLODIPine (NORVASC) 5 mg tablet Take 1 tablet (5 mg total) by mouth daily 90 tablet 3    losartan (COZAAR) 100 mg tablet Take 1 tablet (100 mg total) by mouth daily 90 tablet 3    NovoLOG Flexpen U-100 Insulin 100 unit/mL (3 mL) insulin pen INJECT 20 UNITS UNDER THE SKIN DAILY WITH BREAKFAST AND 26 UNITS DAILY BEFORE LUNCH AND 37 UNITS DAILY WITH DINNER 60 mL 5    pen needle, diabetic 31 gauge x 15/64\" needle Inject 1 Needle under the skin 5 (five) times a day 200 each 6    Toujeo Max U-300 SoloStar 300 unit/mL (3 mL) insulin pen insulin pen INJECT 70 UNITS UNDER THE SKIN 2 TIMES A DAY 45 mL 3    albuterol HFA 90 mcg/actuation inhaler Inhale 2 puffs every 6 (six) hours as needed for wheezing 18 g 0    benzonatate (TESSALON) 200 mg capsule Take 1 capsule (200 mg total) by mouth 3 (three) times a day as needed for cough 30 capsule 0    pen needle, diabetic 31 gauge x 15/64\" needle SMARTSIG:SUB-Q      clobetasoL (TEMOVATE) 0.05 % ointment Apply 1 application topically 2 (two) times a day as needed (Rash) 30 g 2    lutein 20 mg tablet Take 1 tab/cap by mouth daily      multivitamin (THERAGRAN) tablet tablet Take 1 tablet by mouth daily      aspirin 325 mg tablet Take 1 tablet (325 mg total) by mouth daily      finerenone 10 mg tablet Take 10 mg by mouth daily 30 tablet 2     No current facility-administered medications for this visit.     Past Medical History:   Diagnosis Date    Allergic Do not remember    Arthritis     " Cataract     Deep vein thrombosis (CMS/[facility]) 1994    hx of in left leg    Diabetes (CMS/[facility])     type 2    Eczema     Fibromyalgia     GERD (gastroesophageal reflux disease)     Heart murmur     Hyperlipidemia     Hypertension     Obesity In my twenties    Peripheral neuropathy     toes    Psoriasis     Scoliosis     Skin abnormalities 1985    Wears dentures     full set           Objective   /70 (BP Location: Right arm, Patient Position: Sitting, Cuff Size: Large Adult)   Pulse 83   Temp 36.6 °C (97.9 °F) (Temporal)   Wt 100.2 kg (221 lb)   SpO2 93%   BMI 39.15 kg/m²     Physical Exam  Vitals and nursing note reviewed.   Constitutional:       General: She is not in acute distress.     Appearance: Normal appearance. She is obese.   Cardiovascular:      Rate and Rhythm: Normal rate and regular rhythm.      Heart sounds: No murmur heard.  Pulmonary:      Effort: Pulmonary effort is normal. No respiratory distress.      Breath sounds: Normal breath sounds.   Abdominal:      General: Abdomen is flat. Bowel sounds are normal. There is no distension.      Palpations: Abdomen is soft.      Tenderness: There is no abdominal tenderness.   Skin:     General: Skin is warm and dry.   Neurological:      Mental Status: She is alert.   Psychiatric:         Mood and Affect: Mood normal.         Behavior: Behavior normal.         Results    LABS  A1c: 7.1% (03/2024)  GFR: 52 mL/min/1.73m² (03/2024)           Assessment/Plan     Consuelo was seen today for establish care.    Diagnoses and all orders for this visit:    Establishing care with new doctor, encounter for    Type 2 diabetes mellitus with stage 3a chronic kidney disease, with long-term current use of insulin (CMS/[facility])  -     Hemoglobin A1c (glycosylated); Future  -     Hemoglobin A1c (glycosylated); Future  -     Urine Albumin/Creatinine Ratio; Future  -     CBC w/auto differential; Future  -     Basic metabolic panel; Future  -     finerenone; Take 10 mg by mouth  daily  -     Basic metabolic panel; Future    Stage 3a chronic kidney disease (CMS/[facility])  -     finerenone; Take 10 mg by mouth daily  -     Basic metabolic panel; Future         Assessment/Plan     Type 2 Diabetes Mellitus  Patient reports using a Rika 3 continuous glucose monitor and prefers it over the Dexcom. She is on Trulicity 70 units twice daily, NovoLog with varying doses at meals, and recently started Jardiance. Last A1c was 7.1, but patient reports her 90-day average glucose is 6.8. Few episodes of hypoglycemia reported.  -Continue current regimen.  -Order A1c, CBC, basic metabolic panel, and urine microalbumin today.  -Follow up in 3 months with repeat A1c.  -Encourage patient to maintain healthy diet and monitor blood sugars.    Hyperlipidemia  Patient is on Pravastatin 20mg three times weekly due to statin intolerance.  -Continue current regimen.    Hypertension  Patient is on Metoprolol 150mg daily, Losartan 100mg daily, amlodipine 5 mg and Chlorthalidone 25mg daily.  -Continue current regimen.    Gastroesophageal Reflux Disease  Patient is on Omeprazole for heartburn.  -Continue current regimen.    Chronic Kidney Disease  Patient was informed she is at stage 3A. She is on Jardiance which may provide renal benefits.  -Order basic metabolic panel today to assess current kidney function.    Fibromyalgia  Patient is on Nortriptyline 50mg daily.  -Continue current regimen.    Asthma  Patient has an Albuterol inhaler as needed.  -Continue current regimen.    General Health Maintenance / Followup Plans  -Schedule Medicare Wellness visit in 3 months.  -Continue Aspirin 325mg daily for cardiovascular disease prevention.        Follow up in 3 months or sooner PRN.   Labs needed: A1c (orders in)    Patient stated understanding and their questions were answered to the best of my abilities.      Shirley Freeman MD      A voice recognition program may have been used to aid in documentation. Words are not  always transcribed exactly as spoken. Errors may be present despite efforts to correct and should be taken within the context of the discussion. Please contact the provider if you need assistance interpreting this documentation

## 2024-09-27 ENCOUNTER — DOCUMENTATION (OUTPATIENT)
Dept: FAMILY MEDICINE | Facility: CLINIC | Age: 79
End: 2024-09-27
Payer: MEDICARE

## 2024-09-27 DIAGNOSIS — N18.31 STAGE 3A CHRONIC KIDNEY DISEASE (CMS/HCC): Chronic | ICD-10-CM

## 2024-09-27 DIAGNOSIS — Z79.4 TYPE 2 DIABETES MELLITUS WITH STAGE 3A CHRONIC KIDNEY DISEASE, WITH LONG-TERM CURRENT USE OF INSULIN (CMS/HCC): ICD-10-CM

## 2024-09-27 DIAGNOSIS — E11.22 TYPE 2 DIABETES MELLITUS WITH STAGE 3A CHRONIC KIDNEY DISEASE, WITH LONG-TERM CURRENT USE OF INSULIN (CMS/HCC): ICD-10-CM

## 2024-09-27 DIAGNOSIS — N18.31 TYPE 2 DIABETES MELLITUS WITH STAGE 3A CHRONIC KIDNEY DISEASE, WITH LONG-TERM CURRENT USE OF INSULIN (CMS/HCC): ICD-10-CM

## 2024-10-21 DIAGNOSIS — F32.A DEPRESSIVE DISORDER: ICD-10-CM

## 2024-10-21 NOTE — TELEPHONE ENCOUNTER
No care due was identified.  Health [facility] Embedded Care Due Messages. Reference number: [id number]. 10/21/2024 12:50:46 PM MDT

## 2024-10-22 NOTE — TELEPHONE ENCOUNTER
Medication refill request:  Medication(s):  nortriptyline not filled due to Request has not been filled by current PCP listed, please review

## 2024-10-23 ENCOUNTER — PATIENT MESSAGE (OUTPATIENT)
Dept: FAMILY MEDICINE | Facility: CLINIC | Age: 79
End: 2024-10-23
Payer: MEDICARE

## 2024-10-23 DIAGNOSIS — F32.A DEPRESSIVE DISORDER: ICD-10-CM

## 2024-10-23 DIAGNOSIS — Z79.4 TYPE 2 DIABETES MELLITUS WITH DIABETIC NEPHROPATHY, WITH LONG-TERM CURRENT USE OF INSULIN (CMS/HCC): Primary | Chronic | ICD-10-CM

## 2024-10-23 DIAGNOSIS — E11.21 TYPE 2 DIABETES MELLITUS WITH DIABETIC NEPHROPATHY, WITH LONG-TERM CURRENT USE OF INSULIN (CMS/HCC): Primary | Chronic | ICD-10-CM

## 2024-10-23 RX ORDER — NORTRIPTYLINE HYDROCHLORIDE 50 MG/1
50 CAPSULE ORAL DAILY
Qty: 90 CAPSULE | Refills: 0 | Status: SHIPPED | OUTPATIENT
Start: 2024-10-23 | End: 2024-12-20 | Stop reason: SDUPTHER

## 2024-10-23 NOTE — TELEPHONE ENCOUNTER
No care due was identified.  Health [facility] Embedded Care Due Messages. Reference number: [id number]. 10/23/2024 1:01:41 PM ANITA

## 2024-10-25 ENCOUNTER — LAB (OUTPATIENT)
Dept: LAB | Facility: CLINIC | Age: 79
End: 2024-10-25
Payer: MEDICARE

## 2024-10-25 ENCOUNTER — PATIENT MESSAGE (OUTPATIENT)
Dept: FAMILY MEDICINE | Facility: CLINIC | Age: 79
End: 2024-10-25
Payer: MEDICARE

## 2024-10-25 DIAGNOSIS — N18.31 STAGE 3A CHRONIC KIDNEY DISEASE (CMS/HCC): Chronic | ICD-10-CM

## 2024-10-25 DIAGNOSIS — N18.31 TYPE 2 DIABETES MELLITUS WITH STAGE 3A CHRONIC KIDNEY DISEASE, WITH LONG-TERM CURRENT USE OF INSULIN (CMS/HCC): ICD-10-CM

## 2024-10-25 DIAGNOSIS — Z79.4 TYPE 2 DIABETES MELLITUS WITH DIABETIC NEPHROPATHY, WITH LONG-TERM CURRENT USE OF INSULIN (CMS/HCC): Chronic | ICD-10-CM

## 2024-10-25 DIAGNOSIS — Z79.4 TYPE 2 DIABETES MELLITUS WITH STAGE 3A CHRONIC KIDNEY DISEASE, WITH LONG-TERM CURRENT USE OF INSULIN (CMS/HCC): ICD-10-CM

## 2024-10-25 DIAGNOSIS — E11.21 TYPE 2 DIABETES MELLITUS WITH DIABETIC NEPHROPATHY, WITH LONG-TERM CURRENT USE OF INSULIN (CMS/HCC): Chronic | ICD-10-CM

## 2024-10-25 DIAGNOSIS — E11.22 TYPE 2 DIABETES MELLITUS WITH STAGE 3A CHRONIC KIDNEY DISEASE, WITH LONG-TERM CURRENT USE OF INSULIN (CMS/HCC): ICD-10-CM

## 2024-10-25 LAB
ANION GAP SERPL CALC-SCNC: 13 MMOL/L (ref 3–11)
BUN SERPL-MCNC: 28 MG/DL (ref 7–25)
CALCIUM SERPL-MCNC: 10 MG/DL (ref 8.6–10.3)
CHLORIDE SERPL-SCNC: 102 MMOL/L (ref 98–107)
CO2 SERPL-SCNC: 25 MMOL/L (ref 21–32)
CREAT SERPL-MCNC: 1.29 MG/DL (ref 0.6–1.1)
EGFRCR SERPLBLD CKD-EPI 2021: 42 ML/MIN/1.73M*2
EST. AVERAGE GLUCOSE BLD GHB EST-MCNC: 159.9 MG/DL
GLUCOSE SERPL-MCNC: 166 MG/DL (ref 70–105)
HBA1C MFR BLD: 7.2 % (ref 4–6)
POTASSIUM SERPL-SCNC: 4.9 MMOL/L (ref 3.5–5.1)
SODIUM SERPL-SCNC: 140 MMOL/L (ref 135–145)

## 2024-10-25 PROCEDURE — 36415 COLL VENOUS BLD VENIPUNCTURE: CPT

## 2024-10-25 PROCEDURE — 83036 HEMOGLOBIN GLYCOSYLATED A1C: CPT

## 2024-10-25 PROCEDURE — 80048 BASIC METABOLIC PNL TOTAL CA: CPT

## 2024-10-25 RX ORDER — NORTRIPTYLINE HYDROCHLORIDE 50 MG/1
50 CAPSULE ORAL DAILY
OUTPATIENT
Start: 2024-10-25

## 2024-11-04 ENCOUNTER — OFFICE VISIT (OUTPATIENT)
Dept: OTOLARYNGOLOGY | Facility: CLINIC | Age: 79
End: 2024-11-04
Payer: MEDICARE

## 2024-11-04 VITALS
HEART RATE: 72 BPM | TEMPERATURE: 97.3 F | DIASTOLIC BLOOD PRESSURE: 68 MMHG | BODY MASS INDEX: 38.97 KG/M2 | OXYGEN SATURATION: 94 % | SYSTOLIC BLOOD PRESSURE: 134 MMHG | WEIGHT: 220 LBS | RESPIRATION RATE: 18 BRPM

## 2024-11-04 DIAGNOSIS — H61.21 IMPACTED CERUMEN OF RIGHT EAR: ICD-10-CM

## 2024-11-04 DIAGNOSIS — Z90.89 HISTORY OF RIGHT MASTOIDECTOMY: Primary | ICD-10-CM

## 2024-11-04 PROCEDURE — 69222 CLEAN OUT MASTOID CAVITY: CPT | Mod: RT | Performed by: OTOLARYNGOLOGY

## 2024-11-04 PROCEDURE — 69222 CLEAN OUT MASTOID CAVITY: CPT | Performed by: OTOLARYNGOLOGY

## 2024-11-04 PROCEDURE — 99213 OFFICE O/P EST LOW 20 MIN: CPT | Mod: 25 | Performed by: OTOLARYNGOLOGY

## 2024-11-04 PROCEDURE — G0463 HOSPITAL OUTPT CLINIC VISIT: HCPCS | Performed by: OTOLARYNGOLOGY

## 2024-11-04 ASSESSMENT — ENCOUNTER SYMPTOMS
CONSTITUTIONAL NEGATIVE: 1
SORE THROAT: 0
RHINORRHEA: 0
CHILLS: 0
SINUS PRESSURE: 0
FEVER: 0
TROUBLE SWALLOWING: 0

## 2024-11-04 ASSESSMENT — PAIN SCALES - GENERAL: PAINLEVEL: 0-NO PAIN

## 2024-11-04 NOTE — PROGRESS NOTES
Subjective    CC: Mastoid debridement    HPI: Consuelo Singh is a 79 y.o. female status post right canal wall down mastoidectomy years ago.  She is here for her periodic debridement which we do every 3 months.  No complaints today of anything new or problematic.    Past Medical History:   Diagnosis Date    Allergic Do not remember    Arthritis     Cataract     Deep vein thrombosis (CMS/HCC) 1994    hx of in left leg    Diabetes (CMS/HCC)     type 2    Eczema     Fibromyalgia     GERD (gastroesophageal reflux disease)     Heart murmur     Hyperlipidemia     Hypertension     Obesity In my twenties    Peripheral neuropathy     toes    Psoriasis     Scoliosis     Skin abnormalities 1985    Wears dentures     full set       Past Surgical History:   Procedure Laterality Date    CHOLECYSTECTOMY      COLONOSCOPY  03/11/2016    5 yr  follow up    COLONOSCOPY N/A 05/28/2021    Please call the patient regarding her abnormal result. Repeat colonoscopy in 5 year due to 2 tubular adenomas if patient otherwise healthy at that time. Also reasonable to discontinue further colonoscopy--Dr Laboy    EYE SURGERY  I believe 2020    cataracts    HYSTERECTOMY      With BSO    OTHER SURGICAL HISTORY      Rt. radical mastoidectomy, rebuilt canal from growth         Current Outpatient Medications:     nortriptyline (PAMELOR) 50 mg capsule, Take 1 capsule (50 mg total) by mouth daily, Disp: 90 capsule, Rfl: 0    finerenone 10 mg tablet, Take 10 mg by mouth daily, Disp: 30 tablet, Rfl: 2    chlorthalidone 25 mg tablet, Take 1 tablet (25 mg total) by mouth daily, Disp: 90 tablet, Rfl: 1    metoprolol succinate XL (TOPROL-XL) 50 mg 24 hr tablet, Take 3 tablets (150 mg total) by mouth nightly, Disp: 270 tablet, Rfl: 1    empagliflozin (Jardiance) 10 mg tablet, Take 1 tablet (10 mg total) by mouth daily, Disp: 90 tablet, Rfl: 3    blood-glucose sensor device, 1 Device every 14 (fourteen) days 1 device every 10 days., Disp: 3 each, Rfl: 11     "omeprazole (PriLOSEC) 40 mg capsule, Take 1 capsule (40 mg total) by mouth daily, Disp: 90 capsule, Rfl: 3    pravastatin (PRAVACHOL) 20 mg tablet, Take 1 tablet (20 mg total) by mouth daily (Patient taking differently: Take 1 tablet (20 mg total) by mouth 3 (three) times a week), Disp: 90 tablet, Rfl: 3    amLODIPine (NORVASC) 5 mg tablet, Take 1 tablet (5 mg total) by mouth daily, Disp: 90 tablet, Rfl: 3    losartan (COZAAR) 100 mg tablet, Take 1 tablet (100 mg total) by mouth daily, Disp: 90 tablet, Rfl: 3    NovoLOG Flexpen U-100 Insulin 100 unit/mL (3 mL) insulin pen, INJECT 20 UNITS UNDER THE SKIN DAILY WITH BREAKFAST AND 26 UNITS DAILY BEFORE LUNCH AND 37 UNITS DAILY WITH DINNER, Disp: 60 mL, Rfl: 5    pen needle, diabetic 31 gauge x 15/64\" needle, Inject 1 Needle under the skin 5 (five) times a day, Disp: 200 each, Rfl: 6    Toujeo Max U-300 SoloStar 300 unit/mL (3 mL) insulin pen insulin pen, INJECT 70 UNITS UNDER THE SKIN 2 TIMES A DAY, Disp: 45 mL, Rfl: 3    albuterol HFA 90 mcg/actuation inhaler, Inhale 2 puffs every 6 (six) hours as needed for wheezing, Disp: 18 g, Rfl: 0    benzonatate (TESSALON) 200 mg capsule, Take 1 capsule (200 mg total) by mouth 3 (three) times a day as needed for cough, Disp: 30 capsule, Rfl: 0    pen needle, diabetic 31 gauge x 15/64\" needle, SMARTSIG:SUB-Q, Disp: , Rfl:     clobetasoL (TEMOVATE) 0.05 % ointment, Apply 1 application topically 2 (two) times a day as needed (Rash), Disp: 30 g, Rfl: 2    lutein 20 mg tablet, Take 1 tab/cap by mouth daily, Disp: , Rfl:     multivitamin (THERAGRAN) tablet tablet, Take 1 tablet by mouth daily, Disp: , Rfl:     aspirin 325 mg tablet, Take 1 tablet (325 mg total) by mouth daily, Disp: , Rfl:     Allergies   Allergen Reactions    Aloe Vera     Bacitracin      ZINC    Erythromycin Base     Formaldehyde     Gramicidins      OPHTHALMIC    Hydrocortisone      ACETATE    Latex     Levofloxacin     Metformin     Neomycin     " Neomycin-Bacitracnzn-Polymyxnb     Polymyxin B     Prednisolone     Prednisone     Statins-Hmg-Coa Reductase Inhibitors     Sulfamethoxazole     Sulfamethoxazole-Trimethoprim     Thimerosal     Tresiba Flextouch U-100 [Insulin Degludec] Diarrhea and Other (see comments)     Abdominal pain    Trimethoprim     Humalog Kwikpen Insulin [Insulin Lispro] Rash       family history includes Bone cancer in her maternal grandfather; Breast cancer in her mother's sister; Breast cancer (age of onset: 70 - 79) in her mother; Cancer in her mother; Diabetes in her father; Hyperlipidemia in her son; Lung cancer in her maternal grandmother and mother's sister; No Known Problems in her brother and sister; Pancreatic cancer in her mother's brother; Thyroid disease in her daughter.    Social History     Tobacco Use    Smoking status: Former     Current packs/day: 0.00     Average packs/day: 0.5 packs/day for 40.0 years (20.0 ttl pk-yrs)     Types: Cigarettes     Start date: 1967     Quit date: 2007     Years since quittin.8    Smokeless tobacco: Never   Vaping Use    Vaping status: Never Used   Substance Use Topics    Alcohol use: Yes     Comment: Maybe once a month    Drug use: No       Review of Systems   Constitutional: Negative.  Negative for chills and fever.   HENT: Negative.  Negative for congestion, ear discharge, ear pain, hearing loss, nosebleeds, postnasal drip, rhinorrhea, sinus pressure, sneezing, sore throat, tinnitus and trouble swallowing.        Objective    /68 (BP Location: Left arm, Patient Position: Sitting, Cuff Size: Regular Adult)   Pulse 72   Temp 36.3 °C (97.3 °F) (Temporal)   Resp 18   Wt 99.8 kg (220 lb)   SpO2 94%   BMI 38.97 kg/m²     PHYSICAL EXAMINATION:      GENERAL:  Well-developed, well-nourished.  The patient is alert, oriented and in no acute distress.        PSYCH: Normal mood and affect.        SKIN: No evidence of significant rash or concerning skin lesion on the head or  neck.      HEAD/FACE:  Normally formed without evidence of mass or trauma.  The sinuses are nontender.        EARS: Bilateral external ears are normal.  Left external auditory canal and TM normal.  Right external auditory canal and TM show evidence of prior canal wall down mastoidectomy.  Some cerumen present.  No infection.    Procedure: Mastoid debridement, complex  Right mastoid cavity examined with operating microscope.  Combination of curettes and alligator forceps were used to remove a coating of cerumen from the entire cavity and nataliya-TM.  No evidence of infection, perforation, granulation.      Diagnosis    1. History of right mastoidectomy    2. Impacted cerumen of right ear        Recommendations    Patient with healthy appearing right mastoid cavity.  Debridement done today.  Follow-up in 3 months or sooner as needed.

## 2024-11-07 DIAGNOSIS — E11.9 DIABETES MELLITUS WITHOUT COMPLICATION (CMS/HCC): ICD-10-CM

## 2024-11-07 RX ORDER — INSULIN GLARGINE 300 U/ML
70 INJECTION, SOLUTION SUBCUTANEOUS 2 TIMES DAILY
Qty: 45 ML | Refills: 2 | Status: SHIPPED | OUTPATIENT
Start: 2024-11-07 | End: 2024-12-20 | Stop reason: SDUPTHER

## 2024-11-07 NOTE — TELEPHONE ENCOUNTER
No care due was identified.  Health [facility] Embedded Care Due Messages. Reference number: [id number]. 11/07/2024 12:07:35 PM MST

## 2024-11-14 DIAGNOSIS — N18.31 STAGE 3A CHRONIC KIDNEY DISEASE (CMS/HCC): Chronic | ICD-10-CM

## 2024-11-14 DIAGNOSIS — Z79.4 TYPE 2 DIABETES MELLITUS WITH STAGE 3A CHRONIC KIDNEY DISEASE, WITH LONG-TERM CURRENT USE OF INSULIN (CMS/HCC): ICD-10-CM

## 2024-11-14 DIAGNOSIS — E11.22 TYPE 2 DIABETES MELLITUS WITH STAGE 3A CHRONIC KIDNEY DISEASE, WITH LONG-TERM CURRENT USE OF INSULIN (CMS/HCC): ICD-10-CM

## 2024-11-14 DIAGNOSIS — Z79.4 TYPE 2 DIABETES MELLITUS WITH DIABETIC NEPHROPATHY, WITH LONG-TERM CURRENT USE OF INSULIN (CMS/HCC): Chronic | ICD-10-CM

## 2024-11-14 DIAGNOSIS — N18.31 TYPE 2 DIABETES MELLITUS WITH STAGE 3A CHRONIC KIDNEY DISEASE, WITH LONG-TERM CURRENT USE OF INSULIN (CMS/HCC): ICD-10-CM

## 2024-11-14 DIAGNOSIS — E11.21 TYPE 2 DIABETES MELLITUS WITH DIABETIC NEPHROPATHY, WITH LONG-TERM CURRENT USE OF INSULIN (CMS/HCC): Chronic | ICD-10-CM

## 2024-12-11 DIAGNOSIS — E11.9 DIABETES MELLITUS WITHOUT COMPLICATION (CMS/HCC): ICD-10-CM

## 2024-12-11 NOTE — TELEPHONE ENCOUNTER
No care due was identified.  Health [facility] Embedded Care Due Messages. Reference number: [id number]. 12/11/2024 10:21:37 AM MST

## 2024-12-12 NOTE — TELEPHONE ENCOUNTER
Medication refill request:  Medication(s):  Pen Needle not filled due to Request has not been filled by current PCP listed, please review

## 2024-12-13 DIAGNOSIS — E11.9 DIABETES MELLITUS WITHOUT COMPLICATION (CMS/HCC): ICD-10-CM

## 2024-12-13 RX ORDER — PEN NEEDLE, DIABETIC 32GX 5/32"
1 NEEDLE, DISPOSABLE MISCELLANEOUS
Qty: 200 EACH | Refills: 1 | Status: SHIPPED | OUTPATIENT
Start: 2024-12-13

## 2024-12-13 RX ORDER — PEN NEEDLE, DIABETIC 32GX 5/32"
1 NEEDLE, DISPOSABLE MISCELLANEOUS
OUTPATIENT
Start: 2024-12-13

## 2024-12-13 NOTE — TELEPHONE ENCOUNTER
No care due was identified.  Health [facility] Embedded Care Due Messages. Reference number: [id number]. 12/13/2024 11:47:49 AM MST

## 2024-12-13 NOTE — TELEPHONE ENCOUNTER
Medication(s) needle pen refill refused due to DUPLICATE   Recently filled on 12/13/24; same pharmacy, receipt confirmed.  Dispense amount:  200, Refill: 1.  Refills or Rx should be available at preferred pharmacy.

## 2024-12-20 ENCOUNTER — LAB (OUTPATIENT)
Dept: LAB | Facility: CLINIC | Age: 79
End: 2024-12-20
Payer: MEDICARE

## 2024-12-20 ENCOUNTER — OFFICE VISIT (OUTPATIENT)
Dept: FAMILY MEDICINE | Facility: CLINIC | Age: 79
End: 2024-12-20
Payer: MEDICARE

## 2024-12-20 VITALS
HEART RATE: 87 BPM | TEMPERATURE: 97.5 F | BODY MASS INDEX: 39.33 KG/M2 | DIASTOLIC BLOOD PRESSURE: 74 MMHG | OXYGEN SATURATION: 94 % | SYSTOLIC BLOOD PRESSURE: 128 MMHG | WEIGHT: 222 LBS

## 2024-12-20 DIAGNOSIS — E11.21 TYPE 2 DIABETES MELLITUS WITH DIABETIC NEPHROPATHY, WITH LONG-TERM CURRENT USE OF INSULIN (CMS/HCC): ICD-10-CM

## 2024-12-20 DIAGNOSIS — E11.9 DIABETES MELLITUS WITHOUT COMPLICATION (CMS/HCC): ICD-10-CM

## 2024-12-20 DIAGNOSIS — Z79.4 TYPE 2 DIABETES MELLITUS WITH DIABETIC NEPHROPATHY, WITH LONG-TERM CURRENT USE OF INSULIN (CMS/HCC): Chronic | ICD-10-CM

## 2024-12-20 DIAGNOSIS — Z79.4 TYPE 2 DIABETES MELLITUS WITH DIABETIC NEPHROPATHY, WITH LONG-TERM CURRENT USE OF INSULIN (CMS/HCC): ICD-10-CM

## 2024-12-20 DIAGNOSIS — E11.21 TYPE 2 DIABETES MELLITUS WITH DIABETIC NEPHROPATHY, WITH LONG-TERM CURRENT USE OF INSULIN (CMS/HCC): Chronic | ICD-10-CM

## 2024-12-20 DIAGNOSIS — I10 ESSENTIAL HYPERTENSION: ICD-10-CM

## 2024-12-20 DIAGNOSIS — F32.A DEPRESSIVE DISORDER: ICD-10-CM

## 2024-12-20 DIAGNOSIS — N18.31 STAGE 3A CHRONIC KIDNEY DISEASE (CMS/HCC): Chronic | ICD-10-CM

## 2024-12-20 DIAGNOSIS — N18.31 TYPE 2 DIABETES MELLITUS WITH STAGE 3A CHRONIC KIDNEY DISEASE, WITH LONG-TERM CURRENT USE OF INSULIN (CMS/HCC): ICD-10-CM

## 2024-12-20 DIAGNOSIS — E11.22 TYPE 2 DIABETES MELLITUS WITH STAGE 3A CHRONIC KIDNEY DISEASE, WITH LONG-TERM CURRENT USE OF INSULIN (CMS/HCC): ICD-10-CM

## 2024-12-20 DIAGNOSIS — E78.00 PURE HYPERCHOLESTEROLEMIA: ICD-10-CM

## 2024-12-20 DIAGNOSIS — Z79.4 TYPE 2 DIABETES MELLITUS WITH STAGE 3A CHRONIC KIDNEY DISEASE, WITH LONG-TERM CURRENT USE OF INSULIN (CMS/HCC): ICD-10-CM

## 2024-12-20 DIAGNOSIS — Z12.31 ENCOUNTER FOR SCREENING MAMMOGRAM FOR MALIGNANT NEOPLASM OF BREAST: Primary | ICD-10-CM

## 2024-12-20 LAB
ALBUMIN SERPL-MCNC: 4.5 G/DL (ref 3.5–5.3)
ALP SERPL-CCNC: 111 U/L (ref 55–142)
ALT SERPL-CCNC: 23 U/L (ref 7–52)
ANION GAP SERPL CALC-SCNC: 9 MMOL/L (ref 3–11)
AST SERPL-CCNC: 20 U/L
BASOPHILS # BLD AUTO: 0.1 10*3/UL
BASOPHILS NFR BLD AUTO: 0.8 % (ref 0–2)
BILIRUB SERPL-MCNC: 0.55 MG/DL (ref 0.2–1.4)
BUN SERPL-MCNC: 32 MG/DL (ref 7–25)
CALCIUM ALBUM COR SERPL-MCNC: 9.6 MG/DL (ref 8.6–10.3)
CALCIUM SERPL-MCNC: 10 MG/DL (ref 8.6–10.3)
CHLORIDE SERPL-SCNC: 100 MMOL/L (ref 98–107)
CO2 SERPL-SCNC: 29 MMOL/L (ref 21–32)
CREAT SERPL-MCNC: 1.36 MG/DL (ref 0.6–1.1)
EGFRCR SERPLBLD CKD-EPI 2021: 40 ML/MIN/1.73M*2
EOSINOPHIL # BLD AUTO: 0.4 10*3/UL
EOSINOPHIL NFR BLD AUTO: 3.3 % (ref 0–3)
ERYTHROCYTE [DISTWIDTH] IN BLOOD BY AUTOMATED COUNT: 14.9 % (ref 11.5–14)
EST. AVERAGE GLUCOSE BLD GHB EST-MCNC: 157.1 MG/DL
GLUCOSE SERPL-MCNC: 87 MG/DL (ref 70–105)
HBA1C MFR BLD: 7.1 % (ref 4–6)
HCT VFR BLD AUTO: 45.2 % (ref 34–45)
HGB BLD-MCNC: 14.7 G/DL (ref 11.5–15.5)
LYMPHOCYTES # BLD AUTO: 3.1 10*3/UL
LYMPHOCYTES NFR BLD AUTO: 28.8 % (ref 11–47)
MCH RBC QN AUTO: 30.2 PG (ref 28–33)
MCHC RBC AUTO-ENTMCNC: 32.6 G/DL (ref 32–36)
MCV RBC AUTO: 92.8 FL (ref 81–97)
MONOCYTES # BLD AUTO: 0.8 10*3/UL
MONOCYTES NFR BLD AUTO: 7.5 % (ref 3–11)
NEUTROPHILS # BLD AUTO: 6.4 10*3/UL
NEUTROPHILS NFR BLD AUTO: 59.6 % (ref 41–81)
PLATELET # BLD AUTO: 240 10*3/UL (ref 140–350)
PMV BLD AUTO: 7.9 FL (ref 6.9–10.8)
POTASSIUM SERPL-SCNC: 4.3 MMOL/L (ref 3.5–5.1)
PROT SERPL-MCNC: 7.3 G/DL (ref 6–8.3)
RBC # BLD AUTO: 4.87 10*6/UL (ref 3.7–5.3)
SODIUM SERPL-SCNC: 138 MMOL/L (ref 135–145)
WBC # BLD AUTO: 10.7 10*3/UL (ref 4.5–10.5)

## 2024-12-20 PROCEDURE — 99213 OFFICE O/P EST LOW 20 MIN: CPT | Mod: 25 | Performed by: FAMILY MEDICINE

## 2024-12-20 PROCEDURE — G0439 PPPS, SUBSEQ VISIT: HCPCS | Performed by: FAMILY MEDICINE

## 2024-12-20 PROCEDURE — 36415 COLL VENOUS BLD VENIPUNCTURE: CPT

## 2024-12-20 PROCEDURE — 83036 HEMOGLOBIN GLYCOSYLATED A1C: CPT

## 2024-12-20 PROCEDURE — 85025 COMPLETE CBC W/AUTO DIFF WBC: CPT

## 2024-12-20 PROCEDURE — 80053 COMPREHEN METABOLIC PANEL: CPT

## 2024-12-20 PROCEDURE — G0463 HOSPITAL OUTPT CLINIC VISIT: HCPCS | Mod: 25 | Performed by: FAMILY MEDICINE

## 2024-12-20 RX ORDER — NORTRIPTYLINE HYDROCHLORIDE 50 MG/1
50 CAPSULE ORAL DAILY
Qty: 90 CAPSULE | Refills: 3 | Status: SHIPPED | OUTPATIENT
Start: 2024-12-20 | End: 2025-12-20

## 2024-12-20 RX ORDER — LOSARTAN POTASSIUM 100 MG/1
100 TABLET ORAL DAILY
Qty: 90 TABLET | Refills: 1 | Status: SHIPPED | OUTPATIENT
Start: 2024-12-20 | End: 2025-12-20

## 2024-12-20 RX ORDER — INSULIN ASPART 100 [IU]/ML
25-43 INJECTION, SOLUTION INTRAVENOUS; SUBCUTANEOUS
Qty: 15 ML | Refills: 5 | Status: SHIPPED | OUTPATIENT
Start: 2024-12-20

## 2024-12-20 RX ORDER — INSULIN GLARGINE 300 U/ML
76 INJECTION, SOLUTION SUBCUTANEOUS 2 TIMES DAILY
Qty: 15 ML | Refills: 1 | Status: SHIPPED | OUTPATIENT
Start: 2024-12-20 | End: 2025-12-20

## 2024-12-20 RX ORDER — AMLODIPINE BESYLATE 5 MG/1
5 TABLET ORAL DAILY
Qty: 90 TABLET | Refills: 3 | Status: SHIPPED | OUTPATIENT
Start: 2024-12-20 | End: 2025-12-20

## 2024-12-20 RX ORDER — CHLORTHALIDONE 25 MG/1
25 TABLET ORAL DAILY
Qty: 90 TABLET | Refills: 1 | Status: SHIPPED | OUTPATIENT
Start: 2024-12-20 | End: 2025-12-20

## 2024-12-20 RX ORDER — METOPROLOL SUCCINATE 50 MG/1
150 TABLET, EXTENDED RELEASE ORAL NIGHTLY
Qty: 270 TABLET | Refills: 3 | Status: SHIPPED | OUTPATIENT
Start: 2024-12-20 | End: 2025-12-20

## 2024-12-20 RX ORDER — PRAVASTATIN SODIUM 20 MG/1
20 TABLET ORAL 3 TIMES WEEKLY
Qty: 36 TABLET | Refills: 3 | Status: SHIPPED | OUTPATIENT
Start: 2024-12-20 | End: 2025-12-20

## 2024-12-20 ASSESSMENT — ENCOUNTER SYMPTOMS
DIARRHEA: 0
CHILLS: 0
VOMITING: 0
SHORTNESS OF BREATH: 0
DYSURIA: 0
FEVER: 0
FREQUENCY: 0
COUGH: 0
BLOOD IN STOOL: 0
PALPITATIONS: 0
ABDOMINAL PAIN: 0
NAUSEA: 0
COLOR CHANGE: 0

## 2024-12-20 NOTE — PATIENT INSTRUCTIONS
1,200 mg of calcium daily in divided doses-total from food and/or supplements  1,000 to 2,000 international units (IUs) of vitamin D daily    RSV and Tetanus immunizations at Pharmacy

## 2024-12-20 NOTE — PROGRESS NOTES
"Subjective     Consuelo Singh is a 79 y.o. female who presents for Subsequent Medicare Wellness Exam (S-MWE)    History of Present Illness    The patient is a 79-year-old female with a history of diabetes, hypertension, and fibromyalgia. She presents today for her Medicare wellness check and a follow-up on her diabetes. Her most recent A1c is 7.1, essentially unchanged from 6.9 three months ago. The patient reports that her blood sugars have been \"pretty good,\" although she notes that she sometimes drops at night. She is currently on Green, pravastatin, Novolog, Jardiance, losartan, metoprolol, amlodipine, chlorthalidone, and nortriptyline. The patient is also due for several immunizations. She is active and has recently started walking in a pool twice a week.         Screenings  Medicare Health Status Self-Assessment  General Health  Have you been hospitalized since we last saw you?: Unwilling or unable to answer  Have you seen a health care provider outside our clinic?: Unwilling or unable to answer  In general, how would you say your health is?: Unwilling or unable to answer  In general, how satisfied are you with your life?: Unwilling or unable to answer  How would you describe the condition of your mouth and teeth?: Unwilling or unable to answer  Do you go to the dentist regularly?: Unwilling or unable to answer  Do you feel tired during the daytime?: Unwilling or unable to answer  Do you or friends or family have any concerns about memory?: Unwilling or unable to answer  Do you have any problems with your hearing?: Unwilling or unable to answer  Are you on opioids for pain?: Unwilling or unable to answer  Do you have an addiction problem?: Unwilling or unable to answer  Do you have trouble managing your anger?: Unwilling or unable to answer    Safety and ADLs  Safety and ADLs  Do you always fasten your seat belt when you are in the car?: Unwilling or unable to answer  Do you know where to locate and " properly use a first aid kit and fire extinguisher in case of an emergency?: Unwilling or unable to answer  Does your home have rugs in the walkways?: Unwilling or unable to answer  Does your home have grab bars in the bathroom?: Unwilling or unable to answer  Does your home have handrails on the stairs?: Unwilling or unable to answer  Does your home have good lighting?: Unwilling or unable to answer  Do you struggle to care for yourself or your home?: Unwilling or unable to answer       Diet and Exercise  Diet and Exercise  Do you follow a special diet at home?: Unwilling or unable to answer    Advance Care Planning  Advance Care Planning  Would you like information on advance care planning such as living will?: Unwilling or unable to answer    Depression Screening       Fall Risk Assessment       Durable Medical Equipment       Patient Care Team:  Shirley Benitez MD as PCP - General (Family Medicine)        [facility] (if blank, screening not completed)                            Mini-Mental Exam (if blank, screening not completed)                                Comprehensive Medical and Social History  Past Medical History:   Diagnosis Date    Allergic Do not remember    Arthritis     Cataract     Deep vein thrombosis (CMS/HCC) 1994    hx of in left leg    Diabetes (CMS/HCC)     type 2    Eczema     Fibromyalgia     GERD (gastroesophageal reflux disease)     Heart murmur     Hyperlipidemia     Hypertension     Obesity In my twenties    Peripheral neuropathy     toes    Pneumonia     Psoriasis     Scoliosis     Skin abnormalities 1985    Visual impairment When i was 17    Wears dentures     full set     Past Surgical History:   Procedure Laterality Date    CHOLECYSTECTOMY      COLONOSCOPY  03/11/2016    5 yr  follow up    COLONOSCOPY N/A 05/28/2021    Please call the patient regarding her abnormal result. Repeat colonoscopy in 5 year due to 2 tubular adenomas if patient otherwise healthy at that time. Also  reasonable to discontinue further colonoscopy--Dr Laboy    EYE SURGERY  I believe 2020    cataracts    HYSTERECTOMY      With BSO    OTHER SURGICAL HISTORY      Rt. radical mastoidectomy, rebuilt canal from growth     Social History     Socioeconomic History    Marital status:      Spouse name: Jason    Number of children: 2   Occupational History    Occupation: retired     Comment: [employer]   Tobacco Use    Smoking status: Former     Current packs/day: 0.00     Average packs/day: 0.5 packs/day for 40.0 years (20.0 ttl pk-yrs)     Types: Cigarettes     Start date: 1967     Quit date: 2007     Years since quittin.9    Smokeless tobacco: Never   Vaping Use    Vaping status: Never Used   Substance and Sexual Activity    Alcohol use: Yes     Comment: Maybe once a month    Drug use: No    Sexual activity: Not Currently   Social History Narrative    Lives in [place]     [employer]     Tobacco Use: Medium Risk (2024)    Patient History     Smoking Tobacco Use: Former     Smokeless Tobacco Use: Never   Alcohol Use: Patient Declined (2024)    AUDIT-C     Frequency of Alcohol Consumption: Patient declined     Average Number of Drinks: Patient declined     Frequency of Binge Drinking: Patient declined   Financial Resource Strain: Patient Declined (2024)    Overall Financial Resource Strain (CARDIA)     Difficulty of Paying Living Expenses: Patient declined   Food Insecurity: Patient Declined (2024)    Hunger Vital Sign     Worried About Running Out of Food in the Last Year: Patient declined     Ran Out of Food in the Last Year: Patient declined   Transportation Needs: Patient Declined (2024)    PRAPARE - Transportation     Lack of Transportation (Medical): Patient declined     Lack of Transportation (Non-Medical): Patient declined   Physical Activity: Patient Declined (2024)    Exercise Vital Sign     Days of Exercise per Week: Patient declined      Minutes of Exercise per Session: Patient declined   Stress: Patient Declined (12/21/2024)    [employer] - Occupational Stress Questionnaire     Feeling of Stress : Patient declined   Social Connections: Patient Declined (12/21/2024)    Social Connection and Isolation Panel [NHANES]     Frequency of Communication with Friends and Family: Patient declined     Frequency of Social Gatherings with Friends and Family: Patient declined     Attends Zoroastrian Services: Patient declined     Active Member of Clubs or Organizations: Patient declined     Attends Club or Organization Meetings: Patient declined     Marital Status: Patient declined   Housing Stability: Patient Declined (12/21/2024)    Housing Stability Vital Sign     Unable to Pay for Housing in the Last Year: Patient declined     Homeless in the Last Year: Patient declined   Utilities: Patient Declined (12/21/2024)    [facility] Utilities     Threatened with loss of utilities: Patient declined     Family History   Problem Relation Age of Onset    Breast cancer Mother 70 - 79    Cancer Mother     Diabetes Father     No Known Problems Sister     No Known Problems Brother     Lung cancer Maternal Grandmother     Bone cancer Maternal Grandfather     Breast cancer Mother's Sister     Lung cancer Mother's Sister     Pancreatic cancer Mother's Brother     Thyroid disease Daughter     Hyperlipidemia Son      Allergies   Allergen Reactions    Aloe Vera     Bacitracin      ZINC    Erythromycin Base     Formaldehyde     Gramicidins      OPHTHALMIC    Hydrocortisone      ACETATE    Latex     Levofloxacin     Metformin     Neomycin     Neomycin-Bacitracnzn-Polymyxnb     Polymyxin B     Prednisolone     Prednisone     Statins-Hmg-Coa Reductase Inhibitors     Sulfamethoxazole     Sulfamethoxazole-Trimethoprim     Thimerosal     Tresiba Flextouch U-100 [Insulin Degludec] Diarrhea and Other (see comments)     Abdominal pain    Trimethoprim     Humalog  "Kwikpen Insulin [Insulin Lispro] Rash     Current Outpatient Medications   Medication Sig Dispense Refill    pen needle, diabetic 31 gauge x 15/64\" needle Inject 1 Needle under the skin 5 (five) times a day 200 each 1    blood-glucose sensor device 1 Device every 14 (fourteen) days 1 device every 10 days. 3 each 11    omeprazole (PriLOSEC) 40 mg capsule Take 1 capsule (40 mg total) by mouth daily 90 capsule 3    albuterol HFA 90 mcg/actuation inhaler Inhale 2 puffs every 6 (six) hours as needed for wheezing 18 g 0    benzonatate (TESSALON) 200 mg capsule Take 1 capsule (200 mg total) by mouth 3 (three) times a day as needed for cough 30 capsule 0    pen needle, diabetic 31 gauge x 15/64\" needle SMARTSIG:SUB-Q      clobetasoL (TEMOVATE) 0.05 % ointment Apply 1 application topically 2 (two) times a day as needed (Rash) 30 g 2    lutein 20 mg tablet Take 1 tab/cap by mouth daily      multivitamin (THERAGRAN) tablet tablet Take 1 tablet by mouth daily      aspirin 325 mg tablet Take 1 tablet (325 mg total) by mouth daily      amLODIPine (NORVASC) 5 mg tablet Take 1 tablet (5 mg total) by mouth daily 90 tablet 3    chlorthalidone 25 mg tablet Take 1 tablet (25 mg total) by mouth daily 90 tablet 1    empagliflozin (Jardiance) 10 mg tablet Take 1 tablet (10 mg total) by mouth daily 90 tablet 1    finerenone 10 mg tablet Take 10 mg by mouth daily 90 tablet 1    losartan (COZAAR) 100 mg tablet Take 1 tablet (100 mg total) by mouth daily 90 tablet 1    metoprolol succinate XL (TOPROL-XL) 50 mg 24 hr tablet Take 3 tablets (150 mg total) by mouth nightly 270 tablet 3    nortriptyline (PAMELOR) 50 mg capsule Take 1 capsule (50 mg total) by mouth daily 90 capsule 3    pravastatin 20 mg tablet Take 1 tablet (20 mg total) by mouth 3 (three) times a week 36 tablet 3    Toujeo Max U-300 SoloStar 300 unit/mL (3 mL) insulin pen insulin pen Inject 76 Units as directed 2 (two) times a day 15 mL 1    NovoLOG Flexpen U-100 Insulin 100 " unit/mL (3 mL) insulin pen Inject 25-43 Units under the skin 4 (four) times a day (before meals and nightly) Max daily dose of 139 units daily 15 mL 5     No current facility-administered medications for this visit.       The following have been reviewed and updated as appropriate in this visit:   Allergies  Meds  Problems         Review of Systems   Constitutional:  Negative for chills and fever.   Respiratory:  Negative for cough and shortness of breath.    Cardiovascular:  Negative for chest pain and palpitations.   Gastrointestinal:  Negative for abdominal pain, blood in stool, diarrhea, nausea and vomiting.   Genitourinary:  Negative for dysuria and frequency.   Skin:  Negative for color change and rash.       Objective     Vitals:    12/20/24 1312   BP: 128/74   Temp: 36.4 °C (97.5 °F)   TempSrc: Temporal   Pulse: 87   SpO2: 94%   Weight: 100.7 kg (222 lb)   Patient Position: Sitting     Body mass index is 39.33 kg/m².    Physical Exam  Vitals and nursing note reviewed.   Constitutional:       General: She is not in acute distress.     Appearance: Normal appearance.   Cardiovascular:      Rate and Rhythm: Normal rate and regular rhythm.      Heart sounds: No murmur heard.  Pulmonary:      Effort: Pulmonary effort is normal. No respiratory distress.      Breath sounds: Normal breath sounds.   Abdominal:      General: Abdomen is flat. Bowel sounds are normal. There is no distension.      Palpations: Abdomen is soft.      Tenderness: There is no abdominal tenderness.   Skin:     General: Skin is warm and dry.   Neurological:      Mental Status: She is alert.       Assessment/Plan   Diagnoses and all orders for this visit:    Encounter for screening mammogram for malignant neoplasm of breast  -     BI screening mammogram bilateral w vijaya; Future    Essential hypertension  -     amLODIPine (NORVASC) 5 mg tablet; Take 1 tablet (5 mg total) by mouth daily  -     chlorthalidone 25 mg tablet; Take 1 tablet (25 mg  total) by mouth daily  -     losartan (COZAAR) 100 mg tablet; Take 1 tablet (100 mg total) by mouth daily  -     metoprolol succinate XL (TOPROL-XL) 50 mg 24 hr tablet; Take 3 tablets (150 mg total) by mouth nightly    Type 2 diabetes mellitus with diabetic nephropathy, with long-term current use of insulin (CMS/[facility]) ([facility])  -     empagliflozin (Jardiance) 10 mg tablet; Take 1 tablet (10 mg total) by mouth daily  -     Basic metabolic panel Blood, Venous; Future  -     Hemoglobin A1c (glycosylated) Blood, Venous; Future  -     NovoLOG Flexpen U-100 Insulin 100 unit/mL (3 mL) insulin pen; Inject 25-43 Units under the skin 4 (four) times a day (before meals and nightly) Max daily dose of 139 units daily    Type 2 diabetes mellitus with stage 3a chronic kidney disease, with long-term current use of insulin (CMS/[facility])  -     finerenone 10 mg tablet; Take 10 mg by mouth daily  -     Basic metabolic panel Blood, Venous; Future  -     Hemoglobin A1c (glycosylated) Blood, Venous; Future    Stage 3a chronic kidney disease (CMS/[facility])  -     finerenone 10 mg tablet; Take 10 mg by mouth daily    Depressive disorder  -     nortriptyline (PAMELOR) 50 mg capsule; Take 1 capsule (50 mg total) by mouth daily    Type 2 diabetes mellitus with diabetic nephropathy, with long-term current use of insulin (CMS/[facility])  -     empagliflozin (Jardiance) 10 mg tablet; Take 1 tablet (10 mg total) by mouth daily  -     Basic metabolic panel Blood, Venous; Future  -     Hemoglobin A1c (glycosylated) Blood, Venous; Future  -     NovoLOG Flexpen U-100 Insulin 100 unit/mL (3 mL) insulin pen; Inject 25-43 Units under the skin 4 (four) times a day (before meals and nightly) Max daily dose of 139 units daily    Pure hypercholesterolemia  -     pravastatin 20 mg tablet; Take 1 tablet (20 mg total) by mouth 3 (three) times a week    Diabetes mellitus without complication (CMS/[facility])  -     Toujeo Max U-300 SoloStar 300 unit/mL (3 mL) insulin pen insulin pen;  Inject 76 Units as directed 2 (two) times a day        Assessment/Plan     Type 2 Diabetes Mellitus  A1c of 7.1, stable from 6.9 three months ago. Patient reports occasional nocturnal hypoglycemia. Currently on Toujeo 70 units twice daily and Novolog 20 units with breakfast, 26 units at lunch, and 37 units with dinner, with occasional need for additional Novolog. Also on Jardiance.  -Increase Toujeo to 76 units twice daily.  -Adjust Novolog to a fixed dose of 25 units with each meal, with a sliding scale for additional units if pre-meal blood glucose is above 150.  -Check fasting blood glucose daily and adjust Toujeo dose by 2 units every 3 days if fasting blood glucose is consistently above 140.  -Check 2-hour postprandial blood glucose and adjust Novolog dose if consistently above 180.  -Continue Jardiance.  -Check A1c in 6 months.    Hyperlipidemia  On pravastatin 20mg three times weekly due to statin intolerance.  -Continue pravastatin 20mg three times weekly.    Hypertension  On losartan, metoprolol, amlodipine, and chlorthalidone.  -Continue current antihypertensive regimen.    Fibromyalgia  On nortriptyline.  -Continue nortriptyline.    General Health Maintenance / Followup Plans  -Continue calcium and vitamin D intake as discussed.  -Consider DEXA scan for osteoporosis screening.  -Consider flu shot, COVID-19 booster, RSV, and tetanus vaccines at the pharmacy.  -Schedule mammogram for June 2025.  -Follow up in 6 months.           During the course of the visit the patient was educated and counseled about appropriate screening and preventive services including:   Breast Cancer Screening: Reviewed and discussed with patient.  Calcium 1200mg and Vitamin D 1000-2000iu recommended daily.  Depression screening performed, results documented in note.  Indicated labs ordered as above  Influenza vaccine:  Reviewed need for annual immunization.  Pneumococcal Vaccine:  Reviewed and discussed with patient.  Shingles  Vaccine:  Reviewed and discussed with patient.    Written screening schedule individualized for the patient based on current USPSTF, age-appropriate medicare services and ACIP as described above was given and viewable in Patient Instructions.    Mental health conditions and/or risk factors are as listed (if any) in the ROS above as specific to this patient.  Direct cognitive impairment was assessed.      Patient's lifestyle was evaluated to promote wellness in terms of but not limited to: fall prevention, nutrition, physical activity, tobacco/alcohol if present and weight management.  These were addressed specifically with this patient as listed within the social history, discussion notes, and above.

## 2024-12-21 SDOH — ECONOMIC STABILITY: HOUSING INSECURITY: AT ANY TIME IN THE PAST 12 MONTHS, WERE YOU HOMELESS OR LIVING IN A SHELTER (INCLUDING NOW)?: PATIENT DECLINED

## 2024-12-21 SDOH — SOCIAL STABILITY: SOCIAL NETWORK: HOW OFTEN DO YOU ATTEND CHURCH OR RELIGIOUS SERVICES?: PATIENT DECLINED

## 2024-12-21 SDOH — HEALTH STABILITY: MENTAL HEALTH: HOW OFTEN DO YOU HAVE SIX OR MORE DRINKS ON ONE OCCASION?: PATIENT DECLINED

## 2024-12-21 SDOH — HEALTH STABILITY: PHYSICAL HEALTH
ON AVERAGE, HOW MANY DAYS PER WEEK DO YOU ENGAGE IN MODERATE TO STRENUOUS EXERCISE (LIKE A BRISK WALK)?: PATIENT DECLINED

## 2024-12-21 SDOH — SOCIAL STABILITY: SOCIAL INSECURITY: ARE YOU MARRIED, WIDOWED, DIVORCED, SEPARATED, NEVER MARRIED, OR LIVING WITH A PARTNER?: PATIENT DECLINED

## 2024-12-21 SDOH — ECONOMIC STABILITY: FOOD INSECURITY: WITHIN THE PAST 12 MONTHS, THE FOOD YOU BOUGHT JUST DIDN'T LAST AND YOU DIDN'T HAVE MONEY TO GET MORE.: PATIENT DECLINED

## 2024-12-21 SDOH — SOCIAL STABILITY: SOCIAL INSECURITY: WITHIN THE LAST YEAR, HAVE YOU BEEN AFRAID OF YOUR PARTNER OR EX-PARTNER?: PATIENT DECLINED

## 2024-12-21 SDOH — SOCIAL STABILITY: SOCIAL INSECURITY
WITHIN THE LAST YEAR, HAVE YOU BEEN KICKED, HIT, SLAPPED, OR OTHERWISE PHYSICALLY HURT BY YOUR PARTNER OR EX-PARTNER?: PATIENT DECLINED

## 2024-12-21 SDOH — SOCIAL STABILITY: SOCIAL NETWORK
DO YOU BELONG TO ANY CLUBS OR ORGANIZATIONS SUCH AS CHURCH GROUPS, UNIONS, FRATERNAL OR ATHLETIC GROUPS, OR SCHOOL GROUPS?: PATIENT DECLINED

## 2024-12-21 SDOH — HEALTH STABILITY: PHYSICAL HEALTH: ON AVERAGE, HOW MANY MINUTES DO YOU ENGAGE IN EXERCISE AT THIS LEVEL?: PATIENT DECLINED

## 2024-12-21 SDOH — ECONOMIC STABILITY: INCOME INSECURITY
IN THE PAST 12 MONTHS HAS THE ELECTRIC, GAS, OIL, OR WATER COMPANY THREATENED TO SHUT OFF SERVICES IN YOUR HOME?: PATIENT DECLINED

## 2024-12-21 SDOH — ECONOMIC STABILITY: FOOD INSECURITY
WITHIN THE PAST 12 MONTHS, YOU WORRIED THAT YOUR FOOD WOULD RUN OUT BEFORE YOU GOT THE MONEY TO BUY MORE.: PATIENT DECLINED

## 2024-12-21 SDOH — ECONOMIC STABILITY: FOOD INSECURITY: HOW HARD IS IT FOR YOU TO PAY FOR THE VERY BASICS LIKE FOOD, HOUSING, MEDICAL CARE, AND HEATING?: PATIENT DECLINED

## 2024-12-21 SDOH — HEALTH STABILITY: MENTAL HEALTH: HOW OFTEN DO YOU HAVE A DRINK CONTAINING ALCOHOL?: PATIENT DECLINED

## 2024-12-21 SDOH — HEALTH STABILITY: MENTAL HEALTH: HOW MANY DRINKS CONTAINING ALCOHOL DO YOU HAVE ON A TYPICAL DAY WHEN YOU ARE DRINKING?: PATIENT DECLINED

## 2024-12-21 SDOH — SOCIAL STABILITY: SOCIAL INSECURITY
WITHIN THE LAST YEAR, HAVE YOU BEEN HUMILIATED OR EMOTIONALLY ABUSED IN OTHER WAYS BY YOUR PARTNER OR EX-PARTNER?: PATIENT DECLINED

## 2024-12-21 SDOH — ECONOMIC STABILITY: HOUSING INSECURITY: IN THE LAST 12 MONTHS, WAS THERE A TIME WHEN YOU WERE NOT ABLE TO PAY THE MORTGAGE OR RENT ON TIME?: PATIENT DECLINED

## 2024-12-21 SDOH — SOCIAL STABILITY: SOCIAL INSECURITY
WITHIN THE LAST YEAR, HAVE YOU BEEN RAPED OR FORCED TO HAVE ANY KIND OF SEXUAL ACTIVITY BY YOUR PARTNER OR EX-PARTNER?: PATIENT DECLINED

## 2024-12-21 SDOH — SOCIAL STABILITY: SOCIAL NETWORK: IN A TYPICAL WEEK, HOW MANY TIMES DO YOU TALK ON THE PHONE WITH FAMILY, FRIENDS, OR NEIGHBORS?: PATIENT DECLINED

## 2024-12-21 SDOH — SOCIAL STABILITY: SOCIAL NETWORK: HOW OFTEN DO YOU GET TOGETHER WITH FRIENDS OR RELATIVES?: PATIENT DECLINED

## 2024-12-21 SDOH — HEALTH STABILITY: MENTAL HEALTH
DO YOU FEEL STRESS - TENSE, RESTLESS, NERVOUS, OR ANXIOUS, OR UNABLE TO SLEEP AT NIGHT BECAUSE YOUR MIND IS TROUBLED ALL THE TIME - THESE DAYS?: PATIENT DECLINED

## 2024-12-21 SDOH — ECONOMIC STABILITY: TRANSPORTATION INSECURITY
IN THE PAST 12 MONTHS, HAS LACK OF TRANSPORTATION KEPT YOU FROM MEDICAL APPOINTMENTS OR FROM GETTING MEDICATIONS?: PATIENT DECLINED

## 2024-12-21 SDOH — SOCIAL STABILITY: SOCIAL NETWORK: HOW OFTEN DO YOU ATTEND MEETINGS OF THE CLUBS OR ORGANIZATIONS YOU BELONG TO?: PATIENT DECLINED

## 2024-12-21 SDOH — HEALTH STABILITY: PHYSICAL HEALTH
HOW OFTEN DO YOU NEED TO HAVE SOMEONE HELP YOU WHEN YOU READ INSTRUCTIONS, PAMPHLETS, OR OTHER WRITTEN MATERIAL FROM YOUR DOCTOR OR PHARMACY?: PATIENT DECLINES TO RESPOND

## 2024-12-21 ASSESSMENT — LIFESTYLE VARIABLES
AUDIT-C TOTAL SCORE: -1
SKIP TO QUESTIONS 9-10: 0

## 2024-12-21 ASSESSMENT — ACTIVITIES OF DAILY LIVING (ADL): LACK_OF_TRANSPORTATION: PATIENT DECLINED

## 2025-02-03 ENCOUNTER — OFFICE VISIT (OUTPATIENT)
Dept: OTOLARYNGOLOGY | Facility: CLINIC | Age: 80
End: 2025-02-03
Payer: MEDICARE

## 2025-02-03 VITALS
TEMPERATURE: 97.4 F | HEART RATE: 83 BPM | OXYGEN SATURATION: 95 % | DIASTOLIC BLOOD PRESSURE: 74 MMHG | BODY MASS INDEX: 39.86 KG/M2 | RESPIRATION RATE: 18 BRPM | SYSTOLIC BLOOD PRESSURE: 140 MMHG | WEIGHT: 225 LBS

## 2025-02-03 DIAGNOSIS — H61.21 IMPACTED CERUMEN OF RIGHT EAR: Primary | ICD-10-CM

## 2025-02-03 DIAGNOSIS — Z90.89 HISTORY OF RIGHT MASTOIDECTOMY: ICD-10-CM

## 2025-02-03 PROCEDURE — 69220 CLEAN OUT MASTOID CAVITY: CPT | Mod: RT | Performed by: OTOLARYNGOLOGY

## 2025-02-03 PROCEDURE — G0463 HOSPITAL OUTPT CLINIC VISIT: HCPCS | Mod: 25 | Performed by: OTOLARYNGOLOGY

## 2025-02-03 PROCEDURE — 99212 OFFICE O/P EST SF 10 MIN: CPT | Mod: 25 | Performed by: OTOLARYNGOLOGY

## 2025-02-03 PROCEDURE — 69222 CLEAN OUT MASTOID CAVITY: CPT | Performed by: OTOLARYNGOLOGY

## 2025-02-03 PROCEDURE — G0463 HOSPITAL OUTPT CLINIC VISIT: HCPCS | Performed by: OTOLARYNGOLOGY

## 2025-02-03 ASSESSMENT — ENCOUNTER SYMPTOMS
FEVER: 0
TROUBLE SWALLOWING: 0
CONSTITUTIONAL NEGATIVE: 1
RHINORRHEA: 0
SORE THROAT: 0
CHILLS: 0
SINUS PRESSURE: 0

## 2025-02-03 ASSESSMENT — PAIN SCALES - GENERAL: PAINLEVEL_OUTOF10: 0-NO PAIN

## 2025-02-03 NOTE — PROGRESS NOTES
Subjective    CC: Mastoid debridement    HPI: Consuelo Singh is a 79 y.o. female with history of right canal wall down mastoidectomy many years ago.  She comes in every 3 months for periodic debridement.  No problems to report since her last visit.    Past Medical History:   Diagnosis Date    Allergic Do not remember    Arthritis     Cataract     Deep vein thrombosis (CMS/HCC) 1994    hx of in left leg    Diabetes (CMS/HCC)     type 2    Eczema     Fibromyalgia     GERD (gastroesophageal reflux disease)     Heart murmur     Hyperlipidemia     Hypertension     Obesity In my twenties    Peripheral neuropathy     toes    Pneumonia     Psoriasis     Scoliosis     Skin abnormalities 1985    Visual impairment When i was 17    Wears dentures     full set       Past Surgical History:   Procedure Laterality Date    CHOLECYSTECTOMY      COLONOSCOPY  03/11/2016    5 yr  follow up    COLONOSCOPY N/A 05/28/2021    Please call the patient regarding her abnormal result. Repeat colonoscopy in 5 year due to 2 tubular adenomas if patient otherwise healthy at that time. Also reasonable to discontinue further colonoscopy--Dr Laboy    EYE SURGERY  I believe 2020    cataracts    HYSTERECTOMY      With BSO    OTHER SURGICAL HISTORY      Rt. radical mastoidectomy, rebuilt canal from growth         Current Outpatient Medications:     amLODIPine (NORVASC) 5 mg tablet, Take 1 tablet (5 mg total) by mouth daily, Disp: 90 tablet, Rfl: 3    chlorthalidone 25 mg tablet, Take 1 tablet (25 mg total) by mouth daily, Disp: 90 tablet, Rfl: 1    empagliflozin (Jardiance) 10 mg tablet, Take 1 tablet (10 mg total) by mouth daily, Disp: 90 tablet, Rfl: 1    finerenone 10 mg tablet, Take 10 mg by mouth daily, Disp: 90 tablet, Rfl: 1    losartan (COZAAR) 100 mg tablet, Take 1 tablet (100 mg total) by mouth daily, Disp: 90 tablet, Rfl: 1    metoprolol succinate XL (TOPROL-XL) 50 mg 24 hr tablet, Take 3 tablets (150 mg total) by mouth nightly, Disp: 270  "tablet, Rfl: 3    nortriptyline (PAMELOR) 50 mg capsule, Take 1 capsule (50 mg total) by mouth daily, Disp: 90 capsule, Rfl: 3    pravastatin 20 mg tablet, Take 1 tablet (20 mg total) by mouth 3 (three) times a week, Disp: 36 tablet, Rfl: 3    Toujeo Max U-300 SoloStar 300 unit/mL (3 mL) insulin pen insulin pen, Inject 76 Units as directed 2 (two) times a day, Disp: 15 mL, Rfl: 1    NovoLOG Flexpen U-100 Insulin 100 unit/mL (3 mL) insulin pen, Inject 25-43 Units under the skin 4 (four) times a day (before meals and nightly) Max daily dose of 139 units daily, Disp: 15 mL, Rfl: 5    pen needle, diabetic 31 gauge x 15/64\" needle, Inject 1 Needle under the skin 5 (five) times a day, Disp: 200 each, Rfl: 1    blood-glucose sensor device, 1 Device every 14 (fourteen) days 1 device every 10 days., Disp: 3 each, Rfl: 11    omeprazole (PriLOSEC) 40 mg capsule, Take 1 capsule (40 mg total) by mouth daily, Disp: 90 capsule, Rfl: 3    albuterol HFA 90 mcg/actuation inhaler, Inhale 2 puffs every 6 (six) hours as needed for wheezing, Disp: 18 g, Rfl: 0    benzonatate (TESSALON) 200 mg capsule, Take 1 capsule (200 mg total) by mouth 3 (three) times a day as needed for cough, Disp: 30 capsule, Rfl: 0    pen needle, diabetic 31 gauge x 15/64\" needle, SMARTSIG:SUB-Q, Disp: , Rfl:     clobetasoL (TEMOVATE) 0.05 % ointment, Apply 1 application topically 2 (two) times a day as needed (Rash), Disp: 30 g, Rfl: 2    lutein 20 mg tablet, Take 1 tab/cap by mouth daily, Disp: , Rfl:     multivitamin (THERAGRAN) tablet tablet, Take 1 tablet by mouth daily, Disp: , Rfl:     aspirin 325 mg tablet, Take 1 tablet (325 mg total) by mouth daily, Disp: , Rfl:     Allergies   Allergen Reactions    Aloe Vera     Bacitracin      ZINC    Erythromycin Base     Formaldehyde     Gramicidins      OPHTHALMIC    Hydrocortisone      ACETATE    Latex     Levofloxacin     Metformin     Neomycin     Neomycin-Bacitracnzn-Polymyxnb     Polymyxin B     Prednisolone  "    Prednisone     Statins-Hmg-Coa Reductase Inhibitors     Sulfamethoxazole     Sulfamethoxazole-Trimethoprim     Thimerosal     Tresiba Flextouch U-100 [Insulin Degludec] Diarrhea and Other (see comments)     Abdominal pain    Trimethoprim     Humalog Kwikpen Insulin [Insulin Lispro] Rash       family history includes Bone cancer in her maternal grandfather; Breast cancer in her mother's sister; Breast cancer (age of onset: 70 - 79) in her mother; Cancer in her mother; Diabetes in her father; Hyperlipidemia in her son; Lung cancer in her maternal grandmother and mother's sister; No Known Problems in her brother and sister; Pancreatic cancer in her mother's brother; Thyroid disease in her daughter.    Social History     Tobacco Use    Smoking status: Former     Current packs/day: 0.00     Average packs/day: 0.5 packs/day for 40.0 years (20.0 ttl pk-yrs)     Types: Cigarettes     Start date: 1967     Quit date: 2007     Years since quittin.1    Smokeless tobacco: Never   Vaping Use    Vaping status: Never Used   Substance Use Topics    Alcohol use: Yes     Comment: Maybe once a month    Drug use: No       Review of Systems   Constitutional: Negative.  Negative for chills and fever.   HENT: Negative.  Negative for congestion, ear discharge, ear pain, hearing loss, nosebleeds, postnasal drip, rhinorrhea, sinus pressure, sneezing, sore throat, tinnitus and trouble swallowing.        Objective    /74   Pulse 83   Temp 36.3 °C (97.4 °F) (Temporal)   Resp 18   Wt 102.1 kg (225 lb)   SpO2 95%   BMI 39.86 kg/m²     PHYSICAL EXAMINATION:      GENERAL:  Well-developed, well-nourished.  The patient is alert, oriented and in no acute distress.        PSYCH: Normal mood and affect.        SKIN: No evidence of significant rash or concerning skin lesion on the head or neck.      HEAD/FACE:  Normally formed without evidence of mass or trauma.  The sinuses are nontender.        EARS: Bilateral external ears  are normal.  Left external auditory canal is normal.  Left tympanic membrane is normal.  Right external auditory canal and tympanic membrane consistent with prior canal wall down mastoidectomy.  Moderate cerumen impaction present.    Procedure: Mastoid debridement, complex  Right mastoid cavity debrided using microscope, forceps, cerumen curette, right angle and a 45 degree sharp pick.  Significant debris removed from mastoid bowl as well as over the posterior tympanum.  This required approximately 15 minutes.    Diagnosis    1. Impacted cerumen of right ear    2. History of right mastoidectomy        Recommendations    79-year-old female with healthy appearing right mastoid cavity, routine debridement done today.  Follow-up in 3 months for repeat, sooner as needed for problems.

## 2025-02-17 DIAGNOSIS — N18.31 TYPE 2 DIABETES MELLITUS WITH STAGE 3A CHRONIC KIDNEY DISEASE, WITH LONG-TERM CURRENT USE OF INSULIN (CMS/HCC): ICD-10-CM

## 2025-02-17 DIAGNOSIS — N18.31 STAGE 3A CHRONIC KIDNEY DISEASE (CMS/HCC): Chronic | ICD-10-CM

## 2025-02-17 DIAGNOSIS — E11.22 TYPE 2 DIABETES MELLITUS WITH STAGE 3A CHRONIC KIDNEY DISEASE, WITH LONG-TERM CURRENT USE OF INSULIN (CMS/HCC): ICD-10-CM

## 2025-02-17 DIAGNOSIS — Z79.4 TYPE 2 DIABETES MELLITUS WITH STAGE 3A CHRONIC KIDNEY DISEASE, WITH LONG-TERM CURRENT USE OF INSULIN (CMS/HCC): ICD-10-CM

## 2025-02-17 RX ORDER — FINERENONE 10 MG/1
1 TABLET, FILM COATED ORAL DAILY
Qty: 90 TABLET | Refills: 1 | Status: SHIPPED | OUTPATIENT
Start: 2025-02-17

## 2025-02-17 NOTE — TELEPHONE ENCOUNTER
Medication refill request:  Medication(s):  kerendia not filled due to Medication not assigned a protocol

## 2025-02-17 NOTE — TELEPHONE ENCOUNTER
No care due was identified.  Health [facility] Embedded Care Due Messages. Reference number: [id number]. 2/17/2025 3:40:22 AM MST

## 2025-03-02 DIAGNOSIS — E11.9 DIABETES MELLITUS WITHOUT COMPLICATION (CMS/HCC): ICD-10-CM

## 2025-03-02 RX ORDER — PEN NEEDLE, DIABETIC 32GX 5/32"
1 NEEDLE, DISPOSABLE MISCELLANEOUS
Qty: 200 EACH | Refills: 1 | Status: SHIPPED | OUTPATIENT
Start: 2025-03-02

## 2025-03-02 NOTE — TELEPHONE ENCOUNTER
No care due was identified.  [facility] Embedded Care Due Messages. Reference number: [id number]. 3/02/2025 6:53:33 AM MST

## 2025-03-13 DIAGNOSIS — E11.9 DIABETES MELLITUS WITHOUT COMPLICATION (CMS/HCC): ICD-10-CM

## 2025-03-13 NOTE — TELEPHONE ENCOUNTER
No care due was identified.  Health [facility] Embedded Care Due Messages. Reference number: [id number]. 3/13/2025 10:20:24 AM ANITA

## 2025-03-14 RX ORDER — INSULIN GLARGINE 300 U/ML
76 INJECTION, SOLUTION SUBCUTANEOUS 2 TIMES DAILY
Qty: 15 ML | Refills: 2 | Status: SHIPPED | OUTPATIENT
Start: 2025-03-14 | End: 2026-03-14

## 2025-03-14 NOTE — TELEPHONE ENCOUNTER
For Clinic Staff: please review this note,  Centralized RN/Nurse Triage: medication(s) Toujeo Max  not filled due to *90 day or less supply and/or with chart review, there is indication of a medication plan that needs to be reviewed by clinic staff.

## 2025-03-18 DIAGNOSIS — Z79.4 TYPE 2 DIABETES MELLITUS WITH DIABETIC NEPHROPATHY, WITH LONG-TERM CURRENT USE OF INSULIN (CMS/HCC): ICD-10-CM

## 2025-03-18 DIAGNOSIS — Z79.4 TYPE 2 DIABETES MELLITUS WITH DIABETIC NEPHROPATHY, WITH LONG-TERM CURRENT USE OF INSULIN (CMS/HCC): Chronic | ICD-10-CM

## 2025-03-18 DIAGNOSIS — E11.21 TYPE 2 DIABETES MELLITUS WITH DIABETIC NEPHROPATHY, WITH LONG-TERM CURRENT USE OF INSULIN (CMS/HCC): ICD-10-CM

## 2025-03-18 DIAGNOSIS — E11.21 TYPE 2 DIABETES MELLITUS WITH DIABETIC NEPHROPATHY, WITH LONG-TERM CURRENT USE OF INSULIN (CMS/HCC): Chronic | ICD-10-CM

## 2025-03-18 NOTE — TELEPHONE ENCOUNTER
No care due was identified.  [facility] Embedded Care Due Messages. Reference number: [id number]. 3/18/2025 1:10:25 PM MDT

## 2025-03-19 NOTE — TELEPHONE ENCOUNTER
Medication refill request:  Medication(s):  novolog flexpen not filled due to Previous refill visit indicating specific care plan/ change in dosage, please review for refill

## 2025-03-20 RX ORDER — INSULIN ASPART 100 [IU]/ML
INJECTION, SOLUTION INTRAVENOUS; SUBCUTANEOUS
Qty: 90 ML | Refills: 0 | Status: SHIPPED | OUTPATIENT
Start: 2025-03-20

## 2025-03-26 DIAGNOSIS — E78.00 PURE HYPERCHOLESTEROLEMIA: ICD-10-CM

## 2025-03-26 NOTE — TELEPHONE ENCOUNTER
No care due was identified.  [facility] Embedded Care Due Messages. Reference number: [id number]. 3/26/2025 11:45:39 AM ANITA

## 2025-03-28 DIAGNOSIS — E78.00 PURE HYPERCHOLESTEROLEMIA: ICD-10-CM

## 2025-03-28 RX ORDER — PRAVASTATIN SODIUM 20 MG/1
20 TABLET ORAL 3 TIMES WEEKLY
OUTPATIENT
Start: 2025-03-28 | End: 2026-03-28

## 2025-03-28 NOTE — TELEPHONE ENCOUNTER
No care due was identified.  [facility] Embedded Care Due Messages. Reference number: [id number]. 3/28/2025 11:38:20 AM ANITA

## 2025-03-28 NOTE — TELEPHONE ENCOUNTER
Medication refill request:  Medication(s):  pravastatin not filled due to Previous refill visit indicating specific care plan, please review for refill

## 2025-03-28 NOTE — TELEPHONE ENCOUNTER
For Clinic Staff: please review this note,  Centralized RN/Nurse Triage: medication(s) Pravastatin  not filled due to *Dosage: Clarification needed for dosing.  See fax

## 2025-03-31 RX ORDER — PRAVASTATIN SODIUM 20 MG/1
20 TABLET ORAL 3 TIMES WEEKLY
Qty: 36 TABLET | Refills: 1 | Status: SHIPPED | OUTPATIENT
Start: 2025-03-31 | End: 2026-03-31

## 2025-04-03 ENCOUNTER — LAB (OUTPATIENT)
Dept: LAB | Facility: CLINIC | Age: 80
End: 2025-04-03
Payer: MEDICARE

## 2025-04-03 ENCOUNTER — OFFICE VISIT (OUTPATIENT)
Dept: URGENT CARE | Facility: CLINIC | Age: 80
End: 2025-04-03
Payer: MEDICARE

## 2025-04-03 ENCOUNTER — APPOINTMENT (OUTPATIENT)
Dept: CARDIOLOGY | Facility: CLINIC | Age: 80
End: 2025-04-03
Payer: MEDICARE

## 2025-04-03 VITALS
OXYGEN SATURATION: 95 % | TEMPERATURE: 98.1 F | HEART RATE: 75 BPM | SYSTOLIC BLOOD PRESSURE: 130 MMHG | RESPIRATION RATE: 20 BRPM | DIASTOLIC BLOOD PRESSURE: 62 MMHG

## 2025-04-03 DIAGNOSIS — R42 DIZZINESS: ICD-10-CM

## 2025-04-03 DIAGNOSIS — R32 URINARY INCONTINENCE, UNSPECIFIED TYPE: ICD-10-CM

## 2025-04-03 DIAGNOSIS — N30.00 ACUTE CYSTITIS WITHOUT HEMATURIA: Primary | ICD-10-CM

## 2025-04-03 LAB
ALBUMIN SERPL-MCNC: 4.4 G/DL (ref 3.5–5.3)
ALP SERPL-CCNC: 120 U/L (ref 55–142)
ALT SERPL-CCNC: 27 U/L (ref 7–52)
ANION GAP SERPL CALC-SCNC: 14 MMOL/L (ref 3–11)
AST SERPL-CCNC: 26 U/L
BACTERIA #/AREA URNS HPF: ABNORMAL /HPF
BACTERIA #/AREA URNS HPF: ABNORMAL /HPF
BASOPHILS # BLD AUTO: 0.06 10*3/UL
BASOPHILS NFR BLD AUTO: 0.4 % (ref 0–2)
BILIRUB SERPL-MCNC: 0.47 MG/DL (ref 0.2–1.4)
BILIRUB UR QL: NEGATIVE
BILIRUB UR QL: NEGATIVE
BUN SERPL-MCNC: 32 MG/DL (ref 7–25)
CALCIUM ALBUM COR SERPL-MCNC: 9.2 MG/DL (ref 8.6–10.3)
CALCIUM SERPL-MCNC: 9.5 MG/DL (ref 8.6–10.3)
CHLORIDE SERPL-SCNC: 102 MMOL/L (ref 98–107)
CLARITY UR: CLEAR
CLARITY UR: CLEAR
CO2 SERPL-SCNC: 21 MMOL/L (ref 21–32)
COLOR UR: YELLOW
COLOR UR: YELLOW
CREAT SERPL-MCNC: 1.37 MG/DL (ref 0.6–1.1)
EGFRCR SERPLBLD CKD-EPI 2021: 40 ML/MIN/1.73M*2
EOSINOPHIL # BLD AUTO: 0.29 10*3/UL
EOSINOPHIL NFR BLD AUTO: 2 % (ref 0–3)
ERYTHROCYTE DISTRIBUTION WIDTH STANDARD DEVIATION: 48 FL (ref 36.4–46.3)
ERYTHROCYTE [DISTWIDTH] IN BLOOD BY AUTOMATED COUNT: 13.6 % (ref 11.5–14)
GLUCOSE SERPL-MCNC: 166 MG/DL (ref 70–105)
GLUCOSE UR QL: >=1000 MG/DL
GLUCOSE UR QL: >=1000 MG/DL
HCT VFR BLD AUTO: 45.9 % (ref 34–45)
HGB BLD-MCNC: 14.8 G/DL (ref 11.5–15.5)
HGB UR QL: NEGATIVE
HGB UR QL: NEGATIVE
IMMATURE GRANULOCYTES (10*3/UL) LEUKOCYTES IN BLOOD BY AUTOMATED COUNT: 0.05 10*3/UL
IMMATURE GRANULOCYTES/100 LEUKOCYTES IN BLOOD BY AUTOMATED COUNT: 0.3 %
KETONES UR-MCNC: NEGATIVE MG/DL
KETONES UR-MCNC: NEGATIVE MG/DL
LEUKOCYTE ESTERASE UR QL STRIP: NEGATIVE
LEUKOCYTE ESTERASE UR QL STRIP: NEGATIVE
LYMPHOCYTES # BLD AUTO: 3.15 10*3/UL
LYMPHOCYTES NFR BLD AUTO: 21.9 % (ref 11–47)
MCH RBC QN AUTO: 30.6 PG (ref 28–33)
MCHC RBC AUTO-ENTMCNC: 32.2 G/DL (ref 32–36)
MCV RBC AUTO: 94.8 FL (ref 81–97)
MONOCYTES # BLD AUTO: 0.89 10*3/UL
MONOCYTES NFR BLD AUTO: 6.2 % (ref 3–11)
NEUTROPHILS # BLD AUTO: 9.96 10*3/UL
NEUTROPHILS NFR BLD AUTO: 69.2 % (ref 41–81)
NITRITE UR QL: NEGATIVE
NITRITE UR QL: NEGATIVE
NRBC (10*3/UL) BY AUTOMATED COUNT: 0 10*3/UL (ref 0–0.1)
NRBC BLD-RTO: 0 /100 WBCS (ref 0–2)
PH UR: 5.5 PH (ref 5–8)
PH UR: 5.5 PH (ref 5–8)
PLATELET # BLD AUTO: 276 10*3/UL (ref 140–350)
PMV BLD AUTO: 9.5 FL (ref 6.9–10.8)
POTASSIUM SERPL-SCNC: 4 MMOL/L (ref 3.5–5.1)
PROT SERPL-MCNC: 7.1 G/DL (ref 6–8.3)
PROT UR STRIP-MCNC: ABNORMAL MG/DL
PROT UR STRIP-MCNC: NEGATIVE MG/DL
RBC # BLD AUTO: 4.84 10*6/UL (ref 3.7–5.3)
RBC #/AREA URNS HPF: ABNORMAL /HPF
RBC #/AREA URNS HPF: ABNORMAL /HPF
SODIUM SERPL-SCNC: 137 MMOL/L (ref 135–145)
SP GR UR: 1.01 (ref 1–1.03)
SP GR UR: 1.02 (ref 1–1.03)
SQUAMOUS #/AREA URNS HPF: ABNORMAL /HPF
SQUAMOUS #/AREA URNS HPF: ABNORMAL /HPF
UROBILINOGEN UR-MCNC: 0.2 MG/DL
UROBILINOGEN UR-MCNC: 0.2 MG/DL
WBC # BLD AUTO: 14.4 10*3/UL (ref 4.5–10.5)
WBC #/AREA URNS HPF: ABNORMAL /HPF
WBC #/AREA URNS HPF: ABNORMAL /HPF

## 2025-04-03 PROCEDURE — 36415 COLL VENOUS BLD VENIPUNCTURE: CPT | Performed by: NURSE PRACTITIONER

## 2025-04-03 PROCEDURE — 80053 COMPREHEN METABOLIC PANEL: CPT | Performed by: NURSE PRACTITIONER

## 2025-04-03 PROCEDURE — 99214 OFFICE O/P EST MOD 30 MIN: CPT | Performed by: NURSE PRACTITIONER

## 2025-04-03 PROCEDURE — 87088 URINE BACTERIA CULTURE: CPT | Performed by: NURSE PRACTITIONER

## 2025-04-03 PROCEDURE — G0463 HOSPITAL OUTPT CLINIC VISIT: HCPCS | Performed by: NURSE PRACTITIONER

## 2025-04-03 PROCEDURE — 93005 ELECTROCARDIOGRAM TRACING: CPT | Performed by: NURSE PRACTITIONER

## 2025-04-03 PROCEDURE — 85025 COMPLETE CBC W/AUTO DIFF WBC: CPT | Performed by: NURSE PRACTITIONER

## 2025-04-03 PROCEDURE — 81001 URINALYSIS AUTO W/SCOPE: CPT | Performed by: NURSE PRACTITIONER

## 2025-04-03 RX ORDER — CEPHALEXIN 500 MG/1
500 CAPSULE ORAL 3 TIMES DAILY
Qty: 21 CAPSULE | Refills: 0 | Status: SHIPPED | OUTPATIENT
Start: 2025-04-03 | End: 2025-04-10

## 2025-04-03 ASSESSMENT — ENCOUNTER SYMPTOMS
NAUSEA: 1
TROUBLE SWALLOWING: 0
VOMITING: 0
DIFFICULTY URINATING: 0
CHILLS: 0
CONSTIPATION: 0
NERVOUS/ANXIOUS: 0
FATIGUE: 1
EYE PAIN: 0
PALPITATIONS: 0
ARTHRALGIAS: 0
WHEEZING: 0
FREQUENCY: 0
FEVER: 0
SINUS PAIN: 0
MYALGIAS: 0
LIGHT-HEADEDNESS: 0
WEAKNESS: 0
HEADACHES: 0
SINUS PRESSURE: 0
CHEST TIGHTNESS: 0
ABDOMINAL PAIN: 0
SHORTNESS OF BREATH: 0
RHINORRHEA: 0
COUGH: 0
DIZZINESS: 1
APPETITE CHANGE: 1
AGITATION: 0
DIARRHEA: 0
SORE THROAT: 0

## 2025-04-03 ASSESSMENT — PAIN SCALES - GENERAL: PAINLEVEL_OUTOF10: 0-NO PAIN

## 2025-04-04 ENCOUNTER — RESULTS FOLLOW-UP (OUTPATIENT)
Dept: URGENT CARE | Facility: CLINIC | Age: 80
End: 2025-04-04

## 2025-04-04 LAB — BACTERIA UR CULT: NORMAL

## 2025-04-04 PROCEDURE — 93010 ELECTROCARDIOGRAM REPORT: CPT | Performed by: INTERNAL MEDICINE

## 2025-04-27 DIAGNOSIS — K21.9 CHRONIC GERD: ICD-10-CM

## 2025-04-28 RX ORDER — OMEPRAZOLE 40 MG/1
40 CAPSULE, DELAYED RELEASE ORAL DAILY
Qty: 90 CAPSULE | Refills: 2 | Status: SHIPPED | OUTPATIENT
Start: 2025-04-28

## 2025-04-28 NOTE — TELEPHONE ENCOUNTER
No care due was identified.  [facility] Embedded Care Due Messages. Reference number: [id number]. 4/28/2025 9:27:21 AM ANITA

## 2025-05-12 ENCOUNTER — OFFICE VISIT (OUTPATIENT)
Dept: OTOLARYNGOLOGY | Facility: CLINIC | Age: 80
End: 2025-05-12
Payer: MEDICARE

## 2025-05-12 VITALS
SYSTOLIC BLOOD PRESSURE: 126 MMHG | OXYGEN SATURATION: 97 % | DIASTOLIC BLOOD PRESSURE: 68 MMHG | TEMPERATURE: 97 F | RESPIRATION RATE: 18 BRPM | HEART RATE: 74 BPM

## 2025-05-12 DIAGNOSIS — H61.21 IMPACTED CERUMEN OF RIGHT EAR: ICD-10-CM

## 2025-05-12 DIAGNOSIS — Z90.89 HISTORY OF RIGHT MASTOIDECTOMY: Primary | ICD-10-CM

## 2025-05-12 PROCEDURE — 69222 CLEAN OUT MASTOID CAVITY: CPT | Mod: RT | Performed by: OTOLARYNGOLOGY

## 2025-05-12 PROCEDURE — G0463 HOSPITAL OUTPT CLINIC VISIT: HCPCS | Mod: 25 | Performed by: OTOLARYNGOLOGY

## 2025-05-12 PROCEDURE — 99212 OFFICE O/P EST SF 10 MIN: CPT | Mod: 25 | Performed by: OTOLARYNGOLOGY

## 2025-05-12 RX ORDER — FLUCONAZOLE 150 MG/1
TABLET ORAL
COMMUNITY
Start: 2025-04-09

## 2025-05-12 ASSESSMENT — ENCOUNTER SYMPTOMS
SORE THROAT: 0
RHINORRHEA: 0
CONSTITUTIONAL NEGATIVE: 1
TROUBLE SWALLOWING: 0
FEVER: 0
SINUS PRESSURE: 0
CHILLS: 0

## 2025-05-12 ASSESSMENT — PAIN SCALES - GENERAL: PAINLEVEL_OUTOF10: 0-NO PAIN

## 2025-05-12 NOTE — PROGRESS NOTES
Subjective    CC: Mastoid cleaning    HPI: Consuelo Singh is a 79 y.o. female here for her routine every 3-month mastoid debridement.  She had a right canal wall down mastoidectomy many years ago.  No problems to report today.    Past Medical History:   Diagnosis Date    Allergic Do not remember    Arthritis     Cataract     Deep vein thrombosis (CMS/HCC) 1994    hx of in left leg    Diabetes (CMS/HCC)     type 2    Eczema     Fibromyalgia     GERD (gastroesophageal reflux disease)     Heart murmur     Hyperlipidemia     Hypertension     Obesity In my twenties    Peripheral neuropathy     toes    Pneumonia     Psoriasis     Scoliosis     Skin abnormalities 1985    Visual impairment When i was 17    Wears dentures     full set       Past Surgical History:   Procedure Laterality Date    CHOLECYSTECTOMY      COLONOSCOPY  03/11/2016    5 yr  follow up    COLONOSCOPY N/A 05/28/2021    Please call the patient regarding her abnormal result. Repeat colonoscopy in 5 year due to 2 tubular adenomas if patient otherwise healthy at that time. Also reasonable to discontinue further colonoscopy--Dr Laboy    EYE SURGERY  I believe 2020    cataracts    HYSTERECTOMY      With BSO    OTHER SURGICAL HISTORY      Rt. radical mastoidectomy, rebuilt canal from growth         Current Outpatient Medications:     fluconazole (DIFLUCAN) 150 mg tablet, SMARTSIG:By Mouth, Disp: , Rfl:     omeprazole (PriLOSEC) 40 mg capsule, Take 1 capsule (40 mg total) by mouth daily, Disp: 90 capsule, Rfl: 2    pravastatin 20 mg tablet, Take 1 tablet (20 mg total) by mouth 3 (three) times a week, Disp: 36 tablet, Rfl: 1    NovoLOG Flexpen U-100 Insulin 100 unit/mL (3 mL) insulin pen, INJECT 25-43 UNITS UNDER THE SKIN 4 TIMES A DAY (BEFORE MEALS AND NIGHTLY) DO NOT EXCEED 139 UNITS PER 24 HOURS, Disp: 90 mL, Rfl: 0    Toujeo Max U-300 SoloStar 300 unit/mL (3 mL) insulin pen insulin pen, Inject 76 Units as directed 2 (two) times a day, Disp: 15 mL, Rfl: 2     "pen needle, diabetic 31 gauge x 15/64\" needle, Inject 1 Needle under the skin 5 (five) times a day, Disp: 200 each, Rfl: 1    Kerendia 10 mg tablet, TAKE 1 TABLET BY MOUTH DAILY, Disp: 90 tablet, Rfl: 1    amLODIPine (NORVASC) 5 mg tablet, Take 1 tablet (5 mg total) by mouth daily, Disp: 90 tablet, Rfl: 3    chlorthalidone 25 mg tablet, Take 1 tablet (25 mg total) by mouth daily, Disp: 90 tablet, Rfl: 1    empagliflozin (Jardiance) 10 mg tablet, Take 1 tablet (10 mg total) by mouth daily, Disp: 90 tablet, Rfl: 1    losartan (COZAAR) 100 mg tablet, Take 1 tablet (100 mg total) by mouth daily, Disp: 90 tablet, Rfl: 1    metoprolol succinate XL (TOPROL-XL) 50 mg 24 hr tablet, Take 3 tablets (150 mg total) by mouth nightly, Disp: 270 tablet, Rfl: 3    nortriptyline (PAMELOR) 50 mg capsule, Take 1 capsule (50 mg total) by mouth daily, Disp: 90 capsule, Rfl: 3    blood-glucose sensor device, 1 Device every 14 (fourteen) days 1 device every 10 days., Disp: 3 each, Rfl: 11    albuterol HFA 90 mcg/actuation inhaler, Inhale 2 puffs every 6 (six) hours as needed for wheezing, Disp: 18 g, Rfl: 0    benzonatate (TESSALON) 200 mg capsule, Take 1 capsule (200 mg total) by mouth 3 (three) times a day as needed for cough, Disp: 30 capsule, Rfl: 0    clobetasoL (TEMOVATE) 0.05 % ointment, Apply 1 application topically 2 (two) times a day as needed (Rash), Disp: 30 g, Rfl: 2    lutein 20 mg tablet, Take 1 tab/cap by mouth daily, Disp: , Rfl:     multivitamin (THERAGRAN) tablet tablet, Take 1 tablet by mouth daily, Disp: , Rfl:     aspirin 325 mg tablet, Take 1 tablet (325 mg total) by mouth daily, Disp: , Rfl:     pen needle, diabetic 31 gauge x 15/64\" needle, SMARTSIG:SUB-Q, Disp: , Rfl:     Allergies   Allergen Reactions    Aloe Vera     Bacitracin      ZINC    Erythromycin Base     Formaldehyde     Gramicidins      OPHTHALMIC    Hydrocortisone      ACETATE    Latex     Levofloxacin     Metformin     Neomycin     " Neomycin-Bacitracnzn-Polymyxnb     Polymyxin B     Prednisolone     Prednisone     Statins-Hmg-Coa Reductase Inhibitors     Sulfamethoxazole     Sulfamethoxazole-Trimethoprim     Thimerosal     Tresiba Flextouch U-100 [Insulin Degludec] Diarrhea and Other (see comments)     Abdominal pain    Trimethoprim     Humalog Kwikpen Insulin [Insulin Lispro] Rash       family history includes Bone cancer in her maternal grandfather; Breast cancer in her mother's sister; Breast cancer (age of onset: 70 - 79) in her mother; Cancer in her mother; Diabetes in her father; Hyperlipidemia in her son; Lung cancer in her maternal grandmother and mother's sister; No Known Problems in her brother and sister; Pancreatic cancer in her mother's brother; Thyroid disease in her daughter.    Social History     Tobacco Use    Smoking status: Former     Current packs/day: 0.00     Average packs/day: 0.5 packs/day for 40.0 years (20.0 ttl pk-yrs)     Types: Cigarettes     Start date: 1967     Quit date: 2007     Years since quittin.3    Smokeless tobacco: Never   Vaping Use    Vaping status: Never Used   Substance Use Topics    Alcohol use: Yes     Comment: Maybe once a month    Drug use: No       Review of Systems   Constitutional: Negative.  Negative for chills and fever.   HENT: Negative.  Negative for congestion, ear discharge, ear pain, hearing loss, nosebleeds, postnasal drip, rhinorrhea, sinus pressure, sneezing, sore throat, tinnitus and trouble swallowing.        Objective    /68   Pulse 74   Temp 36.1 °C (97 °F) (Temporal)   Resp 18   SpO2 97%     General: Well-developed well-nourished female no acute distress.    Ears: Right external ear normal, right external auditory canal widely patent status post wall down mastoid.  Debridement performed, underlying tissues appear healthy.    Procedure: Right mastoid debridement: Right ear was visualized with the operating microscope and using alligator forceps I removed  adherent cerumen debris from mastoid bowl and overlying the neotympanic membrane.      Diagnosis    1. History of right mastoidectomy    2. Impacted cerumen of right ear        Recommendations    79-year-old female doing well with every 3 months mastoid debridements.  Follow-up in 3 months, sooner as needed for problems.  Ear appears healthy today.

## 2025-05-25 DIAGNOSIS — E11.9 DIABETES MELLITUS WITHOUT COMPLICATION (CMS/HCC): ICD-10-CM

## 2025-05-25 RX ORDER — PEN NEEDLE, DIABETIC 32GX 5/32"
1 NEEDLE, DISPOSABLE MISCELLANEOUS
Qty: 300 EACH | Refills: 0 | Status: SHIPPED | OUTPATIENT
Start: 2025-05-25

## 2025-05-25 NOTE — TELEPHONE ENCOUNTER
Centralized RN/Nurse Triage: *Approved 90 day supply given, NOT courtesy refill given. Patient is due for annual visit, follow up, or regular visit with PCP.

## 2025-05-25 NOTE — TELEPHONE ENCOUNTER
No care due was identified.  [facility] Embedded Care Due Messages. Reference number: [id number]. 5/25/2025 6:52:13 AM ANITA

## 2025-06-09 DIAGNOSIS — E11.9 DIABETES MELLITUS WITHOUT COMPLICATION (CMS/HCC): ICD-10-CM

## 2025-06-09 NOTE — TELEPHONE ENCOUNTER
No care due was identified.  [facility] Embedded Care Due Messages. Reference number: [id number]. 6/09/2025 4:49:24 PM MDT

## 2025-06-10 RX ORDER — INSULIN GLARGINE 300 U/ML
INJECTION, SOLUTION SUBCUTANEOUS
Qty: 15 ML | Refills: 1 | Status: SHIPPED | OUTPATIENT
Start: 2025-06-10

## 2025-06-10 NOTE — TELEPHONE ENCOUNTER
For Clinic Staff: please review this note,  Centralized RN/Nurse Triage: medication(s) Toujeo  not filled due to *90 day or less supply and/or with chart review, there is indication of a medication plan that needs to be reviewed by clinic staff.

## 2025-06-17 DIAGNOSIS — E11.21 TYPE 2 DIABETES MELLITUS WITH DIABETIC NEPHROPATHY, WITH LONG-TERM CURRENT USE OF INSULIN (CMS/HCC): Chronic | ICD-10-CM

## 2025-06-17 DIAGNOSIS — Z79.4 TYPE 2 DIABETES MELLITUS WITH DIABETIC NEPHROPATHY, WITH LONG-TERM CURRENT USE OF INSULIN (CMS/HCC): ICD-10-CM

## 2025-06-17 DIAGNOSIS — E11.21 TYPE 2 DIABETES MELLITUS WITH DIABETIC NEPHROPATHY, WITH LONG-TERM CURRENT USE OF INSULIN (CMS/HCC): ICD-10-CM

## 2025-06-17 DIAGNOSIS — Z79.4 TYPE 2 DIABETES MELLITUS WITH DIABETIC NEPHROPATHY, WITH LONG-TERM CURRENT USE OF INSULIN (CMS/HCC): Chronic | ICD-10-CM

## 2025-06-17 NOTE — TELEPHONE ENCOUNTER
No care due was identified.  [facility] Embedded Care Due Messages. Reference number: [id number]. 6/17/2025 3:40:33 PM MDT

## 2025-06-19 RX ORDER — INSULIN ASPART 100 [IU]/ML
INJECTION, SOLUTION INTRAVENOUS; SUBCUTANEOUS
Qty: 90 ML | Refills: 0 | Status: SHIPPED | OUTPATIENT
Start: 2025-06-19

## 2025-06-19 NOTE — TELEPHONE ENCOUNTER
For Clinic Staff: please review this note,  Centralized RN/Nurse Triage: medication(s) novolog flexpen not filled due to *Drug ALLERGY Alert please review for refill appropriate override comment, or have provider review.

## 2025-06-30 ENCOUNTER — RESULTS FOLLOW-UP (OUTPATIENT)
Dept: FAMILY MEDICINE | Facility: CLINIC | Age: 80
End: 2025-06-30

## 2025-06-30 ENCOUNTER — ANCILLARY PROCEDURE (OUTPATIENT)
Dept: MAMMOGRAPHY | Facility: CLINIC | Age: 80
End: 2025-06-30
Payer: MEDICARE

## 2025-06-30 DIAGNOSIS — Z12.31 ENCOUNTER FOR SCREENING MAMMOGRAM FOR MALIGNANT NEOPLASM OF BREAST: ICD-10-CM

## 2025-06-30 PROCEDURE — 77063 BREAST TOMOSYNTHESIS BI: CPT | Mod: 26 | Performed by: RADIOLOGY

## 2025-06-30 PROCEDURE — 77067 SCR MAMMO BI INCL CAD: CPT | Mod: 26 | Performed by: RADIOLOGY

## 2025-06-30 PROCEDURE — 77067 SCR MAMMO BI INCL CAD: CPT

## 2025-07-07 ENCOUNTER — LAB (OUTPATIENT)
Dept: LAB | Facility: CLINIC | Age: 80
End: 2025-07-07
Payer: MEDICARE

## 2025-07-07 DIAGNOSIS — E11.22 TYPE 2 DIABETES MELLITUS WITH STAGE 3A CHRONIC KIDNEY DISEASE, WITH LONG-TERM CURRENT USE OF INSULIN (CMS/HCC): ICD-10-CM

## 2025-07-07 DIAGNOSIS — E11.21 TYPE 2 DIABETES MELLITUS WITH DIABETIC NEPHROPATHY, WITH LONG-TERM CURRENT USE OF INSULIN (CMS/HCC): Chronic | ICD-10-CM

## 2025-07-07 DIAGNOSIS — Z79.4 TYPE 2 DIABETES MELLITUS WITH DIABETIC NEPHROPATHY, WITH LONG-TERM CURRENT USE OF INSULIN (CMS/HCC): Chronic | ICD-10-CM

## 2025-07-07 DIAGNOSIS — Z79.4 TYPE 2 DIABETES MELLITUS WITH STAGE 3A CHRONIC KIDNEY DISEASE, WITH LONG-TERM CURRENT USE OF INSULIN (CMS/HCC): ICD-10-CM

## 2025-07-07 DIAGNOSIS — N18.31 TYPE 2 DIABETES MELLITUS WITH STAGE 3A CHRONIC KIDNEY DISEASE, WITH LONG-TERM CURRENT USE OF INSULIN (CMS/HCC): ICD-10-CM

## 2025-07-07 LAB
ANION GAP SERPL CALC-SCNC: 11 MMOL/L (ref 3–11)
BUN SERPL-MCNC: 30 MG/DL (ref 7–25)
CALCIUM SERPL-MCNC: 8.9 MG/DL (ref 8.6–10.3)
CHLORIDE SERPL-SCNC: 102 MMOL/L (ref 98–107)
CO2 SERPL-SCNC: 26 MMOL/L (ref 21–32)
CREAT SERPL-MCNC: 1.34 MG/DL (ref 0.6–1.1)
EGFRCR SERPLBLD CKD-EPI 2021: 41 ML/MIN/1.73M*2
EST. AVERAGE GLUCOSE BLD GHB EST-MCNC: 148.5 MG/DL
GLUCOSE SERPL-MCNC: 123 MG/DL (ref 70–105)
HBA1C MFR BLD: 6.8 % (ref 4–6)
POTASSIUM SERPL-SCNC: 4.4 MMOL/L (ref 3.5–5.1)
SODIUM SERPL-SCNC: 139 MMOL/L (ref 135–145)

## 2025-07-07 PROCEDURE — 80048 BASIC METABOLIC PNL TOTAL CA: CPT

## 2025-07-07 PROCEDURE — 83036 HEMOGLOBIN GLYCOSYLATED A1C: CPT

## 2025-07-07 PROCEDURE — 36415 COLL VENOUS BLD VENIPUNCTURE: CPT

## 2025-07-08 ENCOUNTER — OFFICE VISIT (OUTPATIENT)
Dept: FAMILY MEDICINE | Facility: CLINIC | Age: 80
End: 2025-07-08
Payer: MEDICARE

## 2025-07-08 VITALS
SYSTOLIC BLOOD PRESSURE: 124 MMHG | OXYGEN SATURATION: 95 % | DIASTOLIC BLOOD PRESSURE: 72 MMHG | TEMPERATURE: 97.5 F | BODY MASS INDEX: 39.5 KG/M2 | WEIGHT: 223 LBS | HEART RATE: 76 BPM

## 2025-07-08 DIAGNOSIS — Z79.4 TYPE 2 DIABETES MELLITUS WITH DIABETIC NEPHROPATHY, WITH LONG-TERM CURRENT USE OF INSULIN (CMS/HCC): Chronic | ICD-10-CM

## 2025-07-08 DIAGNOSIS — Z79.4 TYPE 2 DIABETES MELLITUS WITH STAGE 3A CHRONIC KIDNEY DISEASE, WITH LONG-TERM CURRENT USE OF INSULIN (CMS/HCC): ICD-10-CM

## 2025-07-08 DIAGNOSIS — I10 ESSENTIAL HYPERTENSION: ICD-10-CM

## 2025-07-08 DIAGNOSIS — E11.9 DIABETES MELLITUS WITHOUT COMPLICATION (CMS/HCC): ICD-10-CM

## 2025-07-08 DIAGNOSIS — E11.21 TYPE 2 DIABETES MELLITUS WITH DIABETIC NEPHROPATHY, WITH LONG-TERM CURRENT USE OF INSULIN (CMS/HCC): Chronic | ICD-10-CM

## 2025-07-08 DIAGNOSIS — B37.9 YEAST INFECTION: Primary | ICD-10-CM

## 2025-07-08 DIAGNOSIS — N18.31 TYPE 2 DIABETES MELLITUS WITH STAGE 3A CHRONIC KIDNEY DISEASE, WITH LONG-TERM CURRENT USE OF INSULIN (CMS/HCC): ICD-10-CM

## 2025-07-08 DIAGNOSIS — N18.31 STAGE 3A CHRONIC KIDNEY DISEASE (CMS/HCC): Chronic | ICD-10-CM

## 2025-07-08 DIAGNOSIS — E11.22 TYPE 2 DIABETES MELLITUS WITH STAGE 3A CHRONIC KIDNEY DISEASE, WITH LONG-TERM CURRENT USE OF INSULIN (CMS/HCC): ICD-10-CM

## 2025-07-08 PROCEDURE — G0463 HOSPITAL OUTPT CLINIC VISIT: HCPCS | Performed by: FAMILY MEDICINE

## 2025-07-08 PROCEDURE — G2211 COMPLEX E/M VISIT ADD ON: HCPCS | Performed by: FAMILY MEDICINE

## 2025-07-08 PROCEDURE — 99214 OFFICE O/P EST MOD 30 MIN: CPT | Performed by: FAMILY MEDICINE

## 2025-07-08 RX ORDER — FINERENONE 10 MG/1
1 TABLET, FILM COATED ORAL DAILY
Qty: 90 TABLET | Refills: 1 | Status: SHIPPED | OUTPATIENT
Start: 2025-07-08 | End: 2026-07-08

## 2025-07-08 RX ORDER — LOSARTAN POTASSIUM 100 MG/1
100 TABLET ORAL DAILY
Qty: 90 TABLET | Refills: 1 | Status: SHIPPED | OUTPATIENT
Start: 2025-07-08 | End: 2026-07-08

## 2025-07-08 RX ORDER — FLUCONAZOLE 150 MG/1
TABLET ORAL
Qty: 2 TABLET | Refills: 1 | Status: SHIPPED | OUTPATIENT
Start: 2025-07-08

## 2025-07-08 RX ORDER — CHLORTHALIDONE 25 MG/1
25 TABLET ORAL DAILY
Qty: 90 TABLET | Refills: 1 | Status: SHIPPED | OUTPATIENT
Start: 2025-07-08 | End: 2026-07-08

## 2025-07-08 RX ORDER — INSULIN GLARGINE 300 U/ML
76 INJECTION, SOLUTION SUBCUTANEOUS 2 TIMES DAILY
Qty: 9 ML | Refills: 4 | Status: SHIPPED | OUTPATIENT
Start: 2025-07-08 | End: 2026-07-08

## 2025-07-08 RX ORDER — AMLODIPINE BESYLATE 5 MG/1
5 TABLET ORAL DAILY
Qty: 90 TABLET | Refills: 1 | Status: SHIPPED | OUTPATIENT
Start: 2025-07-08 | End: 2026-07-08

## 2025-07-08 RX ORDER — CHLORTHALIDONE 25 MG/1
25 TABLET ORAL DAILY
Qty: 30 TABLET | Refills: 11 | Status: CANCELLED | OUTPATIENT
Start: 2025-07-08 | End: 2026-07-08

## 2025-07-08 RX ORDER — FINERENONE 10 MG/1
1 TABLET, FILM COATED ORAL DAILY
Status: CANCELLED | OUTPATIENT
Start: 2025-07-08

## 2025-07-08 RX ORDER — METOPROLOL SUCCINATE 50 MG/1
150 TABLET, EXTENDED RELEASE ORAL NIGHTLY
Qty: 270 TABLET | Refills: 1 | Status: SHIPPED | OUTPATIENT
Start: 2025-07-08 | End: 2026-07-08

## 2025-07-08 NOTE — PROGRESS NOTES
Subjective      Consuelo Singh is a 79 y.o. female who presents for   Chief Complaint   Patient presents with    Follow-up     Pt presents for 6 month follow up for diabetic recheck   Pt states that things have been going pretty good.   Med refill-       History of Present Illness    Consuelo Singh is a 79 year old female with diabetes who presents for a diabetic recheck.    Her hemoglobin A1c has improved to 6.8 from 7.1 six months ago. She experiences occasional low glucose readings but no significant hypoglycemia. Blood sugar levels rarely exceed 200 mg/dL. She is on Toujeo 76 units twice daily and NovoLog approximately 25 units per meal, along with pravastatin, losartan, Creanda, chlorthalidone, metoprolol, omeprazole, and nortriptyline. Nortriptyline is for fibromyalgia, and discontinuation leads to depression.    She frequently experiences yeast infections, which she attributes to Jardiance, and is allergic to insertable treatments. She manages these with oral medication, often requiring urgent care visits.    She experiences alternating constipation and diarrhea, using stool softeners that sometimes cause excessive bowel movements. No chest pain, palpitations, or irregular heartbeats.         Allergies   Allergen Reactions    Aloe Vera     Bacitracin      ZINC    Erythromycin Base     Formaldehyde     Gramicidins      OPHTHALMIC    Hydrocortisone      ACETATE    Latex     Levofloxacin     Metformin     Neomycin     Neomycin-Bacitracnzn-Polymyxnb     Polymyxin B     Prednisolone     Prednisone     Statins-Hmg-Coa Reductase Inhibitors     Sulfamethoxazole     Sulfamethoxazole-Trimethoprim     Thimerosal     Tresiba Flextouch U-100 [Insulin Degludec] Diarrhea and Other (see comments)     Abdominal pain    Trimethoprim     Humalog Kwikpen Insulin [Insulin Lispro] Rash     Current Outpatient Medications   Medication Sig Dispense Refill    NovoLOG Flexpen U-100 Insulin 100 unit/mL (3 mL) insulin pen INJECT 25 TO 43  "UNITS UNDER THE SKIN 4 TIMES A DAY (BEFORE MEALS AND NIGHTLY) DO NOT EXCEED 139 UNITS PER 24 HOURS 90 mL 0    pen needle, diabetic 31 gauge x 15/64\" needle Inject 1 Needle under the skin 5 times a day 300 each 0    omeprazole (PriLOSEC) 40 mg capsule Take 1 capsule (40 mg total) by mouth daily 90 capsule 2    pravastatin 20 mg tablet Take 1 tablet (20 mg total) by mouth 3 (three) times a week 36 tablet 1    nortriptyline (PAMELOR) 50 mg capsule Take 1 capsule (50 mg total) by mouth daily 90 capsule 3    blood-glucose sensor device 1 Device every 14 (fourteen) days 1 device every 10 days. 3 each 11    albuterol HFA 90 mcg/actuation inhaler Inhale 2 puffs every 6 (six) hours as needed for wheezing 18 g 0    benzonatate (TESSALON) 200 mg capsule Take 1 capsule (200 mg total) by mouth 3 (three) times a day as needed for cough 30 capsule 0    clobetasoL (TEMOVATE) 0.05 % ointment Apply 1 application topically 2 (two) times a day as needed (Rash) 30 g 2    lutein 20 mg tablet Take 1 tab/cap by mouth daily      multivitamin (THERAGRAN) tablet tablet Take 1 tablet by mouth daily      aspirin 325 mg tablet Take 1 tablet (325 mg total) by mouth daily      empagliflozin (Jardiance) 10 mg tablet Take 1 tablet (10 mg total) by mouth daily 90 tablet 1    amLODIPine (NORVASC) 5 mg tablet Take 1 tablet (5 mg total) by mouth daily 90 tablet 1    chlorthalidone 25 mg tablet Take 1 tablet (25 mg total) by mouth daily 90 tablet 1    losartan (COZAAR) 100 mg tablet Take 1 tablet (100 mg total) by mouth daily 90 tablet 1    metoprolol succinate XL (TOPROL-XL) 50 mg 24 hr tablet Take 3 tablets (150 mg total) by mouth nightly 270 tablet 1    finerenone (Kerendia) 10 mg tablet Take 1 tablet by mouth daily 90 tablet 1    Toujeo Max U-300 SoloStar 300 unit/mL (3 mL) insulin pen insulin pen Inject 76 Units under the skin 2 times a day 9 mL 4    fluconazole (DIFLUCAN) 150 mg tablet Take 1 tab at onset, repeat dose in 3 days if needed, if not " better then you need to be seen 2 tablet 1     No current facility-administered medications for this visit.     Past Medical History:   Diagnosis Date    Allergic Do not remember    Arthritis     Cataract     Deep vein thrombosis (CMS/HCC) 1994    hx of in left leg    Diabetes (CMS/HCC)     type 2    Eczema     Fibromyalgia     GERD (gastroesophageal reflux disease)     Heart murmur     Hyperlipidemia     Hypertension     Obesity In my twenties    Peripheral neuropathy     toes    Pneumonia     Psoriasis     Scoliosis     Skin abnormalities 1985    Visual impairment When i was 17    Wears dentures     full set     Objective   /72 (BP Location: Right arm, Patient Position: Sitting, Cuff Size: Large Adult)   Pulse 76   Temp 36.4 °C (97.5 °F) (Temporal)   Wt 101.2 kg (223 lb)   SpO2 95%   BMI 39.50 kg/m²     Physical Exam  Vitals and nursing note reviewed.   Constitutional:       General: She is not in acute distress.     Appearance: Normal appearance. She is obese.   Cardiovascular:      Rate and Rhythm: Normal rate and regular rhythm.      Heart sounds: No murmur heard.  Pulmonary:      Effort: Pulmonary effort is normal. No respiratory distress.      Breath sounds: Normal breath sounds.   Neurological:      Mental Status: She is alert.   Psychiatric:         Mood and Affect: Mood normal.         Behavior: Behavior normal.       Recent Results (from the past week)   Basic metabolic panel Blood, Venous    Collection Time: 07/07/25  7:05 AM   Result Value Ref Range    Sodium 139 135 - 145 MMOL/L    Potassium 4.4 3.5 - 5.1 MMOL/L    Chloride 102 98 - 107 MMOL/L    CO2 26 21 - 32 MMOL/L    BUN 30 (H) 7 - 25 MG/DL    Creatinine 1.34 (H) 0.60 - 1.10 MG/DL    Glucose 123 (H) 70 - 105 MG/DL    Calcium 8.9 8.6 - 10.3 MG/DL    Anion Gap 11 3 - 11 MMOL/L    eGFR 41 (L) >60 mL/min/1.73m*2   Hemoglobin A1c (glycosylated) Blood, Venous    Collection Time: 07/07/25  7:05 AM   Result Value Ref Range    Hemoglobin A1C 6.8  (H) 4.0 - 6.0 %    Estimated Average Glucose 148.5 MG/DL     Assessment/Plan     Consuelo was seen today for follow-up.    Diagnoses and all orders for this visit:    Yeast infection  -     fluconazole; Take 1 tab at onset, repeat dose in 3 days if needed, if not better then you need to be seen    Type 2 diabetes mellitus with stage 3a chronic kidney disease, with long-term current use of insulin (CMS/[facility])  -     Kerendia; Take 1 tablet by mouth daily    Stage 3a chronic kidney disease (CMS/[facility])  -     Kerendia; Take 1 tablet by mouth daily    Type 2 diabetes mellitus with diabetic nephropathy, with long-term current use of insulin (CMS/[facility]) ([facility])  -     empagliflozin; Take 1 tablet (10 mg total) by mouth daily    Essential hypertension  -     amLODIPine; Take 1 tablet (5 mg total) by mouth daily  -     chlorthalidone; Take 1 tablet (25 mg total) by mouth daily  -     losartan; Take 1 tablet (100 mg total) by mouth daily  -     metoprolol succinate; Take 3 tablets (150 mg total) by mouth nightly    Diabetes mellitus without complication (CMS/[facility])  -     Toujeo Max U-300 SoloStar; Inject 76 Units under the skin 2 times a day       Assessment/Plan     Type 2 Diabetes Mellitus  Diabetes well-controlled with A1c of 6.8. Occasional hypoglycemia. Blood glucose rarely exceeds 200 mg/dL. Recurrent yeast infections likely due to Jardiance.  - Refill Green, NovoLog, and Jardiance.  - Monitor blood glucose levels, especially if holding Jardiance  - Consider holding Jardiance temporarily to assess impact on blood glucose levels- ok to hold off on jardiance as long as BG <200    Recurrent Yeast Infections  Recurrent yeast infections likely due to Jardiance. Allergic to insertable antifungals, can only take oral medications.  - Hold Jardiance temporarily and monitor for changes in yeast infection frequency.  - Prescribe Diflucan with instructions to take one tablet at onset of symptoms and another in three days if  needed.    Chronic Kidney Disease with T2DM  Chronic Kidney Disease well-managed with consistent GFR and normal potassium levels.  - Stable with Kerendia   - Continue monitoring kidney function and potassium levels regularly.    Hypertension  Hypertension managed with losartan, chlorthalidone, and metoprolol. No chest pain or arrhythmias reported.  - Continue losartan, chlorthalidone, and metoprolol.  - BMP without concerns    Hyperlipidemia  Hyperlipidemia managed with pravastatin. Lipid levels not discussed this visit.  - Continue pravastatin as prescribed.    Fibromyalgia  Nortriptyline used for fibromyalgia and mood stabilization. Discontinuation led to depression.  - Continue nortriptyline as prescribed.    Gastroesophageal Reflux Disease (GERD)  GERD managed with omeprazole. No symptoms reported.  - Continue omeprazole as prescribed.    Constipation  Alternating constipation and diarrhea. Possible fecal impaction. Discussed Miralax for daily soft stools.  - Start Miralax, 2-3 doses per day, to achieve daily soft but formed stools.  - Increase water and fiber intake.  - Consider glycerin suppository if no significant bowel movement by the weekend.    General Health Maintenance  Maintaining health well. Aware of medication costs and insurance coverage.  - Schedule next visit in six months for Medicare wellness check.  - Monitor medication costs and insurance coverage.           Follow up in 6 months or sooner PRN.   Labs needed: DM Panel (orders in)    Patient stated understanding and their questions were answered to the best of my abilities.    Shirley Freeman MD      A voice recognition program may have been used to aid in documentation. Words are not always transcribed exactly as spoken. Errors may be present despite efforts to correct and should be taken within the context of the discussion. Please contact the provider if you need assistance interpreting this documentation

## 2025-07-08 NOTE — PATIENT INSTRUCTIONS
Hold Jardiance     Take Miralax 2-3 times a day for a daily soft, but formed stool (banana consistency) Decrease dose to maintain that  -increase water intake  -increase fiber intake  -keep physically active    Glycerin Suppository by the weekend

## 2025-08-18 ENCOUNTER — OFFICE VISIT (OUTPATIENT)
Dept: OTOLARYNGOLOGY | Facility: CLINIC | Age: 80
End: 2025-08-18
Payer: MEDICARE

## 2025-08-18 VITALS
BODY MASS INDEX: 38.97 KG/M2 | RESPIRATION RATE: 18 BRPM | OXYGEN SATURATION: 95 % | WEIGHT: 220 LBS | HEART RATE: 74 BPM | TEMPERATURE: 97.1 F

## 2025-08-18 DIAGNOSIS — Z90.89 HISTORY OF RIGHT MASTOIDECTOMY: Primary | ICD-10-CM

## 2025-08-18 DIAGNOSIS — H61.21 IMPACTED CERUMEN OF RIGHT EAR: ICD-10-CM

## 2025-08-18 PROCEDURE — 99212 OFFICE O/P EST SF 10 MIN: CPT | Mod: 25 | Performed by: OTOLARYNGOLOGY

## 2025-08-18 PROCEDURE — 69222 CLEAN OUT MASTOID CAVITY: CPT | Mod: 50 | Performed by: OTOLARYNGOLOGY

## 2025-08-18 PROCEDURE — G0463 HOSPITAL OUTPT CLINIC VISIT: HCPCS | Mod: 25 | Performed by: OTOLARYNGOLOGY

## 2025-08-18 ASSESSMENT — ENCOUNTER SYMPTOMS
SINUS PRESSURE: 0
TROUBLE SWALLOWING: 0
CONSTITUTIONAL NEGATIVE: 1
RHINORRHEA: 0
FEVER: 0
SORE THROAT: 0
CHILLS: 0

## 2025-08-18 ASSESSMENT — PAIN SCALES - GENERAL: PAINLEVEL_OUTOF10: 0-NO PAIN

## 2025-08-19 ENCOUNTER — DOCUMENTATION (OUTPATIENT)
Dept: FAMILY MEDICINE | Facility: CLINIC | Age: 80
End: 2025-08-19
Payer: MEDICARE

## 2025-09-01 DIAGNOSIS — E11.9 DIABETES MELLITUS WITHOUT COMPLICATION (CMS/HCC): ICD-10-CM

## 2025-09-02 RX ORDER — PEN NEEDLE, DIABETIC 32GX 5/32"
1 NEEDLE, DISPOSABLE MISCELLANEOUS
Qty: 200 EACH | Refills: 0 | Status: SHIPPED | OUTPATIENT
Start: 2025-09-02

## (undated) DEVICE — KIT ENDO PROCEDURE CARRY ON

## (undated) DEVICE — WATER STL IRR 250ML BTL USP PLASTIC POUR BOTTLE

## (undated) DEVICE — FORCEP BPSY HOT ALLIG TOOTH 2.8MMX230CM

## (undated) DEVICE — WATER STL IRR 1000ML BTL USP PLASTIC POUR BOTTLE

## (undated) DEVICE — SOL LAC RING INJ 1000ML BAG USP VIAFLEX PLASTIC CONTAINER

## (undated) DEVICE — CATH IV 20G 1 1/4" SAFETY

## (undated) DEVICE — SUCTION YANKAUER BULB TIP W 1 PIECE HANDLE

## (undated) DEVICE — TUBING SUCTION 3/16"X10'